# Patient Record
Sex: MALE | Race: WHITE | NOT HISPANIC OR LATINO | Employment: OTHER | URBAN - METROPOLITAN AREA
[De-identification: names, ages, dates, MRNs, and addresses within clinical notes are randomized per-mention and may not be internally consistent; named-entity substitution may affect disease eponyms.]

---

## 2017-03-13 ENCOUNTER — TRANSCRIBE ORDERS (OUTPATIENT)
Dept: ADMINISTRATIVE | Facility: HOSPITAL | Age: 57
End: 2017-03-13

## 2017-03-13 DIAGNOSIS — IMO0001 STRAIN OF RIGHT HAND AND FINGER, INITIAL ENCOUNTER: Primary | ICD-10-CM

## 2017-03-23 ENCOUNTER — HOSPITAL ENCOUNTER (OUTPATIENT)
Dept: MRI IMAGING | Facility: HOSPITAL | Age: 57
Discharge: HOME | End: 2017-03-23
Payer: COMMERCIAL

## 2017-03-23 DIAGNOSIS — IMO0001 STRAIN OF RIGHT HAND AND FINGER, INITIAL ENCOUNTER: ICD-10-CM

## 2017-03-23 PROCEDURE — 73221 MRI JOINT UPR EXTREM W/O DYE: CPT

## 2017-07-18 ENCOUNTER — ALLSCRIPTS OFFICE VISIT (OUTPATIENT)
Dept: OTHER | Facility: OTHER | Age: 57
End: 2017-07-18

## 2017-07-18 DIAGNOSIS — Z12.5 ENCOUNTER FOR SCREENING FOR MALIGNANT NEOPLASM OF PROSTATE: ICD-10-CM

## 2017-07-18 DIAGNOSIS — R31.0 GROSS HEMATURIA: ICD-10-CM

## 2017-07-18 LAB
BILIRUB UR QL STRIP: NORMAL
CLARITY UR: NORMAL
COLOR UR: YELLOW
GLUCOSE (HISTORICAL): NORMAL
HGB UR QL STRIP.AUTO: NORMAL
KETONES UR STRIP-MCNC: NORMAL MG/DL
LEUKOCYTE ESTERASE UR QL STRIP: NORMAL
NITRITE UR QL STRIP: NORMAL
PH UR STRIP.AUTO: 7 [PH]
PROT UR STRIP-MCNC: NORMAL MG/DL
SP GR UR STRIP.AUTO: 1.01
UROBILINOGEN UR QL STRIP.AUTO: 0.2

## 2018-01-12 VITALS
SYSTOLIC BLOOD PRESSURE: 138 MMHG | WEIGHT: 285 LBS | DIASTOLIC BLOOD PRESSURE: 76 MMHG | HEIGHT: 74 IN | BODY MASS INDEX: 36.57 KG/M2

## 2020-11-14 ENCOUNTER — HOSPITAL ENCOUNTER (EMERGENCY)
Facility: HOSPITAL | Age: 60
Discharge: HOME | End: 2020-11-14
Attending: EMERGENCY MEDICINE
Payer: COMMERCIAL

## 2020-11-14 VITALS
RESPIRATION RATE: 18 BRPM | OXYGEN SATURATION: 99 % | SYSTOLIC BLOOD PRESSURE: 185 MMHG | DIASTOLIC BLOOD PRESSURE: 82 MMHG | HEART RATE: 63 BPM | WEIGHT: 271.17 LBS | BODY MASS INDEX: 34.82 KG/M2 | TEMPERATURE: 97.6 F

## 2020-11-14 DIAGNOSIS — G50.0 TRIGEMINAL NEURALGIA PAIN: Primary | ICD-10-CM

## 2020-11-14 PROCEDURE — 99284 EMERGENCY DEPT VISIT MOD MDM: CPT | Performed by: EMERGENCY MEDICINE

## 2020-11-14 PROCEDURE — 99283 EMERGENCY DEPT VISIT LOW MDM: CPT

## 2020-11-14 PROCEDURE — 96365 THER/PROPH/DIAG IV INF INIT: CPT

## 2020-11-14 RX ORDER — MELATONIN
5000 DAILY
COMMUNITY

## 2020-11-14 RX ORDER — LISINOPRIL 10 MG/1
10 TABLET ORAL
COMMUNITY
Start: 2020-08-27 | End: 2021-03-02 | Stop reason: SDUPTHER

## 2020-11-14 RX ORDER — GABAPENTIN 300 MG/1
300 CAPSULE ORAL 3 TIMES DAILY
COMMUNITY
End: 2020-11-19 | Stop reason: SDUPTHER

## 2020-11-14 RX ORDER — CHLORAL HYDRATE 500 MG
2 CAPSULE ORAL DAILY
COMMUNITY

## 2020-11-14 RX ORDER — METOPROLOL TARTRATE AND HYDROCHLOROTHIAZIDE 100; 25 MG/1; MG/1
0.5 TABLET ORAL DAILY
COMMUNITY
Start: 2011-06-21 | End: 2021-10-25 | Stop reason: SDUPTHER

## 2020-11-14 RX ORDER — LANOLIN ALCOHOL/MO/W.PET/CERES
2 CREAM (GRAM) TOPICAL DAILY
COMMUNITY

## 2020-11-14 RX ORDER — ALLOPURINOL 100 MG/1
TABLET ORAL DAILY
COMMUNITY
End: 2022-01-20 | Stop reason: SDUPTHER

## 2020-11-14 RX ORDER — CYCLOBENZAPRINE HCL 10 MG
10 TABLET ORAL
COMMUNITY
End: 2021-01-25 | Stop reason: ALTCHOICE

## 2020-11-14 RX ADMIN — PHENYTOIN SODIUM 1000 MG: 50 INJECTION INTRAMUSCULAR; INTRAVENOUS at 09:39

## 2020-11-16 ENCOUNTER — APPOINTMENT (EMERGENCY)
Dept: CT IMAGING | Facility: HOSPITAL | Age: 60
End: 2020-11-16
Payer: COMMERCIAL

## 2020-11-16 ENCOUNTER — HOSPITAL ENCOUNTER (EMERGENCY)
Facility: HOSPITAL | Age: 60
Discharge: HOME | End: 2020-11-16
Attending: EMERGENCY MEDICINE | Admitting: EMERGENCY MEDICINE
Payer: COMMERCIAL

## 2020-11-16 ENCOUNTER — TELEPHONE (OUTPATIENT)
Dept: NEUROLOGY | Facility: CLINIC | Age: 60
End: 2020-11-16

## 2020-11-16 VITALS
DIASTOLIC BLOOD PRESSURE: 78 MMHG | RESPIRATION RATE: 16 BRPM | HEART RATE: 56 BPM | BODY MASS INDEX: 34.79 KG/M2 | SYSTOLIC BLOOD PRESSURE: 175 MMHG | OXYGEN SATURATION: 99 % | TEMPERATURE: 98.2 F | WEIGHT: 271 LBS

## 2020-11-16 DIAGNOSIS — R51.9 FACIAL PAIN: Primary | ICD-10-CM

## 2020-11-16 DIAGNOSIS — G50.0 TRIGEMINAL NEURALGIA: ICD-10-CM

## 2020-11-16 LAB
ALBUMIN SERPL BCP-MCNC: 3.7 G/DL (ref 3.5–5)
ALP SERPL-CCNC: 85 U/L (ref 46–116)
ALT SERPL W P-5'-P-CCNC: 35 U/L (ref 12–78)
ANION GAP SERPL CALCULATED.3IONS-SCNC: 8 MMOL/L (ref 4–13)
AST SERPL W P-5'-P-CCNC: 11 U/L (ref 5–45)
BASOPHILS # BLD AUTO: 0.06 THOUSANDS/ΜL (ref 0–0.1)
BASOPHILS NFR BLD AUTO: 1 % (ref 0–1)
BILIRUB SERPL-MCNC: 0.23 MG/DL (ref 0.2–1)
BUN SERPL-MCNC: 15 MG/DL (ref 5–25)
CALCIUM SERPL-MCNC: 9.4 MG/DL (ref 8.3–10.1)
CHLORIDE SERPL-SCNC: 103 MMOL/L (ref 100–108)
CO2 SERPL-SCNC: 28 MMOL/L (ref 21–32)
CREAT SERPL-MCNC: 0.88 MG/DL (ref 0.6–1.3)
CRP SERPL QL: 26 MG/L
EOSINOPHIL # BLD AUTO: 0.21 THOUSAND/ΜL (ref 0–0.61)
EOSINOPHIL NFR BLD AUTO: 3 % (ref 0–6)
ERYTHROCYTE [DISTWIDTH] IN BLOOD BY AUTOMATED COUNT: 12.2 % (ref 11.6–15.1)
ERYTHROCYTE [SEDIMENTATION RATE] IN BLOOD: 7 MM/HOUR (ref 0–19)
GFR SERPL CREATININE-BSD FRML MDRD: 92 ML/MIN/1.73SQ M
GLUCOSE SERPL-MCNC: 135 MG/DL (ref 65–140)
HCT VFR BLD AUTO: 42.4 % (ref 36.5–49.3)
HGB BLD-MCNC: 14.6 G/DL (ref 12–17)
IMM GRANULOCYTES # BLD AUTO: 0.02 THOUSAND/UL (ref 0–0.2)
IMM GRANULOCYTES NFR BLD AUTO: 0 % (ref 0–2)
LYMPHOCYTES # BLD AUTO: 1.61 THOUSANDS/ΜL (ref 0.6–4.47)
LYMPHOCYTES NFR BLD AUTO: 25 % (ref 14–44)
MCH RBC QN AUTO: 29 PG (ref 26.8–34.3)
MCHC RBC AUTO-ENTMCNC: 34.4 G/DL (ref 31.4–37.4)
MCV RBC AUTO: 84 FL (ref 82–98)
MONOCYTES # BLD AUTO: 0.67 THOUSAND/ΜL (ref 0.17–1.22)
MONOCYTES NFR BLD AUTO: 10 % (ref 4–12)
NEUTROPHILS # BLD AUTO: 3.88 THOUSANDS/ΜL (ref 1.85–7.62)
NEUTS SEG NFR BLD AUTO: 61 % (ref 43–75)
NRBC BLD AUTO-RTO: 0 /100 WBCS
PLATELET # BLD AUTO: 177 THOUSANDS/UL (ref 149–390)
PMV BLD AUTO: 9.2 FL (ref 8.9–12.7)
POTASSIUM SERPL-SCNC: 4.1 MMOL/L (ref 3.5–5.3)
PROT SERPL-MCNC: 7.1 G/DL (ref 6.4–8.2)
RBC # BLD AUTO: 5.04 MILLION/UL (ref 3.88–5.62)
SODIUM SERPL-SCNC: 139 MMOL/L (ref 136–145)
WBC # BLD AUTO: 6.45 THOUSAND/UL (ref 4.31–10.16)

## 2020-11-16 PROCEDURE — 36415 COLL VENOUS BLD VENIPUNCTURE: CPT | Performed by: EMERGENCY MEDICINE

## 2020-11-16 PROCEDURE — 86140 C-REACTIVE PROTEIN: CPT | Performed by: EMERGENCY MEDICINE

## 2020-11-16 PROCEDURE — 99284 EMERGENCY DEPT VISIT MOD MDM: CPT

## 2020-11-16 PROCEDURE — 70487 CT MAXILLOFACIAL W/DYE: CPT

## 2020-11-16 PROCEDURE — 99284 EMERGENCY DEPT VISIT MOD MDM: CPT | Performed by: EMERGENCY MEDICINE

## 2020-11-16 PROCEDURE — 80053 COMPREHEN METABOLIC PANEL: CPT | Performed by: EMERGENCY MEDICINE

## 2020-11-16 PROCEDURE — 85025 COMPLETE CBC W/AUTO DIFF WBC: CPT | Performed by: EMERGENCY MEDICINE

## 2020-11-16 PROCEDURE — 96375 TX/PRO/DX INJ NEW DRUG ADDON: CPT

## 2020-11-16 PROCEDURE — G1004 CDSM NDSC: HCPCS

## 2020-11-16 PROCEDURE — 96365 THER/PROPH/DIAG IV INF INIT: CPT

## 2020-11-16 PROCEDURE — 85652 RBC SED RATE AUTOMATED: CPT | Performed by: EMERGENCY MEDICINE

## 2020-11-16 PROCEDURE — 70450 CT HEAD/BRAIN W/O DYE: CPT

## 2020-11-16 RX ORDER — IBUPROFEN 400 MG/1
400 TABLET ORAL EVERY 6 HOURS PRN
Qty: 30 TABLET | Refills: 0 | Status: SHIPPED | OUTPATIENT
Start: 2020-11-16 | End: 2020-11-30 | Stop reason: HOSPADM

## 2020-11-16 RX ORDER — CARBAMAZEPINE 100 MG/1
100 CAPSULE, EXTENDED RELEASE ORAL 2 TIMES DAILY
Qty: 60 CAPSULE | Refills: 0 | Status: SHIPPED | OUTPATIENT
Start: 2020-11-16 | End: 2021-01-25 | Stop reason: ALTCHOICE

## 2020-11-16 RX ORDER — KETOROLAC TROMETHAMINE 30 MG/ML
15 INJECTION, SOLUTION INTRAMUSCULAR; INTRAVENOUS ONCE
Status: COMPLETED | OUTPATIENT
Start: 2020-11-16 | End: 2020-11-16

## 2020-11-16 RX ORDER — ACETAMINOPHEN 325 MG/1
650 TABLET ORAL EVERY 6 HOURS PRN
Qty: 30 TABLET | Refills: 0 | Status: SHIPPED | OUTPATIENT
Start: 2020-11-16 | End: 2020-11-23

## 2020-11-16 RX ORDER — CARBAMAZEPINE 200 MG/1
100 TABLET ORAL ONCE
Status: COMPLETED | OUTPATIENT
Start: 2020-11-16 | End: 2020-11-16

## 2020-11-16 RX ORDER — OMEPRAZOLE 20 MG/1
20 CAPSULE, DELAYED RELEASE ORAL DAILY
Qty: 21 CAPSULE | Refills: 0 | Status: SHIPPED | OUTPATIENT
Start: 2020-11-16 | End: 2021-01-25 | Stop reason: ALTCHOICE

## 2020-11-16 RX ORDER — ACETAMINOPHEN 325 MG/1
650 TABLET ORAL ONCE
Status: COMPLETED | OUTPATIENT
Start: 2020-11-16 | End: 2020-11-16

## 2020-11-16 RX ADMIN — SODIUM CHLORIDE 500 ML: 0.9 INJECTION, SOLUTION INTRAVENOUS at 19:19

## 2020-11-16 RX ADMIN — PHENYTOIN SODIUM 1000 MG: 50 INJECTION INTRAMUSCULAR; INTRAVENOUS at 19:32

## 2020-11-16 RX ADMIN — IOHEXOL 100 ML: 350 INJECTION, SOLUTION INTRAVENOUS at 21:02

## 2020-11-16 RX ADMIN — CARBAMAZEPINE 100 MG: 200 TABLET ORAL at 22:42

## 2020-11-16 RX ADMIN — MORPHINE SULFATE 2 MG: 2 INJECTION, SOLUTION INTRAMUSCULAR; INTRAVENOUS at 19:19

## 2020-11-16 RX ADMIN — ACETAMINOPHEN 650 MG: 325 TABLET, FILM COATED ORAL at 19:14

## 2020-11-16 RX ADMIN — KETOROLAC TROMETHAMINE 15 MG: 30 INJECTION, SOLUTION INTRAMUSCULAR at 19:19

## 2020-11-18 ENCOUNTER — TELEPHONE (OUTPATIENT)
Dept: NEUROLOGY | Facility: CLINIC | Age: 60
End: 2020-11-18

## 2020-11-18 ENCOUNTER — HOSPITAL ENCOUNTER (EMERGENCY)
Facility: HOSPITAL | Age: 60
Discharge: HOME | End: 2020-11-18
Attending: EMERGENCY MEDICINE
Payer: COMMERCIAL

## 2020-11-18 VITALS
HEART RATE: 72 BPM | BODY MASS INDEX: 34.01 KG/M2 | OXYGEN SATURATION: 98 % | SYSTOLIC BLOOD PRESSURE: 188 MMHG | RESPIRATION RATE: 18 BRPM | HEIGHT: 74 IN | DIASTOLIC BLOOD PRESSURE: 86 MMHG | WEIGHT: 265 LBS | TEMPERATURE: 98.1 F

## 2020-11-18 DIAGNOSIS — G51.8 NEURALGIC FACIAL PAIN: Primary | ICD-10-CM

## 2020-11-18 LAB
ALBUMIN SERPL BCP-MCNC: 3.9 G/DL (ref 3.5–5)
ALP SERPL-CCNC: 92 U/L (ref 46–116)
ALT SERPL W P-5'-P-CCNC: 37 U/L (ref 12–78)
ANION GAP SERPL CALCULATED.3IONS-SCNC: 12 MMOL/L (ref 4–13)
APAP SERPL-MCNC: <2 UG/ML (ref 10–20)
AST SERPL W P-5'-P-CCNC: 18 U/L (ref 5–45)
BILIRUB SERPL-MCNC: 0.19 MG/DL (ref 0.2–1)
BUN SERPL-MCNC: 12 MG/DL (ref 5–25)
CALCIUM SERPL-MCNC: 9.1 MG/DL (ref 8.3–10.1)
CHLORIDE SERPL-SCNC: 102 MMOL/L (ref 100–108)
CO2 SERPL-SCNC: 27 MMOL/L (ref 21–32)
CREAT SERPL-MCNC: 1 MG/DL (ref 0.6–1.3)
GFR SERPL CREATININE-BSD FRML MDRD: 80 ML/MIN/1.73SQ M
GLUCOSE SERPL-MCNC: 102 MG/DL (ref 65–140)
POTASSIUM SERPL-SCNC: 4 MMOL/L (ref 3.5–5.3)
PROT SERPL-MCNC: 7.8 G/DL (ref 6.4–8.2)
SODIUM SERPL-SCNC: 141 MMOL/L (ref 136–145)

## 2020-11-18 PROCEDURE — 99283 EMERGENCY DEPT VISIT LOW MDM: CPT

## 2020-11-18 PROCEDURE — 80053 COMPREHEN METABOLIC PANEL: CPT | Performed by: EMERGENCY MEDICINE

## 2020-11-18 PROCEDURE — G0480 DRUG TEST DEF 1-7 CLASSES: HCPCS | Performed by: EMERGENCY MEDICINE

## 2020-11-18 PROCEDURE — 80329 ANALGESICS NON-OPIOID 1 OR 2: CPT | Performed by: EMERGENCY MEDICINE

## 2020-11-18 PROCEDURE — 36415 COLL VENOUS BLD VENIPUNCTURE: CPT | Performed by: EMERGENCY MEDICINE

## 2020-11-18 PROCEDURE — 96374 THER/PROPH/DIAG INJ IV PUSH: CPT

## 2020-11-18 PROCEDURE — 99283 EMERGENCY DEPT VISIT LOW MDM: CPT | Performed by: EMERGENCY MEDICINE

## 2020-11-18 RX ORDER — OXYCODONE HYDROCHLORIDE 10 MG/1
10 TABLET ORAL ONCE
Status: COMPLETED | OUTPATIENT
Start: 2020-11-18 | End: 2020-11-18

## 2020-11-18 RX ORDER — OXYCODONE HYDROCHLORIDE 5 MG/1
5-10 TABLET ORAL EVERY 6 HOURS PRN
Qty: 10 TABLET | Refills: 0 | Status: SHIPPED | OUTPATIENT
Start: 2020-11-18 | End: 2021-01-25 | Stop reason: ALTCHOICE

## 2020-11-18 RX ORDER — HYDROMORPHONE HCL/PF 1 MG/ML
1 SYRINGE (ML) INJECTION ONCE
Status: COMPLETED | OUTPATIENT
Start: 2020-11-18 | End: 2020-11-18

## 2020-11-18 RX ADMIN — HYDROMORPHONE HYDROCHLORIDE 1 MG: 1 INJECTION, SOLUTION INTRAMUSCULAR; INTRAVENOUS; SUBCUTANEOUS at 19:59

## 2020-11-18 RX ADMIN — OXYCODONE HYDROCHLORIDE 10 MG: 10 TABLET ORAL at 21:00

## 2020-11-19 ENCOUNTER — CONSULT (OUTPATIENT)
Dept: NEUROLOGY | Facility: CLINIC | Age: 60
End: 2020-11-19
Payer: COMMERCIAL

## 2020-11-19 VITALS
BODY MASS INDEX: 34.01 KG/M2 | SYSTOLIC BLOOD PRESSURE: 138 MMHG | TEMPERATURE: 96.8 F | HEIGHT: 74 IN | DIASTOLIC BLOOD PRESSURE: 76 MMHG | WEIGHT: 265 LBS | HEART RATE: 56 BPM

## 2020-11-19 DIAGNOSIS — M27.2 OSTEOMYELITIS OF MANDIBLE: Primary | ICD-10-CM

## 2020-11-19 DIAGNOSIS — G50.8 TRIGEMINAL NEURITIS: Primary | ICD-10-CM

## 2020-11-19 PROCEDURE — 99204 OFFICE O/P NEW MOD 45 MIN: CPT | Performed by: PSYCHIATRY & NEUROLOGY

## 2020-11-19 RX ORDER — CARBAMAZEPINE 200 MG/1
100 TABLET ORAL 2 TIMES DAILY
Status: ON HOLD | COMMUNITY
Start: 2020-11-11 | End: 2020-11-30 | Stop reason: ALTCHOICE

## 2020-11-19 RX ORDER — ACETAMINOPHEN 500 MG
1000 TABLET ORAL EVERY 4 HOURS
Status: ON HOLD | COMMUNITY
End: 2020-11-30 | Stop reason: SDUPTHER

## 2020-11-19 RX ORDER — IBUPROFEN 200 MG
600 TABLET ORAL EVERY 4 HOURS
COMMUNITY
End: 2020-11-30 | Stop reason: HOSPADM

## 2020-11-19 RX ORDER — GABAPENTIN 300 MG/1
300 CAPSULE ORAL 3 TIMES DAILY
Qty: 30 CAPSULE | Refills: 1 | Status: SHIPPED | OUTPATIENT
Start: 2020-11-19 | End: 2021-01-25 | Stop reason: ALTCHOICE

## 2020-11-19 RX ORDER — ROSUVASTATIN CALCIUM 20 MG/1
TABLET, COATED ORAL
COMMUNITY
End: 2021-03-29 | Stop reason: SDUPTHER

## 2020-11-24 RX ORDER — LEVOFLOXACIN 500 MG/1
500 TABLET, FILM COATED ORAL EVERY 24 HOURS
COMMUNITY
End: 2020-11-30 | Stop reason: HOSPADM

## 2020-11-30 ENCOUNTER — ANESTHESIA (OUTPATIENT)
Dept: PERIOP | Facility: HOSPITAL | Age: 60
End: 2020-11-30
Payer: COMMERCIAL

## 2020-11-30 ENCOUNTER — ANESTHESIA EVENT (OUTPATIENT)
Dept: PERIOP | Facility: HOSPITAL | Age: 60
End: 2020-11-30
Payer: COMMERCIAL

## 2020-11-30 ENCOUNTER — HOSPITAL ENCOUNTER (OUTPATIENT)
Facility: HOSPITAL | Age: 60
Setting detail: OUTPATIENT SURGERY
Discharge: HOME | End: 2020-11-30
Attending: DENTIST | Admitting: DENTIST
Payer: COMMERCIAL

## 2020-11-30 VITALS — HEART RATE: 75 BPM

## 2020-11-30 VITALS
OXYGEN SATURATION: 94 % | HEART RATE: 69 BPM | HEIGHT: 74 IN | WEIGHT: 265 LBS | TEMPERATURE: 97.9 F | DIASTOLIC BLOOD PRESSURE: 92 MMHG | RESPIRATION RATE: 18 BRPM | SYSTOLIC BLOOD PRESSURE: 177 MMHG | BODY MASS INDEX: 34.01 KG/M2

## 2020-11-30 DIAGNOSIS — M27.2 ACUTE OSTEOMYELITIS OF MANDIBLE: Primary | ICD-10-CM

## 2020-11-30 DIAGNOSIS — M87.88 OTHER OSTEONECROSIS, OTHER SITE (HCC): ICD-10-CM

## 2020-11-30 LAB
ATRIAL RATE: 70 BPM
GLUCOSE SERPL-MCNC: 162 MG/DL (ref 65–140)
P AXIS: 29 DEGREES
PR INTERVAL: 112 MS
QRS AXIS: 24 DEGREES
QRSD INTERVAL: 100 MS
QT INTERVAL: 394 MS
QTC INTERVAL: 425 MS
T WAVE AXIS: 19 DEGREES
VENTRICULAR RATE: 70 BPM

## 2020-11-30 PROCEDURE — 88304 TISSUE EXAM BY PATHOLOGIST: CPT | Performed by: PATHOLOGY

## 2020-11-30 PROCEDURE — 88311 DECALCIFY TISSUE: CPT | Performed by: PATHOLOGY

## 2020-11-30 PROCEDURE — 87106 FUNGI IDENTIFICATION YEAST: CPT | Performed by: DENTIST

## 2020-11-30 PROCEDURE — 88342 IMHCHEM/IMCYTCHM 1ST ANTB: CPT | Performed by: PATHOLOGY

## 2020-11-30 PROCEDURE — 82948 REAGENT STRIP/BLOOD GLUCOSE: CPT

## 2020-11-30 PROCEDURE — 87070 CULTURE OTHR SPECIMN AEROBIC: CPT | Performed by: DENTIST

## 2020-11-30 PROCEDURE — 93005 ELECTROCARDIOGRAM TRACING: CPT

## 2020-11-30 PROCEDURE — 87176 TISSUE HOMOGENIZATION CULTR: CPT | Performed by: DENTIST

## 2020-11-30 PROCEDURE — 87205 SMEAR GRAM STAIN: CPT | Performed by: DENTIST

## 2020-11-30 PROCEDURE — 87102 FUNGUS ISOLATION CULTURE: CPT | Performed by: DENTIST

## 2020-11-30 PROCEDURE — 87116 MYCOBACTERIA CULTURE: CPT | Performed by: DENTIST

## 2020-11-30 PROCEDURE — 87075 CULTR BACTERIA EXCEPT BLOOD: CPT | Performed by: DENTIST

## 2020-11-30 PROCEDURE — 93010 ELECTROCARDIOGRAM REPORT: CPT | Performed by: INTERNAL MEDICINE

## 2020-11-30 PROCEDURE — 87077 CULTURE AEROBIC IDENTIFY: CPT | Performed by: DENTIST

## 2020-11-30 PROCEDURE — 87206 SMEAR FLUORESCENT/ACID STAI: CPT | Performed by: DENTIST

## 2020-11-30 RX ORDER — SODIUM CHLORIDE, SODIUM LACTATE, POTASSIUM CHLORIDE, CALCIUM CHLORIDE 600; 310; 30; 20 MG/100ML; MG/100ML; MG/100ML; MG/100ML
125 INJECTION, SOLUTION INTRAVENOUS CONTINUOUS
Status: DISCONTINUED | OUTPATIENT
Start: 2020-11-30 | End: 2020-11-30

## 2020-11-30 RX ORDER — ONDANSETRON 2 MG/ML
INJECTION INTRAMUSCULAR; INTRAVENOUS AS NEEDED
Status: DISCONTINUED | OUTPATIENT
Start: 2020-11-30 | End: 2020-11-30

## 2020-11-30 RX ORDER — NEOSTIGMINE METHYLSULFATE 1 MG/ML
INJECTION INTRAVENOUS AS NEEDED
Status: DISCONTINUED | OUTPATIENT
Start: 2020-11-30 | End: 2020-11-30

## 2020-11-30 RX ORDER — IBUPROFEN 400 MG/1
400 TABLET ORAL EVERY 6 HOURS PRN
Status: DISCONTINUED | OUTPATIENT
Start: 2020-11-30 | End: 2020-11-30 | Stop reason: HOSPADM

## 2020-11-30 RX ORDER — ACETAMINOPHEN 500 MG
1000 TABLET ORAL EVERY 8 HOURS PRN
Qty: 30 TABLET | Refills: 0 | Status: SHIPPED | OUTPATIENT
Start: 2020-11-30 | End: 2021-01-25 | Stop reason: ALTCHOICE

## 2020-11-30 RX ORDER — SODIUM CHLORIDE, SODIUM LACTATE, POTASSIUM CHLORIDE, CALCIUM CHLORIDE 600; 310; 30; 20 MG/100ML; MG/100ML; MG/100ML; MG/100ML
100 INJECTION, SOLUTION INTRAVENOUS CONTINUOUS
Status: DISCONTINUED | OUTPATIENT
Start: 2020-11-30 | End: 2020-11-30 | Stop reason: HOSPADM

## 2020-11-30 RX ORDER — MIDAZOLAM HYDROCHLORIDE 2 MG/2ML
INJECTION, SOLUTION INTRAMUSCULAR; INTRAVENOUS AS NEEDED
Status: DISCONTINUED | OUTPATIENT
Start: 2020-11-30 | End: 2020-11-30

## 2020-11-30 RX ORDER — FENTANYL CITRATE 50 UG/ML
INJECTION, SOLUTION INTRAMUSCULAR; INTRAVENOUS AS NEEDED
Status: DISCONTINUED | OUTPATIENT
Start: 2020-11-30 | End: 2020-11-30

## 2020-11-30 RX ORDER — HYDROMORPHONE HYDROCHLORIDE 2 MG/1
TABLET ORAL
COMMUNITY
Start: 2020-11-23 | End: 2021-01-25 | Stop reason: ALTCHOICE

## 2020-11-30 RX ORDER — CHLORHEXIDINE GLUCONATE 0.12 MG/ML
15 RINSE ORAL 2 TIMES DAILY
Qty: 900 ML | Refills: 0 | Status: SHIPPED | OUTPATIENT
Start: 2020-11-30 | End: 2020-12-30

## 2020-11-30 RX ORDER — LEVOFLOXACIN 500 MG/1
500 TABLET, FILM COATED ORAL EVERY 24 HOURS
Qty: 10 TABLET | Refills: 0 | Status: SHIPPED | OUTPATIENT
Start: 2020-11-30 | End: 2020-12-10

## 2020-11-30 RX ORDER — LIDOCAINE HYDROCHLORIDE 10 MG/ML
0.5 INJECTION, SOLUTION EPIDURAL; INFILTRATION; INTRACAUDAL; PERINEURAL ONCE AS NEEDED
Status: COMPLETED | OUTPATIENT
Start: 2020-11-30 | End: 2020-11-30

## 2020-11-30 RX ORDER — PROPOFOL 10 MG/ML
INJECTION, EMULSION INTRAVENOUS AS NEEDED
Status: DISCONTINUED | OUTPATIENT
Start: 2020-11-30 | End: 2020-11-30

## 2020-11-30 RX ORDER — GLYCOPYRROLATE 0.2 MG/ML
INJECTION INTRAMUSCULAR; INTRAVENOUS AS NEEDED
Status: DISCONTINUED | OUTPATIENT
Start: 2020-11-30 | End: 2020-11-30

## 2020-11-30 RX ORDER — DEXAMETHASONE SODIUM PHOSPHATE 10 MG/ML
INJECTION, SOLUTION INTRAMUSCULAR; INTRAVENOUS AS NEEDED
Status: DISCONTINUED | OUTPATIENT
Start: 2020-11-30 | End: 2020-11-30

## 2020-11-30 RX ORDER — ONDANSETRON 2 MG/ML
4 INJECTION INTRAMUSCULAR; INTRAVENOUS ONCE AS NEEDED
Status: DISCONTINUED | OUTPATIENT
Start: 2020-11-30 | End: 2020-11-30 | Stop reason: HOSPADM

## 2020-11-30 RX ORDER — LIDOCAINE HYDROCHLORIDE AND EPINEPHRINE 10; 10 MG/ML; UG/ML
INJECTION, SOLUTION INFILTRATION; PERINEURAL AS NEEDED
Status: DISCONTINUED | OUTPATIENT
Start: 2020-11-30 | End: 2020-11-30 | Stop reason: HOSPADM

## 2020-11-30 RX ORDER — BUPIVACAINE HYDROCHLORIDE AND EPINEPHRINE 5; 5 MG/ML; UG/ML
INJECTION, SOLUTION EPIDURAL; INTRACAUDAL; PERINEURAL AS NEEDED
Status: DISCONTINUED | OUTPATIENT
Start: 2020-11-30 | End: 2020-11-30 | Stop reason: HOSPADM

## 2020-11-30 RX ORDER — ROCURONIUM BROMIDE 10 MG/ML
INJECTION, SOLUTION INTRAVENOUS AS NEEDED
Status: DISCONTINUED | OUTPATIENT
Start: 2020-11-30 | End: 2020-11-30

## 2020-11-30 RX ORDER — KETOROLAC TROMETHAMINE 10 MG/1
TABLET, FILM COATED ORAL
COMMUNITY
Start: 2020-11-20 | End: 2021-01-25 | Stop reason: ALTCHOICE

## 2020-11-30 RX ORDER — CHLORHEXIDINE GLUCONATE 0.12 MG/ML
RINSE ORAL AS NEEDED
Status: DISCONTINUED | OUTPATIENT
Start: 2020-11-30 | End: 2020-11-30 | Stop reason: HOSPADM

## 2020-11-30 RX ORDER — AMLODIPINE BESYLATE 10 MG/1
TABLET ORAL
COMMUNITY
Start: 2020-11-19 | End: 2021-03-02 | Stop reason: SDUPTHER

## 2020-11-30 RX ORDER — LIDOCAINE HYDROCHLORIDE 10 MG/ML
INJECTION, SOLUTION EPIDURAL; INFILTRATION; INTRACAUDAL; PERINEURAL AS NEEDED
Status: DISCONTINUED | OUTPATIENT
Start: 2020-11-30 | End: 2020-11-30

## 2020-11-30 RX ORDER — OXYCODONE HYDROCHLORIDE AND ACETAMINOPHEN 5; 325 MG/1; MG/1
1 TABLET ORAL EVERY 4 HOURS PRN
Status: DISCONTINUED | OUTPATIENT
Start: 2020-11-30 | End: 2020-11-30 | Stop reason: HOSPADM

## 2020-11-30 RX ORDER — FENTANYL CITRATE/PF 50 MCG/ML
25 SYRINGE (ML) INJECTION
Status: DISCONTINUED | OUTPATIENT
Start: 2020-11-30 | End: 2020-11-30 | Stop reason: HOSPADM

## 2020-11-30 RX ADMIN — LIDOCAINE HYDROCHLORIDE 50 MG: 10 INJECTION, SOLUTION EPIDURAL; INFILTRATION; INTRACAUDAL; PERINEURAL at 14:34

## 2020-11-30 RX ADMIN — ROCURONIUM BROMIDE 30 MG: 50 INJECTION, SOLUTION INTRAVENOUS at 14:34

## 2020-11-30 RX ADMIN — ONDANSETRON 4 MG: 2 INJECTION INTRAMUSCULAR; INTRAVENOUS at 14:33

## 2020-11-30 RX ADMIN — SODIUM CHLORIDE, SODIUM LACTATE, POTASSIUM CHLORIDE, AND CALCIUM CHLORIDE 125 ML/HR: .6; .31; .03; .02 INJECTION, SOLUTION INTRAVENOUS at 14:33

## 2020-11-30 RX ADMIN — PHENYLEPHRINE HYDROCHLORIDE 100 MCG: 10 INJECTION INTRAVENOUS at 15:27

## 2020-11-30 RX ADMIN — FENTANYL CITRATE 100 MCG: 50 INJECTION INTRAMUSCULAR; INTRAVENOUS at 14:34

## 2020-11-30 RX ADMIN — PROPOFOL 200 MG: 10 INJECTION, EMULSION INTRAVENOUS at 14:34

## 2020-11-30 RX ADMIN — REMIFENTANIL HYDROCHLORIDE 0.1 MCG/KG/MIN: 1 INJECTION, POWDER, LYOPHILIZED, FOR SOLUTION INTRAVENOUS at 14:38

## 2020-11-30 RX ADMIN — NEOSTIGMINE METHYLSULFATE 5 MG: 1 INJECTION INTRAVENOUS at 15:41

## 2020-11-30 RX ADMIN — LIDOCAINE HYDROCHLORIDE 0.2 ML: 10 INJECTION, SOLUTION EPIDURAL; INFILTRATION; INTRACAUDAL; PERINEURAL at 14:33

## 2020-11-30 RX ADMIN — MIDAZOLAM 2 MG: 1 INJECTION INTRAMUSCULAR; INTRAVENOUS at 14:27

## 2020-11-30 RX ADMIN — GLYCOPYRROLATE 0.8 MG: 0.2 INJECTION, SOLUTION INTRAMUSCULAR; INTRAVENOUS at 15:41

## 2020-11-30 RX ADMIN — DEXAMETHASONE SODIUM PHOSPHATE 10 MG: 10 INJECTION, SOLUTION INTRAMUSCULAR; INTRAVENOUS at 14:38

## 2020-11-30 RX ADMIN — PHENYLEPHRINE HYDROCHLORIDE 4 DROP: 1 SPRAY NASAL at 14:35

## 2020-12-02 LAB
BACTERIA SPEC ANAEROBE CULT: NORMAL
BACTERIA SPEC ANAEROBE CULT: NORMAL
BACTERIA TISS AEROBE CULT: ABNORMAL
BACTERIA TISS AEROBE CULT: ABNORMAL
BACTERIA TISS AEROBE CULT: NORMAL
GRAM STN SPEC: ABNORMAL
GRAM STN SPEC: NORMAL

## 2020-12-03 LAB — BACTERIA SPEC ANAEROBE CULT: NO GROWTH

## 2020-12-05 LAB
BACTERIA TISS AEROBE CULT: ABNORMAL
BACTERIA TISS AEROBE CULT: ABNORMAL
GRAM STN SPEC: ABNORMAL

## 2020-12-07 ENCOUNTER — OFFICE VISIT (OUTPATIENT)
Dept: INFECTIOUS DISEASES | Facility: CLINIC | Age: 60
End: 2020-12-07
Payer: COMMERCIAL

## 2020-12-07 ENCOUNTER — TELEPHONE (OUTPATIENT)
Dept: PAIN MEDICINE | Facility: CLINIC | Age: 60
End: 2020-12-07

## 2020-12-07 VITALS
TEMPERATURE: 97.9 F | BODY MASS INDEX: 34.78 KG/M2 | WEIGHT: 271 LBS | HEIGHT: 74 IN | HEART RATE: 64 BPM | SYSTOLIC BLOOD PRESSURE: 140 MMHG | DIASTOLIC BLOOD PRESSURE: 82 MMHG

## 2020-12-07 DIAGNOSIS — R79.82 CRP ELEVATED: ICD-10-CM

## 2020-12-07 DIAGNOSIS — M27.2 OSTEOMYELITIS OF MANDIBLE: Primary | ICD-10-CM

## 2020-12-07 DIAGNOSIS — M27.2 OSTEOMYELITIS, JAW ACUTE: ICD-10-CM

## 2020-12-07 DIAGNOSIS — B37.9 CANDIDA GLABRATA INFECTION: ICD-10-CM

## 2020-12-07 DIAGNOSIS — A49.1 STREPTOCOCCAL INFECTION: ICD-10-CM

## 2020-12-07 PROCEDURE — 99214 OFFICE O/P EST MOD 30 MIN: CPT | Performed by: INTERNAL MEDICINE

## 2020-12-07 RX ORDER — SODIUM CHLORIDE 9 MG/ML
20 INJECTION, SOLUTION INTRAVENOUS ONCE
Status: CANCELLED | OUTPATIENT
Start: 2020-12-08

## 2020-12-08 ENCOUNTER — HOSPITAL ENCOUNTER (OUTPATIENT)
Dept: RADIOLOGY | Facility: HOSPITAL | Age: 60
Discharge: HOME | End: 2020-12-08
Attending: RADIOLOGY | Admitting: RADIOLOGY
Payer: COMMERCIAL

## 2020-12-08 ENCOUNTER — PREP FOR PROCEDURE (OUTPATIENT)
Dept: INTERVENTIONAL RADIOLOGY/VASCULAR | Facility: CLINIC | Age: 60
End: 2020-12-08

## 2020-12-08 ENCOUNTER — HOSPITAL ENCOUNTER (OUTPATIENT)
Dept: INFUSION CENTER | Facility: HOSPITAL | Age: 60
Discharge: HOME | End: 2020-12-08
Payer: COMMERCIAL

## 2020-12-08 VITALS
TEMPERATURE: 96.7 F | HEART RATE: 78 BPM | SYSTOLIC BLOOD PRESSURE: 138 MMHG | DIASTOLIC BLOOD PRESSURE: 66 MMHG | RESPIRATION RATE: 18 BRPM

## 2020-12-08 DIAGNOSIS — M27.2 OSTEOMYELITIS, JAW ACUTE: Primary | ICD-10-CM

## 2020-12-08 DIAGNOSIS — M27.2 ACUTE OSTEOMYELITIS OF MANDIBLE: Primary | ICD-10-CM

## 2020-12-08 DIAGNOSIS — M27.2 ACUTE OSTEOMYELITIS OF MANDIBLE: ICD-10-CM

## 2020-12-08 DIAGNOSIS — R79.82 CRP ELEVATED: ICD-10-CM

## 2020-12-08 DIAGNOSIS — M27.2 OSTEOMYELITIS, JAW ACUTE: ICD-10-CM

## 2020-12-08 DIAGNOSIS — B37.9 CANDIDA GLABRATA INFECTION: ICD-10-CM

## 2020-12-08 DIAGNOSIS — A49.1 STREPTOCOCCAL INFECTION: ICD-10-CM

## 2020-12-08 DIAGNOSIS — M27.2 OSTEOMYELITIS OF MANDIBLE: Primary | ICD-10-CM

## 2020-12-08 PROCEDURE — C1751 CATH, INF, PER/CENT/MIDLINE: HCPCS

## 2020-12-08 PROCEDURE — 36573 INSJ PICC RS&I 5 YR+: CPT | Performed by: RADIOLOGY

## 2020-12-08 PROCEDURE — 36569 INSJ PICC 5 YR+ W/O IMAGING: CPT

## 2020-12-08 RX ORDER — SODIUM CHLORIDE 9 MG/ML
20 INJECTION, SOLUTION INTRAVENOUS ONCE
Status: CANCELLED | OUTPATIENT
Start: 2020-12-10

## 2020-12-08 RX ORDER — SODIUM CHLORIDE 9 MG/ML
20 INJECTION, SOLUTION INTRAVENOUS ONCE
Status: CANCELLED | OUTPATIENT
Start: 2020-12-08

## 2020-12-08 RX ORDER — SODIUM CHLORIDE 9 MG/ML
20 INJECTION, SOLUTION INTRAVENOUS ONCE
Status: CANCELLED | OUTPATIENT
Start: 2020-12-09

## 2020-12-09 ENCOUNTER — HOSPITAL ENCOUNTER (OUTPATIENT)
Dept: INFUSION CENTER | Facility: HOSPITAL | Age: 60
Discharge: HOME | End: 2020-12-09
Payer: COMMERCIAL

## 2020-12-09 VITALS
TEMPERATURE: 97.2 F | WEIGHT: 271.17 LBS | HEIGHT: 74 IN | DIASTOLIC BLOOD PRESSURE: 91 MMHG | HEART RATE: 75 BPM | BODY MASS INDEX: 34.8 KG/M2 | RESPIRATION RATE: 18 BRPM | SYSTOLIC BLOOD PRESSURE: 138 MMHG

## 2020-12-09 DIAGNOSIS — M27.2 OSTEOMYELITIS, JAW ACUTE: Primary | ICD-10-CM

## 2020-12-09 PROCEDURE — 96365 THER/PROPH/DIAG IV INF INIT: CPT

## 2020-12-09 PROCEDURE — 96367 TX/PROPH/DG ADDL SEQ IV INF: CPT

## 2020-12-09 RX ORDER — SODIUM CHLORIDE 9 MG/ML
20 INJECTION, SOLUTION INTRAVENOUS ONCE
Status: CANCELLED | OUTPATIENT
Start: 2020-12-09

## 2020-12-09 RX ORDER — SODIUM CHLORIDE 9 MG/ML
20 INJECTION, SOLUTION INTRAVENOUS ONCE
Status: COMPLETED | OUTPATIENT
Start: 2020-12-09 | End: 2020-12-09

## 2020-12-09 RX ADMIN — MICAFUNGIN SODIUM 100 MG: 50 INJECTION, POWDER, LYOPHILIZED, FOR SOLUTION INTRAVENOUS at 09:09

## 2020-12-09 RX ADMIN — CEFTRIAXONE SODIUM 2000 MG: 10 INJECTION, POWDER, FOR SOLUTION INTRAVENOUS at 08:17

## 2020-12-09 RX ADMIN — SODIUM CHLORIDE 20 ML/HR: 0.9 INJECTION, SOLUTION INTRAVENOUS at 08:19

## 2020-12-10 ENCOUNTER — HOSPITAL ENCOUNTER (OUTPATIENT)
Dept: INFUSION CENTER | Facility: HOSPITAL | Age: 60
Discharge: HOME | End: 2020-12-10

## 2020-12-10 LAB
FUNGUS SPEC CULT: ABNORMAL
FUNGUS SPEC CULT: ABNORMAL

## 2020-12-15 ENCOUNTER — TELEPHONE (OUTPATIENT)
Dept: INFECTIOUS DISEASES | Facility: CLINIC | Age: 60
End: 2020-12-15

## 2020-12-21 ENCOUNTER — OFFICE VISIT (OUTPATIENT)
Dept: INFECTIOUS DISEASES | Facility: CLINIC | Age: 60
End: 2020-12-21
Payer: COMMERCIAL

## 2020-12-21 VITALS
DIASTOLIC BLOOD PRESSURE: 76 MMHG | HEART RATE: 59 BPM | TEMPERATURE: 97.6 F | HEIGHT: 74 IN | BODY MASS INDEX: 35.29 KG/M2 | WEIGHT: 275 LBS | SYSTOLIC BLOOD PRESSURE: 138 MMHG

## 2020-12-21 DIAGNOSIS — Z79.2 LONG TERM (CURRENT) USE OF ANTIBIOTICS: ICD-10-CM

## 2020-12-21 DIAGNOSIS — B37.9 CANDIDA GLABRATA INFECTION: ICD-10-CM

## 2020-12-21 DIAGNOSIS — M27.2 OSTEOMYELITIS, JAW ACUTE: Primary | ICD-10-CM

## 2020-12-21 DIAGNOSIS — A49.1 STREPTOCOCCAL INFECTION: ICD-10-CM

## 2020-12-21 PROCEDURE — 99214 OFFICE O/P EST MOD 30 MIN: CPT | Performed by: INTERNAL MEDICINE

## 2021-01-04 LAB — FUNGUS SPEC CULT: NORMAL

## 2021-01-08 ENCOUNTER — TELEPHONE (OUTPATIENT)
Dept: INFECTIOUS DISEASES | Facility: CLINIC | Age: 61
End: 2021-01-08

## 2021-01-08 NOTE — TELEPHONE ENCOUNTER
COVID Pre-Visit Screening     1  Is this a family member screening? No  2  Have you traveled outside of your state in the past 2 weeks? No  3  Do you presently have a fever or flu-like symptoms? No  4  Do you have symptoms of an upper respiratory infection like runny nose, sore throat, or cough? No  5  Are you suffering from new headache that you have not had in the past?  No  6  Do you have/have you experienced any new shortness of breath recently? No  7  Do you have any new diarrhea, nausea or vomiting? No  8  Have you been in contact with anyone who has been sick or diagnosed with COVID-19? No  9  Do you have any new loss of taste or smell? No  10  Are you able to wear a mask without a valve for the entire visit? No - Patient has documentation that he does not have to wear a mask

## 2021-01-11 ENCOUNTER — OFFICE VISIT (OUTPATIENT)
Dept: INFECTIOUS DISEASES | Facility: CLINIC | Age: 61
End: 2021-01-11
Payer: COMMERCIAL

## 2021-01-11 VITALS
SYSTOLIC BLOOD PRESSURE: 142 MMHG | DIASTOLIC BLOOD PRESSURE: 86 MMHG | WEIGHT: 270.6 LBS | HEIGHT: 74 IN | TEMPERATURE: 96.8 F | HEART RATE: 62 BPM | BODY MASS INDEX: 34.73 KG/M2

## 2021-01-11 DIAGNOSIS — Z79.2 LONG TERM (CURRENT) USE OF ANTIBIOTICS: ICD-10-CM

## 2021-01-11 DIAGNOSIS — A49.1 STREPTOCOCCAL INFECTION: ICD-10-CM

## 2021-01-11 DIAGNOSIS — B37.9 CANDIDA GLABRATA INFECTION: ICD-10-CM

## 2021-01-11 DIAGNOSIS — M27.2 OSTEOMYELITIS, JAW ACUTE: Primary | ICD-10-CM

## 2021-01-11 PROCEDURE — 99214 OFFICE O/P EST MOD 30 MIN: CPT | Performed by: INTERNAL MEDICINE

## 2021-01-11 NOTE — PROGRESS NOTES
Progress Note - Infectious Disease   Dulce Gil 56 y o  male MRN: [id number]   Encounter: [id number]    Impression:  · Acute osteomyelitis of mandile: following dental implant infection   Clinically improving with reduced jaw pain and swelling  · 11/30/20 s/p DEBRIDEMENT OF ANTERIOR MANDIBLE BONE BIOPSY, CULTURES AND REMOVAL OF IMPLANT #27, USE OF PRP  Operative findings consistent with osteomyelitis and tissue cx with growth of Streptococcus salivarius and Streptococcus parasanguinis (from broth culture), and Candida glabrata  · Elevated Crp: noted in the setting of osteomyelitis  ESR is mildly elevated, but Crp has risen  · Hx of PCN allergy: hives as a child age 5-6  Pt previously tolerated cephalexin about 20 years ago  · Hx of clindamcyin allergy: hives     RECOMMENDATIONS:   · Continue ceftriaxone 2g IV Q 24 hours times x 8 weeks; started on 12/9/20  · Continue micafungin 100 mg IV Q 24 hours x 8-12 weeks; started on 12/9/20  This will be followed by oral anti-fungal therapy for 6-12 months  · Weekly labs: CBC with differential, creatinine, LFTs, ESR, CRP  · HTN: follow with your primary provider  · Return for ID follow-up in 2 weeks  My recommendations were discussed with the patient in detail who verbalized understanding      Antibiotics: ceftriaxone IV, micafungin IV    Current Outpatient Medications:     allopurinol (ZYLOPRIM) 100 mg tablet, daily, Disp: , Rfl:     cholecalciferol (VITAMIN D3) 1,000 units tablet, Take 1,000 Units by mouth daily Patient stated that is continuously taking, Disp: , Rfl:     glucosamine-chondroitin 500-400 MG tablet, Take 2 tablets by mouth daily , Disp: , Rfl:     lisinopril (ZESTRIL) 10 mg tablet, Take 10 mg by mouth , Disp: , Rfl:     metoprolol-hydrochlorothiazide (LOPRESSOR HCT) 100-25 MG per tablet, Take 0 5 tablets by mouth daily , Disp: , Rfl:     Omega-3 Fatty Acids (Omega-3 Fish Oil) 1000 MG CAPS, Take 2 g by mouth daily, Disp: , Rfl:    rosuvastatin (CRESTOR) 20 MG tablet, rosuvastatin 20 mg tablet, Disp: , Rfl:     sertraline (ZOLOFT) 50 mg tablet, Take 25 mg by mouth daily , Disp: , Rfl:     acetaminophen (TYLENOL) 500 mg tablet, Take 2 tablets (1,000 mg total) by mouth every 8 (eight) hours as needed for mild pain, Disp: 30 tablet, Rfl: 0    amLODIPine (NORVASC) 10 mg tablet, , Disp: , Rfl:     carbamazepine (CARBATROL) 100 MG 12 hr capsule, Take 1 capsule (100 mg total) by mouth 2 (two) times a day, Disp: 60 capsule, Rfl: 0    cyclobenzaprine (FLEXERIL) 10 mg tablet, Take 10 mg by mouth daily at bedtime , Disp: , Rfl:     gabapentin (NEURONTIN) 300 mg capsule, Take 1 capsule (300 mg total) by mouth 3 (three) times a day, Disp: 30 capsule, Rfl: 1    HYDROmorphone (DILAUDID) 2 mg tablet, , Disp: , Rfl:     ketorolac (TORADOL) 10 mg tablet, , Disp: , Rfl:     omeprazole (PriLOSEC) 20 mg delayed release capsule, Take 1 capsule (20 mg total) by mouth daily for 21 days, Disp: 21 capsule, Rfl: 0    oxyCODONE (ROXICODONE) 5 mg immediate release tablet, Take 1-2 tablets (5-10 mg total) by mouth every 6 (six) hours as needed for moderate painMax Daily Amount: 40 mg, Disp: 10 tablet, Rfl: 0    Subjective:  56year-old here for follow-up osteomyelitis of jaw currently receiving both ceftriaxone and micafungin IV  Pt reports insomnia while on IV ABx therapy  He also recently started amlodipine which was effective in lowering his BP with systolic of 720  However, he reports fatigue also and he stopped the amlodipine  His BP has risen to systolic 902K  His jaw pain and swelling have resolved  He stopped taking tylenol and ibuprofen about 3 weeks ago  He denies headaches  He denies fever, chills, nausea, vomiting, diarrhea, rash  He was evaluated by a neurologist and also referred to OMFS  In mid-November he was treated with levofloxacin x 14 day course   His antibiotics were held for 5 days prior to surgery   On 11/30, he underwent debridement of anterior mandible bone with biopsy and cultures   Operative findings included purulent drainage from previous implant site, loose bone and granulation tissue and loose implant       Objective:  Vitals:  Vitals:    01/11/21 1058   BP: 142/86   BP Location: Left arm   Patient Position: Sitting   Cuff Size: Large   Pulse: 62   Temp: (!) 96 8 °F (36 °C)   TempSrc: Temporal   Weight: 123 kg (270 lb 9 6 oz)   Height: 6' 2" (1 88 m)     Physical Exam:   General Appearance:  Alert, interactive, nontoxic, no acute distress  Head, ENT: Mandibular edema and erythema have significantly improved and appear to have resolved  No tenderness to palpation  No mastoid tenderness noted  Upper dental implants noted  Pt has no lower row of teeth   Lungs:   Clear to auscultation bilaterally; no wheezes, respirations unlabored   Heart:  S1, S2, RRR; no murmur   Abdomen:   Soft, non-tender, non-distended   Extremities: RUE PICC line intact  No distal leg edema b/l   Neurologic: AAO x3, conversant, fluent speech   Skin: No rash     Labs, Imaging, & Other studies:   1/4/21: WBC 4 9, Hb 13 8, Hct 39 6, Plt 275, creatinine 0 90, AST 25, ALT 67, ESR 22, Crp 44 5      11/30/20 tissue cx: 2+ mixed oral dat, broth culture demonstrated growth of Streptococcus salivarius and Streptococcus parasanguinis, 2+ growth of Candida glabrata, fungal bone/tissue culture 3+ growth of Candida glabrata, no AFB seen     Imaging Studies:   11/16/20 CT facial bones: Single dental implant in the right anterior mandible without evidence of loosening   Erosive changes in the left anterior mandible with cortical disruption may represent postsurgical or posttraumatic change   No clear evidence of osteomyelitis  All pertinent labs and imaging studies were personally reviewed

## 2021-01-19 LAB
MYCOBACTERIUM SPEC CULT: NORMAL
RHODAMINE-AURAMINE STN SPEC: NORMAL

## 2021-01-22 ENCOUNTER — TELEPHONE (OUTPATIENT)
Dept: INFECTIOUS DISEASES | Facility: CLINIC | Age: 61
End: 2021-01-22

## 2021-01-25 ENCOUNTER — OFFICE VISIT (OUTPATIENT)
Dept: INFECTIOUS DISEASES | Facility: CLINIC | Age: 61
End: 2021-01-25
Payer: COMMERCIAL

## 2021-01-25 VITALS
HEART RATE: 67 BPM | DIASTOLIC BLOOD PRESSURE: 80 MMHG | TEMPERATURE: 96.8 F | RESPIRATION RATE: 17 BRPM | SYSTOLIC BLOOD PRESSURE: 136 MMHG | HEIGHT: 74 IN | BODY MASS INDEX: 34.65 KG/M2 | WEIGHT: 270 LBS

## 2021-01-25 DIAGNOSIS — Z79.2 LONG TERM (CURRENT) USE OF ANTIBIOTICS: ICD-10-CM

## 2021-01-25 DIAGNOSIS — A49.1 STREPTOCOCCAL INFECTION: ICD-10-CM

## 2021-01-25 DIAGNOSIS — M27.2 OSTEOMYELITIS, JAW ACUTE: Primary | ICD-10-CM

## 2021-01-25 DIAGNOSIS — B37.9 CANDIDA GLABRATA INFECTION: ICD-10-CM

## 2021-01-25 PROCEDURE — 99214 OFFICE O/P EST MOD 30 MIN: CPT | Performed by: INTERNAL MEDICINE

## 2021-01-25 RX ORDER — MICAFUNGIN SODIUM 100 MG/5ML
INJECTION, POWDER, LYOPHILIZED, FOR SOLUTION INTRAVENOUS EVERY 24 HOURS
COMMUNITY
End: 2021-03-10 | Stop reason: ALTCHOICE

## 2021-01-25 NOTE — PROGRESS NOTES
Progress Note - Infectious Disease   Manpreet Hathc 56 y o  male MRN: [id number]   Encounter: [id number]    Impression:  · Acute osteomyelitis of mandile: following dental implant infection   Clinically improving with reduced jaw pain and swelling  ESR and Crp have normalized  · 11/30/20 s/p DEBRIDEMENT OF ANTERIOR MANDIBLE BONE BIOPSY, CULTURES AND REMOVAL OF IMPLANT #27, USE OF PRP  Operative findings consistent with osteomyelitis and tissue cx with growth of Streptococcus salivarius and Streptococcus parasanguinis (from broth culture), and Candida glabrata  · Elevated Crp: noted in the setting of osteomyelitis  Both ESR and Crp have normalized    · Hx of PCN allergy: hives as a child age 5-6  Pt previously tolerated cephalexin about 20 years ago  · Hx of clindamcyin allergy: hives     RECOMMENDATIONS:   · Discontinue ceftriaxone 2g IV Q 24 as pt has completed 7 weeks, symptoms and physical exam findings have largely resolved and inflammatory markers have normalized  · Continue micafungin 100 mg IV Q 24 hours x 8-12 weeks; started on 12/9/20  This will be followed by oral anti-fungal therapy for 6-12 months  · Weekly labs: CBC with differential, creatinine, LFTs, ESR, CRP  · Return for ID follow-up in 4 weeks  My recommendations were discussed with the patient in detail who verbalized understanding      Antibiotics: micafungin, ceftriaxone    Current Outpatient Medications:     allopurinol (ZYLOPRIM) 100 mg tablet, daily, Disp: , Rfl:     amLODIPine (NORVASC) 10 mg tablet, , Disp: , Rfl:     cefTRIAXone 2,000 mg in dextrose 5 % 50 mL IVPB, Infuse 2,000 mg into a venous catheter every 24 hours, Disp: , Rfl:     cholecalciferol (VITAMIN D3) 1,000 units tablet, Take 1,000 Units by mouth daily Patient stated that is continuously taking, Disp: , Rfl:     glucosamine-chondroitin 500-400 MG tablet, Take 2 tablets by mouth daily , Disp: , Rfl:     lisinopril (ZESTRIL) 10 mg tablet, Take 10 mg by mouth , Disp: , Rfl:     metoprolol-hydrochlorothiazide (LOPRESSOR HCT) 100-25 MG per tablet, Take 0 5 tablets by mouth daily , Disp: , Rfl:     micafungin sodium (MYCAMINE) 100 mg, Infuse into a venous catheter every 24 hours, Disp: , Rfl:     Omega-3 Fatty Acids (Omega-3 Fish Oil) 1000 MG CAPS, Take 2 g by mouth daily, Disp: , Rfl:     rosuvastatin (CRESTOR) 20 MG tablet, rosuvastatin 20 mg tablet, Disp: , Rfl:     sertraline (ZOLOFT) 50 mg tablet, Take 25 mg by mouth daily , Disp: , Rfl:     Subjective:  49-year-old man here for follow-up osteomyelitis of jaw currently receiving both ceftriaxone and micafungin IV    His jaw pain and swelling have resolved  He denies headaches  He denies fever, chills, nausea, vomiting, diarrhea, rash  Pt is tolerating antibiotics well    He was evaluated by a neurologist and also referred to OMFS  In mid-November he was treated with levofloxacin x 14 day course  His antibiotics were held for 5 days prior to [address] [place] [address] 11/30, he underwent debridement of anterior mandible bone with biopsy and cultures   Operative findings included purulent drainage from previous implant site, loose bone and granulation tissue and loose implant  Objective:  Vitals:  Vitals:    01/25/21 1003   BP: 136/80   Pulse: 67   Resp: 17   Temp: (!) 96 8 °F (36 °C)   Weight: 122 kg (270 lb)   Height: 6' 2" (1 88 m)     Physical Exam:   General Appearance:  Alert, interactive, nontoxic, no acute distress  Throat: No mandible/jaw swelling    Lungs:   Clear to auscultation bilaterally; no wheezes, respirations unlabored   Heart:  S1, S2, RRR; no murmur   Abdomen:   Soft, non-tender, non-distended   Extremities: RUE PICC line intact   No distal leg edema b/l   Neurologic: AAO x3, conversant, fluent speech   Skin: No rash     Labs, Imaging, & Other studies:   1/18/21: WBC 3 5, Hb 14 2, Hct 41 7, Plt 262, creatinine 0 91, AST 23, ALT 71, ESR 14, Crp < 3 0   All pertinent labs and imaging studies were personally reviewed

## 2021-02-19 ENCOUNTER — TELEPHONE (OUTPATIENT)
Dept: INFECTIOUS DISEASES | Facility: CLINIC | Age: 61
End: 2021-02-19

## 2021-02-22 ENCOUNTER — OFFICE VISIT (OUTPATIENT)
Dept: INFECTIOUS DISEASES | Facility: CLINIC | Age: 61
End: 2021-02-22
Payer: COMMERCIAL

## 2021-02-22 VITALS
RESPIRATION RATE: 18 BRPM | HEIGHT: 74 IN | SYSTOLIC BLOOD PRESSURE: 138 MMHG | TEMPERATURE: 96.7 F | DIASTOLIC BLOOD PRESSURE: 82 MMHG | WEIGHT: 273.4 LBS | HEART RATE: 50 BPM | BODY MASS INDEX: 35.09 KG/M2

## 2021-02-22 DIAGNOSIS — D72.819 LEUKOPENIA, UNSPECIFIED TYPE: ICD-10-CM

## 2021-02-22 DIAGNOSIS — M27.2 OSTEOMYELITIS, JAW ACUTE: Primary | ICD-10-CM

## 2021-02-22 DIAGNOSIS — Z79.2 LONG TERM (CURRENT) USE OF ANTIBIOTICS: ICD-10-CM

## 2021-02-22 DIAGNOSIS — R74.01 TRANSAMINITIS: ICD-10-CM

## 2021-02-22 DIAGNOSIS — A49.1 STREPTOCOCCAL INFECTION: ICD-10-CM

## 2021-02-22 DIAGNOSIS — B37.9 CANDIDA GLABRATA INFECTION: ICD-10-CM

## 2021-02-22 PROCEDURE — 99214 OFFICE O/P EST MOD 30 MIN: CPT | Performed by: INTERNAL MEDICINE

## 2021-02-22 NOTE — PROGRESS NOTES
Progress Note - Infectious Disease   Torito Chavez 56 y o  male MRN: [id number]   Encounter: [id number]    Impression/Plan:  1  Acute osteomyelitis of mandile: following dental implant infection   Clinically improving with reduced jaw pain and swelling  ESR and Crp have normalized  · Continue micafungin 100 mg IV Q 24 hours x 8-12 weeks; started on 12/9/20 until 3/3/21  · Transition to fluconazole or voriconazole po x 6-12 months upon completion of micafungin iv  · Pt completed 7 wk course of ceftriaxone 2g IV Q 24  · Plan to remove PICC line upon completion of micafungin IV next week  · Weekly labs: CBC with differential, creatinine, LFTs  · Return in 2 weeks  2  11/30/20 s/p DEBRIDEMENT OF ANTERIOR MANDIBLE BONE BIOPSY, CULTURES AND REMOVAL OF IMPLANT #27, USE OF PRP  Operative findings consistent with osteomyelitis and tissue cx with growth of Streptococcus salivarius and Streptococcus parasanguinis (from broth culture), and 3+ Candida glabrata  3  Elevated Crp: noted in the setting of osteomyelitis  Both ESR and Crp have normalized    4  Hx of PCN allergy: hives as a child age 5-6  Pt previously tolerated cephalexin about 20 years ago  5  Hx of clindamcyin allergy: hives  6  Leukopenia, transaminitis: this may be due to micafungin  · Weekly CBC, LFTs as above      My recommendations were discussed with the patient in detail who verbalized understanding      Antibiotics: micafungin   Current Outpatient Medications:     allopurinol (ZYLOPRIM) 100 mg tablet, daily, Disp: , Rfl:     cefTRIAXone 2,000 mg in dextrose 5 % 50 mL IVPB, Infuse 2,000 mg into a venous catheter every 24 hours, Disp: , Rfl:     cholecalciferol (VITAMIN D3) 1,000 units tablet, Take 1,000 Units by mouth daily Patient stated that is continuously taking, Disp: , Rfl:     glucosamine-chondroitin 500-400 MG tablet, Take 2 tablets by mouth daily , Disp: , Rfl:     lisinopril (ZESTRIL) 10 mg tablet, Take 10 mg by mouth , Disp: , Rfl:    metoprolol-hydrochlorothiazide (LOPRESSOR HCT) 100-25 MG per tablet, Take 0 5 tablets by mouth daily , Disp: , Rfl:     Omega-3 Fatty Acids (Omega-3 Fish Oil) 1000 MG CAPS, Take 2 g by mouth daily, Disp: , Rfl:     rosuvastatin (CRESTOR) 20 MG tablet, rosuvastatin 20 mg tablet, Disp: , Rfl:     sertraline (ZOLOFT) 50 mg tablet, Take 25 mg by mouth daily , Disp: , Rfl:     amLODIPine (NORVASC) 10 mg tablet, , Disp: , Rfl:     micafungin sodium (MYCAMINE) 100 mg, Infuse into a venous catheter every 24 hours, Disp: , Rfl:     Subjective:  68-year-old man here for follow-up osteomyelitis of jaw currently on therapy with micafungin IV    He denies jaw pain and swelling  He denies headaches  He denies fever, chills, nausea, vomiting, diarrhea, rash  No cough, shortness of breath  Pt is tolerating antibiotics well    On 11/30, he underwent debridement of anterior mandible bone with biopsy and cultures   Operative findings included purulent drainage from previous implant site, loose bone and granulation tissue and loose implant  Objective:  Vitals:  Vitals:    02/22/21 1003   BP: 138/82   BP Location: Left arm   Patient Position: Sitting   Cuff Size: Large   Pulse: (!) 50   Resp: 18   Temp: (!) 96 7 °F (35 9 °C)   TempSrc: Temporal   Weight: 124 kg (273 lb 6 4 oz)   Height: 6' 2" (1 88 m)     Physical Exam:   General Appearance:  Alert, interactive, nontoxic, no acute distress  Throat: Oral mucosa moist, no mandible/jaw swelling or tenderness  Pt has no lower teeth  Lungs:   Clear to auscultation bilaterally; no wheezes, respirations unlabored   Heart:  S1, S2, RRR; no murmur   Abdomen:   Soft, non-tender, non-distended   Extremities: RUE PICC line intact  No distal leg edema b/l   Neurologic: AAO x3, conversant, fluent speech   Skin: No rash or lesions        Labs, Imaging, & Other studies:   2/15/21:  WBC 2 8, HGB 14 9, HCT 41 8, , creatinine 0 9, AST 39, ALT 94, ESR 9, CRP < 3  All pertinent labs and imaging studies were personally reviewed

## 2021-03-02 ENCOUNTER — OFFICE VISIT (OUTPATIENT)
Dept: INTERNAL MEDICINE CLINIC | Facility: CLINIC | Age: 61
End: 2021-03-02
Payer: COMMERCIAL

## 2021-03-02 VITALS
TEMPERATURE: 97.3 F | WEIGHT: 270 LBS | DIASTOLIC BLOOD PRESSURE: 82 MMHG | BODY MASS INDEX: 34.65 KG/M2 | HEIGHT: 74 IN | OXYGEN SATURATION: 97 % | SYSTOLIC BLOOD PRESSURE: 150 MMHG | HEART RATE: 60 BPM

## 2021-03-02 DIAGNOSIS — Z12.12 SCREENING FOR COLORECTAL CANCER: ICD-10-CM

## 2021-03-02 DIAGNOSIS — M27.2 OSTEOMYELITIS, JAW ACUTE: ICD-10-CM

## 2021-03-02 DIAGNOSIS — E78.2 MIXED HYPERLIPIDEMIA: ICD-10-CM

## 2021-03-02 DIAGNOSIS — Z11.59 NEED FOR HEPATITIS C SCREENING TEST: ICD-10-CM

## 2021-03-02 DIAGNOSIS — I10 ESSENTIAL HYPERTENSION: Primary | ICD-10-CM

## 2021-03-02 DIAGNOSIS — Z11.4 SCREENING FOR HIV (HUMAN IMMUNODEFICIENCY VIRUS): ICD-10-CM

## 2021-03-02 DIAGNOSIS — M1A.0710 IDIOPATHIC CHRONIC GOUT OF RIGHT FOOT WITHOUT TOPHUS: ICD-10-CM

## 2021-03-02 DIAGNOSIS — Z12.11 SCREENING FOR COLORECTAL CANCER: ICD-10-CM

## 2021-03-02 PROCEDURE — 99204 OFFICE O/P NEW MOD 45 MIN: CPT | Performed by: INTERNAL MEDICINE

## 2021-03-02 PROCEDURE — 3725F SCREEN DEPRESSION PERFORMED: CPT | Performed by: INTERNAL MEDICINE

## 2021-03-02 RX ORDER — AMLODIPINE BESYLATE 5 MG/1
5 TABLET ORAL DAILY
Qty: 90 TABLET | Refills: 3 | Status: SHIPPED | OUTPATIENT
Start: 2021-03-02 | End: 2022-01-20 | Stop reason: SDUPTHER

## 2021-03-02 RX ORDER — LISINOPRIL 20 MG/1
20 TABLET ORAL DAILY
Qty: 90 TABLET | Refills: 3 | Status: SHIPPED | OUTPATIENT
Start: 2021-03-02 | End: 2021-10-25 | Stop reason: DRUGHIGH

## 2021-03-02 NOTE — PROGRESS NOTES
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   Continue allopurinol       Diagnoses and all orders for this visit:    Essential hypertension  -     lisinopril (ZESTRIL) 20 mg tablet; Take 1 tablet (20 mg total) by mouth daily  -     amLODIPine (NORVASC) 5 mg tablet; Take 1 tablet (5 mg total) by mouth daily    Need for hepatitis C screening test    Screening for HIV (human immunodeficiency virus)    Screening for colorectal cancer    Osteomyelitis, jaw acute    Mixed hyperlipidemia    Idiopathic chronic gout 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    Other orders  -     Cancel: Hepatitis C Antibody ([facility]); Future  -     Cancel: HIV 1/2 Antigen/Antibody (4th Generation) w Reflex [facility]; Future  -     Cancel: Ambulatory referral to Gastroenterology; Future        BMI Counseling: Body mass index is 34 67 kg/m²  The BMI is above normal  Nutrition recommendations include encouraging healthy choices of fruits and vegetables and moderation in carbohydrate intake  Exercise recommendations include exercising 3-5 times per week  Subjective:      Patient ID: Dulce Gil is a 56 y o  male  Pt here to establish care  He had recent medical issues related to osteomyelitis  HTN: pt stopped amlodipine due to low /71 at home, but he generally runs around 140  When he was in the hospital amlodipine was started for elevated BP      Hypercholesterolemia:  Pt tolerating statin    Gout: pt has had this in the right foot before, he is on allopurinol      Osteomyelitis of the jaw:  Patient has been following with Infectious Disease for this, he has a PICC line in, has been getting IV meds for this  No fevers chills or sweats, patient currently not having pain  The following portions of the patient's history were reviewed and updated as appropriate: allergies, current medications, past family history, past medical history, past social history, past surgical history and problem list     Review of Systems   Constitutional: Negative for chills, fatigue and fever  HENT: Negative for congestion, nosebleeds, postnasal drip, sore throat and trouble swallowing  Eyes: Negative for pain  Respiratory: Positive for cough (chronic dry)  Negative for chest tightness, shortness of breath and wheezing  Cardiovascular: Negative for chest pain, palpitations and leg swelling  Gastrointestinal: Negative for abdominal pain, constipation, diarrhea, nausea and vomiting  Endocrine: Negative for polydipsia and polyuria  Genitourinary: Negative for dysuria, flank pain and hematuria  Musculoskeletal: Positive for arthralgias and back pain  Negative for myalgias  Skin: Negative for rash  Neurological: Negative for dizziness, tremors, light-headedness and headaches  Hematological: Does not bruise/bleed easily  Psychiatric/Behavioral: Negative for confusion and dysphoric mood  The patient is not nervous/anxious  Objective:      /82   Pulse 60   Temp (!) 97 3 °F (36 3 °C) (Temporal)   Ht 6' 2" (1 88 m)   Wt 122 kg (270 lb)   SpO2 97%   BMI 34 67 kg/m²          Physical Exam  Vitals signs reviewed  Constitutional:       General: He is not in acute distress  Appearance: Normal appearance  He is well-developed  HENT:      Head: Normocephalic and atraumatic  Right Ear: External ear normal       Left Ear: External ear normal    Eyes:      General: No scleral icterus  Conjunctiva/sclera: Conjunctivae normal    Neck:      Musculoskeletal: Normal range of motion and neck supple  Thyroid: No thyromegaly  Trachea: No tracheal deviation  Cardiovascular:      Rate and Rhythm: Normal rate and regular rhythm  Heart sounds: Normal heart sounds  No murmur  Pulmonary:      Effort: Pulmonary effort is normal  No respiratory distress  Breath sounds: Normal breath sounds  No wheezing or rales  Abdominal:      General: Bowel sounds are normal       Palpations: Abdomen is soft  Tenderness: There is no abdominal tenderness  There is no guarding or rebound  Musculoskeletal:      Right lower leg: No edema  Left lower leg: No edema  Lymphadenopathy:      Cervical: No cervical adenopathy  Neurological:      General: No focal deficit present  Mental Status: He is alert and oriented to person, place, and time  Psychiatric:         Mood and Affect: Mood normal          Behavior: Behavior normal          Thought Content:  Thought content normal          Judgment: Judgment normal

## 2021-03-02 NOTE — ASSESSMENT & PLAN NOTE
A little elevated, discussed increasing  Lisinopril from 10 mg to 20 mg verses adding back amlodipine at a lower dose    Later pt reported he has a chronic dry cough, so discussed adding back amlodipine 5 mg daily and can keep lisinopril at 10 mg

## 2021-03-02 NOTE — PATIENT INSTRUCTIONS
Problem List Items Addressed This Visit        Cardiovascular and Mediastinum    Essential hypertension - Primary     A little elevated, discussed increasing  Lisinopril from 10 mg to 20 mg verses adding back amlodipine at a lower dose    Later pt reported he has a chronic dry cough, so discussed adding back amlodipine 5 mg daily and can keep lisinopril at 10 mg         Relevant Medications    lisinopril (ZESTRIL) 20 mg tablet    amLODIPine (NORVASC) 5 mg tablet       Musculoskeletal and Integument    Osteomyelitis, jaw acute       Continue IV medication as per Infectious Disease with plans to change to oral medication in the near future         Idiopathic chronic gout 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      Continue allopurinol            Other    Mixed hyperlipidemia      Continue fish oil and rosuvastatin along with healthy diet and exercise           Other Visit Diagnoses     Need for hepatitis C screening test        Screening for HIV (human immunodeficiency virus)        Screening for colorectal cancer

## 2021-03-02 NOTE — ASSESSMENT & PLAN NOTE
Continue IV medication as per Infectious Disease with plans to change to oral medication in the near future

## 2021-03-03 RX ORDER — FLUCONAZOLE 200 MG/1
800 TABLET ORAL DAILY
Qty: 120 TABLET | Refills: 0 | Status: SHIPPED | OUTPATIENT
Start: 2021-03-03 | End: 2021-03-10 | Stop reason: SDUPTHER

## 2021-03-08 ENCOUNTER — TRANSCRIBE ORDERS (OUTPATIENT)
Dept: LAB | Facility: CLINIC | Age: 61
End: 2021-03-08

## 2021-03-08 ENCOUNTER — LAB (OUTPATIENT)
Dept: LAB | Facility: CLINIC | Age: 61
End: 2021-03-08
Payer: COMMERCIAL

## 2021-03-08 DIAGNOSIS — M27.2 OSTEOMYELITIS, JAW ACUTE: ICD-10-CM

## 2021-03-08 DIAGNOSIS — A49.1 STREPTOCOCCAL INFECTION: ICD-10-CM

## 2021-03-08 LAB
ALBUMIN SERPL BCP-MCNC: 4.3 G/DL (ref 3.5–5)
ALP SERPL-CCNC: 83 U/L (ref 46–116)
ALT SERPL W P-5'-P-CCNC: 82 U/L (ref 12–78)
AST SERPL W P-5'-P-CCNC: 30 U/L (ref 5–45)
BASOPHILS # BLD AUTO: 0.06 THOUSANDS/ΜL (ref 0–0.1)
BASOPHILS NFR BLD AUTO: 2 % (ref 0–1)
BILIRUB DIRECT SERPL-MCNC: 0.08 MG/DL (ref 0–0.2)
BILIRUB SERPL-MCNC: 0.51 MG/DL (ref 0.2–1)
CREAT SERPL-MCNC: 1 MG/DL (ref 0.6–1.3)
EOSINOPHIL # BLD AUTO: 0.13 THOUSAND/ΜL (ref 0–0.61)
EOSINOPHIL NFR BLD AUTO: 4 % (ref 0–6)
ERYTHROCYTE [DISTWIDTH] IN BLOOD BY AUTOMATED COUNT: 12.5 % (ref 11.6–15.1)
GFR SERPL CREATININE-BSD FRML MDRD: 80 ML/MIN/1.73SQ M
HCT VFR BLD AUTO: 45.6 % (ref 36.5–49.3)
HGB BLD-MCNC: 15.6 G/DL (ref 12–17)
IMM GRANULOCYTES # BLD AUTO: 0.01 THOUSAND/UL (ref 0–0.2)
IMM GRANULOCYTES NFR BLD AUTO: 0 % (ref 0–2)
LYMPHOCYTES # BLD AUTO: 1.38 THOUSANDS/ΜL (ref 0.6–4.47)
LYMPHOCYTES NFR BLD AUTO: 43 % (ref 14–44)
MCH RBC QN AUTO: 28.6 PG (ref 26.8–34.3)
MCHC RBC AUTO-ENTMCNC: 34.2 G/DL (ref 31.4–37.4)
MCV RBC AUTO: 84 FL (ref 82–98)
MONOCYTES # BLD AUTO: 0.43 THOUSAND/ΜL (ref 0.17–1.22)
MONOCYTES NFR BLD AUTO: 14 % (ref 4–12)
NEUTROPHILS # BLD AUTO: 1.18 THOUSANDS/ΜL (ref 1.85–7.62)
NEUTS SEG NFR BLD AUTO: 37 % (ref 43–75)
NRBC BLD AUTO-RTO: 0 /100 WBCS
PLATELET # BLD AUTO: 204 THOUSANDS/UL (ref 149–390)
PMV BLD AUTO: 10.4 FL (ref 8.9–12.7)
PROT SERPL-MCNC: 7.7 G/DL (ref 6.4–8.2)
RBC # BLD AUTO: 5.46 MILLION/UL (ref 3.88–5.62)
WBC # BLD AUTO: 3.19 THOUSAND/UL (ref 4.31–10.16)

## 2021-03-08 PROCEDURE — 36415 COLL VENOUS BLD VENIPUNCTURE: CPT

## 2021-03-08 PROCEDURE — 80076 HEPATIC FUNCTION PANEL: CPT

## 2021-03-08 PROCEDURE — 85025 COMPLETE CBC W/AUTO DIFF WBC: CPT

## 2021-03-08 PROCEDURE — 82565 ASSAY OF CREATININE: CPT

## 2021-03-10 ENCOUNTER — OFFICE VISIT (OUTPATIENT)
Dept: INFECTIOUS DISEASES | Facility: CLINIC | Age: 61
End: 2021-03-10
Payer: COMMERCIAL

## 2021-03-10 VITALS
SYSTOLIC BLOOD PRESSURE: 126 MMHG | BODY MASS INDEX: 34.52 KG/M2 | RESPIRATION RATE: 18 BRPM | HEIGHT: 74 IN | HEART RATE: 64 BPM | DIASTOLIC BLOOD PRESSURE: 84 MMHG | WEIGHT: 269 LBS | TEMPERATURE: 97.7 F

## 2021-03-10 DIAGNOSIS — D72.819 LEUKOPENIA, UNSPECIFIED TYPE: ICD-10-CM

## 2021-03-10 DIAGNOSIS — R74.01 TRANSAMINITIS: ICD-10-CM

## 2021-03-10 DIAGNOSIS — B37.9 CANDIDA GLABRATA INFECTION: ICD-10-CM

## 2021-03-10 DIAGNOSIS — M27.2 OSTEOMYELITIS, JAW ACUTE: Primary | ICD-10-CM

## 2021-03-10 DIAGNOSIS — Z23 ENCOUNTER FOR IMMUNIZATION: ICD-10-CM

## 2021-03-10 DIAGNOSIS — Z79.2 LONG TERM (CURRENT) USE OF ANTIBIOTICS: ICD-10-CM

## 2021-03-10 DIAGNOSIS — E66.9 OBESITY (BMI 30.0-34.9): ICD-10-CM

## 2021-03-10 PROCEDURE — 99213 OFFICE O/P EST LOW 20 MIN: CPT | Performed by: INTERNAL MEDICINE

## 2021-03-10 RX ORDER — FLUCONAZOLE 200 MG/1
800 TABLET ORAL DAILY
Qty: 120 TABLET | Refills: 0 | Status: SHIPPED | OUTPATIENT
Start: 2021-04-01 | End: 2021-04-07 | Stop reason: SDUPTHER

## 2021-03-10 NOTE — PROGRESS NOTES
Progress Note - Infectious Disease   Cecilio Russell 56 y o  male MRN: [id number]   Encounter: [id number]    Impression/Plan:  1  Acute osteomyelitis of mandile: following dental implant infection   Clinically improving with reduced jaw pain and swelling  ESR and Crp have normalized  · Continue micafungin 100 mg IV Q 24 hours x 8-12 weeks; started on 12/9/20 until 3/3/21  · Continue fluconazole 800mg po daily x 6-12 months if pt can tolerate  · Pt completed 7 wk course of ceftriaxone 2g IV Q 24  · Plan to remove PICC line upon completion of micafungin IV next week  · Labs every 2 weeks: CBC with differential, creatinine, LFTs  · Return in 4 weeks  2  11/30/20 s/p DEBRIDEMENT OF ANTERIOR MANDIBLE BONE BIOPSY, CULTURES AND REMOVAL OF IMPLANT #27, USE OF PRP  Operative findings consistent with osteomyelitis and tissue cx with growth of Streptococcus salivarius and Streptococcus parasanguinis (from broth culture), and 3+ Candida glabrata  3  Elevated Crp: noted in the setting of osteomyelitis  Both ESR and Crp have normalized    4  Hx of PCN allergy: hives as a child age 5-6  Pt previously tolerated cephalexin about 20 years ago  5  Hx of clindamcyin allergy: hives  6  Leukopenia, transaminitis: this may be due to prolonged antifungal therapy (previously micafungin, now fluconazole)     My recommendations were discussed with the patient in detail who verbalized understanding      Antibiotics: Fluconazole po   Current Outpatient Medications:     allopurinol (ZYLOPRIM) 100 mg tablet, daily, Disp: , Rfl:     amLODIPine (NORVASC) 5 mg tablet, Take 1 tablet (5 mg total) by mouth daily, Disp: 90 tablet, Rfl: 3    cholecalciferol (VITAMIN D3) 1,000 units tablet, Take 1,000 Units by mouth daily Patient stated that is continuously taking, Disp: , Rfl:     fluconazole (DIFLUCAN) 200 mg tablet, Take 4 tablets (800 mg total) by mouth daily, Disp: 120 tablet, Rfl: 0    glucosamine-chondroitin 500-400 MG tablet, Take 2 tablets by mouth daily , Disp: , Rfl:     lisinopril (ZESTRIL) 20 mg tablet, Take 1 tablet (20 mg total) by mouth daily, Disp: 90 tablet, Rfl: 3    metoprolol-hydrochlorothiazide (LOPRESSOR HCT) 100-25 MG per tablet, Take 0 5 tablets by mouth daily , Disp: , Rfl:     Omega-3 Fatty Acids (Omega-3 Fish Oil) 1000 MG CAPS, Take 2 g by mouth daily, Disp: , Rfl:     rosuvastatin (CRESTOR) 20 MG tablet, rosuvastatin 20 mg tablet, Disp: , Rfl:     sertraline (ZOLOFT) 50 mg tablet, Take 25 mg by mouth daily , Disp: , Rfl:     Subjective:  56year-old man here for follow-up osteomyelitis of jaw currently on therapy with fluconazole    He denies jaw pain and swelling  He denies fever, chills, nausea, vomiting, diarrhea, rash  He reports a dry cough which is chronic- his PCP thinks it may be related to lisinopril  Pt is tolerating antibiotics well    On 11/30, he underwent debridement of anterior mandible bone with biopsy and cultures   Operative findings included purulent drainage from previous implant site, loose bone and granulation tissue and loose implant  Objective:  Vitals:  Vitals:    03/10/21 1009   BP: 126/84   BP Location: Right arm   Patient Position: Sitting   Cuff Size: Adult   Pulse: 64   Resp: 18   Temp: 97 7 °F (36 5 °C)   TempSrc: Temporal   Weight: 122 kg (269 lb)   Height: 6' 2" (1 88 m)     Physical Exam:   General Appearance:  Alert, interactive, nontoxic, no acute distress  Throat: Oral mucosa moist, no mandible/jaw swelling or tenderness  Pt has no lower teeth      Lungs:   Clear to auscultation bilaterally; no wheezes, respirations unlabored   Heart:  S1, S2, RRR; no murmur   Abdomen:   Soft, non-tender, non-distended   Extremities: No distal leg edema b/l   Neurologic: AAO x3, conversant, fluent speech   Skin: No rash     Labs, Imaging, & Other studies:   All pertinent labs and imaging studies were personally reviewed  Results from last 7 days   Lab Units 03/08/21  0816   WBC Thousand/uL 3 19* HEMOGLOBIN g/dL 15 6   PLATELETS Thousands/uL 204     Results from last 7 days   Lab Units 03/08/21  0816   CREATININE mg/dL 1 00   EGFR ml/min/1 73sq m 80   AST U/L 30   ALT U/L 82*   ALK PHOS U/L 83

## 2021-03-10 NOTE — PATIENT INSTRUCTIONS
Continue your fluconazole as ordered  Biweekly labs - due next 3/22  Follow up with us 4/7 as scheduled

## 2021-03-22 ENCOUNTER — APPOINTMENT (OUTPATIENT)
Dept: LAB | Facility: CLINIC | Age: 61
End: 2021-03-22
Payer: COMMERCIAL

## 2021-03-22 DIAGNOSIS — E66.9 OBESITY (BMI 30.0-34.9): ICD-10-CM

## 2021-03-22 DIAGNOSIS — E78.5 HYPERLIPIDEMIA, UNSPECIFIED HYPERLIPIDEMIA TYPE: ICD-10-CM

## 2021-03-22 LAB
ALBUMIN SERPL BCP-MCNC: 4.2 G/DL (ref 3.5–5)
ALP SERPL-CCNC: 80 U/L (ref 46–116)
ALT SERPL W P-5'-P-CCNC: 85 U/L (ref 12–78)
AST SERPL W P-5'-P-CCNC: 39 U/L (ref 5–45)
BASOPHILS # BLD AUTO: 0.06 THOUSANDS/ΜL (ref 0–0.1)
BASOPHILS NFR BLD AUTO: 2 % (ref 0–1)
BILIRUB DIRECT SERPL-MCNC: 0.07 MG/DL (ref 0–0.2)
BILIRUB SERPL-MCNC: 0.44 MG/DL (ref 0.2–1)
CHOLEST SERPL-MCNC: 156 MG/DL (ref 50–200)
CREAT SERPL-MCNC: 1.01 MG/DL (ref 0.6–1.3)
EOSINOPHIL # BLD AUTO: 0.15 THOUSAND/ΜL (ref 0–0.61)
EOSINOPHIL NFR BLD AUTO: 4 % (ref 0–6)
ERYTHROCYTE [DISTWIDTH] IN BLOOD BY AUTOMATED COUNT: 12.3 % (ref 11.6–15.1)
EST. AVERAGE GLUCOSE BLD GHB EST-MCNC: 186 MG/DL
GFR SERPL CREATININE-BSD FRML MDRD: 79 ML/MIN/1.73SQ M
HBA1C MFR BLD: 8.1 %
HCT VFR BLD AUTO: 43.8 % (ref 36.5–49.3)
HDLC SERPL-MCNC: 28 MG/DL
HGB BLD-MCNC: 15.2 G/DL (ref 12–17)
IMM GRANULOCYTES # BLD AUTO: 0.01 THOUSAND/UL (ref 0–0.2)
IMM GRANULOCYTES NFR BLD AUTO: 0 % (ref 0–2)
LDLC SERPL CALC-MCNC: 65 MG/DL (ref 0–100)
LYMPHOCYTES # BLD AUTO: 1.45 THOUSANDS/ΜL (ref 0.6–4.47)
LYMPHOCYTES NFR BLD AUTO: 37 % (ref 14–44)
MCH RBC QN AUTO: 28.5 PG (ref 26.8–34.3)
MCHC RBC AUTO-ENTMCNC: 34.7 G/DL (ref 31.4–37.4)
MCV RBC AUTO: 82 FL (ref 82–98)
MONOCYTES # BLD AUTO: 0.42 THOUSAND/ΜL (ref 0.17–1.22)
MONOCYTES NFR BLD AUTO: 11 % (ref 4–12)
NEUTROPHILS # BLD AUTO: 1.85 THOUSANDS/ΜL (ref 1.85–7.62)
NEUTS SEG NFR BLD AUTO: 46 % (ref 43–75)
NONHDLC SERPL-MCNC: 128 MG/DL
NRBC BLD AUTO-RTO: 0 /100 WBCS
PLATELET # BLD AUTO: 186 THOUSANDS/UL (ref 149–390)
PMV BLD AUTO: 10.5 FL (ref 8.9–12.7)
PROT SERPL-MCNC: 7.5 G/DL (ref 6.4–8.2)
RBC # BLD AUTO: 5.34 MILLION/UL (ref 3.88–5.62)
TRIGL SERPL-MCNC: 315 MG/DL
WBC # BLD AUTO: 3.94 THOUSAND/UL (ref 4.31–10.16)

## 2021-03-22 PROCEDURE — 85025 COMPLETE CBC W/AUTO DIFF WBC: CPT

## 2021-03-22 PROCEDURE — 80061 LIPID PANEL: CPT

## 2021-03-22 PROCEDURE — 36415 COLL VENOUS BLD VENIPUNCTURE: CPT

## 2021-03-22 PROCEDURE — 80076 HEPATIC FUNCTION PANEL: CPT

## 2021-03-22 PROCEDURE — 82565 ASSAY OF CREATININE: CPT

## 2021-03-22 PROCEDURE — 83036 HEMOGLOBIN GLYCOSYLATED A1C: CPT

## 2021-03-29 ENCOUNTER — OFFICE VISIT (OUTPATIENT)
Dept: INTERNAL MEDICINE CLINIC | Facility: CLINIC | Age: 61
End: 2021-03-29
Payer: COMMERCIAL

## 2021-03-29 VITALS
BODY MASS INDEX: 34.65 KG/M2 | WEIGHT: 270 LBS | OXYGEN SATURATION: 97 % | HEIGHT: 74 IN | DIASTOLIC BLOOD PRESSURE: 76 MMHG | HEART RATE: 68 BPM | SYSTOLIC BLOOD PRESSURE: 140 MMHG | TEMPERATURE: 98 F

## 2021-03-29 DIAGNOSIS — R73.01 IMPAIRED FASTING GLUCOSE: Primary | ICD-10-CM

## 2021-03-29 DIAGNOSIS — Z11.4 SCREENING FOR HIV (HUMAN IMMUNODEFICIENCY VIRUS): ICD-10-CM

## 2021-03-29 DIAGNOSIS — Z12.11 SCREENING FOR COLORECTAL CANCER: ICD-10-CM

## 2021-03-29 DIAGNOSIS — Z12.12 SCREENING FOR COLORECTAL CANCER: ICD-10-CM

## 2021-03-29 DIAGNOSIS — E78.2 MIXED HYPERLIPIDEMIA: ICD-10-CM

## 2021-03-29 DIAGNOSIS — Z11.59 NEED FOR HEPATITIS C SCREENING TEST: ICD-10-CM

## 2021-03-29 DIAGNOSIS — I10 ESSENTIAL HYPERTENSION: ICD-10-CM

## 2021-03-29 PROCEDURE — 3008F BODY MASS INDEX DOCD: CPT | Performed by: INTERNAL MEDICINE

## 2021-03-29 PROCEDURE — 99214 OFFICE O/P EST MOD 30 MIN: CPT | Performed by: INTERNAL MEDICINE

## 2021-03-29 PROCEDURE — 3077F SYST BP >= 140 MM HG: CPT | Performed by: INTERNAL MEDICINE

## 2021-03-29 PROCEDURE — 3078F DIAST BP <80 MM HG: CPT | Performed by: INTERNAL MEDICINE

## 2021-03-29 PROCEDURE — 1036F TOBACCO NON-USER: CPT | Performed by: INTERNAL MEDICINE

## 2021-03-29 RX ORDER — ROSUVASTATIN CALCIUM 20 MG/1
20 TABLET, COATED ORAL DAILY
Qty: 90 TABLET | Refills: 3 | Status: SHIPPED | OUTPATIENT
Start: 2021-03-29 | End: 2022-07-08 | Stop reason: SDUPTHER

## 2021-03-29 NOTE — ASSESSMENT & PLAN NOTE
See HPI for details, patient has been eating a lot more butter and carbs lately  Discussed options with patient including referral to diabetic nutritionist   Patient knows what to do with his diet, he has done it in the past with good success, he will pursue this route and we can recheck hemoglobin A1c in 3 months rather than starting medication at this time

## 2021-03-29 NOTE — PROGRESS NOTES
Assessment/Plan:    Impaired fasting glucose  See HPI for details, patient has been eating a lot more butter and carbs lately  Discussed options with patient including referral to diabetic nutritionist   Patient knows what to do with his diet, he has done it in the past with good success, he will pursue this route and we can recheck hemoglobin A1c in 3 months rather than starting medication at this time  Mixed hyperlipidemia  Discussed diet, expect triglycerides to improve with his diet changes he is going to make, continue rosuvastatin and fish oil    Essential hypertension  Continue current meds  Diagnoses and all orders for this visit:    Impaired fasting glucose  -     Comprehensive metabolic panel; Future  -     Hemoglobin A1C; Future    Need for hepatitis C screening test  -     Hepatitis C Antibody ([facility]); Future    Screening for HIV (human immunodeficiency virus)    Screening for colorectal cancer    Mixed hyperlipidemia  -     rosuvastatin (CRESTOR) 20 MG tablet; Take 1 tablet (20 mg total) by mouth daily    Essential hypertension    Other orders  -     Cancel: HIV 1/2 Antigen/Antibody (4th Generation) w Reflex [facility]; Future  -     Cancel: Ambulatory referral to Gastroenterology; Future          Subjective:      Patient ID: Neeta Kam is a 56 y o  male  Here for further discussion workup about abnormal labs done by Infectious Disease  Hemoglobin A1c came back at 8 1, I review of records previous hemoglobin A1c from April 1, 2019 was 6 6  On review of his glucose levels, in November 2020 his glucose levels were normal at 98, 73, 102  Going back to 2019 however, his glucose was elevated at 144 on April 1, 2019 and glucose was 127 on October 8, 2014  Regarding patient's associated history around these dates, he was dealing with osteomyelitis November December 2020  He has a PICC line in for 12 weeks    Pt admits to eating a lot more carbs lately, and also a lot more butter than normal and more ice cream   Pt was on metformin in 2010 was he was over 300 pounds and he addressed this with diet, and more recently he stopped drinking alcohol in 2018 (3-4 beers per day)  Patient had tolerability issues with metformin  The following portions of the patient's history were reviewed and updated as appropriate: allergies, current medications, past family history, past medical history, past social history, past surgical history and problem list     Review of Systems   Constitutional: Negative for chills, fatigue and fever  HENT: Negative for congestion, nosebleeds, postnasal drip, sore throat and trouble swallowing  Eyes: Negative for pain  Respiratory: Negative for cough, chest tightness, shortness of breath and wheezing  Cardiovascular: Negative for chest pain, palpitations and leg swelling  Gastrointestinal: Negative for abdominal pain, constipation, diarrhea, nausea and vomiting  Endocrine: Negative for polydipsia and polyuria  Genitourinary: Negative for dysuria, flank pain and hematuria  Musculoskeletal: Negative for arthralgias  Skin: Negative for rash  Neurological: Negative for dizziness, tremors, light-headedness and headaches  Hematological: Does not bruise/bleed easily  Psychiatric/Behavioral: Negative for confusion and dysphoric mood  The patient is not nervous/anxious  Objective:      /76   Pulse 68   Temp 98 °F (36 7 °C) (Temporal)   Ht 6' 2" (1 88 m)   Wt 122 kg (270 lb)   SpO2 97%   BMI 34 67 kg/m²          Physical Exam  Vitals signs reviewed  Constitutional:       General: He is not in acute distress  Appearance: Normal appearance  He is well-developed  HENT:      Head: Normocephalic and atraumatic  Right Ear: External ear normal       Left Ear: External ear normal    Eyes:      General: No scleral icterus       Conjunctiva/sclera: Conjunctivae normal    Neck:      Musculoskeletal: Normal range of motion and neck supple  Thyroid: No thyromegaly  Trachea: No tracheal deviation  Cardiovascular:      Rate and Rhythm: Normal rate and regular rhythm  Heart sounds: Normal heart sounds  No murmur  Pulmonary:      Effort: Pulmonary effort is normal  No respiratory distress  Breath sounds: Normal breath sounds  No wheezing or rales  Abdominal:      General: Bowel sounds are normal       Palpations: Abdomen is soft  Tenderness: There is no abdominal tenderness  There is no guarding or rebound  Musculoskeletal:      Right lower leg: No edema  Left lower leg: No edema  Lymphadenopathy:      Cervical: No cervical adenopathy  Neurological:      General: No focal deficit present  Mental Status: He is alert and oriented to person, place, and time  Psychiatric:         Mood and Affect: Mood normal          Behavior: Behavior normal          Thought Content:  Thought content normal          Judgment: Judgment normal

## 2021-03-29 NOTE — ASSESSMENT & PLAN NOTE
Discussed diet, expect triglycerides to improve with his diet changes he is going to make, continue rosuvastatin and fish oil

## 2021-03-29 NOTE — PATIENT INSTRUCTIONS
Problem List Items Addressed This Visit        Endocrine    Impaired fasting glucose - Primary     See HPI for details, patient has been eating a lot more butter and carbs lately  Discussed options with patient including referral to diabetic nutritionist   Patient knows what to do with his diet, he has done it in the past with good success, he will pursue this route and we can recheck hemoglobin A1c in 3 months rather than starting medication at this time  Relevant Orders    Comprehensive metabolic panel    Hemoglobin A1C       Cardiovascular and Mediastinum    Essential hypertension     Continue current meds              Other    Mixed hyperlipidemia     Discussed diet, expect triglycerides to improve with his diet changes he is going to make, continue rosuvastatin and fish oil         Relevant Medications    rosuvastatin (CRESTOR) 20 MG tablet      Other Visit Diagnoses     Need for hepatitis C screening test        Relevant Orders    Hepatitis C Antibody ([facility])    Screening for HIV (human immunodeficiency virus)        Screening for colorectal cancer

## 2021-04-01 ENCOUNTER — IMMUNIZATIONS (OUTPATIENT)
Dept: FAMILY MEDICINE CLINIC | Facility: HOSPITAL | Age: 61
End: 2021-04-01

## 2021-04-01 DIAGNOSIS — Z23 ENCOUNTER FOR IMMUNIZATION: Primary | ICD-10-CM

## 2021-04-01 PROCEDURE — 0001A SARS-COV-2 / COVID-19 MRNA VACCINE (PFIZER-BIONTECH) 30 MCG: CPT

## 2021-04-01 PROCEDURE — 91300 SARS-COV-2 / COVID-19 MRNA VACCINE (PFIZER-BIONTECH) 30 MCG: CPT

## 2021-04-05 ENCOUNTER — APPOINTMENT (OUTPATIENT)
Dept: LAB | Facility: CLINIC | Age: 61
End: 2021-04-05
Payer: COMMERCIAL

## 2021-04-05 DIAGNOSIS — R73.01 IMPAIRED FASTING GLUCOSE: ICD-10-CM

## 2021-04-05 DIAGNOSIS — Z11.59 NEED FOR HEPATITIS C SCREENING TEST: ICD-10-CM

## 2021-04-05 LAB
ALBUMIN SERPL BCP-MCNC: 4.1 G/DL (ref 3.5–5)
ALP SERPL-CCNC: 82 U/L (ref 46–116)
ALT SERPL W P-5'-P-CCNC: 72 U/L (ref 12–78)
ANION GAP SERPL CALCULATED.3IONS-SCNC: 11 MMOL/L (ref 4–13)
AST SERPL W P-5'-P-CCNC: 38 U/L (ref 5–45)
BILIRUB SERPL-MCNC: 0.36 MG/DL (ref 0.2–1)
BUN SERPL-MCNC: 13 MG/DL (ref 5–25)
CALCIUM SERPL-MCNC: 9.3 MG/DL (ref 8.3–10.1)
CHLORIDE SERPL-SCNC: 103 MMOL/L (ref 100–108)
CO2 SERPL-SCNC: 28 MMOL/L (ref 21–32)
CREAT SERPL-MCNC: 0.92 MG/DL (ref 0.6–1.3)
GFR SERPL CREATININE-BSD FRML MDRD: 89 ML/MIN/1.73SQ M
GLUCOSE SERPL-MCNC: 177 MG/DL (ref 65–140)
HCV AB SER QL: NORMAL
POTASSIUM SERPL-SCNC: 3.9 MMOL/L (ref 3.5–5.3)
PROT SERPL-MCNC: 7.4 G/DL (ref 6.4–8.2)
SODIUM SERPL-SCNC: 142 MMOL/L (ref 136–145)

## 2021-04-05 PROCEDURE — 36415 COLL VENOUS BLD VENIPUNCTURE: CPT

## 2021-04-05 PROCEDURE — 86803 HEPATITIS C AB TEST: CPT

## 2021-04-05 PROCEDURE — 80053 COMPREHEN METABOLIC PANEL: CPT

## 2021-04-07 ENCOUNTER — OFFICE VISIT (OUTPATIENT)
Dept: INFECTIOUS DISEASES | Facility: CLINIC | Age: 61
End: 2021-04-07
Payer: COMMERCIAL

## 2021-04-07 VITALS
TEMPERATURE: 97.7 F | HEIGHT: 74 IN | SYSTOLIC BLOOD PRESSURE: 140 MMHG | DIASTOLIC BLOOD PRESSURE: 90 MMHG | BODY MASS INDEX: 34.19 KG/M2 | WEIGHT: 266.4 LBS | RESPIRATION RATE: 18 BRPM

## 2021-04-07 DIAGNOSIS — B37.9 CANDIDA GLABRATA INFECTION: ICD-10-CM

## 2021-04-07 DIAGNOSIS — R74.01 TRANSAMINITIS: ICD-10-CM

## 2021-04-07 DIAGNOSIS — Z79.2 LONG TERM (CURRENT) USE OF ANTIBIOTICS: Primary | ICD-10-CM

## 2021-04-07 DIAGNOSIS — M27.2 OSTEOMYELITIS, JAW ACUTE: ICD-10-CM

## 2021-04-07 PROCEDURE — 1036F TOBACCO NON-USER: CPT | Performed by: INTERNAL MEDICINE

## 2021-04-07 PROCEDURE — 3080F DIAST BP >= 90 MM HG: CPT | Performed by: INTERNAL MEDICINE

## 2021-04-07 PROCEDURE — 3008F BODY MASS INDEX DOCD: CPT | Performed by: INTERNAL MEDICINE

## 2021-04-07 PROCEDURE — 3077F SYST BP >= 140 MM HG: CPT | Performed by: INTERNAL MEDICINE

## 2021-04-07 PROCEDURE — 99213 OFFICE O/P EST LOW 20 MIN: CPT | Performed by: INTERNAL MEDICINE

## 2021-04-07 RX ORDER — FLUCONAZOLE 200 MG/1
800 TABLET ORAL DAILY
Qty: 120 TABLET | Refills: 0 | Status: SHIPPED | OUTPATIENT
Start: 2021-04-30 | End: 2021-05-30

## 2021-04-07 NOTE — PATIENT INSTRUCTIONS
Please have your labs completed every 3 weeks   Follow up in the office in 6 weeks  We will call you to schedule your appt

## 2021-04-07 NOTE — PROGRESS NOTES
Progress Note - Infectious Disease   Kristopher James 56 y o  male MRN: [id number]   Encounter: [id number]    Impression/Plan:  1  Acute osteomyelitis of mandile: following dental implant infection   Clinically improving with reduced jaw pain and swelling  ESR and Crp have normalized  · Completed micafungin 100 mg IV Q 24 hours course from 12/9/20 until 3/3/21  · Continue fluconazole 800mg po daily x 6-12 months as tolerated  · Pt completed 7 wk course of ceftriaxone 2g IV Q 24  · Labs every 3 weeks: CBC with differential, creatinine, LFTs  · Return in 6 weeks    2  11/30/20 s/p DEBRIDEMENT OF ANTERIOR MANDIBLE BONE BIOPSY, CULTURES AND REMOVAL OF IMPLANT #27, USE OF PRP  Operative findings consistent with osteomyelitis and tissue cx with growth of Streptococcus salivarius and Streptococcus parasanguinis (from broth culture), and 3+ Candida glabrata  3  Elevated Crp: noted in the setting of osteomyelitis  Both ESR and Crp have normalized      4  Hx of PCN allergy: hives as a child age 5-6  Pt previously tolerated cephalexin about 20 years ago  5  Hx of clindamcyin allergy: hives    6  Leukopenia, transaminitis (mild): this may be due to prolonged antifungal therapy (previously micafungin, now fluconazole)  · Repeat CBC and LFTs    7  DM2 with hyperglycemia: noted A1C of 8 1 on 3/22/21  · Advised importance of diet and exercise for overall health and well being  · Follow-up with PCP     My recommendations were discussed with the patient in detail who verbalized understanding      Antibiotics: flunconazole po    Current Outpatient Medications:     allopurinol (ZYLOPRIM) 100 mg tablet, daily, Disp: , Rfl:     amLODIPine (NORVASC) 5 mg tablet, Take 1 tablet (5 mg total) by mouth daily, Disp: 90 tablet, Rfl: 3    cholecalciferol (VITAMIN D3) 1,000 units tablet, Take 1,000 Units by mouth daily 10,000 units Patient stated that is continuously taking, Disp: , Rfl:     fluconazole (DIFLUCAN) 200 mg tablet, Take 4 tablets (800 mg total) by mouth daily, Disp: 120 tablet, Rfl: 0    glucosamine-chondroitin 500-400 MG tablet, Take 2 tablets by mouth daily , Disp: , Rfl:     lisinopril (ZESTRIL) 20 mg tablet, Take 1 tablet (20 mg total) by mouth daily, Disp: 90 tablet, Rfl: 3    metoprolol-hydrochlorothiazide (LOPRESSOR HCT) 100-25 MG per tablet, Take 0 5 tablets by mouth daily , Disp: , Rfl:     Omega-3 Fatty Acids (Omega-3 Fish Oil) 1000 MG CAPS, Take 2 g by mouth daily, Disp: , Rfl:     rosuvastatin (CRESTOR) 20 MG tablet, Take 1 tablet (20 mg total) by mouth daily, Disp: 90 tablet, Rfl: 3    sertraline (ZOLOFT) 50 mg tablet, Take 25 mg by mouth daily , Disp: , Rfl:     Subjective:  56year-old man here for follow-up osteomyelitis of jaw currently on therapy with fluconazole    He denies jaw pain and swelling  He denies fever, chills, nausea, vomiting, diarrhea, rash  Pt is compliant with fluconazole po but reports dry lips for the past 2 weeks    On 11/30, he underwent debridement of anterior mandible bone with biopsy and cultures   Operative findings included purulent drainage from previous implant site, loose bone and granulation tissue and loose implant  Objective:  Vitals:  Vitals:    04/07/21 1030   BP: 140/90   BP Location: Right arm   Patient Position: Sitting   Cuff Size: Adult   Resp: 18   Temp: 97 7 °F (36 5 °C)   TempSrc: Temporal   Weight: 121 kg (266 lb 6 4 oz)   Height: 6' 2" (1 88 m)     Physical Exam:   General Appearance:  Alert, interactive, nontoxic, no acute distress     Throat: Oral mucosa is moist, no mandibular tenderness    Lungs:   Clear to auscultation bilaterally; no wheezes, respirations unlabored   Heart:  S1, S2, RRR; no murmur   Abdomen:   Soft, non-tender, non-distended   Extremities: No distal leg edema b/l   Neurologic: AAO x3, conversant, fluent speech   Skin: No rash      Labs, Imaging, & Other studies:   All pertinent labs and imaging studies were personally reviewed      Results from last 7 days   Lab Units 04/05/21  1046   SODIUM mmol/L 142   POTASSIUM mmol/L 3 9   CHLORIDE mmol/L 103   CO2 mmol/L 28   BUN mg/dL 13   CREATININE mg/dL 0 92   EGFR ml/min/1 73sq m 89   CALCIUM mg/dL 9 3   AST U/L 38   ALT U/L 72   ALK PHOS U/L 82

## 2021-04-08 DIAGNOSIS — R21 RASH: Primary | ICD-10-CM

## 2021-04-23 ENCOUNTER — IMMUNIZATIONS (OUTPATIENT)
Dept: FAMILY MEDICINE CLINIC | Facility: HOSPITAL | Age: 61
End: 2021-04-23

## 2021-04-23 DIAGNOSIS — Z23 ENCOUNTER FOR IMMUNIZATION: Primary | ICD-10-CM

## 2021-04-23 PROCEDURE — 91300 SARS-COV-2 / COVID-19 MRNA VACCINE (PFIZER-BIONTECH) 30 MCG: CPT

## 2021-04-23 PROCEDURE — 0002A SARS-COV-2 / COVID-19 MRNA VACCINE (PFIZER-BIONTECH) 30 MCG: CPT

## 2021-04-26 ENCOUNTER — APPOINTMENT (OUTPATIENT)
Dept: LAB | Facility: CLINIC | Age: 61
End: 2021-04-26
Payer: COMMERCIAL

## 2021-04-26 LAB
ALBUMIN SERPL BCP-MCNC: 4.3 G/DL (ref 3.5–5)
ALP SERPL-CCNC: 81 U/L (ref 46–116)
ALT SERPL W P-5'-P-CCNC: 42 U/L (ref 12–78)
AST SERPL W P-5'-P-CCNC: 36 U/L (ref 5–45)
BASOPHILS # BLD AUTO: 0.04 THOUSANDS/ΜL (ref 0–0.1)
BASOPHILS NFR BLD AUTO: 1 % (ref 0–1)
BILIRUB DIRECT SERPL-MCNC: 0.11 MG/DL (ref 0–0.2)
BILIRUB SERPL-MCNC: 0.43 MG/DL (ref 0.2–1)
CREAT SERPL-MCNC: 0.88 MG/DL (ref 0.6–1.3)
EOSINOPHIL # BLD AUTO: 0.15 THOUSAND/ΜL (ref 0–0.61)
EOSINOPHIL NFR BLD AUTO: 4 % (ref 0–6)
ERYTHROCYTE [DISTWIDTH] IN BLOOD BY AUTOMATED COUNT: 13.2 % (ref 11.6–15.1)
GFR SERPL CREATININE-BSD FRML MDRD: 92 ML/MIN/1.73SQ M
HCT VFR BLD AUTO: 41.8 % (ref 36.5–49.3)
HGB BLD-MCNC: 14.5 G/DL (ref 12–17)
IMM GRANULOCYTES # BLD AUTO: 0.01 THOUSAND/UL (ref 0–0.2)
IMM GRANULOCYTES NFR BLD AUTO: 0 % (ref 0–2)
LYMPHOCYTES # BLD AUTO: 1.25 THOUSANDS/ΜL (ref 0.6–4.47)
LYMPHOCYTES NFR BLD AUTO: 36 % (ref 14–44)
MCH RBC QN AUTO: 28.7 PG (ref 26.8–34.3)
MCHC RBC AUTO-ENTMCNC: 34.7 G/DL (ref 31.4–37.4)
MCV RBC AUTO: 83 FL (ref 82–98)
MONOCYTES # BLD AUTO: 0.55 THOUSAND/ΜL (ref 0.17–1.22)
MONOCYTES NFR BLD AUTO: 16 % (ref 4–12)
NEUTROPHILS # BLD AUTO: 1.52 THOUSANDS/ΜL (ref 1.85–7.62)
NEUTS SEG NFR BLD AUTO: 43 % (ref 43–75)
NRBC BLD AUTO-RTO: 0 /100 WBCS
PLATELET # BLD AUTO: 198 THOUSANDS/UL (ref 149–390)
PMV BLD AUTO: 11.2 FL (ref 8.9–12.7)
PROT SERPL-MCNC: 7.4 G/DL (ref 6.4–8.2)
RBC # BLD AUTO: 5.06 MILLION/UL (ref 3.88–5.62)
WBC # BLD AUTO: 3.52 THOUSAND/UL (ref 4.31–10.16)

## 2021-04-26 PROCEDURE — 36415 COLL VENOUS BLD VENIPUNCTURE: CPT | Performed by: INTERNAL MEDICINE

## 2021-04-26 PROCEDURE — 80076 HEPATIC FUNCTION PANEL: CPT | Performed by: INTERNAL MEDICINE

## 2021-04-26 PROCEDURE — 82565 ASSAY OF CREATININE: CPT | Performed by: INTERNAL MEDICINE

## 2021-04-26 PROCEDURE — 85025 COMPLETE CBC W/AUTO DIFF WBC: CPT | Performed by: INTERNAL MEDICINE

## 2021-05-10 DIAGNOSIS — M25.511 RIGHT SHOULDER PAIN, UNSPECIFIED CHRONICITY: Primary | ICD-10-CM

## 2021-05-14 DIAGNOSIS — M27.2 OSTEOMYELITIS OF MANDIBLE: Primary | ICD-10-CM

## 2021-05-17 ENCOUNTER — APPOINTMENT (OUTPATIENT)
Dept: LAB | Facility: CLINIC | Age: 61
End: 2021-05-17
Payer: COMMERCIAL

## 2021-05-17 LAB
ALBUMIN SERPL BCP-MCNC: 4.3 G/DL (ref 3.5–5)
ALP SERPL-CCNC: 83 U/L (ref 46–116)
ALT SERPL W P-5'-P-CCNC: 61 U/L (ref 12–78)
AST SERPL W P-5'-P-CCNC: 29 U/L (ref 5–45)
BASOPHILS # BLD AUTO: 0.04 THOUSANDS/ΜL (ref 0–0.1)
BASOPHILS NFR BLD AUTO: 1 % (ref 0–1)
BILIRUB DIRECT SERPL-MCNC: 0.12 MG/DL (ref 0–0.2)
BILIRUB SERPL-MCNC: 0.42 MG/DL (ref 0.2–1)
CREAT SERPL-MCNC: 0.91 MG/DL (ref 0.6–1.3)
EOSINOPHIL # BLD AUTO: 0.14 THOUSAND/ΜL (ref 0–0.61)
EOSINOPHIL NFR BLD AUTO: 4 % (ref 0–6)
ERYTHROCYTE [DISTWIDTH] IN BLOOD BY AUTOMATED COUNT: 13.2 % (ref 11.6–15.1)
GFR SERPL CREATININE-BSD FRML MDRD: 90 ML/MIN/1.73SQ M
HCT VFR BLD AUTO: 42.3 % (ref 36.5–49.3)
HGB BLD-MCNC: 14.3 G/DL (ref 12–17)
IMM GRANULOCYTES # BLD AUTO: 0 THOUSAND/UL (ref 0–0.2)
IMM GRANULOCYTES NFR BLD AUTO: 0 % (ref 0–2)
LYMPHOCYTES # BLD AUTO: 1.29 THOUSANDS/ΜL (ref 0.6–4.47)
LYMPHOCYTES NFR BLD AUTO: 39 % (ref 14–44)
MCH RBC QN AUTO: 28.2 PG (ref 26.8–34.3)
MCHC RBC AUTO-ENTMCNC: 33.8 G/DL (ref 31.4–37.4)
MCV RBC AUTO: 83 FL (ref 82–98)
MONOCYTES # BLD AUTO: 0.46 THOUSAND/ΜL (ref 0.17–1.22)
MONOCYTES NFR BLD AUTO: 14 % (ref 4–12)
NEUTROPHILS # BLD AUTO: 1.42 THOUSANDS/ΜL (ref 1.85–7.62)
NEUTS SEG NFR BLD AUTO: 42 % (ref 43–75)
NRBC BLD AUTO-RTO: 0 /100 WBCS
PLATELET # BLD AUTO: 193 THOUSANDS/UL (ref 149–390)
PMV BLD AUTO: 10.3 FL (ref 8.9–12.7)
PROT SERPL-MCNC: 7.4 G/DL (ref 6.4–8.2)
RBC # BLD AUTO: 5.07 MILLION/UL (ref 3.88–5.62)
WBC # BLD AUTO: 3.35 THOUSAND/UL (ref 4.31–10.16)

## 2021-05-17 PROCEDURE — 82565 ASSAY OF CREATININE: CPT

## 2021-05-17 PROCEDURE — 80076 HEPATIC FUNCTION PANEL: CPT

## 2021-05-17 PROCEDURE — 85025 COMPLETE CBC W/AUTO DIFF WBC: CPT

## 2021-05-17 PROCEDURE — 36415 COLL VENOUS BLD VENIPUNCTURE: CPT

## 2021-05-18 ENCOUNTER — OFFICE VISIT (OUTPATIENT)
Dept: OBGYN CLINIC | Facility: HOSPITAL | Age: 61
End: 2021-05-18
Payer: COMMERCIAL

## 2021-05-18 ENCOUNTER — HOSPITAL ENCOUNTER (OUTPATIENT)
Dept: RADIOLOGY | Facility: HOSPITAL | Age: 61
Discharge: HOME | End: 2021-05-18
Attending: ORTHOPAEDIC SURGERY
Payer: COMMERCIAL

## 2021-05-18 VITALS
BODY MASS INDEX: 34.2 KG/M2 | DIASTOLIC BLOOD PRESSURE: 106 MMHG | HEART RATE: 58 BPM | SYSTOLIC BLOOD PRESSURE: 182 MMHG | HEIGHT: 74 IN

## 2021-05-18 DIAGNOSIS — M25.511 CHRONIC RIGHT SHOULDER PAIN: ICD-10-CM

## 2021-05-18 DIAGNOSIS — G89.29 CHRONIC RIGHT SHOULDER PAIN: ICD-10-CM

## 2021-05-18 DIAGNOSIS — M25.511 RIGHT SHOULDER PAIN, UNSPECIFIED CHRONICITY: ICD-10-CM

## 2021-05-18 DIAGNOSIS — M75.01 ADHESIVE CAPSULITIS OF RIGHT SHOULDER: Primary | ICD-10-CM

## 2021-05-18 PROCEDURE — 73030 X-RAY EXAM OF SHOULDER: CPT

## 2021-05-18 PROCEDURE — 99203 OFFICE O/P NEW LOW 30 MIN: CPT | Performed by: ORTHOPAEDIC SURGERY

## 2021-05-18 PROCEDURE — 20610 DRAIN/INJ JOINT/BURSA W/O US: CPT | Performed by: ORTHOPAEDIC SURGERY

## 2021-05-18 RX ORDER — BUPIVACAINE HYDROCHLORIDE 2.5 MG/ML
2 INJECTION, SOLUTION INFILTRATION; PERINEURAL
Status: COMPLETED | OUTPATIENT
Start: 2021-05-18 | End: 2021-05-18

## 2021-05-18 RX ORDER — LISINOPRIL 10 MG/1
TABLET ORAL
COMMUNITY
Start: 2021-05-08 | End: 2022-01-20 | Stop reason: SINTOL

## 2021-05-18 RX ORDER — BETAMETHASONE SODIUM PHOSPHATE AND BETAMETHASONE ACETATE 3; 3 MG/ML; MG/ML
12 INJECTION, SUSPENSION INTRA-ARTICULAR; INTRALESIONAL; INTRAMUSCULAR; SOFT TISSUE
Status: COMPLETED | OUTPATIENT
Start: 2021-05-18 | End: 2021-05-18

## 2021-05-18 RX ORDER — LIDOCAINE HYDROCHLORIDE 10 MG/ML
2 INJECTION, SOLUTION INFILTRATION; PERINEURAL
Status: COMPLETED | OUTPATIENT
Start: 2021-05-18 | End: 2021-05-18

## 2021-05-18 RX ADMIN — LIDOCAINE HYDROCHLORIDE 2 ML: 10 INJECTION, SOLUTION INFILTRATION; PERINEURAL at 07:58

## 2021-05-18 RX ADMIN — BETAMETHASONE SODIUM PHOSPHATE AND BETAMETHASONE ACETATE 12 MG: 3; 3 INJECTION, SUSPENSION INTRA-ARTICULAR; INTRALESIONAL; INTRAMUSCULAR; SOFT TISSUE at 07:58

## 2021-05-18 RX ADMIN — BUPIVACAINE HYDROCHLORIDE 2 ML: 2.5 INJECTION, SOLUTION INFILTRATION; PERINEURAL at 07:58

## 2021-05-18 NOTE — PROGRESS NOTES
Assessment:  1  Adhesive capsulitis of right shoulder  Large joint arthrocentesis: R subacromial bursa   2  Chronic right shoulder pain  Large joint arthrocentesis: R subacromial bursa       Plan:  The patient demonstrates symptoms consistent with adhesive capsulitis  He should initiate physical therapy  The patient was provided with right subacromial steroid injection  The patient tolerated the procedure well  The patient should follow up in 6 weeks  To do next visit:  Return in about 6 weeks (around 6/29/2021)  The above stated was discussed in layman's terms and the patient expressed understanding  All questions were answered to the patient's satisfaction  Scribe Attestation    I,:  Morro Gruber am acting as a scribe while in the presence of the attending physician :       I,:  Eduardo Brown MD personally performed the services described in this documentation    as scribed in my presence :             Subjective:   Marcy Miranda is a 56 y o  male who presents for initial evaluation of bilateral shoulders  He has had symptoms for over a year  He mentions dislocation of right shoulder about 20 years ago  Today he complains of right > left posterolateral shoulder pain  He rates his right shoulder 0-10/10 and left shoulder 0-5/10  Using arm, over head motions and sleeping on side aggravates while rest alleviates  He does use IBU with benefit  He has had left shoulder steroid injection with lasting benefit with Dr Islas  He denies treatment of right shoulder; injections, physical therapy or surgery          Review of systems negative unless otherwise specified in HPI    Past Medical History:   Diagnosis Date    HTN (hypertension)     Hyperlipidemia     Hypertension        Past Surgical History:   Procedure Laterality Date    COLONOSCOPY      HAND SURGERY      HERNIA REPAIR      IR PICC PLACEMENT SINGLE LUMEN  12/8/2020    VASCULAR SURGERY      WOUND DEBRIDEMENT N/A 11/30/2020 Procedure: DEBRIDEMENT OF ANTERIOR MANDIBLE BONE BIOPSY, CULTURES AND REMOVAL OF IMPLANT #27, USE OF PRP;  Surgeon: Marcy Weems DDS;  Location: [facility];  Service: Maxillofacial       Family History   Problem Relation Age of Onset    Cancer Mother        Social History     Occupational History    Not on file   Tobacco Use    Smoking status: Former Smoker    Smokeless tobacco: Never Used   Substance and Sexual Activity    Alcohol use: Never     Frequency: Never    Drug use: Never    Sexual activity: Not on file         Current Outpatient Medications:     allopurinol (ZYLOPRIM) 100 mg tablet, daily, Disp: , Rfl:     amLODIPine (NORVASC) 5 mg tablet, Take 1 tablet (5 mg total) by mouth daily, Disp: 90 tablet, Rfl: 3    cholecalciferol (VITAMIN D3) 1,000 units tablet, Take 1,000 Units by mouth daily 10,000 units Patient stated that is continuously taking, Disp: , Rfl:     fluconazole (DIFLUCAN) 200 mg tablet, Take 4 tablets (800 mg total) by mouth daily, Disp: 120 tablet, Rfl: 0    glucosamine-chondroitin 500-400 MG tablet, Take 2 tablets by mouth daily , Disp: , Rfl:     lisinopril (ZESTRIL) 10 mg tablet, , Disp: , Rfl:     lisinopril (ZESTRIL) 20 mg tablet, Take 1 tablet (20 mg total) by mouth daily, Disp: 90 tablet, Rfl: 3    metoprolol-hydrochlorothiazide (LOPRESSOR HCT) 100-25 MG per tablet, Take 0 5 tablets by mouth daily , Disp: , Rfl:     Omega-3 Fatty Acids (Omega-3 Fish Oil) 1000 MG CAPS, Take 2 g by mouth daily, Disp: , Rfl:     rosuvastatin (CRESTOR) 20 MG tablet, Take 1 tablet (20 mg total) by mouth daily, Disp: 90 tablet, Rfl: 3    sertraline (ZOLOFT) 50 mg tablet, Take 25 mg by mouth daily , Disp: , Rfl:     Allergies   Allergen Reactions    Clindamycin Hives    Penicillins Hives            Vitals:    05/18/21 0733   BP: (!) 182/106   Pulse: 58       Objective:  Physical exam  · General: Awake, Alert, Oriented  · Eyes: Pupils equal, round and reactive to light  · Heart: regular rate and rhythm  · Lungs: No audible wheezing  · Abdomen: soft                    Ortho Exam  Right shoulder:  Symmetric musculature  No muscle waisting  FF active 100, passive 135  Abduction 90, 100  Very limited IR  Good ER  No pain with resisted ER  Good Supraspinatus strength    Diagnostics, reviewed and taken today if performed as documented: The attending physician has personally reviewed the pertinent films in PACS and interpretation is as follows:  Right shoulder x-ray:  No obvious osseus abnormalities  Procedures, if performed today:    Large joint arthrocentesis: R subacromial bursa  Universal Protocol:  Consent: Verbal consent obtained  Risks and benefits: risks, benefits and alternatives were discussed  Consent given by: patient  Time out: Immediately prior to procedure a "time out" was called to verify the correct patient, procedure, equipment, support staff and site/side marked as required  Timeout called at: 5/18/2021 7:56 AM   Patient understanding: patient states understanding of the procedure being performed  Site marked: the operative site was marked  Patient identity confirmed: verbally with patient    Supporting Documentation  Indications: pain   Procedure Details  Location: shoulder - R subacromial bursa  Needle size: 25 G  Ultrasound guidance: no  Approach: posterolateral  Medications administered: 2 mL bupivacaine 0 25 %; 2 mL lidocaine 1 %; 12 mg betamethasone acetate-betamethasone sodium phosphate 6 (3-3) mg/mL    Patient tolerance: patient tolerated the procedure well with no immediate complications  Dressing:  Sterile dressing applied            Portions of the record may have been created with voice recognition software  Occasional wrong word or "sound a like" substitutions may have occurred due to the inherent limitations of voice recognition software  Read the chart carefully and recognize, using context, where substitutions have occurred

## 2021-05-19 ENCOUNTER — OFFICE VISIT (OUTPATIENT)
Dept: INFECTIOUS DISEASES | Facility: CLINIC | Age: 61
End: 2021-05-19
Payer: COMMERCIAL

## 2021-05-19 VITALS
RESPIRATION RATE: 18 BRPM | DIASTOLIC BLOOD PRESSURE: 90 MMHG | SYSTOLIC BLOOD PRESSURE: 146 MMHG | HEIGHT: 74 IN | BODY MASS INDEX: 33.55 KG/M2 | WEIGHT: 261.4 LBS | HEART RATE: 56 BPM | TEMPERATURE: 97.4 F

## 2021-05-19 DIAGNOSIS — R73.01 IMPAIRED FASTING GLUCOSE: Primary | ICD-10-CM

## 2021-05-19 DIAGNOSIS — M27.2 OSTEOMYELITIS, JAW ACUTE: ICD-10-CM

## 2021-05-19 DIAGNOSIS — D72.819 LEUKOPENIA, UNSPECIFIED TYPE: ICD-10-CM

## 2021-05-19 DIAGNOSIS — B37.9 CANDIDA GLABRATA INFECTION: ICD-10-CM

## 2021-05-19 DIAGNOSIS — Z79.2 LONG TERM (CURRENT) USE OF ANTIBIOTICS: ICD-10-CM

## 2021-05-19 DIAGNOSIS — E78.2 MIXED HYPERLIPIDEMIA: ICD-10-CM

## 2021-05-19 PROCEDURE — 99213 OFFICE O/P EST LOW 20 MIN: CPT | Performed by: INTERNAL MEDICINE

## 2021-05-19 PROCEDURE — 3008F BODY MASS INDEX DOCD: CPT | Performed by: INTERNAL MEDICINE

## 2021-05-19 NOTE — PROGRESS NOTES
Progress Note - Infectious Disease   Balwinder Hernandes 56 y o  male MRN: [id number]   Encounter: [id number]    Impression/Plan:  1  Acute osteomyelitis of mandile: following dental implant infection   Clinically improving with reduced jaw pain and swelling    · Completed micafungin 100mg IV Q 24 hours course from 12/9/20 until 3/3/21  · Pt completed 7 wk course of ceftriaxone 2g IV Q 24  · Complete 6 month antifungal course with fluconazole 800mg po daily thru end of this month  · Labs prior to next appt: CBC diff, creatinine, ESR, Crp  · Return in 3-4 weeks for follow-up     2  11/30/20 s/p DEBRIDEMENT OF ANTERIOR MANDIBLE BONE BIOPSY, CULTURES AND REMOVAL OF IMPLANT #27, USE OF PRP  Operative findings consistent with osteomyelitis and tissue cx with growth of Streptococcus salivarius and Streptococcus parasanguinis (from broth culture), and 3+ Candida glabrata      3  Elevated Crp: noted in the setting of osteomyelitis  Both ESR and Crp have normalized       4  Hx of PCN allergy: hives as a child age 5-6  Pt previously tolerated cephalexin about 20 years ago      5  Hx of clindamcyin allergy: hives     6  Leukopenia (mild): this may be due to prolonged antifungal therapy (previously micafungin, now fluconazole)  · Repeat CBC prior to next appt     7  Diabetes mellitus type 2 with hyperglycemia: noted A1C of 8 1 on 3/22/21  · Advised importance of diet and exercise for overall health and well being  · Follow-up with PCP     My recommendations were discussed with the patient in detail who verbalized understanding      Antibiotics: fluconazole    Current Outpatient Medications:     allopurinol (ZYLOPRIM) 100 mg tablet, daily, Disp: , Rfl:     amLODIPine (NORVASC) 5 mg tablet, Take 1 tablet (5 mg total) by mouth daily, Disp: 90 tablet, Rfl: 3    cholecalciferol (VITAMIN D3) 1,000 units tablet, Take 5,000 Units by mouth daily 10,000 units Patient stated that is continuously taking, Disp: , Rfl:     fluconazole (DIFLUCAN) 200 mg tablet, Take 4 tablets (800 mg total) by mouth daily, Disp: 120 tablet, Rfl: 0    glucosamine-chondroitin 500-400 MG tablet, Take 2 tablets by mouth daily , Disp: , Rfl:     lisinopril (ZESTRIL) 10 mg tablet, , Disp: , Rfl:     lisinopril (ZESTRIL) 20 mg tablet, Take 1 tablet (20 mg total) by mouth daily, Disp: 90 tablet, Rfl: 3    metoprolol-hydrochlorothiazide (LOPRESSOR HCT) 100-25 MG per tablet, Take 0 5 tablets by mouth daily , Disp: , Rfl:     Omega-3 Fatty Acids (Omega-3 Fish Oil) 1000 MG CAPS, Take 2 g by mouth daily, Disp: , Rfl:     rosuvastatin (CRESTOR) 20 MG tablet, Take 1 tablet (20 mg total) by mouth daily, Disp: 90 tablet, Rfl: 3    sertraline (ZOLOFT) 50 mg tablet, Take 25 mg by mouth daily , Disp: , Rfl:     Subjective:  56year-old man here for follow-up osteomyelitis of jaw who is compliant with fluconazole    He reports ankle/feet swelling and dry lips over the past 4 weeks and thinks it may be due to fluconazole  He denies jaw pain and swelling  He denies fever, chills, nausea, vomiting, diarrhea, rash  Pt is compliant with fluconazole po but reports dry lips for the past 4 weeks    On 11/30, he underwent debridement of anterior mandible bone with biopsy and cultures  Operative findings included purulent drainage from previous implant site, loose bone and granulation tissue and loose implant  Objective:  Vitals:  Vitals:    05/19/21 0923   BP: 146/90   BP Location: Right arm   Patient Position: Sitting   Cuff Size: Adult   Pulse: 56   Resp: 18   Temp: (!) 97 4 °F (36 3 °C)   TempSrc: Temporal   Weight: 119 kg (261 lb 6 4 oz)   Height: 6' 2" (1 88 m)     Physical Exam:   General Appearance:  Alert, interactive, nontoxic, no acute distress  Throat: Oral mucosa is moist, upper dentures intact   Pt has no lower teeth- dry, chapped lips noted   Lungs:   Clear to auscultation bilaterally; no wheezes, respirations unlabored   Heart:  S1, S2, RRR; no murmur   Abdomen: Obese, soft, non-tender, non-distended   Extremities: Trace distal leg edema b/l; non-pitting   Neurologic: AAO x3, conversant, fluent speech   Skin: No rash     Labs, Imaging, & Other studies:   All pertinent labs, cultures and imaging studies were personally reviewed  Results from last 7 days   Lab Units 05/17/21  1016   WBC Thousand/uL 3 35*   HEMOGLOBIN g/dL 14 3   PLATELETS Thousands/uL 193     Results from last 7 days   Lab Units 05/17/21  1016   CREATININE mg/dL 0 91   EGFR ml/min/1 73sq m 90   AST U/L 29   ALT U/L 61   ALK PHOS U/L 83

## 2021-05-25 DIAGNOSIS — M75.01 ADHESIVE CAPSULITIS OF RIGHT SHOULDER: Primary | ICD-10-CM

## 2021-05-25 RX ORDER — NABUMETONE 750 MG/1
750 TABLET, FILM COATED ORAL 2 TIMES DAILY
Qty: 60 TABLET | Refills: 1 | Status: SHIPPED | OUTPATIENT
Start: 2021-05-25 | End: 2021-07-19

## 2021-05-27 ENCOUNTER — EVALUATION (OUTPATIENT)
Dept: PHYSICAL THERAPY | Facility: CLINIC | Age: 61
End: 2021-05-27
Payer: COMMERCIAL

## 2021-05-27 DIAGNOSIS — M75.01 ADHESIVE CAPSULITIS OF RIGHT SHOULDER: ICD-10-CM

## 2021-05-27 DIAGNOSIS — G89.29 CHRONIC RIGHT SHOULDER PAIN: ICD-10-CM

## 2021-05-27 DIAGNOSIS — M25.511 CHRONIC RIGHT SHOULDER PAIN: ICD-10-CM

## 2021-05-27 PROCEDURE — 97162 PT EVAL MOD COMPLEX 30 MIN: CPT | Performed by: PHYSICAL THERAPIST

## 2021-05-27 NOTE — PROGRESS NOTES
PT Evaluation     Today's date: 2021  Patient name: Avery Luna  : 1960  MRN: [id number]  Referring provider: Rosio Meza MD  Dx:   Encounter Diagnosis     ICD-10-CM    1  Adhesive capsulitis of right shoulder  M75 01 Ambulatory referral to Physical Therapy   2  Chronic right shoulder pain  M25 511 Ambulatory referral to Physical Therapy    G89 29                   Assessment  Assessment details: Avery Luna is a pleasant 56 y o  male who presents with R shoulder pain resulting, greater than 3 months, resulting in difficulty with ADLs and functional activities  He demonstrates significant limitations in AROM and strength deficits moreso related to pain  He was educated at length regarding the phases of adhesive capsulitis  he was agreeable to POC 2x/week to address these deficits and allow improved function  The patient's greatest concerns are the pain he is experiencing, worry over not knowing what's wrong, concern at no signs of improvement and fear of not being able to keep active  The primary movement problem is R shoulder hypomobility and postural dysfunction, resulting in pathoanatomical symptoms of R chronic shoulder pain and adhesive capusulitis and limiting his ability to care for self, drive, exercise or recreation, lift, perform household chores, perform yard work, reach overhead, sleep, turn to look over shoulder and wash behind the back  No further referral appears necessary at this time based upon examination results  Primary Impairments:  1) shoulder hypomobility  2) postural dysfunction    Etiologic factors include prolonged inactivity secondary to recovering from osteomyelitis last winter      Impairments: abnormal coordination, abnormal muscle firing, abnormal muscle tone, abnormal or restricted ROM, abnormal movement, activity intolerance, impaired physical strength, lacks appropriate home exercise program, pain with function, scapular dyskinesis and poor posture Symptom irritability: moderateUnderstanding of Dx/Px/POC: good   Prognosis: good  Prognosis details: Positive prognostic indicators include positive attitude toward recovery, good understanding of diagnosis and treatment plan options and absence of peripheralization  Negative prognostic indicators include chronicity of symptoms, hypertension and multiple concurrent orthopedic problems  Goals  ST  Patient will demonstrate R shoulder abduction AROM > 100 degrees in 4 weeks  2  Patient will be able to tolerate reaching behind head without increase in pain in 4 weeks  LT  Patient will be able to       Patient will be independent with home exercise program    Patient will be able to manage symptoms independently  Plan  Plan details: Prognosis above is given PT services 2x/week tapering to 1x/week over the next 2 months and home program adherence  Patient would benefit from: skilled physical therapy  Planned modality interventions: thermotherapy: hydrocollator packs, cryotherapy and TENS  Planned therapy interventions: activity modification, joint mobilization, manual therapy, motor coordination training, neuromuscular re-education, patient education, self care, therapeutic activities, therapeutic exercise, graded activity, home exercise program, behavior modification, functional ROM exercises, flexibility, stretching, strengthening, balance/weight bearing training and postural training  Frequency: 2x week  Duration in weeks: 8  Treatment plan discussed with: patient and referring physician        Subjective Evaluation    History of Present Illness  Mechanism of injury: Patient reports that he had osteomylolitis of the jaw last winter and he sat around a lot and thinks that  to this R shoulder pain  He notes that 40 years ago he had a R shoulder dislocation, with pain for a few weeks afterwards  He notes that the L shoulder pain started 4-5 years ago   He had an injection in his [facility] joint  He had an injection in it 2 years ago  He notes that it didn't bother him again until last winter while on the IV medications  He notes that he started with R shoulder pain last fall  He notes he is ambidextrous  He notes that any reaching in R shoulder is painful  He notes that he started the new anti-inflammatories last night He notes that he can now reach his elbow to the level of his shoulder  He denied any relief with injection  He denies ice/heat  He notes 1x/week IBU  He denies numbness/tingling  He notes about latency of 30s of pain after reaching  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  He notes that he is working from home currently  He sells flower-bulbs  He notes that he enjoys cooking  He enjoys fishing  He enjoys spending time with his dog and wife  Pain  Current pain ratin  At best pain ratin  At worst pain ratin  Location: R posterior cuff, lateral shoulder  Quality: tight, dull ache and sharp  Relieving factors: medications and rest  Aggravating factors: overhead activity and lifting  Progression: worsening    Treatments  Previous treatment: injection treatment  Patient Goals  Patient goals for therapy: increased motion, decreased pain, increased strength, independence with ADLs/IADLs and return to sport/leisure activities  Patient goal: get more movement,         Objective     Static Posture     Comments  Bilateral rounded shoulders and increased thoracic kyphosis    Active Range of Motion   Left Shoulder   Flexion: 145 degrees   Abduction: 140 degrees   External rotation BTH: C7   Internal rotation BTB: T8     Right Shoulder   Flexion: 105 degrees with pain  Abduction: 90 degrees with pain  External rotation BTH: Active external rotation behind the head: occiput with compensation and pain  Internal rotation BTB: Active internal rotation behind the back: R PSIS       Strength/Myotome Testing     Left Shoulder   Normal muscle strength    Right Shoulder     Planes of Motion   Flexion: 4+   Abduction: 4   External rotation at 90°: 5   Internal rotation at 0°: 5       Flowsheet Rows      Most Recent Value   PT/OT G-Codes   Current Score  45   Projected Score  65   FOTO information reviewed  Yes              Diagnosis: R shoulder adhesive capsulitis  Precautions: HTN, pre diabetic; recent hx osteomyelitis of jaw      Manuals 5/27            Inf/AP/lateral glide GH joint  Gr  III-IV             PROM R shoulder                                       Neuro Re-Ed                          Scapular retractions             Rows, T                                                                 Ther Ex             Pulleys             Wall Climb             Wall Slides             Ball Table Flexion                          Olya Mathew                                       Ther Activity                                       Gait Training                                       Modalities             HP

## 2021-06-01 ENCOUNTER — OFFICE VISIT (OUTPATIENT)
Dept: PHYSICAL THERAPY | Facility: CLINIC | Age: 61
End: 2021-06-01
Payer: COMMERCIAL

## 2021-06-01 DIAGNOSIS — M75.01 ADHESIVE CAPSULITIS OF RIGHT SHOULDER: Primary | ICD-10-CM

## 2021-06-01 DIAGNOSIS — G89.29 CHRONIC RIGHT SHOULDER PAIN: ICD-10-CM

## 2021-06-01 DIAGNOSIS — M25.511 CHRONIC RIGHT SHOULDER PAIN: ICD-10-CM

## 2021-06-01 PROCEDURE — 97110 THERAPEUTIC EXERCISES: CPT | Performed by: PHYSICAL THERAPIST

## 2021-06-01 PROCEDURE — 97112 NEUROMUSCULAR REEDUCATION: CPT | Performed by: PHYSICAL THERAPIST

## 2021-06-01 PROCEDURE — 97110 THERAPEUTIC EXERCISES: CPT

## 2021-06-01 NOTE — PROGRESS NOTES
Daily Note     Today's date: 2021  Patient name: Fern Cr  : 1960  MRN: [id number]  Referring provider: Sandi Nevarez MD  Dx:   Encounter Diagnosis     ICD-10-CM    1  Adhesive capsulitis of right shoulder  M75 01    2  Chronic right shoulder pain  M25 511     G89 29                   Subjective: Patient reports that he felt the best he has in a while this weekend while camping  He notes pain when he was reaching into the tackle box while fishing  Objective: See treatment diary below      Assessment: Tolerated treatment well  Patient would benefit from continued PT  He tolerated initiation of AAROM this date; He was educated on shoulder flexion/abduction AAROM with propping arm on back of couch and walking back vs  Ball on table  He was able to tolerate progression of wall slides with lift off this date, with restriction of lats subjectively reported at end-range  Plan: Continue per plan of care        Diagnosis: R shoulder adhesive capsulitis  Precautions: HTN, pre diabetic; recent hx osteomyelitis of jaw      Manuals            Inf/AP/lateral glide GH joint  Gr  III-IV             PROM R shoulder                                       Neuro Re-Ed                          Scapular retractions             Rows, T                                                                 Ther Ex             Pulleys  3'/3'           Wall Climb             Wall Slides  With lift off 5"x10           Ball Table Flexion  5"x20 flx, scaptio ea                        Cane AAROM  Flx, abd, ER 5"x10-15 ea           IR stretch (towel, wall, sleeper?)             Doorway pec stretch             Thoracic extension seated 1/2 FR             Ther Activity                                       Gait Training                                       Modalities             HP

## 2021-06-04 ENCOUNTER — OFFICE VISIT (OUTPATIENT)
Dept: PHYSICAL THERAPY | Facility: CLINIC | Age: 61
End: 2021-06-04
Payer: COMMERCIAL

## 2021-06-04 DIAGNOSIS — G89.29 CHRONIC RIGHT SHOULDER PAIN: ICD-10-CM

## 2021-06-04 DIAGNOSIS — M25.511 CHRONIC RIGHT SHOULDER PAIN: ICD-10-CM

## 2021-06-04 DIAGNOSIS — M75.01 ADHESIVE CAPSULITIS OF RIGHT SHOULDER: Primary | ICD-10-CM

## 2021-06-04 PROCEDURE — 97110 THERAPEUTIC EXERCISES: CPT

## 2021-06-04 PROCEDURE — 97112 NEUROMUSCULAR REEDUCATION: CPT

## 2021-06-04 NOTE — PROGRESS NOTES
Daily Note     Today's date: 2021  Patient name: Hal Robins  : 1960  MRN: [id number]  Referring provider: Heaven Perez MD  Dx:   Encounter Diagnosis     ICD-10-CM    1  Adhesive capsulitis of right shoulder  M75 01    2  Chronic right shoulder pain  M25 511     G89 29                   Subjective: Pt states he continues to have off and on pain  Pt states he has a lot of achy pain  Objective: See treatment diary below  Assessment: Tolerated treatment well with no increase in pain and moderate fatigue  Pt demonstrates good form with cane exercises as he is able to obtain improved ROM with end reps  Pt had less pain at his end range today   Patient would benefit from continued PT  Plan: Continue per plan of care        Diagnosis: R shoulder adhesive capsulitis  Precautions: HTN, pre diabetic; recent hx osteomyelitis of jaw      Manuals           Inf/AP/lateral glide GH joint  Gr  III-IV             PROM R shoulder                                       Neuro Re-Ed                          Scapular retractions             Rows, T                                                                 Ther Ex             Pulleys  3'/3' 3'/3'           Wall Climb             Wall Slides  With lift off 5"x10 With lift off 5" x 15           Ball Table Flexion  5"x20 flx, scaptio ea 5" x 20 flex, scap                        Cane AAROM  Flx, abd, ER 5"x10-15 ea Flx, abd, ER 5" x 10 ea           IR stretch (towel, wall, sleeper?)             Doorway pec stretch             Thoracic extension seated 1/2 FR             Ther Activity                                       Gait Training                                       Modalities             HP

## 2021-06-07 ENCOUNTER — APPOINTMENT (OUTPATIENT)
Dept: LAB | Facility: MEDICAL CENTER | Age: 61
End: 2021-06-07
Payer: COMMERCIAL

## 2021-06-07 DIAGNOSIS — R73.01 IMPAIRED FASTING GLUCOSE: ICD-10-CM

## 2021-06-07 DIAGNOSIS — M27.2 OSTEOMYELITIS, JAW ACUTE: ICD-10-CM

## 2021-06-07 LAB
BASOPHILS # BLD AUTO: 0.05 THOUSANDS/ΜL (ref 0–0.1)
BASOPHILS NFR BLD AUTO: 1 % (ref 0–1)
CREAT SERPL-MCNC: 1.02 MG/DL (ref 0.6–1.3)
CRP SERPL QL: <3 MG/L
EOSINOPHIL # BLD AUTO: 0.14 THOUSAND/ΜL (ref 0–0.61)
EOSINOPHIL NFR BLD AUTO: 4 % (ref 0–6)
ERYTHROCYTE [DISTWIDTH] IN BLOOD BY AUTOMATED COUNT: 13 % (ref 11.6–15.1)
ERYTHROCYTE [SEDIMENTATION RATE] IN BLOOD: 6 MM/HOUR (ref 0–19)
EST. AVERAGE GLUCOSE BLD GHB EST-MCNC: 140 MG/DL
GFR SERPL CREATININE-BSD FRML MDRD: 78 ML/MIN/1.73SQ M
HBA1C MFR BLD: 6.5 %
HCT VFR BLD AUTO: 43.6 % (ref 36.5–49.3)
HGB BLD-MCNC: 15 G/DL (ref 12–17)
IMM GRANULOCYTES # BLD AUTO: 0.01 THOUSAND/UL (ref 0–0.2)
IMM GRANULOCYTES NFR BLD AUTO: 0 % (ref 0–2)
LYMPHOCYTES # BLD AUTO: 1.33 THOUSANDS/ΜL (ref 0.6–4.47)
LYMPHOCYTES NFR BLD AUTO: 38 % (ref 14–44)
MCH RBC QN AUTO: 28.8 PG (ref 26.8–34.3)
MCHC RBC AUTO-ENTMCNC: 34.4 G/DL (ref 31.4–37.4)
MCV RBC AUTO: 84 FL (ref 82–98)
MONOCYTES # BLD AUTO: 0.42 THOUSAND/ΜL (ref 0.17–1.22)
MONOCYTES NFR BLD AUTO: 12 % (ref 4–12)
NEUTROPHILS # BLD AUTO: 1.51 THOUSANDS/ΜL (ref 1.85–7.62)
NEUTS SEG NFR BLD AUTO: 45 % (ref 43–75)
NRBC BLD AUTO-RTO: 0 /100 WBCS
PLATELET # BLD AUTO: 184 THOUSANDS/UL (ref 149–390)
PMV BLD AUTO: 11.2 FL (ref 8.9–12.7)
RBC # BLD AUTO: 5.21 MILLION/UL (ref 3.88–5.62)
WBC # BLD AUTO: 3.46 THOUSAND/UL (ref 4.31–10.16)

## 2021-06-07 PROCEDURE — 85025 COMPLETE CBC W/AUTO DIFF WBC: CPT

## 2021-06-07 PROCEDURE — 85652 RBC SED RATE AUTOMATED: CPT

## 2021-06-07 PROCEDURE — 86140 C-REACTIVE PROTEIN: CPT

## 2021-06-07 PROCEDURE — 36415 COLL VENOUS BLD VENIPUNCTURE: CPT

## 2021-06-07 PROCEDURE — 82565 ASSAY OF CREATININE: CPT

## 2021-06-07 PROCEDURE — 83036 HEMOGLOBIN GLYCOSYLATED A1C: CPT

## 2021-06-09 ENCOUNTER — OFFICE VISIT (OUTPATIENT)
Dept: PHYSICAL THERAPY | Facility: CLINIC | Age: 61
End: 2021-06-09
Payer: COMMERCIAL

## 2021-06-09 ENCOUNTER — OFFICE VISIT (OUTPATIENT)
Dept: INFECTIOUS DISEASES | Facility: CLINIC | Age: 61
End: 2021-06-09
Payer: COMMERCIAL

## 2021-06-09 VITALS
RESPIRATION RATE: 14 BRPM | SYSTOLIC BLOOD PRESSURE: 130 MMHG | OXYGEN SATURATION: 96 % | WEIGHT: 259 LBS | TEMPERATURE: 97.3 F | HEART RATE: 61 BPM | BODY MASS INDEX: 33.25 KG/M2 | DIASTOLIC BLOOD PRESSURE: 80 MMHG

## 2021-06-09 DIAGNOSIS — D72.819 LEUKOPENIA, UNSPECIFIED TYPE: ICD-10-CM

## 2021-06-09 DIAGNOSIS — M75.01 ADHESIVE CAPSULITIS OF RIGHT SHOULDER: Primary | ICD-10-CM

## 2021-06-09 DIAGNOSIS — G89.29 CHRONIC RIGHT SHOULDER PAIN: ICD-10-CM

## 2021-06-09 DIAGNOSIS — M25.511 CHRONIC RIGHT SHOULDER PAIN: ICD-10-CM

## 2021-06-09 DIAGNOSIS — B37.9 CANDIDA GLABRATA INFECTION: ICD-10-CM

## 2021-06-09 DIAGNOSIS — M27.2 OSTEOMYELITIS, JAW ACUTE: Primary | ICD-10-CM

## 2021-06-09 PROCEDURE — 97112 NEUROMUSCULAR REEDUCATION: CPT | Performed by: PHYSICAL THERAPIST

## 2021-06-09 PROCEDURE — 3079F DIAST BP 80-89 MM HG: CPT | Performed by: INTERNAL MEDICINE

## 2021-06-09 PROCEDURE — 3075F SYST BP GE 130 - 139MM HG: CPT | Performed by: INTERNAL MEDICINE

## 2021-06-09 PROCEDURE — 1036F TOBACCO NON-USER: CPT | Performed by: INTERNAL MEDICINE

## 2021-06-09 PROCEDURE — 99213 OFFICE O/P EST LOW 20 MIN: CPT | Performed by: INTERNAL MEDICINE

## 2021-06-09 PROCEDURE — 97110 THERAPEUTIC EXERCISES: CPT | Performed by: PHYSICAL THERAPIST

## 2021-06-09 NOTE — PROGRESS NOTES
Daily Note     Today's date: 2021  Patient name: Moon Childs  : 1960  MRN: [id number]  Referring provider: Trini Mcrae MD  Dx:   Encounter Diagnosis     ICD-10-CM    1  Adhesive capsulitis of right shoulder  M75 01    2  Chronic right shoulder pain  M25 511     G89 29                   Subjective: He notes that he has improved ROM  He notes that he still has pain at end-range  He notes that he is sleeping on his side comfortably now  He notes that he does have about 40 minutes of improvement in pain after PT  Objective: See treatment diary below      Assessment: Tolerated treatment well  Patient would benefit from continued PT  He tolerated greater ROM on pulleys when cued for thumb up position  He tolerated doorway pec stretch well this date  He had improved ROM of IR after wall stretch this date  He tolerated ER stretch well this date  Updated HEP emailed to patient  Plan: Continue per plan of care  Progress scapular strengthening as able        Diagnosis: R shoulder adhesive capsulitis  Precautions: HTN, pre diabetic; recent hx osteomyelitis of jaw      Manuals          Inf/AP/lateral glide GH joint  Gr  III-IV             PROM R shoulder                                       Neuro Re-Ed                          Scapular retractions             Rows, T                                                                 Ther Ex             Pulleys  3'/3' 3'/3'  3'/3'         Wall Climb    HEP         Wall Slides  With lift off 5"x10 With lift off 5" x 15           Ball Table Flexion  5"x20 flx, scaptio ea 5" x 20 flex, scap  5"x20 flx/scap                      Cane AAROM  Flx, abd, ER 5"x10-15 ea Flx, abd, ER 5" x 10 ea           IR stretch (towel, wall, sleeper?)    Wall 10x10"         Doorway pec stretch    10x10"         Thoracic extension seated 1/2 FR    20x         Inclined table ER stretch    10x10"         Ther Activity                                       Gait Training                                       Modalities             HP

## 2021-06-09 NOTE — PROGRESS NOTES
Progress Note - Infectious Disease   Jennyfer Rice 56 y o  male MRN: [id number]   Encounter: [id number]    Impression/Plan:  1  Acute osteomyelitis of mandile: following dental implant infection   Clinically resolved with resolution of jaw pain and swelling  ESR and Crp have normalized  · Completed micafungin 100mg IV Q 24 hours course from 12/9/20 until 3/3/21  · Pt completed 7 wk course of ceftriaxone thru 2g IV Q 24  · Pt then completed 6 month antifungal course with fluconazole 800mg po daily thru end of 5/31/21  · Discharge from ID office     2  11/30/20 s/p DEBRIDEMENT OF ANTERIOR MANDIBLE BONE BIOPSY, CULTURES AND REMOVAL OF IMPLANT #27, USE OF PRP  Operative findings consistent with osteomyelitis and tissue cx with growth of Streptococcus salivarius and Streptococcus parasanguinis (from broth culture), and 3+ Candida glabrata      3  Hx of PCN allergy: hives as a child age 5-6  Pt previously tolerated cephalexin about 20 years ago      4  Hx of clindamcyin allergy: hives     5  Leukopenia (mild): Could have been due to prolonged antibiotic therapy which was recently completed  Anticipate that WBC will normalize within next few weeks  · Follow with PCP      6  Diabetes mellitus type 2 with hyperglycemia: noted improved HbA1C of 6 5 on 6/7/21  · Advised importance of diet and exercise for overall health and well being  · Follow with PCP     My recommendations were discussed with the patient in detail who verbalized understanding      Antibiotics: none    Current Outpatient Medications:     allopurinol (ZYLOPRIM) 100 mg tablet, daily, Disp: , Rfl:     amLODIPine (NORVASC) 5 mg tablet, Take 1 tablet (5 mg total) by mouth daily, Disp: 90 tablet, Rfl: 3    cholecalciferol (VITAMIN D3) 1,000 units tablet, Take 5,000 Units by mouth daily 10,000 units Patient stated that is continuously taking, Disp: , Rfl:     glucosamine-chondroitin 500-400 MG tablet, Take 2 tablets by mouth daily , Disp: , Rfl:    lisinopril (ZESTRIL) 10 mg tablet, , Disp: , Rfl:     lisinopril (ZESTRIL) 20 mg tablet, Take 1 tablet (20 mg total) by mouth daily, Disp: 90 tablet, Rfl: 3    metoprolol-hydrochlorothiazide (LOPRESSOR HCT) 100-25 MG per tablet, Take 0 5 tablets by mouth daily , Disp: , Rfl:     nabumetone (RELAFEN) 750 mg tablet, Take 1 tablet (750 mg total) by mouth 2 (two) times a day for 60 doses, Disp: 60 tablet, Rfl: 1    Omega-3 Fatty Acids (Omega-3 Fish Oil) 1000 MG CAPS, Take 2 g by mouth daily, Disp: , Rfl:     rosuvastatin (CRESTOR) 20 MG tablet, Take 1 tablet (20 mg total) by mouth daily, Disp: 90 tablet, Rfl: 3    sertraline (ZOLOFT) 50 mg tablet, Take 25 mg by mouth daily , Disp: , Rfl:     Subjective:  56year-old man here for follow-up osteomyelitis of jaw has completed his course of fluconazole on 5/31    He denies jaw pain and swelling  He denies fever, chills, nausea, vomiting, diarrhea, rash    He reports dry/chapped lips are improving  On 11/30, he underwent debridement of anterior mandible bone with biopsy and cultures  Operative findings included purulent drainage from previous implant site, loose bone and granulation tissue and loose implant  Objective:  Vitals:  Vitals:    06/09/21 0900   BP: 130/80   BP Location: Left arm   Cuff Size: Standard   Pulse: 61   Resp: 14   Temp: (!) 97 3 °F (36 3 °C)   TempSrc: Temporal   SpO2: 96%   Weight: 117 kg (259 lb)     Physical Exam:   General Appearance:  Alert, interactive, nontoxic, no acute distress  Throat: Oral mucosa is moist, upper dentures intact   Pt has no lower teeth   Lungs:   Clear to auscultation bilaterally; no wheezes, respirations unlabored   Heart:  S1, S2, RRR; no murmur   Abdomen:   Soft, non-tender, non-distended   Extremities: No distal leg edema b/l   Neurologic: AAO x3, conversant, fluent speech   Skin: No rash     Labs, Imaging, & Other studies:   All pertinent labs, cultures and imaging studies were personally reviewed  Results from last 7 days   Lab Units 06/07/21  1053   WBC Thousand/uL 3 46*   HEMOGLOBIN g/dL 15 0   PLATELETS Thousands/uL 184     Results from last 7 days   Lab Units 06/07/21  1053   CREATININE mg/dL 1 02   EGFR ml/min/1 73sq m 78     Results from last 7 days   Lab Units 06/07/21  1053   CRP mg/L <3 0     ESR 6

## 2021-06-11 ENCOUNTER — APPOINTMENT (OUTPATIENT)
Dept: PHYSICAL THERAPY | Facility: CLINIC | Age: 61
End: 2021-06-11
Payer: COMMERCIAL

## 2021-06-15 ENCOUNTER — OFFICE VISIT (OUTPATIENT)
Dept: PHYSICAL THERAPY | Facility: CLINIC | Age: 61
End: 2021-06-15
Payer: COMMERCIAL

## 2021-06-15 DIAGNOSIS — M25.511 CHRONIC RIGHT SHOULDER PAIN: ICD-10-CM

## 2021-06-15 DIAGNOSIS — M75.01 ADHESIVE CAPSULITIS OF RIGHT SHOULDER: Primary | ICD-10-CM

## 2021-06-15 DIAGNOSIS — G89.29 CHRONIC RIGHT SHOULDER PAIN: ICD-10-CM

## 2021-06-15 PROCEDURE — 97112 NEUROMUSCULAR REEDUCATION: CPT | Performed by: PHYSICAL THERAPIST

## 2021-06-15 PROCEDURE — 97110 THERAPEUTIC EXERCISES: CPT | Performed by: PHYSICAL THERAPIST

## 2021-06-15 NOTE — PROGRESS NOTES
Daily Note     Today's date: 6/15/2021  Patient name: Gurwinder Current  : 1960  MRN: [id number]  Referring provider: Renu Ruvalcaba MD  Dx:   Encounter Diagnosis     ICD-10-CM    1  Adhesive capsulitis of right shoulder  M75 01    2  Chronic right shoulder pain  M25 511     G89 29                   Subjective: Patient reports that he is not compliant with HEP  He notes minimal sleep due to pain  He notes that he does get relief for about 1-2 days after PT sessions  Objective: See treatment diary below      Assessment: Tolerated treatment well  Patient would benefit from continued PT  He was challenged with modified retraction/row/T/Y/I  He tolerated cane extension/adduction stretch well for progression of IR behind back as this remains most challenging for him  He was provided updated HEP to improve consistency with compliance to HEP  He demonstrated improved AROM post session  Plan: Continue per plan of care  Trial sidelying AROM as able        Diagnosis: R shoulder adhesive capsulitis  Precautions: HTN, pre diabetic; recent hx osteomyelitis of jaw      Manuals 5/27 6/1 6/4 6/9 6/15        Inf/AP/lateral glide GH joint  Gr  III-IV             PROM R shoulder                                       Neuro Re-Ed                          Scapular retractions             Rows, T             Bent over incline bench supported retractoins     5"x20        Bent over incline bench T, Y, I, row     15xea                                  Ther Ex             UBE     6' retro        Pulleys  3'/3' 3'/3'  3'/3'         Wall Climb    HEP         Wall Slides  With lift off 5"x10 With lift off 5" x 15           Ball Table Flexion  5"x20 flx, scaptio ea 5" x 20 flex, scap  5"x20 flx/scap         Cane extension, adduction stretch     10x10"        Cane AAROM  Flx, abd, ER 5"x10-15 ea Flx, abd, ER 5" x 10 ea           IR stretch (towel, wall, sleeper?)    Wall 10x10"         Doorway pec stretch    10x10" Doorway ER stretch     10x10"        Thoracic extension seated 1/2 FR    20x         Inclined table ER stretch    10x10"         Ther Activity                                       Gait Training                                       Modalities             HP

## 2021-06-18 ENCOUNTER — OFFICE VISIT (OUTPATIENT)
Dept: PHYSICAL THERAPY | Facility: CLINIC | Age: 61
End: 2021-06-18
Payer: COMMERCIAL

## 2021-06-18 DIAGNOSIS — G89.29 CHRONIC RIGHT SHOULDER PAIN: ICD-10-CM

## 2021-06-18 DIAGNOSIS — M25.511 CHRONIC RIGHT SHOULDER PAIN: ICD-10-CM

## 2021-06-18 DIAGNOSIS — M75.01 ADHESIVE CAPSULITIS OF RIGHT SHOULDER: Primary | ICD-10-CM

## 2021-06-18 PROCEDURE — 97140 MANUAL THERAPY 1/> REGIONS: CPT | Performed by: PHYSICAL THERAPIST

## 2021-06-18 PROCEDURE — 97110 THERAPEUTIC EXERCISES: CPT | Performed by: PHYSICAL THERAPIST

## 2021-06-18 NOTE — PROGRESS NOTES
Daily Note     Today's date: 2021  Patient name: Gregorio Epley  : 1960  MRN: [id number]  Referring provider: Anum Silva MD  Dx:   Encounter Diagnosis     ICD-10-CM    1  Adhesive capsulitis of right shoulder  M75 01    2  Chronic right shoulder pain  M25 511     G89 29        Start Time: 1215  Stop Time: 1300  Total time in clinic (min): 45 minutes    Subjective: Pt reports that typically, the evening following his PT sessions he is not able to sleep at night due to his pain  Reports that he is usually non-compliant with HEP  He gets about one hour of pain relief following PT sessions during the day but then pain returns at nighttime  Objective: See treatment diary below       Assessment: Pt tolerated session fairly well despite continued reports of shoulder pain and motion limitation in general  Session focused on range of motion today through manual treatment and self-stretching  He did require cues on proper performance of ER stretch using the cane in 90* of shoulder abduction to avoid compensation with elbow  I introduced a sleeper stretch which he did well with and motion slowly improved each rep  Also introduced a walk-back stretch to work on flexion direction which he seemed to respond well to  I advised him that he should try using some heat or ice before bed to see if that gives him any symptom relief to allow him to sleep through the night better  He will try this  Plan: Continue per plan of care  Progress treatment as tolerated         Diagnosis: R shoulder adhesive capsulitis  Precautions: HTN, pre diabetic; recent hx osteomyelitis of jaw      Manuals  6/4 6/9 6/15 6/18       Inf/AP/lateral glide GH joint  Gr  III-IV             PROM R shoulder      All directions, per tolerance with OP                                 Neuro Re-Ed                          Scapular retractions             Rows, T             Bent over incline bench supported retractoins     5"x20 Bent over incline bench T, Y, I, row     15xea                                  Ther Ex             UBE     6' retro 6' retro       Pulleys  3'/3' 3'/3'  3'/3'  3'/3'       Wall Climb    HEP         Wall Slides  With lift off 5"x10 With lift off 5" x 15           Ball Table Flexion  5"x20 flx, scaptio ea 5" x 20 flex, scap  5"x20 flx/scap         Cane extension, adduction stretch     10x10"        Cane AAROM  Flx, abd, ER 5"x10-15 ea Flx, abd, ER 5" x 10 ea    Flx, ER 10" 10x       IR stretch (towel, wall, sleeper?)    Wall 10x10"  sleeper stretch 10" 10x       Doorway pec stretch    10x10"         Doorway ER stretch     10x10"        Thoracic extension seated 1/2 FR    20x         Walk-back stretch      10" 10x       Inclined table ER stretch    10x10"         Ther Activity                                       Gait Training                                       Modalities             HP

## 2021-06-21 ENCOUNTER — OFFICE VISIT (OUTPATIENT)
Dept: PHYSICAL THERAPY | Facility: CLINIC | Age: 61
End: 2021-06-21
Payer: COMMERCIAL

## 2021-06-21 DIAGNOSIS — G89.29 CHRONIC RIGHT SHOULDER PAIN: ICD-10-CM

## 2021-06-21 DIAGNOSIS — M25.511 CHRONIC RIGHT SHOULDER PAIN: ICD-10-CM

## 2021-06-21 DIAGNOSIS — M75.01 ADHESIVE CAPSULITIS OF RIGHT SHOULDER: Primary | ICD-10-CM

## 2021-06-21 PROCEDURE — 97112 NEUROMUSCULAR REEDUCATION: CPT | Performed by: PHYSICAL THERAPIST

## 2021-06-21 PROCEDURE — 97110 THERAPEUTIC EXERCISES: CPT | Performed by: PHYSICAL THERAPIST

## 2021-06-21 NOTE — PROGRESS NOTES
Daily Note     Today's date: 2021  Patient name: Gardenia Bowen  : 1960  MRN: [id number]  Referring provider: Kaylie Turner MD  Dx:   Encounter Diagnosis     ICD-10-CM    1  Adhesive capsulitis of right shoulder  M75 01    2  Chronic right shoulder pain  M25 511     G89 29                   Subjective: Patient notes that he still has stiffness/pain when waking from sleep and the pain wakes him from sleep  He notes that he does see progress  Objective: See treatment diary below       Assessment: Tolerated treatment well  Patient would benefit from continued PT  He continues to require verbal cues for scapular retraction and dissociation from [facility] joint  He does demonstrate improved IR ROM this date  He tolerated initiation of resisted serratus punches and also TB r/s this date  Plan: Continue per plan of care  Re-Evaluation next visit        Diagnosis: R shoulder adhesive capsulitis  Precautions: HTN, pre diabetic; recent hx osteomyelitis of jaw      Manuals 5/27 6/1 6/4 6/9 6/15 6/18 6/21      Inf/AP/lateral glide GH joint  Gr  III-IV             PROM R shoulder      All directions, per tolerance with OP                                 Neuro Re-Ed                          Serratus punches       5# 5"x20      Rows, T             Bent over incline bench supported retractoins     5"x20        Bent over incline bench T, Y, I, row     15xea  T 2# 15x    Row 5# 10x    Y no wt  10x    I 2# 15x      TB Rhythmic stabilization       3x30"  1x45"                   Ther Ex             UBE     6' retro 6' retro 3'/3'      Pulleys  3'/3' 3'/3'  3'/3'  3'/3'       Wall Climb    HEP         Wall Slides  With lift off 5"x10 With lift off 5" x 15           Ball Table Flexion  5"x20 flx, scaptio ea 5" x 20 flex, scap  5"x20 flx/scap         Cane extension, adduction stretch     10x10"  10x10"      Cane AAROM  Flx, abd, ER 5"x10-15 ea Flx, abd, ER 5" x 10 ea    Flx, ER 10" 10x       IR stretch (towel, wall, sleeper?)    Wall 10x10"  sleeper stretch 10" 10x Wall 10x10"      Doorway pec stretch    10x10"         Doorway ER stretch     10x10"        Thoracic extension seated 1/2 FR    20x         Walk-back stretch      10" 10x       Inclined table ER stretch    10x10"         Ther Activity                                       Gait Training                                       Modalities             HP

## 2021-06-23 ENCOUNTER — EVALUATION (OUTPATIENT)
Dept: PHYSICAL THERAPY | Facility: CLINIC | Age: 61
End: 2021-06-23
Payer: COMMERCIAL

## 2021-06-23 DIAGNOSIS — M75.01 ADHESIVE CAPSULITIS OF RIGHT SHOULDER: Primary | ICD-10-CM

## 2021-06-23 DIAGNOSIS — G89.29 CHRONIC RIGHT SHOULDER PAIN: ICD-10-CM

## 2021-06-23 DIAGNOSIS — M25.511 CHRONIC RIGHT SHOULDER PAIN: ICD-10-CM

## 2021-06-23 PROCEDURE — 97140 MANUAL THERAPY 1/> REGIONS: CPT | Performed by: PHYSICAL THERAPIST

## 2021-06-23 PROCEDURE — 97110 THERAPEUTIC EXERCISES: CPT | Performed by: PHYSICAL THERAPIST

## 2021-06-23 NOTE — PROGRESS NOTES
PT Re-Evaluation     Today's date: 2021  Patient name: Cat Hendricks  : 1960  MRN: [id number]  Referring provider: Mohsen Ramos MD  Dx:   Encounter Diagnosis     ICD-10-CM    1  Adhesive capsulitis of right shoulder  M75 01    2  Chronic right shoulder pain  M25 511     G89 29                   Assessment  Assessment details: Cat Hendricks is a pleasant 56 y o  male who presents with R shoulder pain resulting, greater than 3 months, resulting in difficulty with ADLs and functional activities  He demonstrates overall improvements in shoulder ROM with greatest deficits in AROM abduction and IR  He does demonstrate greater PROM vs  AROM attributed to his strength deficits  He is demonstrating improved ability to effectively activate scapular musculature  His compliance with HEP remains limited  He would benefit from continued skilled PT to address residual deficits and allow return to PLOF  The primary movement problem is R shoulder hypomobility and postural dysfunction, resulting in pathoanatomical symptoms of R chronic shoulder pain and adhesive capusulitis and limiting his ability to care for self, drive, exercise or recreation, lift, perform household chores, perform yard work, reach overhead, sleep, turn to look over shoulder and wash behind the back  No further referral appears necessary at this time based upon examination results  Primary Impairments:  1) shoulder hypomobility  2) postural dysfunction    Etiologic factors include prolonged inactivity secondary to recovering from osteomyelitis last winter      Impairments: abnormal coordination, abnormal muscle firing, abnormal muscle tone, abnormal or restricted ROM, abnormal movement, activity intolerance, impaired physical strength, lacks appropriate home exercise program, pain with function, scapular dyskinesis and poor posture     Symptom irritability: lowUnderstanding of Dx/Px/POC: good   Prognosis: good  Prognosis details: Positive prognostic indicators include positive attitude toward recovery, good understanding of diagnosis and treatment plan options and absence of peripheralization  Negative prognostic indicators include chronicity of symptoms, hypertension and multiple concurrent orthopedic problems  Goals  ST  Patient will demonstrate R shoulder abduction AROM > 100 degrees in 4 weeks  2  Patient will be able to tolerate reaching behind head without increase in pain in 4 weeks  - met (able to wash hair)  3  Patient will be able to reach down and behind him while fishing without increase in pain in 4 weeks  LT  Patient will be able to reach behind his back without increase in pain in 8 weeks  2  Patient will be able to lift overhead > 10 lbs in 8 weeks  3  Patient will be independent with home exercise program    4  Patient will be able to manage symptoms independently  Plan  Plan details: Patient will be out of town for 1 week  Patient would benefit from: skilled physical therapy  Planned modality interventions: thermotherapy: hydrocollator packs, cryotherapy and TENS  Planned therapy interventions: activity modification, joint mobilization, manual therapy, motor coordination training, neuromuscular re-education, patient education, self care, therapeutic activities, therapeutic exercise, graded activity, home exercise program, behavior modification, functional ROM exercises, flexibility, stretching, strengthening, balance/weight bearing training and postural training  Frequency: 2x week  Duration in weeks: 6  Treatment plan discussed with: patient and referring physician        Subjective Evaluation    History of Present Illness  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  He notes that he is working from home currently  He sells flower-bulbs  He notes that he enjoys cooking  He enjoys fishing  He enjoys spending time with his dog and wife     Pain  Current pain rating: 4  At best pain ratin  At worst pain ratin  Location: R posterior cuff, lateral shoulder  Quality: tight, dull ache and sharp  Relieving factors: medications and rest  Aggravating factors: overhead activity and lifting  Progression: improved    Treatments  Previous treatment: injection treatment  Patient Goals  Patient goals for therapy: increased motion, decreased pain, increased strength, independence with ADLs/IADLs and return to sport/leisure activities  Patient goal: get more movement - progressing    Patient reports 60-70% improvement since start of PT  He notes that reaching overhead is better  He does not incorporate the scapular retraction or thumb up posture into his ADLs, but he does with his exercises  He notes that reaching to the overhead cabinet to lift the peanut butter jar  HE notes that he has been able to wash his hair with his R arm  He notes that he is still not doing his HEP consistently  Objective     Static Posture     Comments  Bilateral rounded shoulders and increased thoracic kyphosis    Active Range of Motion   Left Shoulder   Flexion: 145 degrees   Abduction: 140 degrees   External rotation BTH: C7   Internal rotation BTB: T8     Right Shoulder   Flexion: 140 degrees with pain  Abduction: 105 degrees with pain  External rotation BTH: C7   Internal rotation BTB: Active internal rotation behind the back: R PSIS       Strength/Myotome Testing     Left Shoulder   Normal muscle strength    Right Shoulder     Planes of Motion   Flexion: 4+   Abduction: 4+   External rotation at 90°: 5   Internal rotation at 0°: 5       Flowsheet Rows      Most Recent Value   PT/OT G-Codes   Current Score  49   Projected Score  65   FOTO information reviewed  Yes              Diagnosis: R shoulder adhesive capsulitis  Precautions: HTN, pre diabetic; recent hx osteomyelitis of jaw      Manuals 5/27 6/1 6/4 6/9 6/15 6/18 6/21 6/23     Inf/AP/lateral glide GH joint  Gr  III-IV        RS     PROM R shoulder      All directions, per tolerance with OP  RS                  Re-Evaluation        RS     Neuro Re-Ed                          Serratus punches       5# 5"x20      Rows, T             Bent over incline bench supported retractoins     5"x20        Bent over incline bench T, Y, I, row     15xea  T 2# 15x    Row 5# 10x    Y no wt  10x    I 2# 15x      TB Rhythmic stabilization       3x30"  1x45"                   Ther Ex             UBE     6' retro 6' retro 3'/3' 5' retro     Pulleys  3'/3' 3'/3'  3'/3'  3'/3'       Wall Climb    HEP         Wall Slides  With lift off 5"x10 With lift off 5" x 15      abd with lean 10x     Ball Table Flexion  5"x20 flx, scaptio ea 5" x 20 flex, scap  5"x20 flx/scap         Cane extension, adduction stretch     10x10"  10x10"      Cane AAROM  Flx, abd, ER 5"x10-15 ea Flx, abd, ER 5" x 10 ea    Flx, ER 10" 10x       IR stretch (towel, wall, sleeper?)    Wall 10x10"  sleeper stretch 10" 10x Wall 10x10"      Doorway pec stretch    10x10"         Doorway ER stretch     10x10"        Thoracic extension seated 1/2 FR    20x         Walk-back stretch      10" 10x       Inclined table ER stretch    10x10"         Ther Activity                                       Gait Training                                       Modalities             HP

## 2021-06-29 ENCOUNTER — RA CDI HCC (OUTPATIENT)
Dept: OTHER | Facility: HOSPITAL | Age: 61
End: 2021-06-29

## 2021-06-29 NOTE — PROGRESS NOTES
[facility]  coding opportunities          Chart reviewed, no opportunity found: CHART REVIEWED, NO OPPORTUNITY FOUND                     Patients insurance company: [employer] (Medicare and Commercial for [employer] and [employer])

## 2021-07-06 ENCOUNTER — OFFICE VISIT (OUTPATIENT)
Dept: INTERNAL MEDICINE CLINIC | Facility: CLINIC | Age: 61
End: 2021-07-06
Payer: COMMERCIAL

## 2021-07-06 VITALS
SYSTOLIC BLOOD PRESSURE: 132 MMHG | WEIGHT: 262.4 LBS | BODY MASS INDEX: 33.67 KG/M2 | DIASTOLIC BLOOD PRESSURE: 82 MMHG | HEART RATE: 56 BPM | OXYGEN SATURATION: 98 % | HEIGHT: 74 IN

## 2021-07-06 DIAGNOSIS — E11.9 TYPE 2 DIABETES MELLITUS WITHOUT COMPLICATION, WITHOUT LONG-TERM CURRENT USE OF INSULIN (HCC): ICD-10-CM

## 2021-07-06 DIAGNOSIS — R73.01 IMPAIRED FASTING GLUCOSE: ICD-10-CM

## 2021-07-06 DIAGNOSIS — Z12.5 SCREENING FOR PROSTATE CANCER: ICD-10-CM

## 2021-07-06 DIAGNOSIS — I10 ESSENTIAL HYPERTENSION: ICD-10-CM

## 2021-07-06 DIAGNOSIS — Z00.00 WELLNESS EXAMINATION: Primary | ICD-10-CM

## 2021-07-06 DIAGNOSIS — Z23 NEED FOR SHINGLES VACCINE: ICD-10-CM

## 2021-07-06 DIAGNOSIS — Z11.4 SCREENING FOR HIV (HUMAN IMMUNODEFICIENCY VIRUS): ICD-10-CM

## 2021-07-06 DIAGNOSIS — Z12.11 SCREENING FOR COLORECTAL CANCER: ICD-10-CM

## 2021-07-06 DIAGNOSIS — E78.2 MIXED HYPERLIPIDEMIA: ICD-10-CM

## 2021-07-06 DIAGNOSIS — Z12.12 SCREENING FOR COLORECTAL CANCER: ICD-10-CM

## 2021-07-06 PROCEDURE — 99396 PREV VISIT EST AGE 40-64: CPT | Performed by: INTERNAL MEDICINE

## 2021-07-06 PROCEDURE — 1036F TOBACCO NON-USER: CPT | Performed by: INTERNAL MEDICINE

## 2021-07-06 PROCEDURE — 90750 HZV VACC RECOMBINANT IM: CPT

## 2021-07-06 PROCEDURE — 90471 IMMUNIZATION ADMIN: CPT

## 2021-07-06 NOTE — PATIENT INSTRUCTIONS
Problem List Items Addressed This Visit        Endocrine    Type 2 diabetes mellitus without complication, without long-term current use of insulin (HCC)     Continue healthy diet and exercise, A1C great, diabetic foot exam done today            Cardiovascular and Mediastinum    Essential hypertension     Continue meds along with healthy diet and exercise            Other    Mixed hyperlipidemia      Continue rosuvastatin along with healthy diet and exercise         Wellness examination - Primary       Discussed preventative health, cancer screening, immunizations, and safety issues  Patient reports having most recent colonoscopy 4-5 years ago with recommendations to follow-up in 10 years,   This was at [facility]  Patient up-to-date with Tdap vaccination  I recommend getting the Shingrix shot to help prevent Shingles  You can get it a pharmacy, and they can administer it there  It is a two shot series with the second shot needed between 2-6 months after the first shot  I would not recommend getting the shot before an important or fun event in case you were to have a reaction to the shot like a sore arm or flu-like symptoms  I make the same recommendation about any shot, as people can have a reaction to any shot             Other Visit Diagnoses     Screening for HIV (human immunodeficiency virus)        Screening for colorectal cancer        Screening for prostate cancer        Relevant Orders    PSA, Total Screen    Need for shingles vaccine        Relevant Orders    Zoster Vaccine Recombinant IM

## 2021-07-06 NOTE — PROGRESS NOTES
Assessment/Plan:    Wellness examination    Discussed preventative health, cancer screening, immunizations, and safety issues  Patient reports having most recent colonoscopy 4-5 years ago with recommendations to follow-up in 10 years,   This was at [facility]  Patient up-to-date with Tdap vaccination  I recommend getting the Shingrix shot to help prevent Shingles  You can get it a pharmacy, and they can administer it there  It is a two shot series with the second shot needed between 2-6 months after the first shot  I would not recommend getting the shot before an important or fun event in case you were to have a reaction to the shot like a sore arm or flu-like symptoms  I make the same recommendation about any shot, as people can have a reaction to any shot  Type 2 diabetes mellitus without complication, without long-term current use of insulin (HCC)  Continue healthy diet and exercise, A1C great, diabetic foot exam done today    Essential hypertension  Continue meds along with healthy diet and exercise    Mixed hyperlipidemia   Continue rosuvastatin along with healthy diet and exercise       Diagnoses and all orders for this visit:    Wellness examination    Screening for HIV (human immunodeficiency virus)    Screening for colorectal cancer    Impaired fasting glucose  -     Comprehensive metabolic panel; Future  -     Hemoglobin A1C; Future    Essential hypertension    Mixed hyperlipidemia    Screening for prostate cancer  -     PSA, Total Screen; Future    Type 2 diabetes mellitus without complication, without long-term current use of insulin (HCC)    Need for shingles vaccine  -     Zoster Vaccine Recombinant IM    Other orders  -     Cancel: HIV 1/2 Antigen/Antibody (4th Generation) w Reflex [facility]; Future  -     Cancel: Ambulatory referral to Gastroenterology; Future          Subjective:      Patient ID: Yasmeen Gilbert is a 56 y o  male      Wellness: pt does not the dentist regularly (no teeth), eats a healthy diet, exercises, no smoking cigarettes    Impaired fasting glucose:  Patient's A1c was up high previously at 8 5, he has adjusted his diet, and his hemoglobin A1c came down to 6 5  HTN: pt tolerating BP meds, no lightheadedness          The following portions of the patient's history were reviewed and updated as appropriate: allergies, current medications, past family history, past medical history, past social history, past surgical history and problem list     Review of Systems   Constitutional: Negative for chills, fatigue and fever  HENT: Negative for congestion, nosebleeds, postnasal drip, sore throat and trouble swallowing  Eyes: Negative for pain  Respiratory: Negative for cough, chest tightness, shortness of breath and wheezing  Cardiovascular: Negative for chest pain, palpitations and leg swelling  Gastrointestinal: Negative for abdominal pain, constipation, diarrhea, nausea and vomiting  Endocrine: Negative for polydipsia and polyuria  Genitourinary: Negative for dysuria, flank pain and hematuria  Musculoskeletal: Negative for arthralgias  Skin: Negative for rash  Neurological: Negative for dizziness, tremors, light-headedness and headaches  Hematological: Does not bruise/bleed easily  Psychiatric/Behavioral: Negative for confusion and dysphoric mood  The patient is not nervous/anxious  Objective:      /82   Pulse 56   Ht 6' 2" (1 88 m)   Wt 119 kg (262 lb 6 4 oz)   SpO2 98%   BMI 33 69 kg/m²          Physical Exam  Vitals reviewed  Constitutional:       General: He is not in acute distress  Appearance: Normal appearance  He is well-developed  HENT:      Head: Normocephalic and atraumatic  Right Ear: External ear normal       Left Ear: External ear normal       Nose: Nose normal    Eyes:      General: No scleral icterus  Conjunctiva/sclera: Conjunctivae normal    Neck:      Thyroid: No thyromegaly        Trachea: No tracheal deviation  Cardiovascular:      Rate and Rhythm: Normal rate and regular rhythm  Pulses: no weak pulses          Dorsalis pedis pulses are 1+ on the right side and 2+ on the left side  Heart sounds: Normal heart sounds  No murmur heard  Pulmonary:      Effort: Pulmonary effort is normal  No respiratory distress  Breath sounds: Normal breath sounds  No wheezing or rales  Abdominal:      General: Bowel sounds are normal       Palpations: Abdomen is soft  Tenderness: There is no abdominal tenderness  There is no guarding or rebound  Musculoskeletal:      Cervical back: Normal range of motion and neck supple  Right lower leg: No edema  Left lower leg: No edema  Feet:      Right foot:      Skin integrity: No ulcer, skin breakdown, erythema, warmth, callus or dry skin  Left foot:      Skin integrity: No ulcer, skin breakdown, erythema, warmth, callus or dry skin  Lymphadenopathy:      Cervical: No cervical adenopathy  Skin:     Coloration: Skin is not jaundiced or pale  Neurological:      General: No focal deficit present  Mental Status: He is alert and oriented to person, place, and time  Psychiatric:         Mood and Affect: Mood normal          Behavior: Behavior normal          Thought Content: Thought content normal          Judgment: Judgment normal        Patient's shoes and socks removed  Right Foot/Ankle   Right Foot Inspection  Skin Exam: skin normal and skin intact no dry skin, no warmth, no callus, no erythema, no maceration, no abnormal color, no pre-ulcer, no ulcer and no callus                          Toe Exam: ROM and strength within normal limitsno swelling, no tenderness, erythema and  no right toe deformity  Sensory       Monofilament testing: intact  Vascular    The right DP pulse is 1+       Left Foot/Ankle  Left Foot Inspection  Skin Exam: skin normal and skin intactno dry skin, no warmth, no erythema, no maceration, normal color, no pre-ulcer, no ulcer and no callus                         Toe Exam: ROM and strength within normal limitsno swelling, no tenderness, no erythema and no left toe deformity                   Sensory       Monofilament: intact  Vascular    The left DP pulse is 2+  Assign Risk Category:  No deformity present; No loss of protective sensation;  No weak pulses       Risk: 0

## 2021-07-06 NOTE — ASSESSMENT & PLAN NOTE
Discussed preventative health, cancer screening, immunizations, and safety issues  Patient reports having most recent colonoscopy 4-5 years ago with recommendations to follow-up in 10 years,   This was at [facility]  Patient up-to-date with Tdap vaccination  I recommend getting the Shingrix shot to help prevent Shingles  You can get it a pharmacy, and they can administer it there  It is a two shot series with the second shot needed between 2-6 months after the first shot  I would not recommend getting the shot before an important or fun event in case you were to have a reaction to the shot like a sore arm or flu-like symptoms  I make the same recommendation about any shot, as people can have a reaction to any shot

## 2021-07-07 ENCOUNTER — OFFICE VISIT (OUTPATIENT)
Dept: PHYSICAL THERAPY | Facility: CLINIC | Age: 61
End: 2021-07-07
Payer: COMMERCIAL

## 2021-07-07 ENCOUNTER — OFFICE VISIT (OUTPATIENT)
Dept: OBGYN CLINIC | Facility: HOSPITAL | Age: 61
End: 2021-07-07
Payer: COMMERCIAL

## 2021-07-07 VITALS
DIASTOLIC BLOOD PRESSURE: 84 MMHG | BODY MASS INDEX: 33.55 KG/M2 | HEIGHT: 74 IN | HEART RATE: 62 BPM | WEIGHT: 261.4 LBS | SYSTOLIC BLOOD PRESSURE: 134 MMHG

## 2021-07-07 DIAGNOSIS — M25.511 CHRONIC RIGHT SHOULDER PAIN: ICD-10-CM

## 2021-07-07 DIAGNOSIS — G89.29 CHRONIC RIGHT SHOULDER PAIN: ICD-10-CM

## 2021-07-07 DIAGNOSIS — M75.01 ADHESIVE CAPSULITIS OF RIGHT SHOULDER: Primary | ICD-10-CM

## 2021-07-07 DIAGNOSIS — R26.89 BALANCE PROBLEM: ICD-10-CM

## 2021-07-07 PROCEDURE — 3075F SYST BP GE 130 - 139MM HG: CPT | Performed by: ORTHOPAEDIC SURGERY

## 2021-07-07 PROCEDURE — 97112 NEUROMUSCULAR REEDUCATION: CPT | Performed by: PHYSICAL THERAPIST

## 2021-07-07 PROCEDURE — 1036F TOBACCO NON-USER: CPT | Performed by: ORTHOPAEDIC SURGERY

## 2021-07-07 PROCEDURE — 97140 MANUAL THERAPY 1/> REGIONS: CPT | Performed by: PHYSICAL THERAPIST

## 2021-07-07 PROCEDURE — 99213 OFFICE O/P EST LOW 20 MIN: CPT | Performed by: ORTHOPAEDIC SURGERY

## 2021-07-07 PROCEDURE — 97110 THERAPEUTIC EXERCISES: CPT | Performed by: PHYSICAL THERAPIST

## 2021-07-07 PROCEDURE — 3008F BODY MASS INDEX DOCD: CPT | Performed by: ORTHOPAEDIC SURGERY

## 2021-07-07 PROCEDURE — 3079F DIAST BP 80-89 MM HG: CPT | Performed by: ORTHOPAEDIC SURGERY

## 2021-07-07 NOTE — PROGRESS NOTES
56 y o male  Presents 6 weeks following initiation of functional treatment for adhesive capsulitis affecting the right shoulder  He describes that he continues to make improvement in physical therapy toward achieving greater arcs of motion of his right shoulder  He describes pain at the extremes of motion while in physical therapy, but otherwise he describes very little pain in around the right shoulder area    He also admits to problems with balance and asked with the physical therapy would be helpful    Review of Systems  Review of systems negative unless otherwise specified in HPI    Past Medical History  Past Medical History:   Diagnosis Date    HTN (hypertension)     Hyperlipidemia     Hypertension     Osteomyelitis ([facility] [address] ) 2020    Seizures ([facility] [address] )     Not since age 13       Past Surgical History  Past Surgical History:   Procedure Laterality Date    COLONOSCOPY      HAND SURGERY      HERNIA REPAIR      IR PICC PLACEMENT SINGLE LUMEN  12/8/2020    VASCULAR SURGERY      WOUND DEBRIDEMENT N/A 11/30/2020    Procedure: DEBRIDEMENT OF ANTERIOR MANDIBLE BONE BIOPSY, CULTURES AND REMOVAL OF IMPLANT #27, USE OF PRP;  Surgeon: Amy Graham DDS;  Location: [facility];  Service: Maxillofacial       Current Medications  Current Outpatient Medications on File Prior to Visit   Medication Sig Dispense Refill    allopurinol (ZYLOPRIM) 100 mg tablet daily      amLODIPine (NORVASC) 5 mg tablet Take 1 tablet (5 mg total) by mouth daily 90 tablet 3    cholecalciferol (VITAMIN D3) 1,000 units tablet Take 5,000 Units by mouth daily 10,000 units Patient stated that is continuously taking      glucosamine-chondroitin 500-400 MG tablet Take 2 tablets by mouth daily       lisinopril (ZESTRIL) 10 mg tablet       lisinopril (ZESTRIL) 20 mg tablet Take 1 tablet (20 mg total) by mouth daily 90 tablet 3    metoprolol-hydrochlorothiazide (LOPRESSOR HCT) 100-25 MG per tablet Take 0 5 tablets by mouth daily       Omega-3 Fatty Acids (Omega-3 Fish Oil) 1000 MG CAPS Take 2 g by mouth daily      rosuvastatin (CRESTOR) 20 MG tablet Take 1 tablet (20 mg total) by mouth daily 90 tablet 3    sertraline (ZOLOFT) 50 mg tablet Take 25 mg by mouth daily       nabumetone (RELAFEN) 750 mg tablet Take 1 tablet (750 mg total) by mouth 2 (two) times a day for 60 doses (Patient taking differently: Take 750 mg by mouth daily ) 60 tablet 1     No current facility-administered medications on file prior to visit  Recent Labs [facility])  0   Lab Value Date/Time    HCT 43 6 06/07/2021 1053    HCT 46 9 10/08/2014 1114    HGB 15 0 06/07/2021 1053    HGB 16 3 10/08/2014 1114    WBC 3 46 (L) 06/07/2021 1053    WBC 5 90 10/08/2014 1114    ESR 6 06/07/2021 1053    CRP <3 0 06/07/2021 1053    GLUCOSE 127 10/08/2014 1114    HGBA1C 6 5 (H) 06/07/2021 1053    HGBA1C 6 6 (H) 04/01/2019 0815         Physical exam  · General: Awake, Alert, Oriented  · Eyes: Pupils equal, round and reactive to light  · Heart: regular rate and rhythm  · Lungs: No audible wheezing  · Abdomen: soft   examination finds right shoulder well-muscled  The soft tissue envelope is intact  Active for flexion is 120°, active abduction to 120°  There is a handbreadth difference when comparing full internal rotation on the left shoulder the right shoulder, his external rotation is approaching full in the right shoulder  There is no weakness of external rotation 10 testing  There is no nervous complex of the right upper extremity    Imaging   no x-rays accompany him today    Procedure   none indicated or performed today    Assessment/Plan:   60 y o male  He was making progress towards resolving adhesive capsulitis affecting the right shoulder  He is encouraged to continue physical therapy towards active and passive motion stretching for the next 6 weeks    He has met therapy for his balance issue, and physical therapy would be helpful in helping this patient with balance issue    Office follow-up on an as-needed basis is recommended

## 2021-07-07 NOTE — PROGRESS NOTES
Daily Note     Today's date: 2021  Patient name: Barbie Gilbert  : 1960  MRN: [id number]  Referring provider: Lisandro Trevino MD  Dx:   Encounter Diagnosis     ICD-10-CM    1  Adhesive capsulitis of right shoulder  M75 01    2  Chronic right shoulder pain  M25 511     G89 29                   Subjective: Patient reports that he didn't do his exercises while on vacation but his shoulder feels excellent  Objective: See treatment diary below      Assessment: Tolerated treatment well  Patient would benefit from continued PT  Patient arrived late for session, but was accommodated  He was challenged with end-range abduction and IR during PROM  He tolerated sidelying AROM  He does demonstrate improved IR ROM after stretching  He is tolerating serratus punches well after 10 days on vacation  Plan: Continue per plan of care  Progress strengthening as able        Diagnosis: R shoulder adhesive capsulitis  Precautions: HTN, pre diabetic; recent hx osteomyelitis of jaw      Manuals  6 6/9 6/15 6/18 6/21 6/23 7/7    Inf/AP/lateral glide GH joint  Gr  III-IV        RS RS    PROM R shoulder      All directions, per tolerance with OP  RS RS                 Re-Evaluation        RS     Neuro Re-Ed                          Serratus punches       5# 5"x20  5# 5"x20    Rows, T             Bent over incline bench supported retractoins     5"x20        Bent over incline bench T, Y, I, row     15xea  T 2# 15x    Row 5# 10x    Y no wt  10x    I 2# 15x      TB Rhythmic stabilization       3x30"  1x45"                   Ther Ex             UBE     6' retro 6' retro 3'/3' 5' retro 8'     Pulleys  3'/3' 3'/3'  3'/3'  3'/3'       Wall Climb    HEP         Wall Slides  With lift off 5"x10 With lift off 5" x 15      abd with lean 10x     Ball Table Flexion  5"x20 flx, scaptio ea 5" x 20 flex, scap  5"x20 flx/scap         Cane extension, adduction stretch     10x10"  10x10"      Cane AAROM  Flx, abd, ER 5"x10-15 ea Flx, abd, ER 5" x 10 ea    Flx, ER 10" 10x       sidelying AROM         Flx, abd, ER 15x    IR stretch (towel, wall, sleeper?)    Wall 10x10"  sleeper stretch 10" 10x Wall 10x10"  supine 10x10"    Doorway pec stretch    10x10"         Doorway ER stretch     10x10"        Thoracic extension seated 1/2 FR    20x         Walk-back stretch      10" 10x       Inclined table ER stretch    10x10"         Ther Activity                                       Gait Training                                       Modalities             HP

## 2021-07-09 ENCOUNTER — OFFICE VISIT (OUTPATIENT)
Dept: PHYSICAL THERAPY | Facility: CLINIC | Age: 61
End: 2021-07-09
Payer: COMMERCIAL

## 2021-07-09 DIAGNOSIS — G89.29 CHRONIC RIGHT SHOULDER PAIN: ICD-10-CM

## 2021-07-09 DIAGNOSIS — M75.01 ADHESIVE CAPSULITIS OF RIGHT SHOULDER: Primary | ICD-10-CM

## 2021-07-09 DIAGNOSIS — M25.511 CHRONIC RIGHT SHOULDER PAIN: ICD-10-CM

## 2021-07-09 PROCEDURE — 97110 THERAPEUTIC EXERCISES: CPT | Performed by: PHYSICAL THERAPIST

## 2021-07-09 PROCEDURE — 97140 MANUAL THERAPY 1/> REGIONS: CPT | Performed by: PHYSICAL THERAPIST

## 2021-07-09 NOTE — PROGRESS NOTES
Daily Note     Today's date: 2021  Patient name: Sujit Phillips  : 1960  MRN: [id number]  Referring provider: Rose Mary White MD  Dx:   Encounter Diagnosis     ICD-10-CM    1  Adhesive capsulitis of right shoulder  M75 01    2  Chronic right shoulder pain  M25 511     G89 29                   Subjective: Patient notes that his ortho was pleased with his progress, but encouraged him to continue  Objective: See treatment diary below      Assessment: Tolerated treatment well  Patient would benefit from continued PT  He tolerated initiation of sleeper stretch this date  He had greater difficulty with towel IR stretch  He does continue to tolerate progression of PROM  He was challenged with isotonic IR/ER  He tolerates rows/extensions well  Plan: Continue per plan of care  Evaluate balance next visit        Diagnosis: R shoulder adhesive capsulitis  Precautions: HTN, pre diabetic; recent hx osteomyelitis of jaw      Manuals 5/27 6/1 6/4 6/9 6/15 6/18 6/21 6/23 7/7 7/9   Inf/AP/lateral glide GH joint  Gr  III-IV        RS RS RS   PROM R shoulder      All directions, per tolerance with OP  RS RS RS                Re-Evaluation        RS     Neuro Re-Ed                          Serratus punches       5# 5"x20  5# 5"x20    Rows, Ext          belle  Rows 15#, Ext 10#  20xea   Bent over incline bench supported retractoins     5"x20        Bent over incline bench T, Y, I, row     15xea  T 2# 15x    Row 5# 10x    Y no wt  10x    I 2# 15x      TB Rhythmic stabilization       3x30"  1x45"                   Ther Ex             UBE     6' retro 6' retro 3'/3' 5' retro 8'  5' retro   Pulleys  3'/3' 3'/3'  3'/3'  3'/3'       Wall Climb    HEP         Wall Slides  With lift off 5"x10 With lift off 5" x 15      abd with lean 10x     Ball Table Flexion  5"x20 flx, scaptio ea 5" x 20 flex, scap  5"x20 flx/scap         Cane extension, adduction stretch     10x10"  10x10"      Cane AAROM  Flx, abd, ER 5"x10-15 ea Flx, abd, ER 5" x 10 ea    Flx, ER 10" 10x                    TB IR/ER          Blue 30x   sidelying AROM         Flx, abd, ER 15x    IR stretch (towel, wall, sleeper?)    Wall 10x10"  sleeper stretch 10" 10x Wall 10x10"  supine 10x10" sleeper 10x10"   Doorway pec stretch    10x10"         Doorway ER stretch     10x10"     10x10"   Thoracic extension seated 1/2 FR    20x         Walk-back stretch      10" 10x       Inclined table ER stretch    10x10"         Ther Activity                                       Gait Training                                       Modalities             HP

## 2021-07-13 ENCOUNTER — OFFICE VISIT (OUTPATIENT)
Dept: PHYSICAL THERAPY | Facility: CLINIC | Age: 61
End: 2021-07-13
Payer: COMMERCIAL

## 2021-07-13 DIAGNOSIS — R26.89 BALANCE PROBLEM: Primary | ICD-10-CM

## 2021-07-13 PROCEDURE — 97162 PT EVAL MOD COMPLEX 30 MIN: CPT | Performed by: PHYSICAL THERAPIST

## 2021-07-13 NOTE — PROGRESS NOTES
PT Evaluation     Today's date: 2021  Patient name: Chuck Bangura  : 1960  MRN: [id number]  Referring provider: Stephon Solares PT  Dx:   Encounter Diagnosis     ICD-10-CM    1  Balance problem  R26 89                   Assessment  Assessment details: Chuck Bangura is a pleasant 56 y o  male who presents with increasing difficulty maintaining balance in the past 2 years resulting in difficulty with functional activities  He does demonstrate deficits in LE strength R > L, especially in the posterior chain  He is also currently receiving PT for adhesive capsulitis so he was agreeable to POC 2x/week, 1x/week for shoulder and 1x/week for balance  The patient's greatest concerns are fear of not being able to keep active and future ill health (and wanting to prevent it)  The primary movement problem is balance difficulty resulting in balance problem and limiting his ability to exercise or recreation, perform yard work and walk  No further referral appears necessary at this time based upon examination results  Primary Impairments:  1) balance difficulty  2) LE strength deficits    Etiologic factors include none recalled by the patient  Impairments: impaired balance, impaired physical strength and lacks appropriate home exercise program    Symptom irritability: moderateUnderstanding of Dx/Px/POC: good   Prognosis: good  Prognosis details: Positive prognostic indicators include positive attitude toward recovery, good understanding of diagnosis and treatment plan options and absence of observed red flags  Negative prognostic indicators include multiple concurrent orthopedic problems  Goals  ST  Patient will be able to maintain balance during decline squats in 2 weeks  2  Patient will be able to maintain balance in a crowd without corrective step in 2 weeks  LT  Patient will be able to get on/off his boat with confidence in 4 weeks     2  Patient will be able to negotiate declines on uneven terrain without difficulty in 4 weeks  3  Patient will be independent with home exercise program    4  Patient will be able to manage symptoms independently  Plan  Plan details: Evaluated via direct access, POC expires 8/13/21  Patient would benefit from: skilled physical therapy  Planned therapy interventions: activity modification, joint mobilization, manual therapy, motor coordination training, neuromuscular re-education, patient education, self care, therapeutic activities, therapeutic exercise, graded activity, home exercise program, behavior modification, stretching, strengthening, flexibility, functional ROM exercises, gait training, balance/weight bearing training, balance, postural training and abdominal trunk stabilization  Frequency: 2x week  Duration in weeks: 4  Treatment plan discussed with: patient        Subjective Evaluation    History of Present Illness  Mechanism of injury: He notes that he doesn't feel as confident in his balance in about 2 years ago  He notes that he has less confidence getting on/off his boat  He notes greater difficulty with maintaining balance in the dark  He notes difficulty with maintaining balance in crowds  He notes that he has difficulty with declines or larger than normal steps  He notes that some of this has to do with knee pain  He notes that he had a "chip removed" in R knee a few years ago  He notes that he has had gel injections about 3 years ago  He does get relief with glucosamine and chrondrotin  He denies numbness/tingling in the feet  He notes that typically he has to take corrective steps, but his legs don't necessarily buckle  He believe he is more confident in his R foot  He is also currently receiving PT for his shoulder     Patient Goals  Patient goals for therapy: increased strength, improved balance and return to sport/leisure activities  Patient goal: be confident to walk rough terrain, be more confident on your boat        Objective     Strength/Myotome Testing     Lumbar   Left   Heel walk: normal  Toe walk: normal    Right   Heel walk: normal  Toe walk: normal    Left Hip   Planes of Motion   Flexion: 4-  Extension: 4  Abduction: 4-  External rotation: 4    Right Hip   Planes of Motion   Flexion: 3+  Extension: 4-  Abduction: 4+  External rotation: 4-    Left Knee   Flexion: 4-  Prone flexion: 4  Extension: 5    Right Knee   Flexion: 3+  Prone flexion: 4+  Extension: 4    Left Ankle/Foot   Dorsiflexion: 5  Inversion: 4  Eversion: 4-    Right Ankle/Foot   Dorsiflexion: 5  Inversion: 4  Eversion: 4-    Additional Strength Details  Squat; greater than parallel with forward trunk lean compensation to maintain balance    Functional Assessment        Comments  FTEO: 30s  FTEC: 30s   Tandem stance b/l: 30s with greater sway during LLE posterior  R SLS: 19s greater UE compensations/sways  L SLS: 30s   Tandem on foam: 30s b/l increased sway with LLE posterior     LLE offload of RLE                 Diagnosis: Balance Difficulty  Precautions: R adhesive capsulitis      Manuals 7/13                                                                Neuro Re-Ed             Biodex             Rockerboard             Tandem walk foam             SLS kb pass                                       SLR flx, ext             bridges                                       Ther Ex             Bike             Hamstring stool scoot             Hip hinge                                                                              Ther Activity             Decline squats                          Gait Training                                       Modalities

## 2021-07-16 ENCOUNTER — OFFICE VISIT (OUTPATIENT)
Dept: PHYSICAL THERAPY | Facility: CLINIC | Age: 61
End: 2021-07-16
Payer: COMMERCIAL

## 2021-07-16 DIAGNOSIS — M25.511 CHRONIC RIGHT SHOULDER PAIN: ICD-10-CM

## 2021-07-16 DIAGNOSIS — M75.01 ADHESIVE CAPSULITIS OF RIGHT SHOULDER: Primary | ICD-10-CM

## 2021-07-16 DIAGNOSIS — G89.29 CHRONIC RIGHT SHOULDER PAIN: ICD-10-CM

## 2021-07-16 PROCEDURE — 97112 NEUROMUSCULAR REEDUCATION: CPT | Performed by: PHYSICAL THERAPIST

## 2021-07-16 PROCEDURE — 97110 THERAPEUTIC EXERCISES: CPT | Performed by: PHYSICAL THERAPIST

## 2021-07-16 NOTE — PROGRESS NOTES
Daily Note     Today's date: 2021  Patient name: Richard Amaya  : 1960  MRN: [id number]  Referring provider: Adilson Ramirez MD  Dx:   Encounter Diagnosis     ICD-10-CM    1  Adhesive capsulitis of right shoulder  M75 01    2  Chronic right shoulder pain  M25 511     G89 29                   Subjective: Patient reports that his shoulder is a lot better  He notes that he took his shirt off normally  He notes that he also scratches his back with his R arm without thinking about it  Objective: See treatment diary below      Assessment: Tolerated treatment well  Patient would benefit from continued PT  He did tolerate RTC strengthening at overhead ROM this date  He was challenged with ball on wall with fatigue  He does demonstrate improved IR during wall stretch  He was challenged with seated DB OH, with greater strength deficits on R  He also was unable to obtain full ER on R  Plan: Continue per plan of care        Diagnosis: R shoulder adhesive capsulitis  Precautions: HTN, pre diabetic; recent hx osteomyelitis of jaw      Manuals 7/16 6/1 6/4 6/9 6/15 6/18 6/21 6/23 7/7 7/9   Inf/AP/lateral glide GH joint  Gr  III-IV        RS RS RS   PROM R shoulder      All directions, per tolerance with OP  RS RS RS                Re-Evaluation        RS     Neuro Re-Ed                          Serratus punches       5# 5"x20  5# 5"x20    Rows, Ext          belle  Rows 15#, Ext 10#  20xea   Bent over incline bench supported retractoins     5"x20        Bent over incline bench T, Y, I, row     15xea  T 2# 15x    Row 5# 10x    Y no wt  10x    I 2# 15x      TB Rhythmic stabilization       3x30"  1x45"      TB pull aparts with palms up Green 20x            Ther Ex             UBE 2 5/2 5 lvl 2    6' retro 6' retro 3'/3' 5' retro 8'  5' retro   Pulleys  3'/3' 3'/3'  3'/3'  3'/3'       Wall Climb    HEP         Wall Slides  With lift off 5"x10 With lift off 5" x 15      abd with lean 10x     Ball Table Flexion Red 10xea 5"x20 flx, scaptio ea 5" x 20 flex, scap  5"x20 flx/scap         Cane extension, adduction stretch     10x10"  10x10"      Cane AAROM  Flx, abd, ER 5"x10-15 ea Flx, abd, ER 5" x 10 ea    Flx, ER 10" 10x       DB OH Press seated 5#  2x15            TB IR/ER 90 deg   Blk IR/ER 20x    belle  10# 20x          Blue 30x   sidelying AROM         Flx, abd, ER 15x    IR stretch (towel, wall, sleeper?) Wall 10x10"    Wall 10x10"  sleeper stretch 10" 10x Wall 10x10"  supine 10x10" sleeper 10x10"   Doorway pec stretch    10x10"         Doorway ER stretch 10x10"    10x10"     10x10"   Thoracic extension seated 1/2 FR    20x         Walk-back stretch      10" 10x       Inclined table ER stretch    10x10"         Ther Activity                                       Gait Training                                       Modalities             HP

## 2021-07-18 DIAGNOSIS — M75.01 ADHESIVE CAPSULITIS OF RIGHT SHOULDER: ICD-10-CM

## 2021-07-19 RX ORDER — NABUMETONE 750 MG/1
750 TABLET, FILM COATED ORAL 2 TIMES DAILY
Qty: 60 TABLET | Refills: 1 | Status: SHIPPED | OUTPATIENT
Start: 2021-07-19 | End: 2021-10-25 | Stop reason: ALTCHOICE

## 2021-07-20 ENCOUNTER — OFFICE VISIT (OUTPATIENT)
Dept: PHYSICAL THERAPY | Facility: CLINIC | Age: 61
End: 2021-07-20
Payer: COMMERCIAL

## 2021-07-20 DIAGNOSIS — R26.89 BALANCE PROBLEM: Primary | ICD-10-CM

## 2021-07-20 PROCEDURE — 97110 THERAPEUTIC EXERCISES: CPT | Performed by: PHYSICAL THERAPIST

## 2021-07-20 PROCEDURE — 97112 NEUROMUSCULAR REEDUCATION: CPT | Performed by: PHYSICAL THERAPIST

## 2021-07-22 ENCOUNTER — OFFICE VISIT (OUTPATIENT)
Dept: PHYSICAL THERAPY | Facility: CLINIC | Age: 61
End: 2021-07-22
Payer: COMMERCIAL

## 2021-07-22 DIAGNOSIS — G89.29 CHRONIC RIGHT SHOULDER PAIN: ICD-10-CM

## 2021-07-22 DIAGNOSIS — M75.01 ADHESIVE CAPSULITIS OF RIGHT SHOULDER: Primary | ICD-10-CM

## 2021-07-22 DIAGNOSIS — M25.511 CHRONIC RIGHT SHOULDER PAIN: ICD-10-CM

## 2021-07-22 PROCEDURE — 97112 NEUROMUSCULAR REEDUCATION: CPT | Performed by: PHYSICAL THERAPIST

## 2021-07-22 PROCEDURE — 97110 THERAPEUTIC EXERCISES: CPT | Performed by: PHYSICAL THERAPIST

## 2021-07-22 NOTE — PROGRESS NOTES
Daily Note     Today's date: 2021  Patient name: Bela Suero  : 1960  MRN: [id number]  Referring provider: Compa Peguero MD  Dx:   Encounter Diagnosis     ICD-10-CM    1  Adhesive capsulitis of right shoulder  M75 01    2  Chronic right shoulder pain  M25 511     G89 29                   Subjective: Patient reports that reaching behind his back is still tight, difficulty, but it is improving  Objective: See treatment diary below      Assessment: Tolerated treatment well  Patient would benefit from continued PT  Patient tolerated initiation of PNF for more functional reaching  He did demonstrate aberrant movement with table supported and resistance IR  He tolerated FR forearm wall slides with emphasis on end-range flexion with thoracic extension  Plan: Continue per plan of care        Diagnosis: R shoulder adhesive capsulitis  Precautions: HTN, pre diabetic; recent hx osteomyelitis of jaw      Manuals - balance 7/22 6/9 6/15 6/18 6/21 6/23 7/7 7/9   Inf/AP/lateral glide GH joint  Gr  III-IV        RS RS RS   PROM R shoulder      All directions, per tolerance with OP  RS RS RS                Re-Evaluation        RS     Neuro Re-Ed                          Serratus punches       5# 5"x20  5# 5"x20    Rows, Ext          belle  Rows 15#, Ext 10#  20xea   Bent over incline bench supported retractoins     5"x20        Bent over incline bench T, Y, I, row     15xea  T 2# 15x    Row 5# 10x    Y no wt  10x    I 2# 15x      TB Rhythmic stabilization       3x30"  1x45"      TB pull aparts with palms up Green 20x            FR forearm wall slide    With t/s ext    20x          Standing PNF   Green D1, D2 flx/ext  15xea                       Ther Ex             UBE 2 5/2 5 lvl 2  5' lvl 2 5   6' retro 6' retro 3'/3' 5' retro 8'  5' retro   Pulleys    3'/3'  3'/3'       Wall Climb    HEP         Wall Slides        abd with lean 10x     Ball Table Flexion Red 10xea   5"x20 flx/scap         U [employer] extension, adduction stretch     10x10"  10x10"      Cane AAROM      Flx, ER 10" 10x       DB OH Press seated 5#  2x15            TB IR/ER 90 deg   Blk IR/ER 20x    belle  10# 20x   Table supported Blue IR 20x       Blue 30x   sidelying AROM         Flx, abd, ER 15x    IR stretch (towel, wall, sleeper?) Wall 10x10"   Wall 10x10" Wall 10x10"  sleeper stretch 10" 10x Wall 10x10"  supine 10x10" sleeper 10x10"   Doorway pec stretch    10x10"         Doorway ER stretch 10x10"    10x10"     10x10"   Thoracic extension seated 1/2 FR    20x         Walk-back stretch      10" 10x       Inclined table ER stretch    10x10"         Ther Activity                                       Gait Training                                       Modalities             HP

## 2021-07-23 ENCOUNTER — APPOINTMENT (OUTPATIENT)
Dept: PHYSICAL THERAPY | Facility: CLINIC | Age: 61
End: 2021-07-23
Payer: COMMERCIAL

## 2021-07-27 ENCOUNTER — OFFICE VISIT (OUTPATIENT)
Dept: PHYSICAL THERAPY | Facility: CLINIC | Age: 61
End: 2021-07-27
Payer: COMMERCIAL

## 2021-07-27 DIAGNOSIS — R26.89 BALANCE PROBLEM: Primary | ICD-10-CM

## 2021-07-27 PROCEDURE — 97112 NEUROMUSCULAR REEDUCATION: CPT | Performed by: PHYSICAL THERAPIST

## 2021-07-27 PROCEDURE — 97110 THERAPEUTIC EXERCISES: CPT | Performed by: PHYSICAL THERAPIST

## 2021-07-27 NOTE — PROGRESS NOTES
Daily Note     Today's date: 2021  Patient name: Wicho Bernstein  : 1960  MRN: [id number]  Referring provider: Leesa Thompson MD  Dx:   Encounter Diagnosis     ICD-10-CM    1  Balance problem  R26 89                   Subjective: Patient reports that he plans to go fishing after PT  He notes that he is still worried about his balance on the boat  Objective: See treatment diary below      Assessment: Tolerated treatment well  Patient would benefit from continued PT  He was challenged with hip hinge to avoid lumbar compensation  He was able to perform SLS with kb pass well this date with increased ankle strategy  He was challenged with tandem walking without being able to maintain forward gaze  He tolerated hamstring stool scoot this date  Plan: Continue per plan of care        Diagnosis: Balance Difficulty  Precautions: R adhesive capsulitis      Manuals                                                               Neuro Re-Ed             Biodex  m-CTSIB    weight shift training  2'ea    LOS 3x               Rockerboard  A/P, m/l 2'            Tandem walk    2x           SLS kb pass   10# 20xea                                    SLR flx, ext  10xea           bridges                                       Ther Ex             Bike  5'            T  Mill   5' incline level 2%          Hamstring stool scoot   5# 2x          Hip hinge   10# kb 2x10 to bench                       PF    B/l 20x edge of step                                                 Ther Activity             Decline squats                          Gait Training                                       Modalities

## 2021-07-29 ENCOUNTER — OFFICE VISIT (OUTPATIENT)
Dept: PHYSICAL THERAPY | Facility: CLINIC | Age: 61
End: 2021-07-29
Payer: COMMERCIAL

## 2021-07-29 DIAGNOSIS — M25.511 CHRONIC RIGHT SHOULDER PAIN: ICD-10-CM

## 2021-07-29 DIAGNOSIS — G89.29 CHRONIC RIGHT SHOULDER PAIN: ICD-10-CM

## 2021-07-29 DIAGNOSIS — M75.01 ADHESIVE CAPSULITIS OF RIGHT SHOULDER: ICD-10-CM

## 2021-07-29 DIAGNOSIS — R26.89 BALANCE PROBLEM: Primary | ICD-10-CM

## 2021-07-29 PROCEDURE — 97110 THERAPEUTIC EXERCISES: CPT

## 2021-07-29 PROCEDURE — 97112 NEUROMUSCULAR REEDUCATION: CPT

## 2021-07-29 NOTE — PROGRESS NOTES
Daily Note     Today's date: 2021  Patient name: Fartun Pelaez  : 1960  MRN: [id number]  Referring provider: Luisa Dominguez MD  Dx:   Encounter Diagnosis     ICD-10-CM    1  Balance problem  R26 89    2  Adhesive capsulitis of right shoulder  M75 01    3  Chronic right shoulder pain  M25 511     G89 29                   Subjective: Patient reports that he is doing well today with no new complaints  Notes that he usually is sore following last session  Objective: See treatment diary below      Assessment: Tolerated treatment well  Patient would benefit from continued PT  Pt demonstrated better control with a hip hinge  Added back in the biodex today with no complaints but struggled with weight shifting under control  He was however most challenged with R SLS, with unsteadiness present during KB swings  Improved tandem ambulation present as well as rocker board balance with finger tip A needed  Plan: Continue per plan of care        Diagnosis: Balance Difficulty  Precautions: R adhesive capsulitis      Manuals                                                              Neuro Re-Ed             Biodex  m-CTSIB    weight shift training  2'ea    LOS 3x      LOS static x3         Rockerboard  A/P, m/l 2'   A/P, m/l 2'         Tandem walk    2x  2x         SLS kb pass   10# 20xea 10# 20x ea                                   SLR flx, ext  10xea           bridges                                       Ther Ex             Bike  5'            T  Mill   5' incline level 2% 5' incline lvl 2%         Hamstring stool scoot   5# 2x 5# 2x         Hip hinge   10# kb 2x10 to bench 10# kb 2x10 to bench                      PF    B/l 20x edge of step B/l edge of step 20x         Leg press    70# 2x10                                   Ther Activity             Decline squats                          Gait Training                                       Modalities

## 2021-07-30 ENCOUNTER — APPOINTMENT (OUTPATIENT)
Dept: PHYSICAL THERAPY | Facility: CLINIC | Age: 61
End: 2021-07-30
Payer: COMMERCIAL

## 2021-08-03 ENCOUNTER — OFFICE VISIT (OUTPATIENT)
Dept: PHYSICAL THERAPY | Facility: CLINIC | Age: 61
End: 2021-08-03
Payer: COMMERCIAL

## 2021-08-03 DIAGNOSIS — M25.511 CHRONIC RIGHT SHOULDER PAIN: ICD-10-CM

## 2021-08-03 DIAGNOSIS — G89.29 CHRONIC RIGHT SHOULDER PAIN: ICD-10-CM

## 2021-08-03 DIAGNOSIS — M75.01 ADHESIVE CAPSULITIS OF RIGHT SHOULDER: Primary | ICD-10-CM

## 2021-08-03 PROCEDURE — 97140 MANUAL THERAPY 1/> REGIONS: CPT | Performed by: PHYSICAL THERAPIST

## 2021-08-03 PROCEDURE — 97110 THERAPEUTIC EXERCISES: CPT | Performed by: PHYSICAL THERAPIST

## 2021-08-03 NOTE — PROGRESS NOTES
PT Discharge Summary     Today's date: 8/3/2021  Patient name: Trey Piper  : 1960  MRN: [id number]  Referring provider: Chapito Lyn MD  Dx:   Encounter Diagnosis     ICD-10-CM    1  Adhesive capsulitis of right shoulder  M75 01    2  Chronic right shoulder pain  M25 511     G89 29        Start Time: 930  Stop Time: 1013  Total time in clinic (min): 43 minutes    Assessment  Assessment details: Trey Piper is a pleasant 56 y o  male who presents with R shoulder pain resulting, greater than 3 months, resulting in difficulty with ADLs and functional activities  He demonstrates return to functional AROM and strength  He is appropriate for discharge to [facility] at this time  Goals  ST  Patient will demonstrate R shoulder abduction AROM > 100 degrees in 4 weeks  - MET  2  Patient will be able to tolerate reaching behind head without increase in pain in 4 weeks  - met (able to wash hair)  3  Patient will be able to reach down and behind him while fishing without increase in pain in 4 weeks  - MET    LT  Patient will be able to reach behind his back without increase in pain in 8 weeks  - MET  2  Patient will be able to lift overhead > 10 lbs in 8 weeks  - MET   3  Patient will be independent with home exercise program  - MET  4  Patient will be able to manage symptoms independently  - MET    Plan  Plan details: Discharge to [facility]  Planned therapy interventions: home exercise program  Treatment plan discussed with: patient and referring physician        Subjective Evaluation    History of Present Illness  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  He notes that he is working from home currently  He sells flower-bulbs  He notes that he enjoys cooking  He enjoys fishing  He enjoys spending time with his dog and wife     Pain  Current pain ratin  At best pain ratin  At worst pain ratin  Location: R posterior cuff, lateral shoulder  Quality: tight and dull ache  Relieving factors: medications and rest  Aggravating factors: overhead activity and lifting  Progression: improved    Treatments  Previous treatment: injection treatment  Patient Goals  Patient goals for therapy: increased motion, decreased pain, increased strength, independence with ADLs/IADLs and return to sport/leisure activities  Patient goal: get more movement - met    Patient reports 100% improvement in his shoulder pain  He notes that he has all the overhead ROM throughout his functional activities  He notes that he only has mild difficulty with reaching behind his back now  He plans to continue with HEP       Objective     Static Posture     Comments  Bilateral rounded shoulders and increased thoracic kyphosis    Active Range of Motion   Left Shoulder   Flexion: 145 degrees   Abduction: 140 degrees   External rotation BTH: C7   Internal rotation BTB: T8     Right Shoulder   Flexion: 150 degrees   Abduction: 140 degrees   External rotation BTH: C7   Internal rotation BTB: L1     Strength/Myotome Testing     Left Shoulder   Normal muscle strength    Right Shoulder     Planes of Motion   Flexion: 5   Abduction: 5   External rotation at 90°: 5   Internal rotation at 0°: 5       Flowsheet Rows      Most Recent Value   PT/OT G-Codes   Current Score  80   Projected Score  65   FOTO information reviewed  Yes              Diagnosis: R shoulder adhesive capsulitis  Precautions: HTN, pre diabetic; recent hx osteomyelitis of jaw      Manuals 7/16 7/20- balance 7/22 8/3 6/15 6/18 6/21 6/23 7/7 7/9   Inf/AP/lateral glide GH joint  Gr  III-IV        RS RS RS   PROM R shoulder      All directions, per tolerance with OP  RS RS RS                Re-Evaluation    RS    RS     Neuro Re-Ed                          Serratus punches       5# 5"x20  5# 5"x20    Rows, Ext          belle  Rows 15#, Ext 10#  20xea   Bent over incline bench supported retractoins     5"x20        Bent over incline bench T, Y, I, row Spero Halon 2# 15x    Row 5# 10x    Y no wt  10x    I 2# 15x      TB Rhythmic stabilization       3x30"  1x45"      TB pull aparts with palms up Green 20x            FR forearm wall slide    With t/s ext    20x          Standing PNF   Green D1, D2 flx/ext  15xea                       Ther Ex             UBE 2 5/2 5 lvl 2  5' lvl 2 5  5' retro 6' retro 6' retro 3'/3' 5' retro 8'  5' retro   Pulleys      3'/3'       Wall Climb             Wall Slides        abd with lean 10x     Ball Table Flexion Red 10xea            Cane extension, adduction stretch     10x10"  10x10"      Cane AAROM      Flx, ER 10" 10x       DB OH Press seated 5#  2x15            TB IR/ER 90 deg   Blk IR/ER 20x    belle  10# 20x   Table supported Blue IR 20x ER 20x      Blue 30x   sidelying AROM         Flx, abd, ER 15x    IR stretch (towel, wall, sleeper?) Wall 10x10"   Wall 10x10"   sleeper stretch 10" 10x Wall 10x10"  supine 10x10" sleeper 10x10"   Doorway pec stretch             Doorway ER stretch 10x10"    10x10"     10x10"   Thoracic extension seated 1/2 FR             Walk-back stretch      10" 10x       Inclined table ER stretch             Ther Activity                                       Gait Training                                       Modalities             HP

## 2021-08-06 ENCOUNTER — OFFICE VISIT (OUTPATIENT)
Dept: PHYSICAL THERAPY | Facility: CLINIC | Age: 61
End: 2021-08-06
Payer: COMMERCIAL

## 2021-08-06 DIAGNOSIS — M25.511 CHRONIC RIGHT SHOULDER PAIN: ICD-10-CM

## 2021-08-06 DIAGNOSIS — G89.29 CHRONIC RIGHT SHOULDER PAIN: ICD-10-CM

## 2021-08-06 DIAGNOSIS — M75.01 ADHESIVE CAPSULITIS OF RIGHT SHOULDER: Primary | ICD-10-CM

## 2021-08-06 DIAGNOSIS — R26.89 BALANCE PROBLEM: ICD-10-CM

## 2021-08-06 PROCEDURE — 97110 THERAPEUTIC EXERCISES: CPT | Performed by: PHYSICAL THERAPIST

## 2021-08-06 PROCEDURE — 97112 NEUROMUSCULAR REEDUCATION: CPT | Performed by: PHYSICAL THERAPIST

## 2021-08-06 NOTE — PROGRESS NOTES
Daily Note     Today's date: 2021  Patient name: Fartun Pelaez  : 1960  MRN: [id number]  Referring provider: Stanley Murrieta, SPENCER  Dx:   Encounter Diagnosis     ICD-10-CM    1  Adhesive capsulitis of right shoulder  M75 01    2  Chronic right shoulder pain  M25 511     G89 29    3  Balance problem  R26 89                   Subjective: Patient denies any new balance difficulties  Objective: See treatment diary below      Assessment: Tolerated treatment well  Patient would benefit from continued PT  He tolerated decline squats well,but did require verbal cues for posterior weight shift and to maintain chest upright  He was able to tolerate added resistance hamstring stool scoot  He was challenged with PF up 2 down 1 with greater difficulty on R vs  L  He was able to tolerate x-walks well with only initial cues for technique  He was able to perform tandem stance during kb pass  Plan: Continue per plan of care  Re-Evaluation next week        Diagnosis: Balance Difficulty  Precautions: R adhesive capsulitis      Manuals                                                             Neuro Re-Ed             Biodex  m-CTSIB    weight shift training  2'ea    LOS 3x      LOS static x3         Rockerboard  A/P, m/l 2'   A/P, m/l 2' A/P, M/L 2'         Tandem walk    2x  2x 2x        SLS kb pass   10# 20xea 10# 20x ea tandem 10# 20xea                                  SLR flx, ext  10xea           bridges             X-walks     Green 2x                     Ther Ex             Bike  5'            T  Mill   5' incline level 2% 5' incline lvl 2% 5' incline 3 0        Hamstring stool scoot   5# 2x 5# 2x 10# 2x        Hip hinge   10# kb 2x10 to bench 10# kb 2x10 to bench                      PF    B/l 20x edge of step B/l edge of step 20x Up 2 down 1  Step  20x        Leg press    70# 2x10                                   Ther Activity             Decline squats     x10  x10 10#kb Gait Training                                       Modalities

## 2021-08-10 ENCOUNTER — EVALUATION (OUTPATIENT)
Dept: PHYSICAL THERAPY | Facility: CLINIC | Age: 61
End: 2021-08-10
Payer: COMMERCIAL

## 2021-08-10 DIAGNOSIS — M75.01 ADHESIVE CAPSULITIS OF RIGHT SHOULDER: Primary | ICD-10-CM

## 2021-08-10 DIAGNOSIS — R26.89 BALANCE PROBLEM: ICD-10-CM

## 2021-08-10 DIAGNOSIS — G89.29 CHRONIC RIGHT SHOULDER PAIN: ICD-10-CM

## 2021-08-10 DIAGNOSIS — M25.511 CHRONIC RIGHT SHOULDER PAIN: ICD-10-CM

## 2021-08-10 PROCEDURE — 97140 MANUAL THERAPY 1/> REGIONS: CPT | Performed by: PHYSICAL THERAPIST

## 2021-08-10 NOTE — PROGRESS NOTES
PT Re-Evaluation     Today's date: 8/10/2021  Patient name: Antonino Moreno  : 1960  MRN: [id number]  Referring provider: Erik Lopez MD  Dx:   Encounter Diagnosis     ICD-10-CM    1  Adhesive capsulitis of right shoulder  M75 01    2  Chronic right shoulder pain  M25 511     G89 29    3  Balance problem  R26 89                   Assessment  Assessment details: Antonino Moreno is a pleasant 56 y o  male who presents with increasing difficulty maintaining balance in the past 2 years resulting in difficulty with functional activities  He does demonstrate improvements in strength deficits in LE strength with greatest impairments still R > L, especially in the posterior chain  He plans to continue to progress overall with his strength and balance as he makes improvements in PT  He plans to transition to HEP within the next month  The patient's greatest concerns are fear of not being able to keep active and future ill health (and wanting to prevent it)  The primary movement problem is balance difficulty resulting in balance problem and limiting his ability to exercise or recreation, perform yard work and walk  No further referral appears necessary at this time based upon examination results  Primary Impairments:  1) balance difficulty  2) LE strength deficits    Etiologic factors include none recalled by the patient  Impairments: impaired balance, impaired physical strength and lacks appropriate home exercise program    Symptom irritability: moderateUnderstanding of Dx/Px/POC: good   Prognosis: good  Prognosis details: Positive prognostic indicators include positive attitude toward recovery, good understanding of diagnosis and treatment plan options and absence of observed red flags  Negative prognostic indicators include multiple concurrent orthopedic problems  Goals  ST  Patient will be able to maintain balance during decline squats in 2 weeks  - partially met  2   Patient will be able to maintain balance in a crowd without corrective step in 2 weeks  - progressing    LT  Patient will be able to get on/off his boat with confidence in 4 weeks  - progressing  2  Patient will be able to negotiate declines on uneven terrain without difficulty in 4 weeks  - progressing  3  Patient will be independent with home exercise program    4  Patient will be able to manage symptoms independently  Plan  Plan details: POC sent to PCP  Patient would benefit from: skilled physical therapy  Planned therapy interventions: activity modification, joint mobilization, manual therapy, motor coordination training, neuromuscular re-education, patient education, self care, therapeutic activities, therapeutic exercise, graded activity, home exercise program, behavior modification, stretching, strengthening, flexibility, functional ROM exercises, gait training, balance/weight bearing training, balance, postural training and abdominal trunk stabilization  Frequency: 2x week  Duration in weeks: 4  Treatment plan discussed with: patient and PCP        Subjective Evaluation    History of Present Illness  Mechanism of injury:   He denies numbness/tingling in the feet  He notes that typically he has to take corrective steps, but his legs don't necessarily buckle  He believe he is more confident in his R foot  He is also currently receiving PT for his shoulder  Patient Goals  Patient goals for therapy: increased strength, improved balance and return to sport/leisure activities  Patient goal: be confident to walk rough terrain- progressing, be more confident on your boat - progressing    Patient reports that he's not sure he sees improvement because he feels as though he always wants to be better  He notes that he has been on the boat since starting PT and he has had less stumbled and had to grab on less for balance   He notes that he is more aware of what muscles are required for maintaining his balance and where he has deficits  He notes that he has had soreness in muscles       Objective     Strength/Myotome Testing     Lumbar   Left   Heel walk: normal  Toe walk: normal    Right   Heel walk: normal  Toe walk: normal    Left Hip   Planes of Motion   Flexion: 4  Extension: 4  Abduction: 4+  External rotation: 4+    Right Hip   Planes of Motion   Flexion: 4-  Extension: 4  Abduction: 4+  External rotation: 4    Left Knee   Flexion: 4+  Prone flexion: 4  Extension: 5    Right Knee   Flexion: 4  Prone flexion: 4+  Extension: 4+    Left Ankle/Foot   Dorsiflexion: 5  Inversion: 4  Eversion: 4-    Right Ankle/Foot   Dorsiflexion: 5  Inversion: 4  Eversion: 4-    Additional Strength Details  Squat; greater than parallel with forward trunk lean compensation to maintain balance    Functional Assessment        Comments  M-CTSIB scanned into media tab              Diagnosis: Balance Difficulty  Precautions: R adhesive capsulitis      Manuals 7/13 7/20 7/27 7/29 8/6 8/10                                              Re-Evaluation      RS       Neuro Re-Ed             Biodex  m-CTSIB    weight shift training  2'ea    LOS 3x      LOS static x3         Rockerboard  A/P, m/l 2'   A/P, m/l 2' A/P, M/L 2'         Tandem walk    2x  2x 2x        SLS kb pass   10# 20xea 10# 20x ea tandem 10# 20xea                                  SLR flx, ext  10xea           bridges             X-walks     Green 2x                     Ther Ex             Bike  5'            T  Mill   5' incline level 2% 5' incline lvl 2% 5' incline 3 0        Hamstring stool scoot   5# 2x 5# 2x 10# 2x        Hip hinge   10# kb 2x10 to bench 10# kb 2x10 to bench                      PF    B/l 20x edge of step B/l edge of step 20x Up 2 down 1  Step  20x        Leg press    70# 2x10                                   Ther Activity             Decline squats     x10  x10 10#kb                     Gait Training                                       Modalities

## 2021-08-13 ENCOUNTER — OFFICE VISIT (OUTPATIENT)
Dept: PHYSICAL THERAPY | Facility: CLINIC | Age: 61
End: 2021-08-13
Payer: COMMERCIAL

## 2021-08-13 DIAGNOSIS — M25.511 CHRONIC RIGHT SHOULDER PAIN: ICD-10-CM

## 2021-08-13 DIAGNOSIS — R26.89 BALANCE PROBLEM: ICD-10-CM

## 2021-08-13 DIAGNOSIS — G89.29 CHRONIC RIGHT SHOULDER PAIN: ICD-10-CM

## 2021-08-13 DIAGNOSIS — M75.01 ADHESIVE CAPSULITIS OF RIGHT SHOULDER: Primary | ICD-10-CM

## 2021-08-13 PROCEDURE — 97112 NEUROMUSCULAR REEDUCATION: CPT

## 2021-08-13 PROCEDURE — 97110 THERAPEUTIC EXERCISES: CPT

## 2021-08-13 NOTE — PROGRESS NOTES
Daily Note     Today's date: 2021  Patient name: Rach Ramsey  : 1960  MRN: [id number]  Referring provider: Sofia Arroyo PT  Dx:   Encounter Diagnosis     ICD-10-CM    1  Adhesive capsulitis of right shoulder  M75 01    2  Chronic right shoulder pain  M25 511     G89 29    3  Balance problem  R26 89        Start Time: 1200  Stop Time: 1240  Total time in clinic (min): 40 minutes    Subjective: Patient states he is not sure if balance is improving because he just continues to do more  Objective: See treatment diary below      Assessment:Continued with outlined program  Patient cued for to avoid looking down to improve proprioception with activities  He was able to perform tandem walking while maintaining straight line without looking  Patient exhibits noticeable weakness with R calf for eccentric heel raises at Biodex step following several reps requiring UE support  Patient cued to maintain proper technique with squats as he tends to shift forward due to muscle fatigue  Patient challenged with single leg stance on dynamic surfaces  Will continue to progress as he is able to tolerate  Plan: Progress treatment as tolerated         Diagnosis: Balance Difficulty  Precautions: R adhesive capsulitis      Manuals 7/13 7/20 7/27 7/29 8/6 8/10 8/13                                             Re-Evaluation      RS       Neuro Re-Ed             Biodex  m-CTSIB    weight shift training  2'ea    LOS 3x      LOS static x3         Rockerboard  A/P, m/l 2'   A/P, m/l 2' A/P, M/L 2'   A/P, M/L 2'      Tandem walk    2x  2x 2x  2x      SLS kb pass   10# 20xea 10# 20x ea tandem 10# 20xea        SLS        30 sec x3 ea                   SLR flx, ext  10xea           bridges             X-walks     Green 2x  Green 2x                   Ther Ex             Bike  5'            T  Mill   5' incline level 2% 5' incline lvl 2% 5' incline 3 0  5' incline 3 0       Hamstring stool scoot   5# 2x 5# 2x 10# 2x  10# 2x Hip hinge   10# kb 2x10 to bench 10# kb 2x10 to bench                      PF    B/l 20x edge of step B/l edge of step 20x Up 2 down 1  Step  20x  Up 2 down 1 step 20x       Leg press    70# 2x10                                   Ther Activity             Decline squats     x10  x10 10#kb  20x   10# kb                    Gait Training                                       Modalities

## 2021-08-17 ENCOUNTER — OFFICE VISIT (OUTPATIENT)
Dept: PHYSICAL THERAPY | Facility: CLINIC | Age: 61
End: 2021-08-17
Payer: COMMERCIAL

## 2021-08-17 DIAGNOSIS — R26.89 BALANCE PROBLEM: Primary | ICD-10-CM

## 2021-08-17 PROCEDURE — 97110 THERAPEUTIC EXERCISES: CPT | Performed by: PHYSICAL THERAPIST

## 2021-08-17 PROCEDURE — 97112 NEUROMUSCULAR REEDUCATION: CPT | Performed by: PHYSICAL THERAPIST

## 2021-08-17 NOTE — PROGRESS NOTES
Daily Note     Today's date: 2021  Patient name: Carrie Rose  : 1960  MRN: [id number]  Referring provider: John Burton, PT  Dx:   Encounter Diagnosis     ICD-10-CM    1  Balance problem  R26 89                   Subjective: Patient reports that his L shoulder and knees have been hurting more but he attributes it to not taking Chondrotin in a few weeks  He notes that he doesn't go home as sore as he did previously  Objective: See treatment diary below      Assessment: Tolerated treatment well  Patient would benefit from continued PT  He was challenged with Biodex balance training this date with initiation of maze control  He remains challenged with SLS kb pass when foot is elevated behind him vs  Anterior to him  He was challenged with hip hinge this date requiring verbal cues to unlock knees  He did demonstrate better eccentric control with x-walks this date  Plan: Discharge to [facility] next visit if no change in status        Diagnosis: Balance Difficulty  Precautions: R adhesive capsulitis      Manuals 7/13 7/20 7/27 7/29 8/6 8/10 8/13 8/17                                            Re-Evaluation      RS       Neuro Re-Ed             Biodex  m-CTSIB    weight shift training  2'ea    LOS 3x      LOS static x3    LOS  static 3x    Maze control 1x lvl 1  1x lvl 2     Rockerboard  A/P, m/l 2'   A/P, m/l 2' A/P, M/L 2'   A/P, M/L 2'      Tandem walk    2x  2x 2x  2x      SLS kb pass   10# 20xea 10# 20x ea tandem 10# 20xea   SLS 10# 20x     SLS        30 sec x3 ea                   SLR flx, ext  10xea           bridges             X-walks     Green 2x  Green 2x Green 2x                  Ther Ex             Bike  5'            T  Mill   5' incline level 2% 5' incline lvl 2% 5' incline 3 0  5' incline 3 0  5' inline 3 0%     Hamstring stool scoot   5# 2x 5# 2x 10# 2x  10# 2x       Hip hinge   10# kb 2x10 to bench 10# kb 2x10 to bench    20# kb 2x10 to bench                  PF    B/l 20x edge of step B/l edge of step 20x Up 2 down 1  Step  20x  Up 2 down 1 step 20x       Leg press    70# 2x10    70# 2x10 DL    70# PF  30x                                   Ther Activity             Decline squats     x10  x10 10#kb  20x   10# kb                    Gait Training                                       Modalities

## 2021-08-20 ENCOUNTER — EVALUATION (OUTPATIENT)
Dept: PHYSICAL THERAPY | Facility: CLINIC | Age: 61
End: 2021-08-20
Payer: COMMERCIAL

## 2021-08-20 DIAGNOSIS — G89.29 CHRONIC RIGHT SHOULDER PAIN: ICD-10-CM

## 2021-08-20 DIAGNOSIS — M25.511 CHRONIC RIGHT SHOULDER PAIN: ICD-10-CM

## 2021-08-20 DIAGNOSIS — R26.89 BALANCE PROBLEM: Primary | ICD-10-CM

## 2021-08-20 DIAGNOSIS — M75.01 ADHESIVE CAPSULITIS OF RIGHT SHOULDER: ICD-10-CM

## 2021-08-20 PROCEDURE — 97140 MANUAL THERAPY 1/> REGIONS: CPT | Performed by: PHYSICAL THERAPIST

## 2021-08-20 NOTE — PROGRESS NOTES
PT Discharge Summary    Today's date: 2021  Patient name: Nathan Mcguire  : 1960  MRN: [id number]  Referring provider: Akosua Garcia MD  Dx:   Encounter Diagnosis     ICD-10-CM    1  Balance problem  R26 89    2  Adhesive capsulitis of right shoulder  M75 01    3  Chronic right shoulder pain  M25 511     G89 29                   Assessment  Assessment details: Nathan Mcguire is a pleasant 56 y o  male who presents with increasing difficulty maintaining balance in the past 2 years resulting in difficulty with functional activities  He does demonstrate improvements in strength deficits in LE strength and balance with improved postural awareness  He is appropriate for and agreeable to discharge to [facility]  Goals  ST  Patient will be able to maintain balance during decline squats in 2 weeks  - met  2  Patient will be able to maintain balance in a crowd without corrective step in 2 weeks  - progressing    LT  Patient will be able to get on/off his boat with confidence in 4 weeks  - met  2  Patient will be able to negotiate declines on uneven terrain without difficulty in 4 weeks  - partially met (with the correct shoes)  3  Patient will be independent with home exercise program  - met  4  Patient will be able to manage symptoms independently  - met    Plan  Plan details: Discharge to [facility]  Planned therapy interventions: home exercise program  Treatment plan discussed with: patient        Subjective Evaluation    History of Present Illness  Mechanism of injury:   He denies numbness/tingling in the feet  He notes that typically he has to take corrective steps, but his legs don't necessarily buckle  He believe he is more confident in his R foot  He is also currently receiving PT for his shoulder     Patient Goals  Patient goals for therapy: increased strength, improved balance and return to sport/leisure activities  Patient goal: be confident to walk rough terrain- progressing, be more confident on your boat - progressing    Patient reports that he sees improvement in his balance  He notes that he would like to continue with HEP  He notes that as he sees improvements in his balance, he plans to increase the challenge to get better  Objective     Strength/Myotome Testing     Lumbar   Left   Heel walk: normal  Toe walk: normal    Right   Heel walk: normal  Toe walk: normal    Left Hip   Planes of Motion   Flexion: 4  Extension: 4  Abduction: 4+  External rotation: 4+    Right Hip   Planes of Motion   Flexion: 4-  Extension: 4  Abduction: 4+  External rotation: 4    Left Knee   Flexion: 4+  Prone flexion: 4  Extension: 5    Right Knee   Flexion: 4  Prone flexion: 4+  Extension: 4+    Left Ankle/Foot   Dorsiflexion: 5  Inversion: 4  Eversion: 4-    Right Ankle/Foot   Dorsiflexion: 5  Inversion: 4  Eversion: 4-    Additional Strength Details  Squat; greater than parallel with forward trunk lean compensation to maintain balance    Functional Assessment        Comments  M-CTSIB scanned into media tab       Patient declined FOTO survey         Diagnosis: Balance Difficulty  Precautions: R adhesive capsulitis      Manuals 7/13 7/20 7/27 7/29 8/6 8/10 8/13 8/17 8/20                                           Re-Evaluation      RS   RS    Neuro Re-Ed             Biodex  m-CTSIB    weight shift training  2'ea    LOS 3x      LOS static x3    LOS  static 3x    Maze control 1x lvl 1  1x lvl 2     Rockerboard  A/P, m/l 2'   A/P, m/l 2' A/P, M/L 2'   A/P, M/L 2'      Tandem walk    2x  2x 2x  2x      SLS kb pass   10# 20xea 10# 20x ea tandem 10# 20xea   SLS 10# 20x     SLS        30 sec x3 ea                   SLR flx, ext  10xea           bridges             X-walks     Green 2x  Green 2x Green 2x                  Ther Ex             Bike  5'            T  Mill   5' incline level 2% 5' incline lvl 2% 5' incline 3 0  5' incline 3 0  5' inline 3 0%     Hamstring stool scoot   5# 2x 5# 2x 10# 2x  10# 2x       Hip hinge   10# kb 2x10 to bench 10# kb 2x10 to bench    20# kb 2x10 to bench                  PF    B/l 20x edge of step B/l edge of step 20x Up 2 down 1  Step  20x  Up 2 down 1 step 20x       Leg press    70# 2x10    70# 2x10 DL    70# PF  30x                                   Ther Activity             Decline squats     x10  x10 10#kb  20x   10# kb                    Gait Training                                       Modalities

## 2021-08-24 ENCOUNTER — APPOINTMENT (OUTPATIENT)
Dept: PHYSICAL THERAPY | Facility: CLINIC | Age: 61
End: 2021-08-24
Payer: COMMERCIAL

## 2021-08-27 ENCOUNTER — APPOINTMENT (OUTPATIENT)
Dept: PHYSICAL THERAPY | Facility: CLINIC | Age: 61
End: 2021-08-27
Payer: COMMERCIAL

## 2021-08-31 ENCOUNTER — APPOINTMENT (OUTPATIENT)
Dept: PHYSICAL THERAPY | Facility: CLINIC | Age: 61
End: 2021-08-31
Payer: COMMERCIAL

## 2021-09-25 ENCOUNTER — HOSPITAL ENCOUNTER (EMERGENCY)
Facility: HOSPITAL | Age: 61
Discharge: HOME | End: 2021-09-25
Attending: EMERGENCY MEDICINE | Admitting: EMERGENCY MEDICINE
Payer: COMMERCIAL

## 2021-09-25 ENCOUNTER — APPOINTMENT (EMERGENCY)
Dept: RADIOLOGY | Facility: HOSPITAL | Age: 61
End: 2021-09-25
Payer: COMMERCIAL

## 2021-09-25 VITALS
HEART RATE: 54 BPM | TEMPERATURE: 98.6 F | SYSTOLIC BLOOD PRESSURE: 152 MMHG | WEIGHT: 264 LBS | BODY MASS INDEX: 33.9 KG/M2 | OXYGEN SATURATION: 97 % | RESPIRATION RATE: 18 BRPM | DIASTOLIC BLOOD PRESSURE: 68 MMHG

## 2021-09-25 DIAGNOSIS — S39.012A LOW BACK STRAIN, INITIAL ENCOUNTER: ICD-10-CM
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
## 2021-09-25 DIAGNOSIS — M25.569 KNEE PAIN: ICD-10-CM

## 2021-09-25 DIAGNOSIS — S92.819A: ICD-10-CM

## 2021-09-25 DIAGNOSIS — S90.31XA CONTUSION OF RIGHT FOOT: ICD-10-CM

## 2021-09-25 PROCEDURE — 72100 X-RAY EXAM L-S SPINE 2/3 VWS: CPT

## 2021-09-25 PROCEDURE — 99284 EMERGENCY DEPT VISIT MOD MDM: CPT | Performed by: EMERGENCY MEDICINE

## 2021-09-25 PROCEDURE — 99284 EMERGENCY DEPT VISIT MOD MDM: CPT

## 2021-09-25 PROCEDURE — 73564 X-RAY EXAM KNEE 4 OR MORE: CPT

## 2021-09-25 PROCEDURE — 73130 X-RAY EXAM OF HAND: CPT

## 2021-09-25 PROCEDURE — 73630 X-RAY EXAM OF FOOT: CPT

## 2021-09-25 NOTE — DISCHARGE INSTRUCTIONS
Elevate the right hands and foot as able  Apply ice for 15 to 20 minute intervals over the next couple of days  Consider taking ibuprofen as directed on over-the-counter packaging to assist with discomfort/inflammation    Please schedule follow-up care with either a podiatrist or orthopedic specialist for your foot injury  If hand pain worsens or does not dramatically improve over the next week please follow-up additionally with orthopedics for your hand  Foot Contusion   WHAT YOU NEED TO KNOW:   A foot contusion is a bruise to the foot  DISCHARGE INSTRUCTIONS:   Medicines:   · NSAIDs:  These medicines decrease swelling and pain  NSAIDs are available without a doctor's order  Ask your healthcare provider which medicine is right for you  Ask how much to take and when to take it  Take as directed  NSAIDs can cause stomach bleeding and kidney problems if not taken correctly  · Take your medicine as directed  Contact your healthcare provider if you think your medicine is not helping or if you have side effects  Tell him of her if you are allergic to any medicine  Keep a list of the medicines, vitamins, and herbs you take  Include the amounts, and when and why you take them  Bring the list or the pill bottles to follow-up visits  Carry your medicine list with you in case of an emergency  Follow up with your healthcare provider as directed:  Write down your questions so you remember to ask them during your visits  Care for your foot: Follow your treatment plan to help decrease your pain and improve your muscle movement  · Rest:  You will need to rest your foot for 1 to 2 days after your injury  This will help decrease the risk of more damage  · Ice:  Ice helps decrease swelling and pain  Ice may also help prevent tissue damage  Use an ice pack, or put crushed ice in a plastic bag   Cover it with a towel and place it on your foot for 15 to 20 minutes every hour or as directed  · Compression:  Compression (tight hold) provides support and helps decrease swelling and movement so your foot can heal  You may be told to keep your foot wrapped with a tight elastic bandage  Follow instructions about how to apply your bandage  Do not massage your foot  You could cause more damage or pain  · Elevation:  Keep your foot raised above the level of your heart while you are sitting or lying down  This will help decrease or limit swelling  Use pillows, blankets, or rolled towels to elevate your foot comfortably  Exercise your foot:  You may be given gentle exercises to improve your foot movement and help decrease stiffness  Ask when you can return to your normal activities or sports  Prevent another injury:   · Wear equipment to protect yourself when you play sports  · Make sure your shoes fit properly  · Always wear shoes on streets or sidewalks  · Clean spills off the floor right away to avoid slipping or hitting your foot  · Make sure your home is well lit when you get up during the night  This will help you avoid hurting your foot in the dark  Contact your healthcare provider if:   · You have increased swelling on your foot  · You have severe foot pain  · You are not able to move your foot  · You have questions or concerns about your injury or treatment  © Copyright [employer] 2021 Information is for End User's use only and may not be sold, redistributed or otherwise used for commercial purposes  All illustrations and images included in CareNotes® are the copyrighted property of [employer]  or Aileen Snow   The above information is an  only  It is not intended as medical advice for individual conditions or treatments  Talk to your doctor, nurse or pharmacist before following any medical regimen to see if it is safe and effective for you        Acute Low Back Pain, Ambulatory Care   GENERAL INFORMATION:   Acute low back pain  is discomfort in your lower back area that lasts for less than 12 weeks  The word acute is used to describe pain that starts suddenly, worsens quickly, and lasts for a short time  Common symptoms include the following:   · Back stiffness or spasms    · Pain down the back or side of one leg    · Holding yourself in an unusual position or posture to decrease your back pain    · Not being able to find a sitting position that is comfortable    · Slow increase in your pain for 24 to 48 hours after you stress your back    · Tenderness on your lower back or severe pain when you move your back  Seek immediate care for the following symptoms:   · Severe pain    · Sudden stiffness and heaviness in both buttocks down to both legs    · Numbness or weakness in one leg, or pain in both legs    · Numbness in your genital area or across your lower back    · Unable to control your urine or bowel movements  Treatment for acute low back pain  may include any of the following:  · Medicines:      ¨ NSAIDs  help decrease swelling and pain or fever  This medicine is available with or without a doctor's order  NSAIDs can cause stomach bleeding or kidney problems in certain people  If you take blood thinner medicine, always ask your healthcare provider if NSAIDs are safe for you  Always read the medicine label and follow directions  ¨ Muscle relaxers  help decrease muscle spasms pain  ¨ Prescription pain medicine  may be given  Ask how to take this medicine safely  · Surgery  may be needed if your pain is severe and other treatments do not work  Surgery may be needed for conditions of the lumbar spine, such as herniated disc or spinal stenosis  Manage your symptoms:   · Sleep on a firm mattress  If you do not have a firm mattress, have someone move your mattress to the floor for a few days  A piece of plywood under your mattress can also help make it firmer      · Apply ice  on your lower back for 15 to 20 minutes every hour or as directed  Use an ice pack, or put crushed ice in a plastic bag  Cover it with a towel  Ice helps prevent tissue damage and decreases swelling and pain  You can alternate ice and heat  · Apply heat  on your lower back for 20 to 30 minutes every 2 hours for as many days as directed  Heat helps decrease pain and muscle spasms  · Go to physical therapy  A physical therapist teaches you exercises to help improve movement and strength, and to decrease pain  Prevent acute low back pain:   · Use proper body mechanics  ¨ Bend at the hips and knees when you  objects  Do not bend from the waist  Use your leg muscles as you lift the load  Do not use your back  Keep the object close to your chest as you lift it  Try not to twist or lift anything above your waist     ¨ Change your position often when you stand for long periods of time  Rest one foot on a small box or footrest, and then switch to the other foot often  ¨ Try not to sit for long periods of time  When you do, sit in a straight-backed chair with your feet flat on the floor  Never reach, pull, or push while you are sitting  · Exercise regularly  Warm up before you exercise  Do exercises that strengthen your back muscles  Ask about the best exercise plan for you  · Maintain a healthy weight  Ask your healthcare provider how much you should weigh  Ask him to help you create a weight loss plan if you are overweight  Follow up with your healthcare provider as directed:  Return for a follow-up visit if you still have pain after 1 to 3 weeks of treatment  You may need to visit an orthopedist if your back pain lasts more than 6 to 12 weeks  Write down your questions so you remember to ask them during your visits  CARE AGREEMENT:   You have the right to help plan your care  Learn about your health condition and how it may be treated  Discuss treatment options with your caregivers to decide what care you want to receive   You always have the right to refuse treatment  The above information is an  only  It is not intended as medical advice for individual conditions or treatments  Talk to your doctor, nurse or pharmacist before following any medical regimen to see if it is safe and effective for you  © 2014 [address] is for End User's use only and may not be sold, redistributed or otherwise used for commercial purposes  All illustrations and images included in CareNotes® are the copyrighted property of [employer]  or Joel Newman  Hand Sprain   WHAT YOU NEED TO KNOW:   A hand sprain is when a ligament in your hand is stretched or torn  Ligaments are the strong tissues that connect bones  You may have bruising, pain, and swelling of your injured hand  DISCHARGE INSTRUCTIONS:   Call your doctor if:   · The skin of your injured hand looks bluish or pale (less color than normal)  · You have increased swelling and pain in your hand  · You have new or increased numbness in your injured hand  · You have new or increased stiffness or trouble moving your injured hand  · You have questions or concerns about your injury or treatment  Medicines:   · NSAIDs  help decrease swelling and pain or fever  This medicine is available with or without a doctor's order  NSAIDs can cause stomach bleeding or kidney problems in certain people  If you take blood thinner medicine, always ask your healthcare provider if NSAIDs are safe for you  Always read the medicine label and follow directions  · Take your medicine as directed  Contact your healthcare provider if you think your medicine is not helping or if you have side effects  Tell him or her if you are allergic to any medicine  Keep a list of the medicines, vitamins, and herbs you take  Include the amounts, and when and why you take them  Bring the list or the pill bottles to follow-up visits   Carry your medicine list with you in case of an emergency  Rest your hand: You will need to rest your hand for 1 to 2 days after your injury  This will help decrease the risk of more damage to your hand  Do not lift anything with your injured hand  Ask your healthcare provider when you can return to your normal activities  Ice your hand:  Ice your hand to help decrease swelling and pain  Put crushed ice in a plastic bag and cover it with a towel  Put the ice on your hand for 15 to 20 minutes every hour  Use ice as directed  Use compression:  Compression (tight hold) provides support and helps decrease swelling and movement so your hand can heal  You may need to keep your hand wrapped with an elastic bandage  Elevate your hand:  Keep your injured hand raised above the level of your heart as often as you can  This will help decrease or limit swelling  You can elevate your hand by resting your arm up on a pillow  Use a splint:  You may need to use a splint on your hand and wrist  A splint is a special device that keeps your hand and wrist from moving  Use the splint as directed  Exercise your hand: You may be given exercises to improve your strength once you are able to move your hand without pain  Exercises will also help decrease stiffness  Start your exercises and normal activities slowly  Exercise your hand as directed  Follow up with your doctor as directed:  Write down your questions you so you remember to ask them during your visits  © Copyright [employer] 2021 Information is for End User's use only and may not be sold, redistributed or otherwise used for commercial purposes  All illustrations and images included in CareNotes® are the copyrighted property of [employer]  or [facility] Grecia Snow   The above information is an  only  It is not intended as medical advice for individual conditions or treatments   Talk to your doctor, nurse or pharmacist before following any medical regimen to see if it is safe and effective for you
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
## 2021-09-30 NOTE — PROGRESS NOTES
Daily Note     Today's date: 2021  Patient name: Dakotah Hoff  : 1960  MRN: [id number]  Referring provider: Audrey Moreno MD  Dx:   Encounter Diagnosis     ICD-10-CM    1  Balance problem  R26 89                   Subjective: He denies any balance issues this weekend as he wasn't on a boat or on hills lately  Objective: See treatment diary below      Assessment: Tolerated treatment well  Patient would benefit from continued PT  He only demonstrated mild deficits in his balance testing during m-CSIB with noncompliant surface and eyes closed  He was challenged with balance training with dynamic surface  He was most challenged with anterior weight shift during LOS training on [employer]; this was further replicated during a/p rockerboard training for proprioception training  This is attributed to deficits in hamstring and glute strength discovered on IE  He was more challenged with medial/lateral rockerboard proprioception without tactile feedback of finger tip on railing  Plan: Continue per plan of care        Diagnosis: Balance Difficulty  Precautions: R adhesive capsulitis      Manuals                                                                Neuro Re-Ed             [employer]  m-CTSIB    weight shift training  2'ea    LOS 3x               Rockerboard  A/P, m/l 2'            Tandem walk foam             SLS kb pass                                       SLR flx, ext  10xea           bridges                                       Ther Ex             Bike  5'            Hamstring stool scoot             Hip hinge                                                                              Ther Activity             Decline squats                          Gait Training                                       Modalities
Never

## 2021-10-01 ENCOUNTER — RA CDI HCC (OUTPATIENT)
Dept: OTHER | Facility: HOSPITAL | Age: 61
End: 2021-10-01

## 2021-10-11 ENCOUNTER — RA CDI HCC (OUTPATIENT)
Dept: OTHER | Facility: HOSPITAL | Age: 61
End: 2021-10-11

## 2021-10-25 ENCOUNTER — OFFICE VISIT (OUTPATIENT)
Dept: INTERNAL MEDICINE CLINIC | Facility: CLINIC | Age: 61
End: 2021-10-25
Payer: COMMERCIAL

## 2021-10-25 VITALS
RESPIRATION RATE: 16 BRPM | WEIGHT: 269.4 LBS | BODY MASS INDEX: 34.57 KG/M2 | HEART RATE: 60 BPM | DIASTOLIC BLOOD PRESSURE: 78 MMHG | OXYGEN SATURATION: 98 % | SYSTOLIC BLOOD PRESSURE: 124 MMHG | HEIGHT: 74 IN

## 2021-10-25 DIAGNOSIS — Z11.4 SCREENING FOR HIV (HUMAN IMMUNODEFICIENCY VIRUS): ICD-10-CM

## 2021-10-25 DIAGNOSIS — E11.9 TYPE 2 DIABETES MELLITUS WITHOUT COMPLICATION, WITHOUT LONG-TERM CURRENT USE OF INSULIN (HCC): ICD-10-CM

## 2021-10-25 DIAGNOSIS — Z23 ENCOUNTER FOR IMMUNIZATION: ICD-10-CM

## 2021-10-25 DIAGNOSIS — M27.2 OSTEOMYELITIS, JAW ACUTE: ICD-10-CM

## 2021-10-25 DIAGNOSIS — Z12.11 SCREENING FOR COLORECTAL CANCER: ICD-10-CM

## 2021-10-25 DIAGNOSIS — E78.2 MIXED HYPERLIPIDEMIA: Primary | ICD-10-CM

## 2021-10-25 DIAGNOSIS — I10 ESSENTIAL HYPERTENSION: ICD-10-CM

## 2021-10-25 DIAGNOSIS — M1A.0710 IDIOPATHIC CHRONIC GOUT OF RIGHT FOOT WITHOUT TOPHUS: ICD-10-CM

## 2021-10-25 DIAGNOSIS — Z12.12 SCREENING FOR COLORECTAL CANCER: ICD-10-CM

## 2021-10-25 PROCEDURE — 90472 IMMUNIZATION ADMIN EACH ADD: CPT

## 2021-10-25 PROCEDURE — 90471 IMMUNIZATION ADMIN: CPT

## 2021-10-25 PROCEDURE — 99214 OFFICE O/P EST MOD 30 MIN: CPT | Performed by: INTERNAL MEDICINE

## 2021-10-25 PROCEDURE — 3074F SYST BP LT 130 MM HG: CPT | Performed by: INTERNAL MEDICINE

## 2021-10-25 PROCEDURE — 90750 HZV VACC RECOMBINANT IM: CPT

## 2021-10-25 PROCEDURE — 3078F DIAST BP <80 MM HG: CPT | Performed by: INTERNAL MEDICINE

## 2021-10-25 PROCEDURE — 1036F TOBACCO NON-USER: CPT | Performed by: INTERNAL MEDICINE

## 2021-10-25 PROCEDURE — 90682 RIV4 VACC RECOMBINANT DNA IM: CPT

## 2021-10-25 PROCEDURE — 3008F BODY MASS INDEX DOCD: CPT | Performed by: INTERNAL MEDICINE

## 2021-10-25 RX ORDER — METOPROLOL TARTRATE AND HYDROCHLOROTHIAZIDE 100; 25 MG/1; MG/1
0.5 TABLET ORAL DAILY
Qty: 90 TABLET | Refills: 3 | Status: SHIPPED | OUTPATIENT
Start: 2021-10-25

## 2021-12-08 ENCOUNTER — TELEMEDICINE (OUTPATIENT)
Dept: INTERNAL MEDICINE CLINIC | Facility: CLINIC | Age: 61
End: 2021-12-08
Payer: COMMERCIAL

## 2021-12-08 DIAGNOSIS — Z11.59 SCREENING FOR VIRAL DISEASE: Primary | ICD-10-CM

## 2021-12-08 DIAGNOSIS — R05.9 COUGH: Primary | ICD-10-CM

## 2021-12-08 PROCEDURE — U0003 INFECTIOUS AGENT DETECTION BY NUCLEIC ACID (DNA OR RNA); SEVERE ACUTE RESPIRATORY SYNDROME CORONAVIRUS 2 (SARS-COV-2) (CORONAVIRUS DISEASE [COVID-19]), AMPLIFIED PROBE TECHNIQUE, MAKING USE OF HIGH THROUGHPUT TECHNOLOGIES AS DESCRIBED BY CMS-2020-01-R: HCPCS | Performed by: INTERNAL MEDICINE

## 2021-12-08 PROCEDURE — 99213 OFFICE O/P EST LOW 20 MIN: CPT | Performed by: INTERNAL MEDICINE

## 2021-12-08 PROCEDURE — U0005 INFEC AGEN DETEC AMPLI PROBE: HCPCS | Performed by: INTERNAL MEDICINE

## 2021-12-14 ENCOUNTER — CONSULT (OUTPATIENT)
Dept: DERMATOLOGY | Facility: CLINIC | Age: 61
End: 2021-12-14
Payer: COMMERCIAL

## 2021-12-14 VITALS — TEMPERATURE: 96.3 F | WEIGHT: 272 LBS | BODY MASS INDEX: 34.92 KG/M2

## 2021-12-14 DIAGNOSIS — Z12.83 SCREENING FOR MALIGNANT NEOPLASM OF SKIN: Primary | ICD-10-CM

## 2021-12-14 PROCEDURE — 1036F TOBACCO NON-USER: CPT | Performed by: DERMATOLOGY

## 2021-12-14 PROCEDURE — 99203 OFFICE O/P NEW LOW 30 MIN: CPT | Performed by: DERMATOLOGY

## 2021-12-16 ENCOUNTER — HOSPITAL ENCOUNTER (EMERGENCY)
Facility: HOSPITAL | Age: 61
Discharge: HOME | End: 2021-12-16
Attending: EMERGENCY MEDICINE
Payer: COMMERCIAL

## 2021-12-16 VITALS
RESPIRATION RATE: 18 BRPM | DIASTOLIC BLOOD PRESSURE: 88 MMHG | HEART RATE: 62 BPM | BODY MASS INDEX: 34.92 KG/M2 | OXYGEN SATURATION: 100 % | TEMPERATURE: 98.7 F | WEIGHT: 272 LBS | SYSTOLIC BLOOD PRESSURE: 146 MMHG

## 2021-12-16 DIAGNOSIS — S51.851A DOG BITE OF RIGHT FOREARM, INITIAL ENCOUNTER: Primary | ICD-10-CM
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
## 2021-12-16 PROCEDURE — 99284 EMERGENCY DEPT VISIT MOD MDM: CPT | Performed by: PHYSICIAN ASSISTANT

## 2021-12-16 PROCEDURE — 99283 EMERGENCY DEPT VISIT LOW MDM: CPT

## 2021-12-16 RX ORDER — CEFUROXIME AXETIL 250 MG/1
500 TABLET ORAL EVERY 12 HOURS SCHEDULED
Status: DISCONTINUED | OUTPATIENT
Start: 2021-12-16 | End: 2021-12-16 | Stop reason: HOSPADM

## 2021-12-16 RX ORDER — METRONIDAZOLE 500 MG/1
500 TABLET ORAL EVERY 8 HOURS SCHEDULED
Qty: 21 TABLET | Refills: 0 | Status: SHIPPED | OUTPATIENT
Start: 2021-12-16 | End: 2021-12-23

## 2021-12-16 RX ORDER — METRONIDAZOLE 500 MG/1
500 TABLET ORAL ONCE
Status: COMPLETED | OUTPATIENT
Start: 2021-12-16 | End: 2021-12-16

## 2021-12-16 RX ORDER — CEFUROXIME AXETIL 500 MG/1
500 TABLET ORAL EVERY 12 HOURS SCHEDULED
Qty: 14 TABLET | Refills: 0 | Status: SHIPPED | OUTPATIENT
Start: 2021-12-16 | End: 2021-12-23

## 2021-12-16 RX ADMIN — METRONIDAZOLE 500 MG: 500 TABLET ORAL at 19:30

## 2021-12-16 RX ADMIN — CEFUROXIME AXETIL 500 MG: 250 TABLET, FILM COATED ORAL at 19:30

## 2022-01-19 RX ORDER — ALLOPURINOL 100 MG/1
100 TABLET ORAL DAILY
Qty: 90 TABLET | Refills: 1 | Status: CANCELLED | OUTPATIENT
Start: 2022-01-19

## 2022-01-20 ENCOUNTER — OFFICE VISIT (OUTPATIENT)
Dept: INTERNAL MEDICINE CLINIC | Facility: CLINIC | Age: 62
End: 2022-01-20
Payer: COMMERCIAL

## 2022-01-20 VITALS
HEART RATE: 61 BPM | HEIGHT: 74 IN | BODY MASS INDEX: 34.91 KG/M2 | DIASTOLIC BLOOD PRESSURE: 88 MMHG | SYSTOLIC BLOOD PRESSURE: 136 MMHG | OXYGEN SATURATION: 97 % | WEIGHT: 272 LBS

## 2022-01-20 DIAGNOSIS — E11.9 TYPE 2 DIABETES MELLITUS WITHOUT COMPLICATION, WITHOUT LONG-TERM CURRENT USE OF INSULIN (HCC): ICD-10-CM

## 2022-01-20 DIAGNOSIS — Z23 ENCOUNTER FOR IMMUNIZATION: ICD-10-CM

## 2022-01-20 DIAGNOSIS — Z12.12 SCREENING FOR COLORECTAL CANCER: ICD-10-CM

## 2022-01-20 DIAGNOSIS — E78.2 MIXED HYPERLIPIDEMIA: ICD-10-CM

## 2022-01-20 DIAGNOSIS — I10 ESSENTIAL HYPERTENSION: ICD-10-CM

## 2022-01-20 DIAGNOSIS — M79.671 RIGHT FOOT PAIN: ICD-10-CM

## 2022-01-20 DIAGNOSIS — M1A.0710 IDIOPATHIC CHRONIC GOUT OF RIGHT FOOT WITHOUT TOPHUS: ICD-10-CM

## 2022-01-20 DIAGNOSIS — Z11.4 SCREENING FOR HIV (HUMAN IMMUNODEFICIENCY VIRUS): ICD-10-CM

## 2022-01-20 DIAGNOSIS — R05.9 COUGH: Primary | ICD-10-CM

## 2022-01-20 DIAGNOSIS — R00.2 PALPITATION: ICD-10-CM

## 2022-01-20 DIAGNOSIS — Z12.11 SCREENING FOR COLORECTAL CANCER: ICD-10-CM

## 2022-01-20 DIAGNOSIS — F33.8 SEASONAL AFFECTIVE DISORDER (HCC): ICD-10-CM

## 2022-01-20 LAB — SL AMB POCT HEMOGLOBIN AIC: 9 (ref ?–6.5)

## 2022-01-20 PROCEDURE — 3075F SYST BP GE 130 - 139MM HG: CPT | Performed by: INTERNAL MEDICINE

## 2022-01-20 PROCEDURE — 93000 ELECTROCARDIOGRAM COMPLETE: CPT | Performed by: INTERNAL MEDICINE

## 2022-01-20 PROCEDURE — 3008F BODY MASS INDEX DOCD: CPT | Performed by: INTERNAL MEDICINE

## 2022-01-20 PROCEDURE — 1036F TOBACCO NON-USER: CPT | Performed by: INTERNAL MEDICINE

## 2022-01-20 PROCEDURE — 3725F SCREEN DEPRESSION PERFORMED: CPT | Performed by: INTERNAL MEDICINE

## 2022-01-20 PROCEDURE — 4010F ACE/ARB THERAPY RXD/TAKEN: CPT | Performed by: INTERNAL MEDICINE

## 2022-01-20 PROCEDURE — 3052F HG A1C>EQUAL 8.0%<EQUAL 9.0%: CPT | Performed by: INTERNAL MEDICINE

## 2022-01-20 PROCEDURE — 99214 OFFICE O/P EST MOD 30 MIN: CPT | Performed by: INTERNAL MEDICINE

## 2022-01-20 PROCEDURE — 83036 HEMOGLOBIN GLYCOSYLATED A1C: CPT | Performed by: INTERNAL MEDICINE

## 2022-01-20 PROCEDURE — 3079F DIAST BP 80-89 MM HG: CPT | Performed by: INTERNAL MEDICINE

## 2022-01-20 RX ORDER — LOSARTAN POTASSIUM 50 MG/1
50 TABLET ORAL DAILY
Qty: 90 TABLET | Refills: 3 | Status: SHIPPED | OUTPATIENT
Start: 2022-01-20 | End: 2022-02-19

## 2022-01-20 RX ORDER — ALLOPURINOL 100 MG/1
100 TABLET ORAL DAILY
Qty: 90 TABLET | Refills: 3 | Status: SHIPPED | OUTPATIENT
Start: 2022-01-20

## 2022-01-20 RX ORDER — AMLODIPINE BESYLATE 5 MG/1
5 TABLET ORAL DAILY
Qty: 90 TABLET | Refills: 3 | Status: SHIPPED | OUTPATIENT
Start: 2022-01-20 | End: 2022-07-08 | Stop reason: SDUPTHER

## 2022-01-20 NOTE — ASSESSMENT & PLAN NOTE
Patient having some cough, will stop lisinopril and substitute losartan 50 mg daily to see if cough improves

## 2022-01-20 NOTE — ASSESSMENT & PLAN NOTE
Ectopy picked up on exam,  Will check EKG today    EKG shows Sinus bradycardia at 46 beats per minute, normal axis, no ischemic changes

## 2022-01-20 NOTE — ASSESSMENT & PLAN NOTE
Pt had COVID a few weeks ago, but pt has had a cough for about 2 years  Patient is on lisinopril    I recommend changing to losartan to see if cough improves, and if not improving will check chest x-ray

## 2022-01-20 NOTE — PATIENT INSTRUCTIONS
Problem List Items Addressed This Visit        Endocrine    Type 2 diabetes mellitus without complication, without long-term current use of insulin (HCC)      A1c elevated at 9 0, patient reports he did not tolerate metformin in the past, discussed other medication options for patient due to his elevated A1c  Continue with healthy diet and exercise also  Will start Januvia 50 mg daily, discussed with patient keep an eye out for any epigastric pain  Lab Results   Component Value Date    HGBA1C 9 0 (A) 01/20/2022            Relevant Medications    sitaGLIPtin (JANUVIA) 50 mg tablet    Other Relevant Orders    Ambulatory Referral to Ophthalmology    POCT hemoglobin A1c (Completed)       Cardiovascular and Mediastinum    Essential hypertension      Patient having some cough, will stop lisinopril and substitute losartan 50 mg daily to see if cough improves         Relevant Medications    losartan (COZAAR) 50 mg tablet    amLODIPine (NORVASC) 5 mg tablet       Musculoskeletal and Integument    Idiopathic chronic gout 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    Relevant Medications    allopurinol (ZYLOPRIM) 100 mg tablet       Other    Mixed hyperlipidemia       Continue rosuvastatin along healthy diet and exercise         Cough - Primary     Pt had COVID a few weeks ago, but pt has had a cough for about 2 years  Patient is on lisinopril  I recommend changing to losartan to see if cough improves, and if not improving will check chest x-ray         Relevant Orders    XR chest pa & lateral    Seasonal affective disorder (HCC)      Continue sertraline         Relevant Medications    sertraline (ZOLOFT) 50 mg tablet    Palpitation     Ectopy picked up on exam,  Will check EKG today    EKG shows Sinus bradycardia at 46 beats per minute, normal axis, no ischemic changes         Relevant Orders    POCT ECG (Completed)    Right foot pain     Patient has questions about possibly removing the sesamoid bone, will refer to Podiatry Relevant Orders    Ambulatory Referral to Podiatry      Other Visit Diagnoses     Screening for HIV (human immunodeficiency virus)        Relevant Orders    HIV 1/2 Antigen/Antibody (4th Generation) w Reflex [facility]    Screening for colorectal cancer        Encounter for immunization        Relevant Orders    PNEUMOCOCCAL POLYSACCHARIDE VACCINE 23-VALENT =>3YO SQ IM

## 2022-01-20 NOTE — ASSESSMENT & PLAN NOTE
A1c elevated at 9 0, patient reports he did not tolerate metformin in the past, discussed other medication options for patient due to his elevated A1c  Continue with healthy diet and exercise also      Will start Januvia 50 mg daily, discussed with patient keep an eye out for any epigastric pain  Lab Results   Component Value Date    HGBA1C 9 0 (A) 01/20/2022

## 2022-01-20 NOTE — PROGRESS NOTES
Assessment/Plan:    Cough  Pt had COVID a few weeks ago, but pt has had a cough for about 2 years  Patient is on lisinopril  I recommend changing to losartan to see if cough improves, and if not improving will check chest x-ray    Mixed hyperlipidemia    Continue rosuvastatin along healthy diet and exercise    Essential hypertension   Patient having some cough, will stop lisinopril and substitute losartan 50 mg daily to see if cough improves    Seasonal affective disorder (HCC)   Continue sertraline    Palpitation  Ectopy picked up on exam,  Will check EKG today  EKG shows Sinus bradycardia at 46 beats per minute, normal axis, no ischemic changes    Right foot pain  Patient has questions about possibly removing the sesamoid bone, will refer to Podiatry    Type 2 diabetes mellitus without complication, without long-term current use of insulin ([facility])   A1c elevated at 9 0, patient reports he did not tolerate metformin in the past, discussed other medication options for patient due to his elevated A1c  Continue with healthy diet and exercise also  Will start Januvia 50 mg daily, discussed with patient keep an eye out for any epigastric pain  Lab Results   Component Value Date    HGBA1C 9 0 (A) 01/20/2022        Diagnoses and all orders for this visit:    Cough  -     XR chest pa & lateral; Future    Type 2 diabetes mellitus without complication, without long-term current use of insulin ([facility] [address] )  -     Ambulatory Referral to Ophthalmology; Future  -     POCT hemoglobin A1c  -     sitaGLIPtin (JANUVIA) 50 mg tablet; Take 1 tablet (50 mg total) by mouth daily    Screening for HIV (human immunodeficiency virus)  -     HIV 1/2 Antigen/Antibody (4th Generation) w Reflex [facility]; Future    Screening for colorectal cancer    Encounter for immunization  -     PNEUMOCOCCAL POLYSACCHARIDE VACCINE 23-VALENT =>1YO SQ IM    Mixed hyperlipidemia    Essential hypertension  -     losartan (COZAAR) 50 mg tablet;  Take 1 tablet (50 mg total) by mouth daily  -     amLODIPine (NORVASC) 5 mg tablet; Take 1 tablet (5 mg total) by mouth daily    Right foot pain  -     Ambulatory Referral to Podiatry; Future    Seasonal affective disorder (HCC)  -     sertraline (ZOLOFT) 50 mg tablet; Take 0 5 tablets (25 mg total) by mouth daily    Palpitation  -     POCT ECG    Idiopathic chronic gout 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  -     allopurinol (ZYLOPRIM) 100 mg tablet; Take 1 tablet (100 mg total) by mouth daily        BMI Counseling: Body mass index is 34 92 kg/m²  The BMI is above normal  Nutrition recommendations include encouraging healthy choices of fruits and vegetables and moderation in carbohydrate intake  Exercise recommendations include exercising 3-5 times per week  Rationale for BMI follow-up plan is due to patient being overweight or obese  Depression Screening and Follow-up Plan: Patient was screened for depression during today's encounter  They screened negative with a PHQ-2 score of 0  Subjective:      Patient ID: Dipika Yarbrough is a 59 y o  male  Patient here for follow-up chronic conditions, and to discuss issue of ongoing cough  He did have COVID few weeks ago, but has been having a cough for couple of years  No hemoptysis, no pleuritic pain  The following portions of the patient's history were reviewed and updated as appropriate: allergies, current medications, past family history, past medical history, past social history, past surgical history and problem list     Review of Systems   Constitutional: Negative for chills, fatigue and fever  HENT: Negative for congestion, nosebleeds, postnasal drip, sore throat and trouble swallowing  Eyes: Negative for pain  Respiratory: Negative for cough, chest tightness, shortness of breath and wheezing  Cardiovascular: Negative for chest pain, palpitations and leg swelling  Gastrointestinal: Negative for abdominal pain, constipation, diarrhea, nausea and vomiting  Endocrine: Negative for polydipsia and polyuria  Genitourinary: Negative for dysuria, flank pain and hematuria  Musculoskeletal: Positive for arthralgias (foot pain)  Skin: Negative for rash  Neurological: Negative for dizziness, tremors, light-headedness and headaches  Hematological: Does not bruise/bleed easily  Psychiatric/Behavioral: Negative for confusion and dysphoric mood  The patient is not nervous/anxious  Objective:      /88 (BP Location: Left arm, Patient Position: Sitting, Cuff Size: Large)   Pulse 61   Ht 6' 2" (1 88 m)   Wt 123 kg (272 lb)   SpO2 97%   BMI 34 92 kg/m²          Physical Exam  Vitals reviewed  Constitutional:       General: He is not in acute distress  Appearance: Normal appearance  He is well-developed  HENT:      Head: Normocephalic and atraumatic  Right Ear: External ear normal       Left Ear: External ear normal       Nose: Nose normal    Eyes:      General: No scleral icterus  Conjunctiva/sclera: Conjunctivae normal    Neck:      Thyroid: No thyromegaly  Trachea: No tracheal deviation  Cardiovascular:      Rate and Rhythm: Normal rate and regular rhythm  Occasional extrasystoles are present  Pulses: no weak pulses          Dorsalis pedis pulses are 1+ on the right side and 1+ on the left side  Heart sounds: Normal heart sounds  No murmur heard  Pulmonary:      Effort: Pulmonary effort is normal  No respiratory distress  Breath sounds: Normal breath sounds  No wheezing or rales  Abdominal:      General: Bowel sounds are normal       Palpations: Abdomen is soft  Tenderness: There is no abdominal tenderness  There is no guarding or rebound  Musculoskeletal:      Cervical back: Normal range of motion and neck supple  Right lower leg: No edema  Left lower leg: No edema  Feet:      Right foot:      Skin integrity: No ulcer, skin breakdown, erythema, warmth, callus or dry skin        Left foot: Skin integrity: No ulcer, skin breakdown, erythema, warmth, callus or dry skin  Lymphadenopathy:      Cervical: No cervical adenopathy  Skin:     Coloration: Skin is not jaundiced or pale  Neurological:      General: No focal deficit present  Mental Status: He is alert and oriented to person, place, and time  Psychiatric:         Mood and Affect: Mood normal          Behavior: Behavior normal          Thought Content: Thought content normal          Judgment: Judgment normal          Patient's shoes and socks removed  Right Foot/Ankle   Right Foot Inspection  Skin Exam: skin normal and skin intact  No dry skin, no warmth, no callus, no erythema, no maceration, no abnormal color, no pre-ulcer, no ulcer and no callus  Toe Exam: ROM and strength within normal limits  No swelling, no tenderness, erythema and  no right toe deformity    Sensory   Monofilament testing: intact    Vascular  The right DP pulse is 1+  Left Foot/Ankle  Left Foot Inspection  Skin Exam: skin normal and skin intact  No dry skin, no warmth, no erythema, no maceration, normal color, no pre-ulcer, no ulcer and no callus  Toe Exam: ROM and strength within normal limits  No swelling, no tenderness, no erythema and no left toe deformity  Sensory   Monofilament testing: intact    Vascular  The left DP pulse is 1+       Assign Risk Category  No deformity present  No loss of protective sensation  No weak pulses  Risk: 0

## 2022-04-20 ENCOUNTER — RA CDI HCC (OUTPATIENT)
Dept: OTHER | Facility: HOSPITAL | Age: 62
End: 2022-04-20

## 2022-04-20 PROBLEM — E11.65 TYPE 2 DIABETES MELLITUS WITH HYPERGLYCEMIA (HCC): Status: ACTIVE | Noted: 2022-04-20

## 2022-04-20 NOTE — PROGRESS NOTES
[facility]  coding opportunities          Chart Reviewed number of suggestions sent to Provider: 1     Patients Insurance        Commercial Insurance: [employer]     Based on clinical documentation indicated in your record, it appears that the patient may have the following conditions:    E11 63 Type 2 diabetes mellitus with hyperglycemia (elevated a1c)    If this is correct, please document and assess at your next visit, April 27

## 2022-07-08 ENCOUNTER — NURSE TRIAGE (OUTPATIENT)
Dept: OTHER | Facility: OTHER | Age: 62
End: 2022-07-08

## 2022-07-08 DIAGNOSIS — I10 ESSENTIAL HYPERTENSION: ICD-10-CM

## 2022-07-08 DIAGNOSIS — E78.2 MIXED HYPERLIPIDEMIA: ICD-10-CM

## 2022-07-08 RX ORDER — ROSUVASTATIN CALCIUM 20 MG/1
20 TABLET, COATED ORAL DAILY
Qty: 90 TABLET | Refills: 3 | Status: SHIPPED | OUTPATIENT
Start: 2022-07-08

## 2022-07-08 RX ORDER — AMLODIPINE BESYLATE 5 MG/1
5 TABLET ORAL DAILY
Qty: 90 TABLET | Refills: 3 | Status: SHIPPED | OUTPATIENT
Start: 2022-07-08 | End: 2022-10-01

## 2022-07-08 NOTE — TELEPHONE ENCOUNTER
Patient leaving on vacation and is requesting an urgent refill for Amlodipine and Rosuvastatin  Reason for Disposition   [1] Prescription refill request for ESSENTIAL medicine (i e , likelihood of harm to patient if not taken) AND [2] triager unable to refill per department policy    Answer Assessment - Initial Assessment Questions  1   DRUG NAME: "What medicine do you need to have refilled?"      Amlodipine 5mg, Rosuvastatin 20mg    Protocols used: MEDICATION REFILL AND RENEWAL CALL-[facility]

## 2022-07-11 ENCOUNTER — VBI (OUTPATIENT)
Dept: ADMINISTRATIVE | Facility: OTHER | Age: 62
End: 2022-07-11

## 2022-08-30 DIAGNOSIS — Z12.5 SCREENING FOR PROSTATE CANCER: ICD-10-CM

## 2022-08-30 DIAGNOSIS — E11.9 TYPE 2 DIABETES MELLITUS WITHOUT COMPLICATION, WITHOUT LONG-TERM CURRENT USE OF INSULIN (HCC): Primary | ICD-10-CM

## 2022-08-30 DIAGNOSIS — E55.9 VITAMIN D DEFICIENCY: ICD-10-CM

## 2022-09-08 ENCOUNTER — LAB (OUTPATIENT)
Dept: LAB | Facility: CLINIC | Age: 62
End: 2022-09-08
Payer: COMMERCIAL

## 2022-09-08 DIAGNOSIS — E55.9 VITAMIN D DEFICIENCY: ICD-10-CM

## 2022-09-08 DIAGNOSIS — Z11.4 SCREENING FOR HIV (HUMAN IMMUNODEFICIENCY VIRUS): ICD-10-CM

## 2022-09-08 DIAGNOSIS — Z12.5 SCREENING FOR PROSTATE CANCER: ICD-10-CM

## 2022-09-08 DIAGNOSIS — E11.9 TYPE 2 DIABETES MELLITUS WITHOUT COMPLICATION, WITHOUT LONG-TERM CURRENT USE OF INSULIN (HCC): ICD-10-CM

## 2022-09-08 LAB
25(OH)D3 SERPL-MCNC: 30.6 NG/ML (ref 30–100)
ALBUMIN SERPL BCP-MCNC: 4.8 G/DL (ref 3.5–5)
ALP SERPL-CCNC: 66 U/L (ref 34–104)
ALT SERPL W P-5'-P-CCNC: 69 U/L (ref 7–52)
ANION GAP SERPL CALCULATED.3IONS-SCNC: 6 MMOL/L (ref 4–13)
AST SERPL W P-5'-P-CCNC: 41 U/L (ref 13–39)
BASOPHILS # BLD AUTO: 0.07 THOUSANDS/ΜL (ref 0–0.1)
BASOPHILS NFR BLD AUTO: 2 % (ref 0–1)
BILIRUB SERPL-MCNC: 0.8 MG/DL (ref 0.2–1)
BUN SERPL-MCNC: 11 MG/DL (ref 5–25)
CALCIUM SERPL-MCNC: 9.6 MG/DL (ref 8.4–10.2)
CHLORIDE SERPL-SCNC: 96 MMOL/L (ref 96–108)
CHOLEST SERPL-MCNC: 162 MG/DL
CO2 SERPL-SCNC: 31 MMOL/L (ref 21–32)
CREAT SERPL-MCNC: 0.93 MG/DL (ref 0.6–1.3)
CREAT UR-MCNC: 98 MG/DL
EOSINOPHIL # BLD AUTO: 0.13 THOUSAND/ΜL (ref 0–0.61)
EOSINOPHIL NFR BLD AUTO: 4 % (ref 0–6)
ERYTHROCYTE [DISTWIDTH] IN BLOOD BY AUTOMATED COUNT: 12 % (ref 11.6–15.1)
EST. AVERAGE GLUCOSE BLD GHB EST-MCNC: 235 MG/DL
GFR SERPL CREATININE-BSD FRML MDRD: 87 ML/MIN/1.73SQ M
GLUCOSE P FAST SERPL-MCNC: 287 MG/DL (ref 65–99)
HBA1C MFR BLD: 9.8 %
HCT VFR BLD AUTO: 44.8 % (ref 36.5–49.3)
HDLC SERPL-MCNC: 29 MG/DL
HGB BLD-MCNC: 16.2 G/DL (ref 12–17)
IMM GRANULOCYTES # BLD AUTO: 0.01 THOUSAND/UL (ref 0–0.2)
IMM GRANULOCYTES NFR BLD AUTO: 0 % (ref 0–2)
LDLC SERPL DIRECT ASSAY-MCNC: 70 MG/DL (ref 0–100)
LYMPHOCYTES # BLD AUTO: 1.27 THOUSANDS/ΜL (ref 0.6–4.47)
LYMPHOCYTES NFR BLD AUTO: 35 % (ref 14–44)
MCH RBC QN AUTO: 29.5 PG (ref 26.8–34.3)
MCHC RBC AUTO-ENTMCNC: 36.2 G/DL (ref 31.4–37.4)
MCV RBC AUTO: 82 FL (ref 82–98)
MICROALBUMIN UR-MCNC: 53.2 MG/L (ref 0–20)
MICROALBUMIN/CREAT 24H UR: 54 MG/G CREATININE (ref 0–30)
MONOCYTES # BLD AUTO: 0.38 THOUSAND/ΜL (ref 0.17–1.22)
MONOCYTES NFR BLD AUTO: 11 % (ref 4–12)
NEUTROPHILS # BLD AUTO: 1.73 THOUSANDS/ΜL (ref 1.85–7.62)
NEUTS SEG NFR BLD AUTO: 48 % (ref 43–75)
NRBC BLD AUTO-RTO: 0 /100 WBCS
PLATELET # BLD AUTO: 201 THOUSANDS/UL (ref 149–390)
PMV BLD AUTO: 10.3 FL (ref 8.9–12.7)
POTASSIUM SERPL-SCNC: 4.6 MMOL/L (ref 3.5–5.3)
PROT SERPL-MCNC: 7.5 G/DL (ref 6.4–8.4)
PSA SERPL-MCNC: 0.4 NG/ML (ref 0–4)
RBC # BLD AUTO: 5.49 MILLION/UL (ref 3.88–5.62)
SODIUM SERPL-SCNC: 133 MMOL/L (ref 135–147)
TRIGL SERPL-MCNC: 491 MG/DL
TSH SERPL DL<=0.05 MIU/L-ACNC: 1.48 UIU/ML (ref 0.45–4.5)
WBC # BLD AUTO: 3.59 THOUSAND/UL (ref 4.31–10.16)

## 2022-09-08 PROCEDURE — 82043 UR ALBUMIN QUANTITATIVE: CPT | Performed by: INTERNAL MEDICINE

## 2022-09-08 PROCEDURE — 84443 ASSAY THYROID STIM HORMONE: CPT

## 2022-09-08 PROCEDURE — 87389 HIV-1 AG W/HIV-1&-2 AB AG IA: CPT

## 2022-09-08 PROCEDURE — 85025 COMPLETE CBC W/AUTO DIFF WBC: CPT

## 2022-09-08 PROCEDURE — G0103 PSA SCREENING: HCPCS

## 2022-09-08 PROCEDURE — 80053 COMPREHEN METABOLIC PANEL: CPT

## 2022-09-08 PROCEDURE — 83036 HEMOGLOBIN GLYCOSYLATED A1C: CPT

## 2022-09-08 PROCEDURE — 36415 COLL VENOUS BLD VENIPUNCTURE: CPT

## 2022-09-08 PROCEDURE — 83721 ASSAY OF BLOOD LIPOPROTEIN: CPT

## 2022-09-08 PROCEDURE — 82306 VITAMIN D 25 HYDROXY: CPT

## 2022-09-08 PROCEDURE — 80061 LIPID PANEL: CPT

## 2022-09-08 PROCEDURE — 82570 ASSAY OF URINE CREATININE: CPT | Performed by: INTERNAL MEDICINE

## 2022-09-09 DIAGNOSIS — E78.2 MIXED HYPERLIPIDEMIA: Primary | ICD-10-CM

## 2022-09-09 DIAGNOSIS — E11.9 TYPE 2 DIABETES MELLITUS WITHOUT COMPLICATION, WITHOUT LONG-TERM CURRENT USE OF INSULIN (HCC): ICD-10-CM

## 2022-09-09 LAB — HIV 1+2 AB+HIV1 P24 AG SERPL QL IA: NORMAL

## 2022-09-09 RX ORDER — CHLORAL HYDRATE 500 MG
2000 CAPSULE ORAL DAILY
Qty: 180 CAPSULE | Refills: 3 | Status: SHIPPED | OUTPATIENT
Start: 2022-09-09

## 2022-09-12 ENCOUNTER — TELEPHONE (OUTPATIENT)
Dept: INTERNAL MEDICINE CLINIC | Facility: CLINIC | Age: 62
End: 2022-09-12

## 2022-09-30 DIAGNOSIS — I10 ESSENTIAL HYPERTENSION: ICD-10-CM

## 2022-10-01 RX ORDER — AMLODIPINE BESYLATE 5 MG/1
5 TABLET ORAL DAILY
Qty: 90 TABLET | Refills: 3 | Status: SHIPPED | OUTPATIENT
Start: 2022-10-01

## 2022-10-12 PROBLEM — R05.9 COUGH: Status: RESOLVED | Noted: 2021-12-08 | Resolved: 2022-10-12

## 2022-10-18 ENCOUNTER — RA CDI HCC (OUTPATIENT)
Dept: OTHER | Facility: HOSPITAL | Age: 62
End: 2022-10-18

## 2022-10-25 ENCOUNTER — OFFICE VISIT (OUTPATIENT)
Dept: INTERNAL MEDICINE CLINIC | Facility: CLINIC | Age: 62
End: 2022-10-25

## 2022-10-25 VITALS
OXYGEN SATURATION: 94 % | BODY MASS INDEX: 34.78 KG/M2 | HEART RATE: 54 BPM | DIASTOLIC BLOOD PRESSURE: 92 MMHG | HEIGHT: 74 IN | SYSTOLIC BLOOD PRESSURE: 140 MMHG | WEIGHT: 271 LBS

## 2022-10-25 DIAGNOSIS — Z23 NEEDS FLU SHOT: ICD-10-CM

## 2022-10-25 DIAGNOSIS — I10 ESSENTIAL HYPERTENSION: ICD-10-CM

## 2022-10-25 DIAGNOSIS — E11.65 TYPE 2 DIABETES MELLITUS WITH HYPERGLYCEMIA, WITHOUT LONG-TERM CURRENT USE OF INSULIN (HCC): ICD-10-CM

## 2022-10-25 DIAGNOSIS — M1A.0710 IDIOPATHIC CHRONIC GOUT OF RIGHT FOOT WITHOUT TOPHUS: ICD-10-CM

## 2022-10-25 DIAGNOSIS — E78.2 MIXED HYPERLIPIDEMIA: ICD-10-CM

## 2022-10-25 DIAGNOSIS — E11.9 TYPE 2 DIABETES MELLITUS WITHOUT COMPLICATION, WITHOUT LONG-TERM CURRENT USE OF INSULIN (HCC): Primary | ICD-10-CM

## 2022-10-25 DIAGNOSIS — Z00.00 WELLNESS EXAMINATION: ICD-10-CM

## 2022-10-25 DIAGNOSIS — F33.8 SEASONAL AFFECTIVE DISORDER (HCC): ICD-10-CM

## 2022-10-25 DIAGNOSIS — R47.01 APHASIA: ICD-10-CM

## 2022-10-25 LAB — SL AMB POCT HEMOGLOBIN AIC: 10.7 (ref ?–6.5)

## 2022-10-25 RX ORDER — LOSARTAN POTASSIUM 100 MG/1
100 TABLET ORAL DAILY
Qty: 90 TABLET | Refills: 3 | Status: SHIPPED | OUTPATIENT
Start: 2022-10-25 | End: 2023-01-23

## 2022-10-25 NOTE — PROGRESS NOTES
Name: Kika Moreau      : 1960      MRN: [id number]  Encounter Provider: Lynn Means MD  Encounter Date: 10/25/2022   [facility]    Assessment & Plan     1  Type 2 diabetes mellitus without complication, without long-term current use of insulin ([facility] )  Assessment & Plan:  Patient has been on a Januvia for about 4 weeks  Patient reports having tolerability issues with metformin in the past  Lab Results   Component Value Date    HGBA1C 9 8 (H) 2022       Orders:  -     IRIS Diabetic eye exam  -     POCT hemoglobin A1c    2  Essential hypertension  Assessment & Plan:  I recommend increasing losartan from 50 mg daily to 100 mg daily  Orders:  -     losartan (COZAAR) 100 MG tablet; Take 1 tablet (100 mg total) by mouth daily    3  Mixed hyperlipidemia  Assessment & Plan:  Continue rosuvastatin along with healthy diet      4  Seasonal affective disorder [facility])  Assessment & Plan:  Continue sertraline      5  Idiopathic chronic gout 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  Assessment & Plan:  Continue allopurinol    Orders:  -     Uric acid; Future    6  Type 2 diabetes mellitus with hyperglycemia, without long-term current use of insulin ([facility])    7  Aphasia  Assessment & Plan:  Patient had episode of this over the summer for a couple of months  Will check CT scan of the head to make sure he did have a small stroke, will consider MRI brain if needed  Discussed other possible etiologies including atypical migraine which can cause aphasia, but would not expect the aphasia to last a couple of months from an atypical migraine    Orders:  -     CT head wo contrast; Future; Expected date: 10/25/2022    8  Needs flu shot  -     influenza vaccine, quadrivalent, recombinant, PF, 0 5 mL, for patients 18 yr+ (FLUBLOK)    9  Wellness examination  Assessment & Plan:  Discussed preventative health, cancer screening, immunizations, and safety issues    Patient reports having a colonoscopy about 5-6 years ago with recommendations to follow-up in 10 years, this was done according [facility], will try to get this information  Patient had Shingrix vaccines  I recommend yearly flu shot  Patient up-to-date with labs             Subjective     Wellness:  Patient gets dental care (has false teeth), no smoking cigarettes, patient is active, eats a healthy diet    Review of Systems   Constitutional: Negative for chills, fatigue and fever  HENT: Negative for congestion, nosebleeds, postnasal drip, sore throat and trouble swallowing  Eyes: Negative for pain  Respiratory: Negative for cough, chest tightness, shortness of breath and wheezing  Cardiovascular: Negative for chest pain, palpitations and leg swelling  Gastrointestinal: Negative for abdominal pain, constipation, diarrhea, nausea and vomiting  Endocrine: Negative for polydipsia and polyuria  Genitourinary: Negative for dysuria, flank pain and hematuria  Musculoskeletal: Negative for arthralgias, back pain and myalgias  Skin: Negative for rash  Neurological: Negative for dizziness, tremors, light-headedness and headaches  Hematological: Does not bruise/bleed easily  Psychiatric/Behavioral: Negative for confusion and dysphoric mood  The patient is not nervous/anxious          Past Medical History:   Diagnosis Date   • HTN (hypertension)    • Hyperlipidemia    • Hypertension    • Osteomyelitis ([facility] [address] ) 2020   • Seizures ([facility] [address] )     Not since age 13     Past Surgical History:   Procedure Laterality Date   • COLONOSCOPY     • HAND SURGERY     • HERNIA REPAIR     • IR PICC PLACEMENT SINGLE LUMEN  12/8/2020   • VASCULAR SURGERY     • WOUND DEBRIDEMENT N/A 11/30/2020    Procedure: DEBRIDEMENT OF ANTERIOR MANDIBLE BONE BIOPSY, CULTURES AND REMOVAL OF IMPLANT #27, USE OF PRP;  Surgeon: Boby Bence, DDS;  Location: [facility];  Service: Maxillofacial     Family History   Problem Relation Age of Onset   • Cancer Mother      Social History     Socioeconomic History   • Marital status: /Civil Union     Spouse name: None   • Number of children: None   • Years of education: None   • Highest education level: None   Occupational History   • None   Tobacco Use   • Smoking status: Former Smoker     Packs/day: 3 00     Types: Cigarettes, Pipe, Cigars     Quit date: 2019     Years since quitting: 3 8   • Smokeless tobacco: Never Used   Vaping Use   • Vaping Use: Never used   Substance and Sexual Activity   • Alcohol use: Never   • Drug use: Never   • Sexual activity: Not Currently   Other Topics Concern   • None   Social History Narrative   • None     Social Determinants of Health     Financial Resource Strain: Not on file   Food Insecurity: Not on file   Transportation Needs: Not on file   Physical Activity: Not on file   Stress: Not on file   Social Connections: Not on file   Intimate Partner Violence: Not on file   Housing Stability: Not on file     Current Outpatient Medications on File Prior to Visit   Medication Sig   • allopurinol (ZYLOPRIM) 100 mg tablet Take 1 tablet (100 mg total) by mouth daily   • amLODIPine (NORVASC) 5 mg tablet TAKE 1 TABLET (5 MG TOTAL) BY MOUTH DAILY     • cholecalciferol (VITAMIN D3) 1,000 units tablet Take 5,000 Units by mouth daily 10,000 units Patient stated that is continuously taking   • glucosamine-chondroitin 500-400 MG tablet Take 2 tablets by mouth daily   • metoprolol-hydrochlorothiazide (LOPRESSOR HCT) 100-25 MG per tablet Take 0 5 tablets by mouth daily   • Omega-3 Fatty Acids (Omega-3 Fish Oil) 1000 MG CAPS Take 2,000 mg by mouth daily   • rosuvastatin (CRESTOR) 20 MG tablet Take 1 tablet (20 mg total) by mouth daily   • sertraline (ZOLOFT) 50 mg tablet Take 0 5 tablets (25 mg total) by mouth daily   • sitaGLIPtin (JANUVIA) 50 mg tablet Take 1 tablet (50 mg total) by mouth daily   • [DISCONTINUED] losartan (COZAAR) 50 mg tablet Take 1 tablet (50 mg total) by mouth daily     Allergies   Allergen Reactions   • Clindamycin Hives   • Penicillins Hives     Immunization History   Administered Date(s) Administered   • COVID-19 PFIZER VACCINE 0 3 ML IM 04/01/2021, 04/23/2021, 12/28/2021   • INFLUENZA 10/07/2011, 05/18/2012, 10/05/2012, 10/26/2015, 10/11/2016, 11/05/2020   • Influenza, recombinant, quadrivalent,injectable, preservative free 10/25/2021   • Pneumococcal Conjugate 13-Valent 10/26/2015   • Tdap 07/17/2017   • Zoster Vaccine Recombinant 07/06/2021, 10/25/2021       Objective     /92 (BP Location: Left arm, Patient Position: Sitting, Cuff Size: Large)   Pulse (!) 54   Ht 6' 2" (1 88 m)   Wt 123 kg (271 lb)   SpO2 94%   BMI 34 79 kg/m²     Physical Exam  Vitals reviewed  Constitutional:       General: He is not in acute distress  Appearance: Normal appearance  He is well-developed  HENT:      Head: Normocephalic and atraumatic  Right Ear: External ear normal       Left Ear: External ear normal       Nose: Nose normal    Eyes:      General: No scleral icterus  Conjunctiva/sclera: Conjunctivae normal    Neck:      Thyroid: No thyromegaly  Trachea: No tracheal deviation  Cardiovascular:      Rate and Rhythm: Normal rate and regular rhythm  Heart sounds: Normal heart sounds  No murmur heard  Pulmonary:      Effort: Pulmonary effort is normal  No respiratory distress  Breath sounds: Normal breath sounds  No wheezing or rales  Abdominal:      General: Bowel sounds are normal       Palpations: Abdomen is soft  Tenderness: There is no abdominal tenderness  There is no guarding or rebound  Hernia: There is no hernia in the left inguinal area or right inguinal area  Genitourinary:     Testes: Normal    Musculoskeletal:      Cervical back: Normal range of motion and neck supple  Right lower leg: No edema  Left lower leg: No edema  Lymphadenopathy:      Cervical: No cervical adenopathy  Skin:     Coloration: Skin is not jaundiced or pale  Neurological:      General: No focal deficit present  Mental Status: He is alert and oriented to person, place, and time  Psychiatric:         Mood and Affect: Mood normal          Behavior: Behavior normal          Thought Content:  Thought content normal          Judgment: Judgment normal        Jean Mendez MD

## 2022-10-25 NOTE — ASSESSMENT & PLAN NOTE
Patient had episode of this over the summer for a couple of months  Will check CT scan of the head to make sure he did have a small stroke, will consider MRI brain if needed    Discussed other possible etiologies including atypical migraine which can cause aphasia, but would not expect the aphasia to last a couple of months from an atypical migraine

## 2022-10-25 NOTE — ASSESSMENT & PLAN NOTE
Patient has been on a Januvia for about 4 weeks  Patient reports having tolerability issues with metformin in the past   I recommend increasing Januvia frrom 50 mg daily to 100 mg daily if A1C elevated, will check A1C today  A1C higher at 10 7, will refer to Endo    Lab Results   Component Value Date    HGBA1C 9 8 (H) 09/08/2022

## 2022-10-25 NOTE — PATIENT INSTRUCTIONS
Problem List Items Addressed This Visit          Endocrine    Type 2 diabetes mellitus without complication, without long-term current use of insulin ([facility] [address] ) - Primary     Patient has been on a Januvia for about 4 weeks  Patient reports having tolerability issues with metformin in the past   I recommend increasing Januvia frrom 50 mg daily to 100 mg daily if A1C elevated, will check A1C today  A1C higher at 10 7, will refer to Endo  Lab Results   Component Value Date    HGBA1C 9 8 (H) 09/08/2022            Relevant Medications    sitaGLIPtin (JANUVIA) 100 mg tablet    Other Relevant Orders    IRIS Diabetic eye exam    POCT hemoglobin A1c    Ambulatory Referral to Endocrinology    Type 2 diabetes mellitus with hyperglycemia (HCC)    Relevant Medications    sitaGLIPtin (JANUVIA) 100 mg tablet    Other Relevant Orders    Ambulatory Referral to Endocrinology       Cardiovascular and Mediastinum    Essential hypertension     I recommend increasing losartan from 50 mg daily to 100 mg daily  Relevant Medications    losartan (COZAAR) 100 MG tablet       Musculoskeletal and Integument    Idiopathic chronic gout 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     Continue allopurinol           Relevant Orders    Uric acid       Other    Mixed hyperlipidemia     Continue rosuvastatin along with healthy diet           Wellness examination     Discussed preventative health, cancer screening, immunizations, and safety issues  Patient reports having a colonoscopy about 5-6 years ago with recommendations to follow-up in 10 years, this was done [facility], will try to get this information  Patient had Shingrix vaccines  I recommend yearly flu shot  Patient up-to-date with labs           Seasonal affective disorder [facility])     Continue sertraline           Aphasia     Patient had episode of this over the summer for a couple of months    Will check CT scan of the head to make sure he did have a small stroke, will consider MRI brain if needed    Discussed other possible etiologies including atypical migraine which can cause aphasia, but would not expect the aphasia to last a couple of months from an atypical migraine           Relevant Orders    CT head wo contrast          Other Visit Diagnoses       Needs flu shot        Relevant Orders    influenza vaccine, quadrivalent, recombinant, PF, 0 5 mL, for patients 18 yr+ (FLUBLOK)

## 2022-10-25 NOTE — PROGRESS NOTES
Name: Nikunj Graham      : 1960      MRN: [id number]  Encounter Provider: Gaviota Phan MD  Encounter Date: 10/25/2022   Encounter department: [facility]    Assessment & Plan     1  Type 2 diabetes mellitus without complication, without long-term current use of insulin ([facility] [address] )  Assessment & Plan:  Patient has been on a Januvia for about 4 weeks  Patient reports having tolerability issues with metformin in the past   I recommend increasing Januvia frrom 50 mg daily to 100 mg daily if A1C elevated, will check A1C today  A1C higher at 10 7, will refer to Endo  Lab Results   Component Value Date    HGBA1C 9 8 (H) 2022       Orders:  -     IRIS Diabetic eye exam  -     POCT hemoglobin A1c  -     sitaGLIPtin (JANUVIA) 100 mg tablet; Take 1 tablet (100 mg total) by mouth daily  -     Ambulatory Referral to Endocrinology; Future    2  Essential hypertension  Assessment & Plan:  I recommend increasing losartan from 50 mg daily to 100 mg daily  Orders:  -     losartan (COZAAR) 100 MG tablet; Take 1 tablet (100 mg total) by mouth daily    3  Mixed hyperlipidemia  Assessment & Plan:  Continue rosuvastatin along with healthy diet      4  Seasonal affective disorder [facility])  Assessment & Plan:  Continue sertraline      5  Idiopathic chronic gout 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  Assessment & Plan:  Continue allopurinol    Orders:  -     Uric acid; Future    6  Type 2 diabetes mellitus with hyperglycemia, without long-term current use of insulin ([facility] )  -     Ambulatory Referral to Endocrinology; Future    7  Aphasia  Assessment & Plan:  Patient had episode of this over the summer for a couple of months  Will check CT scan of the head to make sure he did have a small stroke, will consider MRI brain if needed    Discussed other possible etiologies including atypical migraine which can cause aphasia, but would not expect the aphasia to last a couple of months from an atypical migraine    Orders:  - CT head wo contrast; Future; Expected date: 10/25/2022    8  Needs flu shot  -     influenza vaccine, quadrivalent, recombinant, PF, 0 5 mL, for patients 18 yr+ (FLUBLOK)    9  Wellness examination  Assessment & Plan:  Discussed preventative health, cancer screening, immunizations, and safety issues  Patient reports having a colonoscopy about 5-6 years ago with recommendations to follow-up in 10 years, this was done according [facility], will try to get this information  Patient had Shingrix vaccines  I recommend yearly flu shot  Patient up-to-date with labs             Subjective     Here for follow-up of chronic conditions and wellness    Review of Systems   Constitutional: Negative for chills, fatigue and fever  HENT: Negative for congestion, nosebleeds, postnasal drip, sore throat and trouble swallowing  Eyes: Negative for pain  Respiratory: Negative for cough, chest tightness, shortness of breath and wheezing  Cardiovascular: Negative for chest pain, palpitations and leg swelling  Gastrointestinal: Negative for abdominal pain, constipation, diarrhea, nausea and vomiting  Endocrine: Negative for polydipsia and polyuria  Genitourinary: Negative for dysuria, flank pain and hematuria  Musculoskeletal: Negative for arthralgias, back pain and myalgias  Skin: Negative for rash  Neurological: Positive for speech difficulty (for a couple months over the summer)  Negative for dizziness, tremors, light-headedness and headaches  Hematological: Does not bruise/bleed easily  Psychiatric/Behavioral: Negative for confusion and dysphoric mood  The patient is not nervous/anxious          Past Medical History:   Diagnosis Date   • HTN (hypertension)    • Hyperlipidemia    • Hypertension    • Osteomyelitis ([facility] [address] ) 2020   • Seizures ([facility] [address] )     Not since age 13     Past Surgical History:   Procedure Laterality Date   • COLONOSCOPY     • HAND SURGERY     • HERNIA REPAIR     • IR PICC PLACEMENT SINGLE LUMEN 12/8/2020   • VASCULAR SURGERY     • WOUND DEBRIDEMENT N/A 11/30/2020    Procedure: DEBRIDEMENT OF ANTERIOR MANDIBLE BONE BIOPSY, CULTURES AND REMOVAL OF IMPLANT #27, USE OF PRP;  Surgeon: Skye Flores DDS;  Location: [facility];  Service: Maxillofacial     Family History   Problem Relation Age of Onset   • Cancer Mother      Social History     Socioeconomic History   • Marital status: /Civil Union     Spouse name: None   • Number of children: None   • Years of education: None   • Highest education level: None   Occupational History   • None   Tobacco Use   • Smoking status: Former Smoker     Packs/day: 3 00     Types: Cigarettes, Pipe, Cigars     Quit date: 2019     Years since quitting: 3 8   • Smokeless tobacco: Never Used   Vaping Use   • Vaping Use: Never used   Substance and Sexual Activity   • Alcohol use: Never   • Drug use: Never   • Sexual activity: Not Currently   Other Topics Concern   • None   Social History Narrative   • None     Social Determinants of Health     Financial Resource Strain: Not on file   Food Insecurity: Not on file   Transportation Needs: Not on file   Physical Activity: Not on file   Stress: Not on file   Social Connections: Not on file   Intimate Partner Violence: Not on file   Housing Stability: Not on file     Current Outpatient Medications on File Prior to Visit   Medication Sig   • allopurinol (ZYLOPRIM) 100 mg tablet Take 1 tablet (100 mg total) by mouth daily   • amLODIPine (NORVASC) 5 mg tablet TAKE 1 TABLET (5 MG TOTAL) BY MOUTH DAILY     • cholecalciferol (VITAMIN D3) 1,000 units tablet Take 5,000 Units by mouth daily 10,000 units Patient stated that is continuously taking   • glucosamine-chondroitin 500-400 MG tablet Take 2 tablets by mouth daily   • metoprolol-hydrochlorothiazide (LOPRESSOR HCT) 100-25 MG per tablet Take 0 5 tablets by mouth daily   • Omega-3 Fatty Acids (Omega-3 Fish Oil) 1000 MG CAPS Take 2,000 mg by mouth daily   • rosuvastatin (CRESTOR) 20 MG tablet Take 1 tablet (20 mg total) by mouth daily   • sertraline (ZOLOFT) 50 mg tablet Take 0 5 tablets (25 mg total) by mouth daily   • [DISCONTINUED] sitaGLIPtin (JANUVIA) 50 mg tablet Take 1 tablet (50 mg total) by mouth daily   • [DISCONTINUED] losartan (COZAAR) 50 mg tablet Take 1 tablet (50 mg total) by mouth daily     Allergies   Allergen Reactions   • Clindamycin Hives   • Penicillins Hives     Immunization History   Administered Date(s) Administered   • COVID-19 PFIZER VACCINE 0 3 ML IM 04/01/2021, 04/23/2021, 12/28/2021   • INFLUENZA 10/07/2011, 05/18/2012, 10/05/2012, 10/26/2015, 10/11/2016, 11/05/2020   • Influenza, recombinant, quadrivalent,injectable, preservative free 10/25/2021   • Pneumococcal Conjugate 13-Valent 10/26/2015   • Tdap 07/17/2017   • Zoster Vaccine Recombinant 07/06/2021, 10/25/2021       Objective     /92 (BP Location: Left arm, Patient Position: Sitting, Cuff Size: Large)   Pulse (!) 54   Ht 6' 2" (1 88 m)   Wt 123 kg (271 lb)   SpO2 94%   BMI 34 79 kg/m²     Physical Exam  Vitals reviewed  Constitutional:       General: He is not in acute distress  Appearance: Normal appearance  He is well-developed  HENT:      Head: Normocephalic and atraumatic  Right Ear: External ear normal       Left Ear: External ear normal       Nose: Nose normal    Eyes:      General: No scleral icterus  Conjunctiva/sclera: Conjunctivae normal    Neck:      Thyroid: No thyromegaly  Trachea: No tracheal deviation  Cardiovascular:      Rate and Rhythm: Normal rate and regular rhythm  Heart sounds: Normal heart sounds  No murmur heard  Pulmonary:      Effort: Pulmonary effort is normal  No respiratory distress  Breath sounds: Normal breath sounds  No wheezing or rales  Abdominal:      General: Bowel sounds are normal       Palpations: Abdomen is soft  Tenderness: There is no abdominal tenderness  There is no guarding or rebound        Hernia: There is no hernia in the left inguinal area or right inguinal area  Genitourinary:     Testes: Normal    Musculoskeletal:      Cervical back: Normal range of motion and neck supple  Right lower leg: No edema  Left lower leg: No edema  Lymphadenopathy:      Cervical: No cervical adenopathy  Skin:     Coloration: Skin is not jaundiced or pale  Neurological:      General: No focal deficit present  Mental Status: He is alert and oriented to person, place, and time  Psychiatric:         Mood and Affect: Mood normal          Behavior: Behavior normal          Thought Content:  Thought content normal          Judgment: Judgment normal        Danis Lutz MD

## 2022-10-25 NOTE — ASSESSMENT & PLAN NOTE
Discussed preventative health, cancer screening, immunizations, and safety issues  Patient reports having a colonoscopy about 5-6 years ago with recommendations to follow-up in 10 years, this was done according [facility], will try to get this information  Patient had Shingrix vaccines  I recommend yearly flu shot    Patient up-to-date with labs

## 2022-11-02 ENCOUNTER — TELEPHONE (OUTPATIENT)
Dept: ADMINISTRATIVE | Facility: OTHER | Age: 62
End: 2022-11-02

## 2022-11-14 DIAGNOSIS — I10 ESSENTIAL HYPERTENSION: ICD-10-CM

## 2022-11-14 RX ORDER — METOPROLOL TARTRATE AND HYDROCHLOROTHIAZIDE 100; 25 MG/1; MG/1
TABLET ORAL
Qty: 45 TABLET | Refills: 7 | Status: SHIPPED | OUTPATIENT
Start: 2022-11-14

## 2022-12-06 ENCOUNTER — OFFICE VISIT (OUTPATIENT)
Dept: INTERNAL MEDICINE CLINIC | Facility: CLINIC | Age: 62
End: 2022-12-06

## 2022-12-06 VITALS
HEIGHT: 74 IN | BODY MASS INDEX: 34.27 KG/M2 | OXYGEN SATURATION: 97 % | WEIGHT: 267 LBS | HEART RATE: 60 BPM | SYSTOLIC BLOOD PRESSURE: 120 MMHG | DIASTOLIC BLOOD PRESSURE: 70 MMHG

## 2022-12-06 DIAGNOSIS — E11.9 TYPE 2 DIABETES MELLITUS WITHOUT COMPLICATION, WITHOUT LONG-TERM CURRENT USE OF INSULIN (HCC): Primary | ICD-10-CM

## 2022-12-06 DIAGNOSIS — F33.8 SEASONAL AFFECTIVE DISORDER (HCC): ICD-10-CM

## 2022-12-06 DIAGNOSIS — I10 ESSENTIAL HYPERTENSION: ICD-10-CM

## 2022-12-06 DIAGNOSIS — E78.2 MIXED HYPERLIPIDEMIA: ICD-10-CM

## 2022-12-06 LAB — SL AMB POCT HEMOGLOBIN AIC: 10.8 (ref ?–6.5)

## 2022-12-06 RX ORDER — BUPROPION HYDROCHLORIDE 75 MG/1
75 TABLET ORAL 2 TIMES DAILY
Qty: 180 TABLET | Refills: 3 | Status: SHIPPED | OUTPATIENT
Start: 2022-12-06

## 2022-12-06 NOTE — ASSESSMENT & PLAN NOTE
Last hemoglobin A1c was high, patient was not eating well at that time on a road trip, we will recheck  Lab Results   Component Value Date    HGBA1C 10 7 (A) 10/25/2022

## 2022-12-06 NOTE — ASSESSMENT & PLAN NOTE
Discussed with pt increasing sertraline  Pt is on 25 mg  Pt can increase to 50 mg and titrate up as needed  Pt is asking about switching to wellbutrin  Will start 75 mg daily, and then titrate up to 150 mg daily if needed  Patient is on metoprolol /25 mg daily, he takes half a tablet daily  Discussed possible interaction with this medication with higher levels of metoprolol, and if patient having lightheadedness, low blood pressure, slow heart rate, then we could decrease this medication further

## 2022-12-06 NOTE — ASSESSMENT & PLAN NOTE
To new medications, discussed with patient about possible need for decreasing metoprolol with starting bupropion

## 2022-12-06 NOTE — PROGRESS NOTES
Name: Bradford Rodgers      : 1960      MRN: [id number]  Encounter Provider: Michael Rapp MD  Encounter Date: 2022   Encounter department: [facility]    Assessment & Plan     1  Type 2 diabetes mellitus without complication, without long-term current use of insulin ([facility])  Assessment & Plan:  Last hemoglobin A1c was high, patient was not eating well at that time on a road trip, we will recheck  Lab Results   Component Value Date    HGBA1C 10 7 (A) 10/25/2022       Orders:  -     POCT hemoglobin A1c    2  Seasonal affective disorder [facility])  Assessment & Plan:  Discussed with pt increasing sertraline  Pt is on 25 mg  Pt can increase to 50 mg and titrate up as needed  Pt is asking about switching to wellbutrin  Will start 75 mg daily, and then titrate up to 150 mg daily if needed  Patient is on metoprolol /25 mg daily, he takes half a tablet daily  Discussed possible interaction with this medication with higher levels of metoprolol, and if patient having lightheadedness, low blood pressure, slow heart rate, then we could decrease this medication further  Orders:  -     buPROPion (WELLBUTRIN) 75 mg tablet; Take 1 tablet (75 mg total) by mouth 2 (two) times a day    3  Essential hypertension  Assessment & Plan: To new medications, discussed with patient about possible need for decreasing metoprolol with starting bupropion      4  Mixed hyperlipidemia  Assessment & Plan:  Continue rosuvastatin along with healthy diet and exercise             Subjective     Pt reports having problems with seasonal affective disorder which he has had most of his life  Review of Systems   Constitutional: Negative for chills, fatigue and fever  HENT: Negative for congestion, nosebleeds, postnasal drip, sore throat and trouble swallowing  Eyes: Negative for pain  Respiratory: Negative for cough, chest tightness, shortness of breath and wheezing      Cardiovascular: Negative for chest pain, palpitations and leg swelling  Gastrointestinal: Negative for abdominal pain, constipation, diarrhea, nausea and vomiting  Endocrine: Negative for polydipsia and polyuria  Genitourinary: Negative for dysuria, flank pain and hematuria  Musculoskeletal: Negative for arthralgias, back pain and myalgias  Skin: Negative for rash  Neurological: Positive for light-headedness  Negative for dizziness, tremors and headaches  Hematological: Does not bruise/bleed easily  Psychiatric/Behavioral: Negative for confusion and dysphoric mood  The patient is not nervous/anxious          Past Medical History:   Diagnosis Date   • HTN (hypertension)    • Hyperlipidemia    • Hypertension    • Osteomyelitis ([facility] [address] ) 2020   • [facility] ([facility] )     Not since age 13     Past Surgical History:   Procedure Laterality Date   • COLONOSCOPY     • HAND SURGERY     • HERNIA REPAIR     • IR PICC PLACEMENT SINGLE LUMEN  12/8/2020   • VASCULAR SURGERY     • WOUND DEBRIDEMENT N/A 11/30/2020    Procedure: DEBRIDEMENT OF ANTERIOR MANDIBLE BONE BIOPSY, CULTURES AND REMOVAL OF IMPLANT #27, USE OF PRP;  Surgeon: Dixie Holter, DDS;  Location: [facility];  Service: Maxillofacial     Family History   Problem Relation Age of Onset   • Cancer Mother      Social History     Socioeconomic History   • Marital status: /Civil Union     Spouse name: None   • Number of children: None   • Years of education: None   • Highest education level: None   Occupational History   • None   Tobacco Use   • Smoking status: Former     Packs/day: 3 00     Types: Cigarettes, Pipe, Cigars     Quit date: 2019     Years since quitting: 3 9   • Smokeless tobacco: Never   Vaping Use   • Vaping Use: Never used   Substance and Sexual Activity   • Alcohol use: Never   • Drug use: Never   • Sexual activity: Not Currently   Other Topics Concern   • None   Social History Narrative   • None     Social Determinants of Health     Financial Resource Strain: Not on file   Food Insecurity: Not on file   Transportation Needs: Not on file   Physical Activity: Not on file   Stress: Not on file   Social Connections: Not on file   Intimate Partner Violence: Not on file   Housing Stability: Not on file     Current Outpatient Medications on File Prior to Visit   Medication Sig   • allopurinol (ZYLOPRIM) 100 mg tablet Take 1 tablet (100 mg total) by mouth daily   • amLODIPine (NORVASC) 5 mg tablet TAKE 1 TABLET (5 MG TOTAL) BY MOUTH DAILY  • cholecalciferol (VITAMIN D3) 1,000 units tablet Take 5,000 Units by mouth daily 10,000 units Patient stated that is continuously taking   • glucosamine-chondroitin 500-400 MG tablet Take 2 tablets by mouth daily   • losartan (COZAAR) 100 MG tablet Take 1 tablet (100 mg total) by mouth daily   • metoprolol-hydrochlorothiazide (LOPRESSOR HCT) 100-25 MG per tablet TAKE 1/2 TABLET BY MOUTH EVERY DAY   • Omega-3 Fatty Acids (Omega-3 Fish Oil) 1000 MG CAPS Take 2,000 mg by mouth daily   • rosuvastatin (CRESTOR) 20 MG tablet Take 1 tablet (20 mg total) by mouth daily   • sitaGLIPtin (JANUVIA) 100 mg tablet Take 1 tablet (100 mg total) by mouth daily   • [DISCONTINUED] sertraline (ZOLOFT) 50 mg tablet Take 0 5 tablets (25 mg total) by mouth daily     Allergies   Allergen Reactions   • Clindamycin Hives   • Penicillins Hives     Immunization History   Administered Date(s) Administered   • COVID-19 PFIZER VACCINE 0 3 ML IM 04/01/2021, 04/23/2021, 12/28/2021   • INFLUENZA 10/07/2011, 05/18/2012, 10/05/2012, 10/26/2015, 10/11/2016, 11/05/2020   • Influenza, recombinant, quadrivalent,injectable, preservative free 10/25/2021, 10/25/2022   • Pneumococcal Conjugate 13-Valent 10/26/2015   • Tdap 07/17/2017   • Zoster Vaccine Recombinant 07/06/2021, 10/25/2021       Objective     /70   Pulse 60   Ht 6' 2" (1 88 m)   Wt 121 kg (267 lb)   SpO2 97%   BMI 34 28 kg/m²     Physical Exam  Vitals reviewed  Constitutional:       General: He is not in acute distress  Appearance: Normal appearance  He is well-developed and well-nourished  HENT:      Head: Normocephalic and atraumatic  Right Ear: External ear normal       Left Ear: External ear normal    Eyes:      General: No scleral icterus  Conjunctiva/sclera: Conjunctivae normal    Neck:      Thyroid: No thyromegaly  Trachea: No tracheal deviation  Cardiovascular:      Rate and Rhythm: Normal rate and regular rhythm  Heart sounds: Normal heart sounds  No murmur heard  Pulmonary:      Effort: Pulmonary effort is normal  No respiratory distress  Breath sounds: Normal breath sounds  No wheezing or rales  Musculoskeletal:         General: No edema  Cervical back: Normal range of motion and neck supple  Right lower leg: No edema  Left lower leg: No edema  Lymphadenopathy:      Cervical: No cervical adenopathy  Skin:     Coloration: Skin is not jaundiced or pale  Neurological:      Mental Status: He is alert and oriented to person, place, and time  Psychiatric:         Mood and Affect: Mood and affect normal          Behavior: Behavior normal          Thought Content:  Thought content normal          Judgment: Judgment normal        Jarrod Sparrow MD

## 2022-12-06 NOTE — PATIENT INSTRUCTIONS
Problem List Items Addressed This Visit          Endocrine    Type 2 diabetes mellitus without complication, without long-term current use of insulin ([facility] ) - Primary     Last hemoglobin A1c was high, patient was not eating well at that time on a road trip, we will recheck  Lab Results   Component Value Date    HGBA1C 10 7 (A) 10/25/2022            Relevant Orders    POCT hemoglobin A1c       Cardiovascular and Mediastinum    Essential hypertension     To new medications, discussed with patient about possible need for decreasing metoprolol with starting bupropion            Other    Mixed hyperlipidemia     Continue rosuvastatin along with healthy diet and exercise         Seasonal affective disorder ([facility] )     Discussed with pt increasing sertraline  Pt is on 25 mg  Pt can increase to 50 mg and titrate up as needed  Pt is asking about switching to wellbutrin  Will start 75 mg daily, and then titrate up to 150 mg daily if needed  Patient is on metoprolol /25 mg daily, he takes half a tablet daily  Discussed possible interaction with this medication with higher levels of metoprolol, and if patient having lightheadedness, low blood pressure, slow heart rate, then we could decrease this medication further           Relevant Medications    buPROPion (WELLBUTRIN) 75 mg tablet

## 2022-12-28 ENCOUNTER — CONSULT (OUTPATIENT)
Dept: ENDOCRINOLOGY | Facility: CLINIC | Age: 62
End: 2022-12-28

## 2022-12-28 VITALS
HEART RATE: 65 BPM | TEMPERATURE: 98 F | SYSTOLIC BLOOD PRESSURE: 124 MMHG | DIASTOLIC BLOOD PRESSURE: 86 MMHG | HEIGHT: 74 IN | WEIGHT: 247 LBS | OXYGEN SATURATION: 97 % | BODY MASS INDEX: 31.7 KG/M2

## 2022-12-28 DIAGNOSIS — E78.2 MIXED HYPERLIPIDEMIA: ICD-10-CM

## 2022-12-28 DIAGNOSIS — E11.65 TYPE 2 DIABETES MELLITUS WITH HYPERGLYCEMIA, WITHOUT LONG-TERM CURRENT USE OF INSULIN (HCC): Primary | ICD-10-CM

## 2022-12-28 DIAGNOSIS — K76.0 NAFLD (NONALCOHOLIC FATTY LIVER DISEASE): ICD-10-CM

## 2022-12-28 DIAGNOSIS — G47.33 OSA (OBSTRUCTIVE SLEEP APNEA): ICD-10-CM

## 2022-12-28 DIAGNOSIS — E55.9 VITAMIN D DEFICIENCY: ICD-10-CM

## 2022-12-28 PROBLEM — E11.9 TYPE 2 DIABETES MELLITUS WITHOUT COMPLICATION, WITHOUT LONG-TERM CURRENT USE OF INSULIN (HCC): Status: RESOLVED | Noted: 2021-03-29 | Resolved: 2022-12-28

## 2022-12-28 RX ORDER — SEMAGLUTIDE 1.34 MG/ML
INJECTION, SOLUTION SUBCUTANEOUS
Qty: 6 ML | Refills: 1 | Status: SHIPPED | OUTPATIENT
Start: 2022-12-28

## 2022-12-28 NOTE — LETTER
Medical Fitness Referral    Patient: Angelo Serna  : 1960  MRN: [id number]  Address: [place] [address]  Phone Number: [phone]  E-mail: [email or web address]    [] Medical Disorders (Ex  Diabetes)   [] Cardiovascular Disorders (Ex  Hypertension)   [] Pulmonary Disorders (Ex  Asthma)   [] Cancer Disorders (Ex  Breast, Prostate)   [] Immunological & Hematological Disorders (Rheumatoid Arthritis)   [] Neurological Disorders (Ex  Parkinson's Disease)   [] Cognitive Disorders (Ex  Dementia)   [] Children & Adolescents (Ex  BMI)   [] Older Adults (Ex  Balance & Ambulation)   [] Female Specific Disorders (Ex  Osteoporosis)       Diagnoses:   1  Type 2 diabetes mellitus with hyperglycemia, without long-term current use of insulin [facility])  Ambulatory Referral to Endocrinology      2  Mixed hyperlipidemia        3  Vitamin D deficiency        4   NAFLD (nonalcoholic fatty liver disease)          Additional Comments:    Service Requested:  [x] Medical Fitness Consult- Screening For Appropriateness of Medical Fitness Program   [x] Medical Fitness Program- Assessment of Body Composition, Aerobic, Anaerobic, Muscle Strength & Endurance, Flexibility, Neuromuscular & Functional Fitness   [x] Medical Fitness Assessment- Individualized Evidence-Based Exercise Program       Requesting Provider: Sonia Wilson MD  Date: 22

## 2022-12-28 NOTE — PROGRESS NOTES
New consult Note      CC:hyperglycemia    History of Present Illness:   68-year-old male with type 2 diabetes, macroalbuminuria, HTN, HLD, seasonal affective disorder, history of osteomyelitis of jaw 2019, DJD, gout, obesity and vitamin D deficiency  He was first diagnosed with prediabetes about 10 years ago  That progressed to diabetes over a few years and he was well controlled with A1c <7%  Since his OM jaw, he had tooth extraction and had to switch to softer diets and started to gain weights  Max adult weight: 305lbs  Lowest adult weight 185lbs  Current 247 lbs  Home blood glucose monitoring:   Before breakfast:   Before lunch:   Before dinner:   Bedtime:   Hypoglycemia:    Current meds:  Januvia 100 mg daily    Opthamology: No  Podiatry: No  Vaccination: yes   Dental:  Pancreatitis: No    Ace/ARB: Losartan  Statin: Crestor 20 mg nightly  Thyroid issues: no    FH: Mother had type 2 diabetes   Sister - diabetes    Patient Active Problem List   Diagnosis   • Trigeminal neuritis   • Osteomyelitis, jaw acute   • Streptococcal infection   • Candida glabrata infection   • Long term (current) use of antibiotics   • Leukopenia   • Transaminitis   • Essential hypertension   • Mixed hyperlipidemia   • Idiopathic chronic gout 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   • Wellness examination   • Adhesive capsulitis of right shoulder   • Balance problem   • Seasonal affective disorder (HCC)   • Palpitation   • Right foot pain   • Type 2 diabetes mellitus with hyperglycemia ([address] )   • Aphasia     Past Medical History:   Diagnosis Date   • HTN (hypertension)    • Hyperlipidemia    • Hypertension    • Osteomyelitis ([address] ) 2020   • Seizures ([address] )     Not since age 13      Past Surgical History:   Procedure Laterality Date   • COLONOSCOPY     • HAND SURGERY     • HERNIA REPAIR     • IR PICC PLACEMENT SINGLE LUMEN  12/8/2020   • VASCULAR SURGERY     • WOUND DEBRIDEMENT N/A 11/30/2020    Procedure: DEBRIDEMENT OF ANTERIOR MANDIBLE BONE BIOPSY, CULTURES AND REMOVAL OF IMPLANT #27, USE OF PRP;  Surgeon: Brinda Phoenix DDS;  Location: [facility];  Service: Maxillofacial      Family History   Problem Relation Age of Onset   • Cancer Mother      Social History     Tobacco Use   • Smoking status: Former     Packs/day: 3 00     Types: Cigarettes, Pipe, Cigars     Quit date: 2019     Years since quitting: 3 9   • Smokeless tobacco: Never   Substance Use Topics   • Alcohol use: Never     Allergies   Allergen Reactions   • Clindamycin Hives   • Penicillins Hives         Current Outpatient Medications:   •  allopurinol (ZYLOPRIM) 100 mg tablet, Take 1 tablet (100 mg total) by mouth daily, Disp: 90 tablet, Rfl: 3  •  amLODIPine (NORVASC) 5 mg tablet, TAKE 1 TABLET (5 MG TOTAL) BY MOUTH DAILY  , Disp: 90 tablet, Rfl: 3  •  buPROPion (WELLBUTRIN) 75 mg tablet, Take 1 tablet (75 mg total) by mouth 2 (two) times a day, Disp: 180 tablet, Rfl: 3  •  cholecalciferol (VITAMIN D3) 1,000 units tablet, Take 5,000 Units by mouth daily 10,000 units Patient stated that is continuously taking, Disp: , Rfl:   •  glucosamine-chondroitin 500-400 MG tablet, Take 2 tablets by mouth daily, Disp: , Rfl:   •  losartan (COZAAR) 100 MG tablet, Take 1 tablet (100 mg total) by mouth daily, Disp: 90 tablet, Rfl: 3  •  metoprolol-hydrochlorothiazide (LOPRESSOR HCT) 100-25 MG per tablet, TAKE 1/2 TABLET BY MOUTH EVERY DAY, Disp: 45 tablet, Rfl: 7  •  Omega-3 Fatty Acids (Omega-3 Fish Oil) 1000 MG CAPS, Take 2,000 mg by mouth daily, Disp: 180 capsule, Rfl: 3  •  rosuvastatin (CRESTOR) 20 MG tablet, Take 1 tablet (20 mg total) by mouth daily, Disp: 90 tablet, Rfl: 3  •  sitaGLIPtin (JANUVIA) 100 mg tablet, Take 1 tablet (100 mg total) by mouth daily, Disp: 90 tablet, Rfl: 3    Review of Systems   Constitutional: Positive for activity change, appetite change and fatigue  HENT: Negative  Eyes: Negative  Respiratory: Negative  Cardiovascular: Negative for chest pain  Gastrointestinal: Negative  Endocrine: Negative  Genitourinary: Negative  Musculoskeletal: Negative  Skin: Negative  Allergic/Immunologic: Negative  Neurological: Negative  Hematological: Negative  Psychiatric/Behavioral: Negative  All other systems reviewed and are negative  Physical Exam:  Body mass index is 31 71 kg/m²  /86   Pulse 65   Temp 98 °F (36 7 °C)   Ht 6' 2" (1 88 m)   Wt 112 kg (247 lb)   SpO2 97%   BMI 31 71 kg/m²    Vitals:    12/28/22 0838   Weight: 112 kg (247 lb)        Physical Exam  Constitutional:       Appearance: He is well-developed  HENT:      Head: Normocephalic  Eyes:      Pupils: Pupils are equal, round, and reactive to light  Neck:      Thyroid: No thyromegaly  Cardiovascular:      Rate and Rhythm: Normal rate  Pulmonary:      Effort: Pulmonary effort is normal    Abdominal:      Palpations: Abdomen is soft  Musculoskeletal:         General: No deformity  Cervical back: Normal range of motion  Skin:     Capillary Refill: Capillary refill takes less than 2 seconds  Coloration: Skin is not pale  Findings: No rash  Neurological:      Mental Status: He is alert and oriented to person, place, and time           Labs:   Lab Results   Component Value Date    HGBA1C 10 8 (A) 12/06/2022       Lab Results   Component Value Date    SPC9VDNARKGF 1 483 09/08/2022       Lab Results   Component Value Date    CREATININE 0 93 09/08/2022    CREATININE 1 02 06/07/2021    CREATININE 0 91 05/17/2021    BUN 11 09/08/2022     10/08/2014    K 4 6 09/08/2022    CL 96 09/08/2022    CO2 31 09/08/2022     eGFR   Date Value Ref Range Status   09/08/2022 87 ml/min/1 73sq m Final       Lab Results   Component Value Date    ALT 69 (H) 09/08/2022    AST 41 (H) 09/08/2022    ALKPHOS 66 09/08/2022    BILITOT 0 4 10/08/2014       Lab Results   Component Value Date    CHOLESTEROL 162 09/08/2022    CHOLESTEROL 156 03/22/2021     Lab Results Component Value Date    HDL 29 (L) 09/08/2022    HDL 28 (L) 03/22/2021     Lab Results   Component Value Date    TRIG 491 (H) 09/08/2022    TRIG 315 (H) 03/22/2021     Lab Results   Component Value Date    [place] 128 03/22/2021         Impression:  1  Type 2 diabetes mellitus with hyperglycemia, without long-term current use of insulin ([address] )    2  Mixed hyperlipidemia    3  Vitamin D deficiency    4  NAFLD (nonalcoholic fatty liver disease)    5  DEBRA (obstructive sleep apnea)         Plan:    Enrique Taylor was seen today for consult  Diagnoses and all orders for this visit:    Type 2 diabetes mellitus with hyperglycemia, without long-term current use of insulin ([address] )  Is uncontrolled with an A1c of 9 9%  Goal is 7%  Today we discussed all aspects of diabetes including pathophysiology, risk factors, complications, SAGM, CGM, diet, lifestyle modifications, medical fitness training, diabetes education, goals of therapy, follow up needs and medications including insulin, metformin, Jardiance and GLP1 agonists  Advised to maintain goal blood sugars 70-180mg/dL  He would probably benefit from a basal insulin but patient elected to try lifestyle modifications over the next few weeks along with switching to a GLP-1 agonist before considering insulin  I think this is reasonable  Advised to stop Januvia once Ozempic is started  Referral made to [facility] medical fitness program   Follow-up in 4 to 6 weeks with glucose logs  -     Ambulatory Referral to Endocrinology  -     Semaglutide,0 25 or 0 5MG/DOS, (Ozempic, 0 25 or 0 5 MG/DOSE,) 2 MG/1 5ML SOPN; Use 0 25mg weekly for 4 weeks and then 0 5mg weekly  -     Glucometer test strips  -     Glucometer  -     Lancets  -     Ambulatory referral to Diabetic Education; Future    Mixed hyperlipidemia  Significant hypertriglyceridemia  Continue omega-3  We will need to treat underlying diabetes to improve this  Vitamin D deficiency  On replacement    Advised to take 5000 IU vitamin D3 OTC  NAFLD (nonalcoholic fatty liver disease)  GLP-1 agonist should help  DEBRA (obstructive sleep apnea)  -     Ambulatory referral to Sleep Medicine; Future        I have spent 50 minutes with patient today in which greater than 50% of this time was spent in counseling/coordination of care  Discussed with the patient and all questioned fully answered  He will call me if any problems arise  Educated/ Counseled patient on diagnostic test results, prognosis, risk vs benefit of treatment options, importance of treatment compliance, healthy life and lifestyle choices        [address]

## 2022-12-28 NOTE — PATIENT INSTRUCTIONS
Instructions    Diet:   Take 1500 lars/day of balanced diet with 1/3rd carbs, 1/3rd protein and rest fiber and small amount fat  Try to include fasting for 12-16hrs -early dinner and late breakfast   Drink at least 64 oz fluids daily  Lifestyle:   30 min brisk activity most days  Join  [employer] fitness training to build muscles and burn fat  Medical fitness: follow these exercise links  Full Version - [email or web address]     Warmup - [email or web address]     Lower Body - [email or web address]     Upper Body - [email or web address]     Core -  [email or web address]    Medications: look up Wegovy, Trulicity, Victoza - weight loss meds  Consider switching from medications that cause weight gain to weight neutral medications  Other:   Make sure you are treated appropriately if you have sleep apnea

## 2022-12-30 ENCOUNTER — TELEPHONE (OUTPATIENT)
Dept: ENDOCRINOLOGY | Facility: CLINIC | Age: 62
End: 2022-12-30

## 2022-12-30 NOTE — TELEPHONE ENCOUNTER
Ric Hernández (Key: [id number])  Rx #: [id number]  Ozempic (0 25 or 0 5 MG/DOSE) 2MG/1 5ML pen-injectors       Form  [facility] Prescription Drug Authorization Form

## 2023-01-03 ENCOUNTER — TELEPHONE (OUTPATIENT)
Dept: ENDOCRINOLOGY | Facility: CLINIC | Age: 63
End: 2023-01-03

## 2023-01-03 ENCOUNTER — DOCUMENTATION (OUTPATIENT)
Dept: PULMONOLOGY | Facility: CLINIC | Age: 63
End: 2023-01-03

## 2023-01-03 DIAGNOSIS — E11.65 TYPE 2 DIABETES MELLITUS WITH HYPERGLYCEMIA, WITHOUT LONG-TERM CURRENT USE OF INSULIN (HCC): Primary | ICD-10-CM

## 2023-01-03 NOTE — TELEPHONE ENCOUNTER
[facility] left v/m re: Glucometer test strips and Lancets  They are requesting directions on how many times pt is suppose to test in order to bill insurances  Their call back #[phone]

## 2023-01-04 ENCOUNTER — TELEPHONE (OUTPATIENT)
Dept: ENDOCRINOLOGY | Facility: CLINIC | Age: 63
End: 2023-01-04

## 2023-01-04 NOTE — TELEPHONE ENCOUNTER
Scripts are in the system under other orders, can you please print and fax them to pharmacy   Thank you

## 2023-01-04 NOTE — TELEPHONE ENCOUNTER
Pts wife called in stating that they recently switched pharmacys to [facility], and they are questioning the scripts that were sent in for the test strips to check pts blood sugar   Please advise

## 2023-01-05 ENCOUNTER — OFFICE VISIT (OUTPATIENT)
Dept: DIABETES SERVICES | Facility: CLINIC | Age: 63
End: 2023-01-05

## 2023-01-05 VITALS — BODY MASS INDEX: 34.92 KG/M2 | WEIGHT: 272 LBS

## 2023-01-05 DIAGNOSIS — E11.65 TYPE 2 DIABETES MELLITUS WITH HYPERGLYCEMIA, WITHOUT LONG-TERM CURRENT USE OF INSULIN (HCC): Primary | ICD-10-CM

## 2023-01-05 NOTE — PATIENT INSTRUCTIONS
Consume 3 balanced meals/day at appropriate times  45 gram carbohydrate/meal and 15 grams carbohydrate/snack  Consume 3 balanced snacks/day at appropriate times  Physically healthy/Supportive family

## 2023-01-05 NOTE — PROGRESS NOTES
Medical Nutrition Therapy        Assessment    Visit Type: Initial visit  Chief complaint/Medical Diagnosis/reason for visit E11 63 Type 2 Diabetes Mellitus with hyperglycemia without long term current use of insulin    HPI Yuridia Saunders came in with his wife, Belkis Allen, for his initial [facility] (MNT) appointment today  First, Yuridia Saunders stated the weight that was recorded on 12/28/22, his last encounter on his chart, was incorrect  On 12/28/22, Stuart's weight is listed as 247 lbs, however, Yuridia Saunders stated he currently has been weighing around 274 lbs  Today, Yuridia Saunders weighed in at 272 lbs  Yuridia Saunders also stated he has a large appetite, eats 3 large meals, 3 snacks, and also snacks at 2AM when he wakes up in the middle of the night  Yuridia Saunders stated his physician advised him to consume 1500 calories/day  However, Yuridia Saunders stated he does not feel he would be able to follow a 1500 calorie/day meal plan based on his current daily food intake  Obtained a 24 hour food recall from Yuridia Saunders, which revealed Leahs daily food intake is approximately 7033-1059 calories/day  Based on my professional opinion and nutritional assessment of Yuridia Saunders being 6'2" and weighing 272 lbs, the most appropriate daily meal plan for lowering his blood sugars and for appropriate weight loss is 2200 calories/day  This was calculated using an adjusted desirable weight and 22 kcal/kg for weight loss  Yuridia Saunders feels he can abide by this 2200 calorie diabetic weight loss meal plan as he feels it is most realistic for him at this time  Problems identified in food recall include inconsistent carbohydrate intake, and unbalanced meals plus consuming large intake of food, including marshmallows and ice cream   Patient stated he does not have any teeth due to necrosis of jaw, but patient stated he is being fitted for correct set of dentures  Provided patient with a 2200 calorie balanced individualized meal plan to assist with consistency, balance and portion control    Encouraged the consumption of balanced meals and snacks at appropriate times  Advised patient to keep carbohydrate intake to 45grams per meal and 15 grams per snack to assist with glycemic control  My Plate, food models, portion booklet and food labels were used to teach basic carbohydrate counting  Patient agreed to keep daily food logs and return them in four weeks at his MNT follow-up appointment for assessment  RD will remain available for further dietary questions/concerns  Ht Readings from Last 1 Encounters:   12/28/22 6' 2" (1 88 m)     Wt Readings from Last 3 Encounters:   01/05/23 123 kg (272 lb)   12/28/22 112 kg (247 lb)   12/06/22 121 kg (267 lb)     Weight Change: Yes Patient stated 12/28/22 encounter of 247 is incorrect  Patient stated he has been weighing around 274 lbs, of which using 274 as last encounter of chart, means patient lost 2 lbs  Barriers to Learning: no barriers    Do you follow any special diet presently?: No  Who shops: patient and spouse  Who cooks: patient and spouse    Food Log: Completed via the method of food recall    Breakfast:7:30AM - whole avocado, jalopena peppers with olive oil, 3 eggs, 2 sl bread with lots of butter, sometimes 1 cup cooked grits and or fried potatoes, and 2 small sausage, 2 c coffee with little milk + Splenda  Morning Snack:10AM - brownie and 5 marshmallows, coffee with Splenda  Lunch:1PM - Leftovers from dinner, Large Chili with beans, 2 sl bread, kale bowl with olives and olive oil, whole avocado, and a Mixed Drink of 3 oz diet coke, 1 oz juice, 8 oz water    Afternoon Snack: 3PM - Brownie + 5 marshmallows, coffee  Dinner:6PM - 2 hot dogs, kale bowl, rutabaga fries, jalopena peppers with olive oil, Mixed drink of 3 oz diet coke, 1 oz juice, 8 oz water, mineral water, 12 oz non-alcoholic beer, 1 oz michael  Evening Snack:9PM - 10 marshmallows, and or sometimes large bowl of ice cream with large amount of peanut butter and regular Cedar's chocolate syrup  2AM Snack: 3/4 pack of lawrence crackers + 8 oz 2 % milk  Beverages: mineral water, coffee, hot tea, diet coke mixed with juice and water  Eating out/Take out:occasionally  Exercise has started walking    Calorie needs 2200kcals/day  Carbs: 45g/meal, 15g/snack         Nutrition Diagnosis:  Food and nutrition related knowledge deficit  related to Lack of prior exposure to accurate nutrition related information as evidenced by [employer] inaccurate or incomplete information    Intervention: plate method, label reading, carbohydrate counting, meal timing, weight/ BMI goals, meal planning, individualized meal plan, monitoring portion control, exercise guidelines and food diary     Treatment Goals: Patient will consume 3 meals a day, Patient will consume snacks and Patient will count carbohydrates    Monitoring and evaluation:    Term code indicator  FH 1 3 2 Food Intake Criteria: Consume 3 balanced meals/day at appropriate times  Term code indicator  FH 1 6 3 Carbohydrate Intake Criteria: 45 grams carbohydrate/meal and 15 grams carbohydrate/snack  Term code indicator  FH 1 3 2 Food Intake Criteria: Consume 3 balanced snacks/day at appropriate times  Materials Provided: CHO counting, portion booklet, individualized meal plan, food logs    Patient’s Response to Instruction:  Comprehensiongood  Motivationgood  Expected Compliancegood    Begin Time: 1:35PM  End Time: 2:50PM  Referring Provider: Dr Rizwan Kimble    Thank you for coming to the [address] [place] [address] for education today  Please feel free to call with any questions or concerns      Navi García  [address] [place] [id number] [phone]

## 2023-01-06 ENCOUNTER — TELEPHONE (OUTPATIENT)
Dept: OTHER | Facility: OTHER | Age: 63
End: 2023-01-06

## 2023-01-06 NOTE — TELEPHONE ENCOUNTER
Spoke with pt and advised Dr Nguyen is not back in the office until Monday 1/9  Pt states he is okay to wait for this to be addressed until then  Pt would like his Wellbutrin increased to 225 mg as his current dose seems to "wear off" late day  He notices he becomes more agitated and critical  Pt denies any suicidal and homicidal ideation

## 2023-01-06 NOTE — TELEPHONE ENCOUNTER
Patient would like a call back from Dr Geneva Matos to discuss his Wellbutrin  He would like Dr Geneva Matos or staff to continue to try to reach him if he does not answer the first time because he is having a hard time getting through to the office

## 2023-01-09 DIAGNOSIS — F33.8 SEASONAL AFFECTIVE DISORDER (HCC): ICD-10-CM

## 2023-01-09 RX ORDER — BUPROPION HYDROCHLORIDE 75 MG/1
75 TABLET ORAL DAILY
Qty: 90 TABLET | Refills: 3 | Status: SHIPPED | OUTPATIENT
Start: 2023-01-09 | End: 2023-01-20 | Stop reason: DRUGHIGH

## 2023-01-09 RX ORDER — BUPROPION HYDROCHLORIDE 150 MG/1
150 TABLET ORAL DAILY
Qty: 90 TABLET | Refills: 3 | Status: SHIPPED | OUTPATIENT
Start: 2023-01-09

## 2023-01-10 DIAGNOSIS — E11.65 TYPE 2 DIABETES MELLITUS WITH HYPERGLYCEMIA, WITHOUT LONG-TERM CURRENT USE OF INSULIN (HCC): Primary | ICD-10-CM

## 2023-01-20 ENCOUNTER — NURSE TRIAGE (OUTPATIENT)
Dept: OTHER | Facility: OTHER | Age: 63
End: 2023-01-20

## 2023-01-20 ENCOUNTER — TELEMEDICINE (OUTPATIENT)
Dept: INTERNAL MEDICINE CLINIC | Facility: CLINIC | Age: 63
End: 2023-01-20

## 2023-01-20 DIAGNOSIS — E11.65 TYPE 2 DIABETES MELLITUS WITH HYPERGLYCEMIA, WITHOUT LONG-TERM CURRENT USE OF INSULIN (HCC): ICD-10-CM

## 2023-01-20 DIAGNOSIS — R05.1 ACUTE COUGH: Primary | ICD-10-CM

## 2023-01-20 RX ORDER — AZITHROMYCIN 250 MG/1
250 TABLET, FILM COATED ORAL EVERY 24 HOURS
Qty: 6 TABLET | Refills: 0 | Status: SHIPPED | OUTPATIENT
Start: 2023-01-20 | End: 2023-01-25

## 2023-01-20 RX ORDER — FLUTICASONE PROPIONATE 220 UG/1
1 AEROSOL, METERED RESPIRATORY (INHALATION) 2 TIMES DAILY
Qty: 12 G | Refills: 0 | Status: SHIPPED | OUTPATIENT
Start: 2023-01-20 | End: 2023-02-19

## 2023-01-20 NOTE — ASSESSMENT & PLAN NOTE
Patient did a home COVID test which was negative  With onset symptoms being 5 days ago, will treat for possible bronchitis/pneumonia  Follow-up significant shortness of breath or pleuritic pain or worsening symptoms, then I recommend evaluation  Does have some slight wheezing so we will send prescription for inhaler  Pt can also use cough medicine    Will get chest x-ray if not improving

## 2023-01-20 NOTE — PROGRESS NOTES
COVID-19 Outpatient Progress Note    Assessment/Plan:    Problem List Items Addressed This Visit        Endocrine    Type 2 diabetes mellitus with hyperglycemia ([facility] [address] )    Relevant Orders    Microalbumin / creatinine urine ratio       Other    Acute cough - Primary     Patient did a home COVID test which was negative  With onset symptoms being 5 days ago, will treat for possible bronchitis/pneumonia  Follow-up significant shortness of breath or pleuritic pain or worsening symptoms, then I recommend evaluation  Does have some slight wheezing so we will send prescription for inhaler  Pt can also use cough medicine  Will get chest x-ray if not improving         Relevant Medications    azithromycin (ZITHROMAX) 250 mg tablet    fluticasone (FLOVENT HFA) 220 mcg/act inhaler    Other Relevant Orders    Hemoglobin A1C    Comprehensive metabolic panel      Disposition:     I have spent 15 minutes directly with the patient  Greater than 50% of this time was spent in counseling/coordination of care regarding: instructions for management, patient and family education and impressions  Encounter provider: Jose Hawkins MD     Provider located at: [address] [place] [phone]     Recent Visits  No visits were found meeting these conditions  Showing recent visits within past 7 days and meeting all other requirements  Today's Visits  Date Type Provider Dept   01/20/23 Telemedicine Stevan Becerra today's visits and meeting all other requirements  Future Appointments  No visits were found meeting these conditions  Showing future appointments within next 150 days and meeting all other requirements       Patient agrees to participate in a virtual check in via telephone or video visit instead of presenting to the office to address urgent/immediate medical needs  Patient is aware this is a billable service   He acknowledged consent and understanding of privacy and security of the video platform  The patient has agreed to participate and understands they can discontinue the visit at any time  After connecting through [facility], the patient was identified by name and date of birth  Lesvia Leiva was informed that this was a telemedicine visit and that the exam was being conducted confidentially over secure lines  My office door was closed  No one else was in the room  Lesvia Leiva acknowledged consent and understanding of privacy and security of the telemedicine visit  I informed the patient that I have reviewed his record in Epic and presented the opportunity for him to ask any questions regarding the visit today  The patient agreed to participate  Verification of patient location:  Patient is located in the following state in which I hold an active license: [place]    Subjective:   Lesvia Leiva is a 57 y o  male who is concerned about COVID-19  Patient's symptoms include fever, fatigue, nasal congestion, rhinorrhea, sore throat, cough, shortness of breath (mild) and myalgias  - Date of symptom onset: 1/16/2023      COVID-19 vaccination status: Fully vaccinated with booster  BMI Counseling: There is no height or weight on file to calculate BMI  The BMI is above normal  Nutrition recommendations include encouraging healthy choices of fruits and vegetables and moderation in carbohydrate intake  Exercise recommendations include exercising 3-5 times per week  Rationale for BMI follow-up plan is due to patient being overweight or obese  Lab Results   Component Value Date    SARSCOV2 Negative 12/08/2021       Review of Systems   Constitutional: Positive for fatigue and fever  HENT: Positive for congestion, rhinorrhea and sore throat  Respiratory: Positive for cough and shortness of breath (mild)  Musculoskeletal: Positive for myalgias       Current Outpatient Medications on File Prior to Visit   Medication Sig   • allopurinol (ZYLOPRIM) 100 mg tablet Take 1 tablet (100 mg total) by mouth daily   • amLODIPine (NORVASC) 5 mg tablet TAKE 1 TABLET (5 MG TOTAL) BY MOUTH DAILY  • buPROPion (Wellbutrin XL) 150 mg 24 hr tablet Take 1 tablet (150 mg total) by mouth daily   • cholecalciferol (VITAMIN D3) 1,000 units tablet Take 5,000 Units by mouth daily 10,000 units Patient stated that is continuously taking   • glucosamine-chondroitin 500-400 MG tablet Take 2 tablets by mouth daily   • losartan (COZAAR) 100 MG tablet Take 1 tablet (100 mg total) by mouth daily   • metoprolol-hydrochlorothiazide (LOPRESSOR HCT) 100-25 MG per tablet TAKE 1/2 TABLET BY MOUTH EVERY DAY   • Omega-3 Fatty Acids (Omega-3 Fish Oil) 1000 MG CAPS Take 2,000 mg by mouth daily   • rosuvastatin (CRESTOR) 20 MG tablet Take 1 tablet (20 mg total) by mouth daily   • Semaglutide,0 25 or 0 5MG/DOS, (Ozempic, 0 25 or 0 5 MG/DOSE,) 2 MG/1 5ML SOPN Use 0 25mg weekly for 4 weeks and then 0 5mg weekly (Patient taking differently: Use 0 25mg weekly for 4 weeks and then 0 5mg weekly    Patient stated he will be starting this medication 1/6/23, as he will be picking this medication up at the Pharmacy today )   • [DISCONTINUED] buPROPion (WELLBUTRIN) 75 mg tablet Take 1 tablet (75 mg total) by mouth in the morning   • [DISCONTINUED] sitaGLIPtin (JANUVIA) 100 mg tablet Take 1 tablet (100 mg total) by mouth daily       Objective: There were no vitals taken for this visit  Physical Exam  Constitutional:       General: He is not in acute distress  Pulmonary:      Effort: Pulmonary effort is normal  No respiratory distress  Neurological:      Mental Status: He is alert and oriented to person, place, and time         Garrick Mcguire MD

## 2023-01-20 NOTE — PATIENT INSTRUCTIONS
Problem List Items Addressed This Visit          Endocrine    Type 2 diabetes mellitus with hyperglycemia ([facility] [address] )    Relevant Orders    Microalbumin / creatinine urine ratio       Other    Acute cough - Primary     Patient did a home COVID test which was negative  With onset symptoms being 5 days ago, will treat for possible bronchitis/pneumonia  Follow-up significant shortness of breath or pleuritic pain or worsening symptoms, then I recommend evaluation  Does have some slight wheezing so we will send prescription for inhaler  Pt can also use cough medicine    Will get chest x-ray if not improving         Relevant Medications    azithromycin (ZITHROMAX) 250 mg tablet    fluticasone (FLOVENT HFA) 220 mcg/act inhaler    Other Relevant Orders    Hemoglobin A1C    Comprehensive metabolic panel

## 2023-01-20 NOTE — TELEPHONE ENCOUNTER
Patient with non productive cough for the past several days, patient states cough is worsening  Patient's wife states he was wheezing in his sleep  Patient denies fever and chest pain  Patient requesting medications because he will be traveling soon  Patient scheduled for virtual appointment today, home care advice given  Reason for Disposition  • Continuous (nonstop) coughing interferes with work or school and no improvement using cough treatment per Care Advice    Answer Assessment - Initial Assessment Questions  1  ONSET: "When did the cough begin?"      1/16    2  SEVERITY: "How bad is the cough today?"       Dry cough    3  SPUTUM: "Describe the color of your sputum" (none, dry cough; clear, white, yellow, green)      Denies    4  HEMOPTYSIS: "Are you coughing up any blood?" If so ask: "How much?" (flecks, streaks, tablespoons, etc )      Denies    5  DIFFICULTY BREATHING: "Are you having difficulty breathing?" If Yes, ask: "How bad is it?" (e g , mild, moderate, severe)     - MILD: No SOB at rest, mild SOB with walking, speaks normally in sentences, can lay down, no retractions, pulse < 100      - MODERATE: SOB at rest, SOB with minimal exertion and prefers to sit, cannot lie down flat, speaks in phrases, mild retractions, audible wheezing, pulse 100-120      - SEVERE: Very SOB at rest, speaks in single words, struggling to breathe, sitting hunched forward, retractions, pulse > 120      Denies    6  FEVER: "Do you have a fever?" If Yes, ask: "What is your temperature, how was it measured, and when did it start?"      Denies    7  CARDIAC HISTORY: "Do you have any history of heart disease?" (e g , heart attack, congestive heart failure)       Denies    8  LUNG HISTORY: "Do you have any history of lung disease?"  (e g , pulmonary embolus, asthma, emphysema)      Denies     9   PE RISK FACTORS: "Do you have a history of blood clots?" (or: recent major surgery, recent prolonged travel, bedridden) Denies    10  OTHER SYMPTOMS: "Do you have any other symptoms?" (e g , runny nose, wheezing, chest pain)        Denies     11  PREGNANCY: "Is there any chance you are pregnant?" "When was your last menstrual period?"        N/A    12   TRAVEL: "Have you traveled out of the country in the last month?" (e g , travel history, exposures)        Denies    Protocols used: COUGH-ADULT-OH

## 2023-01-20 NOTE — TELEPHONE ENCOUNTER
Regarding: ocugh, wants Rx  ----- Message from Marisel Núñez sent at 1/20/2023  8:52 AM EST -----  " I am getting over a cold  I still have a cough, I am getting ready to go on a business trip  I want to get something in case it goes into my lungs  "

## 2023-01-27 ENCOUNTER — TELEMEDICINE (OUTPATIENT)
Dept: INTERNAL MEDICINE CLINIC | Facility: CLINIC | Age: 63
End: 2023-01-27

## 2023-01-27 ENCOUNTER — TELEPHONE (OUTPATIENT)
Dept: OTHER | Facility: OTHER | Age: 63
End: 2023-01-27

## 2023-01-27 DIAGNOSIS — H53.9 VISUAL DISTURBANCE: Primary | ICD-10-CM

## 2023-01-27 NOTE — ASSESSMENT & PLAN NOTE
Symptoms consistent with ocular migraine, patient has had this in the past, I recommend evaluation with eye doctor    Having worsening symptoms over the weekend, then to get to the emergency room

## 2023-01-27 NOTE — PROGRESS NOTES
Virtual Regular Visit    Verification of patient location:    Patient is located in the following state in which I hold an active license [place]      Assessment/Plan:    Problem List Items Addressed This Visit        Other    Visual disturbance - Primary     Symptoms consistent with ocular migraine, patient has had this in the past, I recommend evaluation with eye doctor  Having worsening symptoms over the weekend, then to get to the emergency room            BMI Counseling: There is no height or weight on file to calculate BMI  The BMI is above normal  Nutrition recommendations include encouraging healthy choices of fruits and vegetables and moderation in carbohydrate intake  Exercise recommendations include exercising 3-5 times per week  Rationale for BMI follow-up plan is due to patient being overweight or obese  Reason for visit is   Chief Complaint   Patient presents with   • Virtual Regular Visit        Encounter provider Kaushik Wang MD    Provider located at [address]  [address] [place] [id number]      Recent Visits  Date Type Provider Dept   01/20/23 Telemedicine Kaushik Wang MD [facility] recent visits within past 7 days and meeting all other requirements  Today's Visits  Date Type Provider Dept   01/27/23 Telemedicine Kaushik Wang MD [address] today's visits and meeting all other requirements  Future Appointments  No visits were found meeting these conditions  Showing future appointments within next 150 days and meeting all other requirements       The patient was identified by name and date of birth  Brandyn Prestoncarlos was informed that this is a telemedicine visit and that the visit is being conducted through other   [facility] My office door was closed  No one else was in the room  He acknowledged consent and understanding of privacy and security of the video platform   The patient has agreed to participate and understands they can discontinue the visit at any time  Patient is aware this is a billable service  Subjective  Merla Goodpasture is a 57 y o  male    I called pt, symptoms have resolved  He had some transient "tunnel vision" associated with a little dizziness a few hours ago  No headache or slurred speech or weakness numbness tingling 1 side of the body  Pt denies dim shade/curtain coming down  He does admit to a sparkly blind spot that lasted less than two hours  No fevers no chills  Pt does have a history of migraines  Past Medical History:   Diagnosis Date   • HTN (hypertension)    • Hyperlipidemia    • Hypertension    • Osteomyelitis ([facility] ) 2020   • Seizures ([facility] [facility] )     Not since age 13       Past Surgical History:   Procedure Laterality Date   • COLONOSCOPY     • HAND SURGERY     • HERNIA REPAIR     • IR PICC PLACEMENT SINGLE LUMEN  12/8/2020   • VASCULAR SURGERY     • WOUND DEBRIDEMENT N/A 11/30/2020    Procedure: DEBRIDEMENT OF ANTERIOR MANDIBLE BONE BIOPSY, CULTURES AND REMOVAL OF IMPLANT #27, USE OF PRP;  Surgeon: Nia Clinton DDS;  Location: [facility];  Service: Maxillofacial       Current Outpatient Medications   Medication Sig Dispense Refill   • allopurinol (ZYLOPRIM) 100 mg tablet Take 1 tablet (100 mg total) by mouth daily 90 tablet 3   • amLODIPine (NORVASC) 5 mg tablet TAKE 1 TABLET (5 MG TOTAL) BY MOUTH DAILY   90 tablet 3   • buPROPion (Wellbutrin XL) 150 mg 24 hr tablet Take 1 tablet (150 mg total) by mouth daily 90 tablet 3   • cholecalciferol (VITAMIN D3) 1,000 units tablet Take 5,000 Units by mouth daily 10,000 units Patient stated that is continuously taking     • fluticasone (FLOVENT HFA) 220 mcg/act inhaler Inhale 1 puff 2 (two) times a day 12 g 0   • glucosamine-chondroitin 500-400 MG tablet Take 2 tablets by mouth daily     • losartan (COZAAR) 100 MG tablet Take 1 tablet (100 mg total) by mouth daily 90 tablet 3   • metoprolol-hydrochlorothiazide (LOPRESSOR HCT) 100-25 MG per tablet TAKE 1/2 TABLET BY MOUTH EVERY DAY 45 tablet 7   • Omega-3 Fatty Acids (Omega-3 Fish Oil) 1000 MG CAPS Take 2,000 mg by mouth daily 180 capsule 3   • rosuvastatin (CRESTOR) 20 MG tablet Take 1 tablet (20 mg total) by mouth daily 90 tablet 3   • Semaglutide,0 25 or 0 5MG/DOS, (Ozempic, 0 25 or 0 5 MG/DOSE,) 2 MG/1 5ML SOPN Use 0 25mg weekly for 4 weeks and then 0 5mg weekly (Patient taking differently: Use 0 25mg weekly for 4 weeks and then 0 5mg weekly    Patient stated he will be starting this medication 1/6/23, as he will be picking this medication up at the Pharmacy today ) 6 mL 1     No current facility-administered medications for this visit  Allergies   Allergen Reactions   • Clindamycin Hives   • Penicillins Hives       Review of Systems   Constitutional: Negative for chills and fever  HENT: Negative for trouble swallowing  Eyes: Positive for visual disturbance  Gastrointestinal: Negative for nausea and vomiting  Neurological: Positive for dizziness  Negative for facial asymmetry, speech difficulty and headaches  Psychiatric/Behavioral: Negative for confusion  Video Exam    There were no vitals filed for this visit  Physical Exam  Constitutional:       General: He is not in acute distress  Pulmonary:      Effort: Pulmonary effort is normal  No respiratory distress  Neurological:      Mental Status: He is alert and oriented to person, place, and time        Comments: Speech is fluent, no slurred speech, no word finding problems          I spent 20 minutes with patient today in which greater than 50% of the time was spent in counseling/coordination of care regarding acute issue

## 2023-01-27 NOTE — PATIENT INSTRUCTIONS
Problem List Items Addressed This Visit          Other    Visual disturbance - Primary     Symptoms consistent with ocular migraine, patient has had this in the past, I recommend evaluation with eye doctor    Having worsening symptoms over the weekend, then to get to the emergency room

## 2023-01-27 NOTE — TELEPHONE ENCOUNTER
Call from patient requesting to speak with Dr Ivet Stevens about his dizziness and tunnel vision that he is having  It started about 2 hours ago  He has had this before and it comes and goes  Please return his call

## 2023-02-03 ENCOUNTER — OFFICE VISIT (OUTPATIENT)
Dept: DIABETES SERVICES | Facility: CLINIC | Age: 63
End: 2023-02-03

## 2023-02-03 ENCOUNTER — TELEPHONE (OUTPATIENT)
Dept: ENDOCRINOLOGY | Facility: CLINIC | Age: 63
End: 2023-02-03

## 2023-02-03 VITALS — BODY MASS INDEX: 33.77 KG/M2 | WEIGHT: 263 LBS

## 2023-02-03 DIAGNOSIS — E11.65 TYPE 2 DIABETES MELLITUS WITH HYPERGLYCEMIA, WITHOUT LONG-TERM CURRENT USE OF INSULIN (HCC): Primary | ICD-10-CM

## 2023-02-03 NOTE — PATIENT INSTRUCTIONS
Consume 3 balanced meals/day at appropriate times  30 grams carbohydrate/meal and 15 grams carbohydrate/snack  Consume 3 balanced snacks/day at appropriate times

## 2023-02-03 NOTE — PROGRESS NOTES
Medical Nutrition Therapy        Assessment    Visit Type: Follow-up visit  Chief complaint/Medical Diagnosis/reason for visit E11 63 Type 2 Diabetes Mellitus with hyperglycemia without long term current use of insulin    HPI Melchor Alvarez came in for his Medical Nutrition Therapy (MNT) follow up appointment  Melchor Alvarez stated since he started taking his Ozempic on 1/6/23, he has no appetite  Melchor Alvarez stated he cannot eat his 2200 calorie meal plan with 45 grams Carbohydrates/meal and 15 grams carbohydrate/snack  Melchor Alvarez has also lost 9 lbs since his last appointment with me on 1/5/23  Therefore, Stuart's meal plan will be reduced to 1800 calories with 30 grams carbohydrate/meal and 15 grams carbohydrate/snack, which Melchor Nikunjas feels is more realistic at this time  Melchor Alvarez stated he has been trying to have at least 30 grams carbohydrate/meal along with his 15 grams carbohydrate/snack  Melchor Alvarez stated he is not good about writing down what he eats, but will tell me what he ate  Obtained a 24 hour food recall from Jackieilda Nikunjas  Problems identified in food recall include some inconsistent carbohydrate intake, low protein intake, and some unbalanced meals  Reviewed and reinforced Stuart's new 1800 calorie individualized meal plan with 30 grams carbohydrate/meal and 15 grams carbohydrate/snack (with 3 snacks/day)  Various meals and snacks were discussed with the 30 grams carbohydrate/meal and 15 grams carbohydrate/snack  Emphasized the importance of following the 1800 calorie meal plan to assist with consistency, balance, portion control, and glycemic control  Proper timing for meals and snacks were also emphasized for blood sugar control as patient stated he felt he had a few low blood sugars within the past four weeks  Melchor Alvarez was educated on the 15/15 rule and written guidelines were provide for hypoglycemia   Patient stated he will be traveling for the next couple months for work, however, he requested to make another MNT follow up appointment in April, which was scheduled  RD will remain available for further dietary questions/concerns  Ht Readings from Last 1 Encounters:   12/28/22 6' 2" (1 88 m)     Wt Readings from Last 3 Encounters:   02/03/23 119 kg (263 lb)   01/05/23 123 kg (272 lb)   12/28/22 112 kg (247 lb)     Weight Change: Yes Lost 9 lbs since last encounter    Barriers to Learning: no barriers    Do you follow any special diet presently?: Yes - trying to follow carbohydrate counting meal plan  Who shops: patient and spouse  Who cooks: patient and spouse    Food Log: Completed via the method of food recall    Breakfast:7:30AM - italian sausage, 1 egg, chopped peppers and onions, 1/2 avocado, 1/2 large Santos pear, 2 c coffee  Morning Snack:10AM - Greek yogurt + 1 T peanut butter  Lunch:12:30PM - Homemade Chicken Barley Soup - 1 cup, and 1/2 large Santos Pear  Afternoon Snack: 3PM - Greek yogurt + 1 T Peanut butter  Dinner:6PM - Codfish, brussel sprouts, and asparagus  Evening Snack:9PM - 1/4 cup ice cream + 1 T Peanut butter  Beverages: coffee, water, diet coke    Eating out/Take out:occasionally  Exercise started walking    Calorie needs 1800kcals/day  Carbs: 30g/meal, 15g/snack         Nutrition Diagnosis:  Food and nutrition related knowledge deficit  related to Lack of prior exposure to accurate nutrition related information as evidenced by Verbalizes inaccurate or incomplete information    Intervention: carbohydrate counting, meal timing, meal planning and individualized meal plan     Treatment Goals: Patient will consume 3 meals a day, Patient will consume snacks and Patient will count carbohydrates    Monitoring and evaluation:    Term code indicator  FH 1 3 2 Food Intake Criteria: Consume 3 balanced meals/day at appropriate times  Term code indicator  FH 1 6 3 Carbohydrate Intake Criteria: 30 grams carbohydrate/meal and 15 grams carbohydrate/snack    Term code indicator  FH 1 3 2 Food Intake Criteria: Consume 3 balanced snacks/day at appropriate times  Materials Provided: CHO counting, 15/15 rule to treat hypoglycemia, individualized meal plan for 1800 calorie with 30 grams carbohydrate/meal and 15 grams carbohydrate/snack with 3 balanced snacks/day  Patient’s Response to Instruction:  Comprehensiongood  Motivationgood  Expected Compliancegood    Begin Time: 9:00AM  End Time: 9:30AM  Referring Provider: Dr Quintin Mortimer    Thank you for coming to the [address] [place] [address] for education today  Please feel free to call with any questions or concerns      Lena Benites  [address] [place] [id number] [phone]

## 2023-02-03 NOTE — TELEPHONE ENCOUNTER
Pt called due to his Ozempic  He was suppose to come in on 2/7 for a f/u but can not come in until 2/15  His dosage was suppose to change and he was asking if he is going to have enough

## 2023-02-04 DIAGNOSIS — E11.65 TYPE 2 DIABETES MELLITUS WITH HYPERGLYCEMIA, WITHOUT LONG-TERM CURRENT USE OF INSULIN (HCC): Primary | ICD-10-CM

## 2023-02-04 RX ORDER — PERPHENAZINE 16 MG/1
TABLET, FILM COATED ORAL
Qty: 100 STRIP | Refills: 1 | Status: SHIPPED | OUTPATIENT
Start: 2023-02-04

## 2023-02-06 DIAGNOSIS — E11.65 TYPE 2 DIABETES MELLITUS WITH HYPERGLYCEMIA, WITHOUT LONG-TERM CURRENT USE OF INSULIN (HCC): ICD-10-CM

## 2023-02-15 ENCOUNTER — OFFICE VISIT (OUTPATIENT)
Dept: ENDOCRINOLOGY | Facility: CLINIC | Age: 63
End: 2023-02-15

## 2023-02-15 VITALS
TEMPERATURE: 96.9 F | SYSTOLIC BLOOD PRESSURE: 132 MMHG | DIASTOLIC BLOOD PRESSURE: 92 MMHG | OXYGEN SATURATION: 98 % | WEIGHT: 266 LBS | HEART RATE: 67 BPM | BODY MASS INDEX: 34.14 KG/M2 | HEIGHT: 74 IN

## 2023-02-15 DIAGNOSIS — E11.65 TYPE 2 DIABETES MELLITUS WITH HYPERGLYCEMIA, WITHOUT LONG-TERM CURRENT USE OF INSULIN (HCC): Primary | ICD-10-CM

## 2023-02-15 DIAGNOSIS — I10 ESSENTIAL HYPERTENSION: ICD-10-CM

## 2023-02-15 DIAGNOSIS — E78.2 MIXED HYPERLIPIDEMIA: ICD-10-CM

## 2023-02-15 RX ORDER — LOSARTAN POTASSIUM 100 MG/1
100 TABLET ORAL DAILY
Qty: 90 TABLET | Refills: 3
Start: 2023-02-15 | End: 2024-02-10

## 2023-02-15 NOTE — PROGRESS NOTES
Follow-up Patient Progress Note      CC:hyperglycemia     History of Present Illness:   64-year-old male with type 2 diabetes since 2018, macroalbuminuria, HTN, HLD, NAFLD, seasonal affective disorder, history of osteomyelitis of jaw 2019, DJD, gout, obesity and vitamin D deficiency  Last visit was 12/28/2022    Since last visit, weight is stable  Max adult weight: 305lbs  Lowest adult weight 185lbs  Current 247 lbs     Home blood glucose monitoring: checks 2 times a day fasting 120-130mg/dl and postprandial     Current meds: metformin -intolerance  Ozempic 0 5mg weekly     Opthamology: yes, 2 weeks ago  Podiatry: No  Vaccination: yes   Dental:  Pancreatitis: No     Ace/ARB: Losartan  Statin: Crestor 20 mg nightly  Thyroid issues: no     FH: Mother had type 2 diabetes   Sister - diabetes       Patient Active Problem List   Diagnosis   • Trigeminal neuritis   • Osteomyelitis, jaw acute   • Streptococcal infection   • Candida glabrata infection   • Long term (current) use of antibiotics   • Leukopenia   • Transaminitis   • Essential hypertension   • Mixed hyperlipidemia   • Idiopathic chronic gout 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   • Wellness examination   • Adhesive capsulitis of right shoulder   • Balance problem   • Acute cough   • Seasonal affective disorder (HCC)   • Palpitation   • Right foot pain   • Type 2 diabetes mellitus with hyperglycemia (HCC)   • Aphasia   • Visual disturbance     Past Medical History:   Diagnosis Date   • HTN (hypertension)    • Hyperlipidemia    • Hypertension    • Osteomyelitis ([address] ) 2020   • Seizures ([address] )     Not since age 13      Past Surgical History:   Procedure Laterality Date   • COLONOSCOPY     • HAND SURGERY     • HERNIA REPAIR     • IR PICC PLACEMENT SINGLE LUMEN  12/8/2020   • VASCULAR SURGERY     • WOUND DEBRIDEMENT N/A 11/30/2020    Procedure: DEBRIDEMENT OF ANTERIOR MANDIBLE BONE BIOPSY, CULTURES AND REMOVAL OF IMPLANT #27, USE OF PRP;  Surgeon: Karri Capps DDS; Location[facility];  Service: Maxillofacial      Family History   Problem Relation Age of Onset   • Cancer Mother      Social History     Tobacco Use   • Smoking status: Former     Packs/day: 3 00     Types: Cigarettes, Pipe, Cigars     Quit date:      Years since quittin 1   • Smokeless tobacco: Never   Substance Use Topics   • Alcohol use: Never     Allergies   Allergen Reactions   • Clindamycin Hives   • Penicillins Hives         Current Outpatient Medications:   •  allopurinol (ZYLOPRIM) 100 mg tablet, Take 1 tablet (100 mg total) by mouth daily, Disp: 90 tablet, Rfl: 3  •  amLODIPine (NORVASC) 5 mg tablet, TAKE 1 TABLET (5 MG TOTAL) BY MOUTH DAILY  , Disp: 90 tablet, Rfl: 3  •  buPROPion (Wellbutrin XL) 150 mg 24 hr tablet, Take 1 tablet (150 mg total) by mouth daily, Disp: 90 tablet, Rfl: 3  •  cholecalciferol (VITAMIN D3) 1,000 units tablet, Take 5,000 Units by mouth daily 10,000 units Patient stated that is continuously taking, Disp: , Rfl:   •  Contour Next Test test strip, TEST UP TO FOUR TIMES A DAY BEFORE MEALS AND BEDTIME, Disp: 100 strip, Rfl: 1  •  glucosamine-chondroitin 500-400 MG tablet, Take 2 tablets by mouth daily, Disp: , Rfl:   •  metoprolol-hydrochlorothiazide (LOPRESSOR HCT) 100-25 MG per tablet, TAKE 1/2 TABLET BY MOUTH EVERY DAY, Disp: 45 tablet, Rfl: 7  •  Omega-3 Fatty Acids (Omega-3 Fish Oil) 1000 MG CAPS, Take 2,000 mg by mouth daily, Disp: 180 capsule, Rfl: 3  •  rosuvastatin (CRESTOR) 20 MG tablet, Take 1 tablet (20 mg total) by mouth daily, Disp: 90 tablet, Rfl: 3  •  semaglutide, 0 25 or 0 5 mg/dose, (Ozempic, 0 25 or 0 5 MG/DOSE,) 2 mg/1 5 mL injection pen, Inject 0 38 mL (0 5 mg total) under the skin every 7 days, Disp: 6 mL, Rfl: 0  •  fluticasone (FLOVENT HFA) 220 mcg/act inhaler, Inhale 1 puff 2 (two) times a day (Patient not taking: Reported on 2/15/2023), Disp: 12 g, Rfl: 0  •  losartan (COZAAR) 100 MG tablet, Take 1 tablet (100 mg total) by mouth daily (Patient not taking: Reported on 2/15/2023), Disp: 90 tablet, Rfl: 3    Review of Systems   Constitutional: Positive for activity change and appetite change  HENT: Negative  Eyes: Negative  Respiratory: Negative  Cardiovascular: Negative for chest pain  Gastrointestinal: Negative  Endocrine: Negative  Genitourinary: Negative  Musculoskeletal: Negative  Skin: Negative  Allergic/Immunologic: Negative  Neurological: Negative  Hematological: Negative  Psychiatric/Behavioral: Negative  All other systems reviewed and are negative  Physical Exam:  Body mass index is 34 15 kg/m²  /92 (BP Location: Left arm, Patient Position: Sitting, Cuff Size: Large)   Pulse 67   Temp (!) 96 9 °F (36 1 °C) (Tympanic)   Ht 6' 2" (1 88 m)   Wt 121 kg (266 lb)   SpO2 98%   BMI 34 15 kg/m²    Vitals:    02/15/23 0926   Weight: 121 kg (266 lb)        Physical Exam  Constitutional:       General: He is not in acute distress  Appearance: He is well-developed  He is not ill-appearing  HENT:      Head: Normocephalic and atraumatic  Nose: Nose normal       Mouth/Throat:      Pharynx: Oropharynx is clear  Eyes:      Extraocular Movements: Extraocular movements intact  Conjunctiva/sclera: Conjunctivae normal    Neck:      Thyroid: No thyromegaly  Cardiovascular:      Rate and Rhythm: Normal rate  Pulmonary:      Effort: Pulmonary effort is normal    Musculoskeletal:         General: No deformity  Cervical back: Normal range of motion  Skin:     Capillary Refill: Capillary refill takes less than 2 seconds  Coloration: Skin is not pale  Findings: No rash  Neurological:      Mental Status: He is alert and oriented to person, place, and time     Psychiatric:         Behavior: Behavior normal          Labs:   Lab Results   Component Value Date    HGBA1C 10 8 (A) 12/06/2022       Lab Results   Component Value Date    KVC3PWXBPWMP 1 483 09/08/2022       Lab Results Component Value Date    CREATININE 0 93 09/08/2022    CREATININE 1 02 06/07/2021    CREATININE 0 91 05/17/2021    BUN 11 09/08/2022     10/08/2014    K 4 6 09/08/2022    CL 96 09/08/2022    CO2 31 09/08/2022     eGFR   Date Value Ref Range Status   09/08/2022 87 ml/min/1 73sq m Final       Lab Results   Component Value Date    ALT 69 (H) 09/08/2022    AST 41 (H) 09/08/2022    ALKPHOS 66 09/08/2022    BILITOT 0 4 10/08/2014       Lab Results   Component Value Date    CHOLESTEROL 162 09/08/2022    CHOLESTEROL 156 03/22/2021     Lab Results   Component Value Date    HDL 29 (L) 09/08/2022    HDL 28 (L) 03/22/2021     Lab Results   Component Value Date    TRIG 491 (H) 09/08/2022    TRIG 315 (H) 03/22/2021     Lab Results   Component Value Date    [place] 128 03/22/2021         Impression:  1  Type 2 diabetes mellitus with hyperglycemia, without long-term current use of insulin ([facility] [address] )    2  Mixed hyperlipidemia    3  Essential hypertension         Plan:    Runell Nageotte was seen today for follow-up  Diagnoses and all orders for this visit:    Type 2 diabetes mellitus with hyperglycemia, without long-term current use of insulin ([facility] )  He is uncontrolled with most recent A1c 9%  More recent reported SAGM seem to be under 180 mg/dL  We discussed options and agreed to increase Ozempic to 1 mg daily  He has intolerance to metformin  Advised to send glucose logs  In future, may consider adding an SGLT2 inhibitor  Repeat labs prior to next visit in 3 months    -     Hemoglobin A1C; Future  -     Hemoglobin A1C    Mixed hyperlipidemia  His ASCVD risk score is 30%  He is on Crestor 20 mg daily  Essential hypertension  -     losartan (COZAAR) 100 MG tablet; Take 1 tablet (100 mg total) by mouth daily      I have spent 22 minutes with patient today in which greater than 50% of this time was spent in counseling/coordination of care  Discussed with the patient and all questioned fully answered   He will call me if any problems arise  Educated/ Counseled patient on diagnostic test results, prognosis, risk vs benefit of treatment options, importance of treatment compliance, healthy life and lifestyle choices        Nadege Putnam

## 2023-02-20 ENCOUNTER — TELEPHONE (OUTPATIENT)
Dept: OTHER | Facility: OTHER | Age: 63
End: 2023-02-20

## 2023-02-20 ENCOUNTER — OFFICE VISIT (OUTPATIENT)
Dept: INTERNAL MEDICINE CLINIC | Facility: CLINIC | Age: 63
End: 2023-02-20

## 2023-02-20 ENCOUNTER — CONSULT (OUTPATIENT)
Dept: SURGERY | Facility: CLINIC | Age: 63
End: 2023-02-20

## 2023-02-20 VITALS
BODY MASS INDEX: 34.29 KG/M2 | WEIGHT: 267.2 LBS | HEART RATE: 66 BPM | OXYGEN SATURATION: 96 % | SYSTOLIC BLOOD PRESSURE: 128 MMHG | DIASTOLIC BLOOD PRESSURE: 84 MMHG | HEIGHT: 74 IN

## 2023-02-20 VITALS — HEIGHT: 74 IN | WEIGHT: 265 LBS | BODY MASS INDEX: 34.01 KG/M2

## 2023-02-20 DIAGNOSIS — E78.2 MIXED HYPERLIPIDEMIA: ICD-10-CM

## 2023-02-20 DIAGNOSIS — M1A.0710 IDIOPATHIC CHRONIC GOUT OF RIGHT FOOT WITHOUT TOPHUS: ICD-10-CM

## 2023-02-20 DIAGNOSIS — E11.65 TYPE 2 DIABETES MELLITUS WITH HYPERGLYCEMIA, WITHOUT LONG-TERM CURRENT USE OF INSULIN (HCC): Primary | ICD-10-CM

## 2023-02-20 DIAGNOSIS — L72.3 SEBACEOUS CYST: ICD-10-CM

## 2023-02-20 DIAGNOSIS — I10 ESSENTIAL HYPERTENSION: ICD-10-CM

## 2023-02-20 RX ORDER — BLOOD-GLUCOSE METER
EACH MISCELLANEOUS
COMMUNITY
Start: 2023-01-04

## 2023-02-20 RX ORDER — LANCETS
EACH MISCELLANEOUS
COMMUNITY
Start: 2023-01-05

## 2023-02-20 NOTE — ASSESSMENT & PLAN NOTE
Continue semaglutide, continue monitoring A1c, diabetic eye exam just done, diabetic foot exam done today    Lab Results   Component Value Date    HGBA1C 10 8 (A) 12/06/2022

## 2023-02-20 NOTE — PROGRESS NOTES
Name: Gregorio Epley      : 1960      MRN: [id number]  Encounter Provider: Antonella Reed MD  Encounter Date: 2023   [facility] [address] [place], [place] [address]     1  Type 2 diabetes mellitus with hyperglycemia, without long-term current use of insulin (HCC)  Assessment & Plan:  Continue semaglutide, continue monitoring A1c, diabetic eye exam just done, diabetic foot exam done today  Lab Results   Component Value Date    HGBA1C 10 8 (A) 2022       Orders:  -     Hemoglobin A1C; Future; Expected date: 2023  -     Hemoglobin A1C    2  Essential hypertension  Assessment & Plan:  Controlled, continue meds      3  Mixed hyperlipidemia  Assessment & Plan:  Continue rosuvastatin along with healthy diet      4  Idiopathic chronic gout 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  Assessment & Plan:  Continue allopurinol      5  Sebaceous cyst  Assessment & Plan: Will refer to surgery for evaluation    Orders:  -     Ambulatory Referral to General Surgery; Future      BMI Counseling: Body mass index is 34 31 kg/m²  The BMI is above normal  Nutrition recommendations include encouraging healthy choices of fruits and vegetables and moderation in carbohydrate intake  Exercise recommendations include exercising 3-5 times per week  Rationale for BMI follow-up plan is due to patient being overweight or obese  Subjective     Pt here for regular follow up    Review of Systems   Constitutional: Negative for chills, fatigue and fever  HENT: Negative for congestion, nosebleeds, postnasal drip, sore throat and trouble swallowing  Eyes: Negative for pain  Respiratory: Negative for cough, chest tightness, shortness of breath and wheezing  Cardiovascular: Negative for chest pain, palpitations and leg swelling  Gastrointestinal: Negative for abdominal pain, constipation, diarrhea, nausea and vomiting  Endocrine: Negative for polydipsia and polyuria     Genitourinary: Negative for dysuria, flank pain and hematuria  Musculoskeletal: Positive for back pain  Negative for arthralgias and myalgias  Skin: Negative for rash  Neurological: Negative for dizziness, tremors, light-headedness and headaches  Hematological: Does not bruise/bleed easily  Psychiatric/Behavioral: Negative for confusion and dysphoric mood  The patient is not nervous/anxious          Past Medical History:   Diagnosis Date   • HTN (hypertension)    • Hyperlipidemia    • Hypertension    • Osteomyelitis ([facility] [address] )    • [facility] )     Not since age 13     Past Surgical History:   Procedure Laterality Date   • COLONOSCOPY     • HAND SURGERY     • HERNIA REPAIR     • IR PICC PLACEMENT SINGLE LUMEN  2020   • VASCULAR SURGERY     • WOUND DEBRIDEMENT N/A 2020    Procedure: DEBRIDEMENT OF ANTERIOR MANDIBLE BONE BIOPSY, CULTURES AND REMOVAL OF IMPLANT #27, USE OF PRP;  Surgeon: Tessa Hartley DDS;  Location: [facility];  Service: Maxillofacial     Family History   Problem Relation Age of Onset   • Cancer Mother    • Kidney disease Maternal Uncle      Social History     Socioeconomic History   • Marital status: /Civil Union     Spouse name: None   • Number of children: None   • Years of education: None   • Highest education level: None   Occupational History   • None   Tobacco Use   • Smoking status: Former     Packs/day: 3 00     Types: Cigarettes, Pipe, Cigars     Start date:      Quit date: 2019     Years since quittin 1   • Smokeless tobacco: Never   Vaping Use   • Vaping Use: Never used   Substance and Sexual Activity   • Alcohol use: Never   • Drug use: Never   • Sexual activity: Not Currently   Other Topics Concern   • None   Social History Narrative   • None     Social Determinants of Health     Financial Resource Strain: Not on file   Food Insecurity: Not on file   Transportation Needs: Not on file   Physical Activity: Not on file   Stress: Not on file   Social Connections: Not on file Intimate Partner Violence: Not on file   Housing Stability: Not on file     Current Outpatient Medications on File Prior to Visit   Medication Sig   • allopurinol (ZYLOPRIM) 100 mg tablet Take 1 tablet (100 mg total) by mouth daily   • amLODIPine (NORVASC) 5 mg tablet TAKE 1 TABLET (5 MG TOTAL) BY MOUTH DAILY  • buPROPion (Wellbutrin XL) 150 mg 24 hr tablet Take 1 tablet (150 mg total) by mouth daily   • cholecalciferol (VITAMIN D3) 1,000 units tablet Take 5,000 Units by mouth daily 10,000 units Patient stated that is continuously taking   • Contour Next Test test strip TEST UP TO FOUR TIMES A DAY BEFORE MEALS AND BEDTIME   • glucosamine-chondroitin 500-400 MG tablet Take 2 tablets by mouth daily   • losartan (COZAAR) 100 MG tablet Take 1 tablet (100 mg total) by mouth daily   • metoprolol-hydrochlorothiazide (LOPRESSOR HCT) 100-25 MG per tablet TAKE 1/2 TABLET BY MOUTH EVERY DAY   • Omega-3 Fatty Acids (Omega-3 Fish Oil) 1000 MG CAPS Take 2,000 mg by mouth daily   • rosuvastatin (CRESTOR) 20 MG tablet Take 1 tablet (20 mg total) by mouth daily   • semaglutide, 2 mg/dose, (Ozempic, 2 MG/DOSE,) 8 mg/ mL injection pen Inject 0 75 mL (2 mg total) under the skin every 7 days (Patient taking differently: Inject 2 mg under the skin every 7 days The patient is currently taking   5 ml, every 7 days)   • Blood Glucose Monitoring Suppl (Contour Next One) JIN Use as directed   • fluticasone (FLOVENT HFA) 220 mcg/act inhaler Inhale 1 puff 2 (two) times a day (Patient not taking: Reported on 2/15/2023)   • Microlet Lancets MISC TEST BLOOD SUGAR UP TO 4 TIMES DAILY (BEFORE MEALS AND AT BEDTIME)     Allergies   Allergen Reactions   • Clindamycin Hives   • Penicillins Hives     Immunization History   Administered Date(s) Administered   • COVID-19 PFIZER VACCINE 0 3 ML IM 04/01/2021, 04/23/2021, 12/28/2021   • INFLUENZA 10/07/2011, 05/18/2012, 10/05/2012, 10/26/2015, 10/11/2016, 11/05/2020, 10/25/2022   • Influenza, recombinant, quadrivalent,injectable, preservative free 10/25/2021, 10/25/2022   • Pneumococcal Conjugate 13-Valent 10/26/2015   • Tdap 07/17/2017   • Zoster Vaccine Recombinant 07/06/2021, 10/25/2021       Objective     /84   Pulse 66   Ht 6' 2" (1 88 m)   Wt 121 kg (267 lb 3 2 oz)   SpO2 96%   BMI 34 31 kg/m²   Patient's shoes and socks removed  Right Foot/Ankle   Right Foot Inspection  Skin Exam: skin normal and skin intact  No dry skin, no warmth, no callus, no erythema, no maceration, no abnormal color, no pre-ulcer, no ulcer and no callus  Toe Exam: ROM and strength within normal limits  No swelling, no tenderness, erythema and  no right toe deformity    Sensory   Monofilament testing: intact    Vascular  The right DP pulse is 1+  Left Foot/Ankle  Left Foot Inspection  Skin Exam: skin normal and skin intact  No dry skin, no warmth, no erythema, no maceration, normal color, no pre-ulcer, no ulcer and no callus  Toe Exam: ROM and strength within normal limits  No swelling, no tenderness, no erythema and no left toe deformity  Sensory   Monofilament testing: intact    Vascular  The left DP pulse is 1+  Assign Risk Category  No deformity present  No loss of protective sensation  No weak pulses  Risk: 0      Physical Exam  Vitals reviewed  Constitutional:       General: He is not in acute distress  Appearance: Normal appearance  He is well-developed  HENT:      Head: Normocephalic and atraumatic  Right Ear: External ear normal       Left Ear: External ear normal    Eyes:      General: No scleral icterus  Conjunctiva/sclera: Conjunctivae normal    Neck:      Thyroid: No thyromegaly  Trachea: No tracheal deviation  Cardiovascular:      Rate and Rhythm: Normal rate and regular rhythm  Pulses: no weak pulses          Dorsalis pedis pulses are 1+ on the right side and 1+ on the left side  Heart sounds: Normal heart sounds  No murmur heard    Pulmonary:      Effort: Pulmonary effort is normal  No respiratory distress  Breath sounds: Normal breath sounds  No wheezing or rales  Abdominal:      General: Bowel sounds are normal       Palpations: Abdomen is soft  Tenderness: There is no abdominal tenderness  There is no guarding or rebound  Musculoskeletal:      Cervical back: Normal range of motion and neck supple  Right lower leg: No edema  Left lower leg: No edema  Feet:      Right foot:      Skin integrity: No ulcer, skin breakdown, erythema, warmth, callus or dry skin  Left foot:      Skin integrity: No ulcer, skin breakdown, erythema, warmth, callus or dry skin  Lymphadenopathy:      Cervical: No cervical adenopathy  Skin:     Coloration: Skin is not jaundiced or pale  Neurological:      General: No focal deficit present  Mental Status: He is alert and oriented to person, place, and time  Psychiatric:         Behavior: Behavior normal          Thought Content:  Thought content normal          Judgment: Judgment normal        Casper Ruiz MD

## 2023-02-20 NOTE — PROGRESS NOTES
Assessment/Plan: Patient presents with 2 subcutaneous masses over the back  These are most consistent with sebaceous cysts  He would like to undergo removal for symptoms of discomfort and itching  He is due to go on a road trip to sell [employer] over the next several months  We can remove these 2 areas at the office upon his return  Risks and benefits were described  Patient is agreeable  Diagnoses and all orders for this visit:    Sebaceous cyst  -     Ambulatory Referral to General Surgery        Subjective:      Patient ID: Kristopher James is a 57 y o  male  Patient presents for evaluation of 2 back cysts  He has had the cysts for 30 years  He has pain with pressure  The following portions of the patient's history were reviewed and updated as appropriate:     He  has a past medical history of HTN (hypertension), Hyperlipidemia, Hypertension, Osteomyelitis ([address] ) (2020), and Seizures ([facility] [address] )  He  has a past surgical history that includes Hand surgery; Vascular surgery; Colonoscopy; Hernia repair; Wound debridement (N/A, 11/30/2020); and IR PICC placement single lumen (12/8/2020)  His family history includes Cancer in his mother; Kidney disease in his maternal uncle  He  reports that he quit smoking about 4 years ago  His smoking use included cigarettes, pipe, and cigars  He started smoking about 54 years ago  He smoked an average of 3 packs per day  He has never used smokeless tobacco  He reports that he does not drink alcohol and does not use drugs  Current Outpatient Medications   Medication Sig Dispense Refill   • allopurinol (ZYLOPRIM) 100 mg tablet Take 1 tablet (100 mg total) by mouth daily 90 tablet 3   • amLODIPine (NORVASC) 5 mg tablet TAKE 1 TABLET (5 MG TOTAL) BY MOUTH DAILY   90 tablet 3   • Blood Glucose Monitoring Suppl (Contour Next One) JIN Use as directed     • buPROPion (Wellbutrin XL) 150 mg 24 hr tablet Take 1 tablet (150 mg total) by mouth daily 90 tablet 3   • cholecalciferol (VITAMIN D3) 1,000 units tablet Take 5,000 Units by mouth daily 10,000 units Patient stated that is continuously taking     • Contour Next Test test strip TEST UP TO FOUR TIMES A DAY BEFORE MEALS AND BEDTIME 100 strip 1   • fluticasone (FLOVENT HFA) 220 mcg/act inhaler Inhale 1 puff 2 (two) times a day (Patient not taking: Reported on 2/15/2023) 12 g 0   • glucosamine-chondroitin 500-400 MG tablet Take 2 tablets by mouth daily     • losartan (COZAAR) 100 MG tablet Take 1 tablet (100 mg total) by mouth daily 90 tablet 3   • metoprolol-hydrochlorothiazide (LOPRESSOR HCT) 100-25 MG per tablet TAKE 1/2 TABLET BY MOUTH EVERY DAY 45 tablet 7   • Microlet Lancets MISC TEST BLOOD SUGAR UP TO 4 TIMES DAILY (BEFORE MEALS AND AT BEDTIME)     • Omega-3 Fatty Acids (Omega-3 Fish Oil) 1000 MG CAPS Take 2,000 mg by mouth daily 180 capsule 3   • rosuvastatin (CRESTOR) 20 MG tablet Take 1 tablet (20 mg total) by mouth daily 90 tablet 3   • semaglutide, 2 mg/dose, (Ozempic, 2 MG/DOSE,) 8 mg/ mL injection pen Inject 0 75 mL (2 mg total) under the skin every 7 days (Patient taking differently: Inject 2 mg under the skin every 7 days The patient is currently taking   5 ml, every 7 days) 6 mL 0     No current facility-administered medications for this visit  He is allergic to clindamycin and penicillins       Review of Systems   Constitutional: Negative  Negative for activity change  HENT: Negative  Eyes: Negative  Respiratory: Negative  Cardiovascular: Negative  Gastrointestinal: Negative  Endocrine: Negative  Genitourinary: Negative  Musculoskeletal: Negative  Skin: Negative  Allergic/Immunologic: Negative  Neurological: Negative  Psychiatric/Behavioral: Negative for agitation, behavioral problems and confusion  The patient is not nervous/anxious            Objective:      Ht 6' 2" (1 88 m)   Wt 120 kg (265 lb)   BMI 34 02 kg/m²          Physical Exam  Constitutional: Appearance: He is well-developed  He is not diaphoretic  HENT:      Head: Normocephalic and atraumatic  Eyes:      General: No scleral icterus  Right eye: No discharge  Left eye: No discharge  Extraocular Movements: Extraocular movements intact  Conjunctiva/sclera: Conjunctivae normal    Neck:      Thyroid: No thyromegaly  Trachea: No tracheal deviation  Cardiovascular:      Rate and Rhythm: Normal rate and regular rhythm  Heart sounds: Normal heart sounds  No murmur heard  No friction rub  Pulmonary:      Effort: Pulmonary effort is normal  No respiratory distress  Breath sounds: Normal breath sounds  No stridor  No wheezing  Chest:      Chest wall: No tenderness  Abdominal:      General: There is no distension  Palpations: There is no mass  Tenderness: There is no abdominal tenderness  There is no guarding or rebound  Musculoskeletal:         General: No tenderness  Right lower leg: No edema  Left lower leg: No edema  Lymphadenopathy:      Cervical: No cervical adenopathy  Skin:     General: Skin is warm and dry  Findings: No erythema or rash  Comments: 2 sebaceous cyst noted over the back  The central area is 3 cm  The other nodule is 2 cm in size  Neurological:      Mental Status: He is alert and oriented to person, place, and time  Cranial Nerves: No cranial nerve deficit        Coordination: Coordination normal    Psychiatric:         Behavior: Behavior normal          Judgment: Judgment normal

## 2023-02-20 NOTE — TELEPHONE ENCOUNTER
Patient is calling regarding cancelling an appointment      Date/Time: 02/20/2023 @ 0920    Patient was rescheduled: YES [] NO [x]    Patient requesting call back to reschedule: YES [x] NO []

## 2023-02-20 NOTE — PATIENT INSTRUCTIONS
Problem List Items Addressed This Visit          Endocrine    Type 2 diabetes mellitus with hyperglycemia ([facility] [address] ) - Primary     Continue semaglutide, continue monitoring A1c, diabetic eye exam just done, diabetic foot exam done today    Lab Results   Component Value Date    HGBA1C 10 8 (A) 12/06/2022            Relevant Orders    Hemoglobin A1C       Cardiovascular and Mediastinum    Essential hypertension     Controlled, continue meds            Musculoskeletal and Integument    Idiopathic chronic gout 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     Continue allopurinol         Sebaceous cyst     Will refer to surgery for evaluation         Relevant Orders    Ambulatory Referral to General Surgery       Other    Mixed hyperlipidemia     Continue rosuvastatin along with healthy diet

## 2023-02-24 DIAGNOSIS — M1A.0710 IDIOPATHIC CHRONIC GOUT OF RIGHT FOOT WITHOUT TOPHUS: ICD-10-CM

## 2023-02-24 RX ORDER — ALLOPURINOL 100 MG/1
TABLET ORAL
Qty: 90 TABLET | Refills: 3 | Status: SHIPPED | OUTPATIENT
Start: 2023-02-24

## 2023-03-21 PROBLEM — R05.1 ACUTE COUGH: Status: RESOLVED | Noted: 2021-12-08 | Resolved: 2023-03-21

## 2023-04-05 DIAGNOSIS — E11.65 TYPE 2 DIABETES MELLITUS WITH HYPERGLYCEMIA, WITHOUT LONG-TERM CURRENT USE OF INSULIN (HCC): ICD-10-CM

## 2023-04-25 ENCOUNTER — PROCEDURE VISIT (OUTPATIENT)
Dept: SURGERY | Facility: CLINIC | Age: 63
End: 2023-04-25

## 2023-04-25 VITALS — WEIGHT: 265 LBS | HEIGHT: 74 IN | BODY MASS INDEX: 34.01 KG/M2

## 2023-04-25 DIAGNOSIS — L72.3 SEBACEOUS CYST: Primary | ICD-10-CM

## 2023-04-25 NOTE — PROGRESS NOTES
Assessment/Plan:Pt presents with 4 SQ masses over the back  He desires removal for relief of pain  Risks discussed  There are no diagnoses linked to this encounter  Subjective:      Patient ID: Akira Nguyen is a 57 y o  male  Patient presents for follow up for excision of 2 SQ masses over the back  The following portions of the patient's history were reviewed and updated as appropriate:     He  has a past medical history of HTN (hypertension), Hyperlipidemia, Hypertension, Osteomyelitis ([address] ) (2020), and Seizures ([facility] [address] )  He  has a past surgical history that includes Hand surgery; Vascular surgery; Colonoscopy; Hernia repair; Wound debridement (N/A, 11/30/2020); and IR PICC placement single lumen (12/8/2020)  His family history includes Cancer in his mother; Kidney disease in his maternal uncle  He  reports that he quit smoking about 4 years ago  His smoking use included cigarettes, pipe, and cigars  He started smoking about 54 years ago  He smoked an average of 3 packs per day  He has never used smokeless tobacco  He reports that he does not drink alcohol and does not use drugs    Current Outpatient Medications   Medication Sig Dispense Refill   • allopurinol (ZYLOPRIM) 100 mg tablet TAKE 1 TABLET BY MOUTH EVERY DAY 90 tablet 3   • amLODIPine (NORVASC) 5 mg tablet Take 1 tablet (5 mg total) by mouth daily 90 tablet 3   • Blood Glucose Monitoring Suppl (Contour Next One) JIN Use as directed     • buPROPion (Wellbutrin XL) 150 mg 24 hr tablet Take 1 tablet (150 mg total) by mouth daily 90 tablet 3   • cholecalciferol (VITAMIN D3) 1,000 units tablet Take 5,000 Units by mouth daily 10,000 units Patient stated that is continuously taking     • Contour Next Test test strip TEST UP TO FOUR TIMES A DAY BEFORE MEALS AND BEDTIME 100 strip 1   • fluticasone (FLOVENT HFA) 220 mcg/act inhaler Inhale 1 puff 2 (two) times a day (Patient not taking: Reported on 2/15/2023) 12 g 0   • glucosamine-chondroitin "500-400 MG tablet Take 2 tablets by mouth daily     • losartan (COZAAR) 100 MG tablet Take 1 tablet (100 mg total) by mouth daily 90 tablet 3   • metoprolol-hydrochlorothiazide (LOPRESSOR HCT) 100-25 MG per tablet TAKE 1/2 TABLET BY MOUTH EVERY DAY 45 tablet 7   • Microlet Lancets MISC TEST BLOOD SUGAR UP TO 4 TIMES DAILY (BEFORE MEALS AND AT BEDTIME)     • Omega-3 Fatty Acids (Omega-3 Fish Oil) 1000 MG CAPS Take 2,000 mg by mouth daily 180 capsule 3   • rosuvastatin (CRESTOR) 20 MG tablet Take 1 tablet (20 mg total) by mouth daily 90 tablet 3   • semaglutide, 0 25 or 0 5 mg/dose, (Ozempic, 0 25 or 0 5 MG/DOSE,) 2 mg/3 mL injection pen Use 0 5mg weekly 6 mL 0     No current facility-administered medications for this visit  He is allergic to clindamycin and penicillins       Review of Systems   Constitutional: Negative  Negative for activity change  HENT: Negative  Eyes: Negative  Respiratory: Negative  Cardiovascular: Negative  Gastrointestinal: Negative  Endocrine: Negative  Genitourinary: Negative  Musculoskeletal: Negative  Skin: Negative  Allergic/Immunologic: Negative  Neurological: Negative  Psychiatric/Behavioral: Negative for agitation, behavioral problems and confusion  The patient is not nervous/anxious  Objective:      Ht 6' 2\" (1 88 m)   Wt 120 kg (265 lb)   BMI 34 02 kg/m²          Physical Exam  Constitutional:       Appearance: He is well-developed  He is not diaphoretic  HENT:      Head: Normocephalic and atraumatic  Eyes:      General: No scleral icterus  Right eye: No discharge  Left eye: No discharge  Extraocular Movements: Extraocular movements intact  Conjunctiva/sclera: Conjunctivae normal    Neck:      Thyroid: No thyromegaly  Trachea: No tracheal deviation  Cardiovascular:      Rate and Rhythm: Normal rate and regular rhythm  Heart sounds: Normal heart sounds  No murmur heard  No friction rub   " Pulmonary:      Effort: Pulmonary effort is normal  No respiratory distress  Breath sounds: Normal breath sounds  No stridor  No wheezing  Chest:      Chest wall: No tenderness  Abdominal:      General: There is no distension  Palpations: There is no mass  Tenderness: There is no abdominal tenderness  There is no guarding or rebound  Musculoskeletal:         General: No tenderness  Right lower leg: No edema  Left lower leg: No edema  Lymphadenopathy:      Cervical: No cervical adenopathy  Skin:     General: Skin is warm and dry  Findings: Lesion (,4 SQ masses over the back   ) present  No erythema or rash  Neurological:      Mental Status: He is alert and oriented to person, place, and time  Cranial Nerves: No cranial nerve deficit        Coordination: Coordination normal    Psychiatric:         Behavior: Behavior normal          Judgment: Judgment normal

## 2023-04-26 ENCOUNTER — PREP FOR PROCEDURE (OUTPATIENT)
Dept: SURGERY | Facility: CLINIC | Age: 63
End: 2023-04-26

## 2023-04-26 DIAGNOSIS — R22.2 MASS ON BACK: Primary | ICD-10-CM

## 2023-04-26 PROBLEM — L72.3 SEBACEOUS CYST: Status: RESOLVED | Noted: 2023-02-20 | Resolved: 2023-04-26

## 2023-04-28 ENCOUNTER — APPOINTMENT (OUTPATIENT)
Dept: LAB | Facility: CLINIC | Age: 63
End: 2023-04-28

## 2023-04-28 ENCOUNTER — OFFICE VISIT (OUTPATIENT)
Dept: LAB | Facility: CLINIC | Age: 63
End: 2023-04-28

## 2023-04-28 DIAGNOSIS — M1A.0710 IDIOPATHIC CHRONIC GOUT OF RIGHT FOOT WITHOUT TOPHUS: ICD-10-CM

## 2023-04-28 DIAGNOSIS — E11.65 TYPE II DIABETES MELLITUS WITH HYPEROSMOLARITY, UNCONTROLLED (HCC): ICD-10-CM

## 2023-04-28 DIAGNOSIS — R22.2 MASS ON BACK: ICD-10-CM

## 2023-04-28 DIAGNOSIS — R05.1 ACUTE COUGH: Primary | ICD-10-CM

## 2023-04-28 DIAGNOSIS — E11.00 TYPE II DIABETES MELLITUS WITH HYPEROSMOLARITY, UNCONTROLLED (HCC): ICD-10-CM

## 2023-04-28 LAB
ALBUMIN SERPL BCP-MCNC: 4.4 G/DL (ref 3.5–5)
ALP SERPL-CCNC: 55 U/L (ref 34–104)
ALT SERPL W P-5'-P-CCNC: 49 U/L (ref 7–52)
ANION GAP SERPL CALCULATED.3IONS-SCNC: 6 MMOL/L (ref 4–13)
AST SERPL W P-5'-P-CCNC: 27 U/L (ref 13–39)
ATRIAL RATE: 52 BPM
BASOPHILS # BLD AUTO: 0.07 THOUSANDS/ΜL (ref 0–0.1)
BASOPHILS NFR BLD AUTO: 2 % (ref 0–1)
BILIRUB SERPL-MCNC: 0.59 MG/DL (ref 0.2–1)
BUN SERPL-MCNC: 16 MG/DL (ref 5–25)
CALCIUM SERPL-MCNC: 9.4 MG/DL (ref 8.4–10.2)
CHLORIDE SERPL-SCNC: 101 MMOL/L (ref 96–108)
CO2 SERPL-SCNC: 28 MMOL/L (ref 21–32)
CREAT SERPL-MCNC: 1.04 MG/DL (ref 0.6–1.3)
EOSINOPHIL # BLD AUTO: 0.16 THOUSAND/ΜL (ref 0–0.61)
EOSINOPHIL NFR BLD AUTO: 4 % (ref 0–6)
ERYTHROCYTE [DISTWIDTH] IN BLOOD BY AUTOMATED COUNT: 12.7 % (ref 11.6–15.1)
GFR SERPL CREATININE-BSD FRML MDRD: 75 ML/MIN/1.73SQ M
GLUCOSE SERPL-MCNC: 158 MG/DL (ref 65–140)
HCT VFR BLD AUTO: 47.2 % (ref 36.5–49.3)
HGB BLD-MCNC: 16.2 G/DL (ref 12–17)
IMM GRANULOCYTES # BLD AUTO: 0.01 THOUSAND/UL (ref 0–0.2)
IMM GRANULOCYTES NFR BLD AUTO: 0 % (ref 0–2)
LYMPHOCYTES # BLD AUTO: 1.11 THOUSANDS/ΜL (ref 0.6–4.47)
LYMPHOCYTES NFR BLD AUTO: 30 % (ref 14–44)
MCH RBC QN AUTO: 29 PG (ref 26.8–34.3)
MCHC RBC AUTO-ENTMCNC: 34.3 G/DL (ref 31.4–37.4)
MCV RBC AUTO: 85 FL (ref 82–98)
MONOCYTES # BLD AUTO: 0.48 THOUSAND/ΜL (ref 0.17–1.22)
MONOCYTES NFR BLD AUTO: 13 % (ref 4–12)
NEUTROPHILS # BLD AUTO: 1.9 THOUSANDS/ΜL (ref 1.85–7.62)
NEUTS SEG NFR BLD AUTO: 51 % (ref 43–75)
NRBC BLD AUTO-RTO: 0 /100 WBCS
P AXIS: 40 DEGREES
PLATELET # BLD AUTO: 192 THOUSANDS/UL (ref 149–390)
PMV BLD AUTO: 9.7 FL (ref 8.9–12.7)
POTASSIUM SERPL-SCNC: 4.8 MMOL/L (ref 3.5–5.3)
PR INTERVAL: 138 MS
PROT SERPL-MCNC: 6.9 G/DL (ref 6.4–8.4)
QRS AXIS: 36 DEGREES
QRSD INTERVAL: 100 MS
QT INTERVAL: 416 MS
QTC INTERVAL: 386 MS
RBC # BLD AUTO: 5.58 MILLION/UL (ref 3.88–5.62)
SODIUM SERPL-SCNC: 135 MMOL/L (ref 135–147)
T WAVE AXIS: 82 DEGREES
URATE SERPL-MCNC: 6.7 MG/DL (ref 3.5–8.5)
VENTRICULAR RATE: 52 BPM
WBC # BLD AUTO: 3.73 THOUSAND/UL (ref 4.31–10.16)

## 2023-04-30 LAB
CREAT UR-MCNC: 101 MG/DL
MICROALBUMIN UR-MCNC: 18.4 MG/L (ref 0–20)
MICROALBUMIN/CREAT 24H UR: 18 MG/G CREATININE (ref 0–30)

## 2023-05-01 LAB
EST. AVERAGE GLUCOSE BLD GHB EST-MCNC: 134 MG/DL
HBA1C MFR BLD: 6.3 %

## 2023-05-08 ENCOUNTER — CONSULT (OUTPATIENT)
Age: 63
End: 2023-05-08

## 2023-05-08 VITALS
OXYGEN SATURATION: 96 % | DIASTOLIC BLOOD PRESSURE: 80 MMHG | SYSTOLIC BLOOD PRESSURE: 130 MMHG | WEIGHT: 270 LBS | HEIGHT: 74 IN | HEART RATE: 73 BPM | BODY MASS INDEX: 34.65 KG/M2

## 2023-05-08 DIAGNOSIS — G47.19 EXCESSIVE DAYTIME SLEEPINESS: ICD-10-CM

## 2023-05-08 DIAGNOSIS — G47.00 FREQUENT NOCTURNAL AWAKENING: ICD-10-CM

## 2023-05-08 DIAGNOSIS — R06.83 SNORING: ICD-10-CM

## 2023-05-08 DIAGNOSIS — R29.818 SUSPECTED SLEEP APNEA: Primary | ICD-10-CM

## 2023-05-08 DIAGNOSIS — E78.2 MIXED HYPERLIPIDEMIA: ICD-10-CM

## 2023-05-08 DIAGNOSIS — G47.33 OSA (OBSTRUCTIVE SLEEP APNEA): ICD-10-CM

## 2023-05-08 RX ORDER — ROSUVASTATIN CALCIUM 20 MG/1
20 TABLET, COATED ORAL DAILY
Qty: 90 TABLET | Refills: 3 | Status: CANCELLED | OUTPATIENT
Start: 2023-05-08

## 2023-05-08 NOTE — LETTER
May 8, 2023     [address] Godwin, [address]    Patient: Natali Lozano   YOB: 1960   Date of Visit: 5/8/2023       Dear Dr Gerri Mcdonald: Thank you for referring Galen Yancey to me for evaluation  Below are my notes for this consultation  If you have questions, please do not hesitate to call me  I look forward to following your patient along with you  Sincerely,        Nona Duane, MD        CC: No Recipients  Nona Duane, MD  5/8/2023  3:45 PM  Attested  Sleep Consultation   Natali Lozano 57 y o  male MRN: [id number]      Reason for consultation: Concern for sleep apnea    Requesting physician: Kp Godwin MD    Assessment/Plan  1  Suspected sleep apnea- He is having daytime fatigue which he attributes to multiple awakenings at night due to his bladder issues  Minimal occasional snoring and occasional gasping    - Home sleep study ordered to evaluate for sleep apnea  - We discussed the need to treat his sleep apnea even if it is mild due to his daytime symptoms and comorbid conditions  - He is willing to try CPAP if he has sleep apnea  - Will discuss about diagnostic PSG if home sleep study is negative for DEBRA  - Follow up after the sleep study  2  Multiple nocturnal awakenings- He attributes this to issues with his bladder  He does not know exact diagnosis but says that his bladder wall is thick and his bladder capacity is reduced  He does not this this is related to DEBRA  We discussed that DEBRA can also cause multiple awakenings  - Follow up after the sleep study  - Also advised to follow up with urologist to re-evaluate  History of Present Illness   HPI:  Natali Lozano is a 57 y o  male with past medical history of hypertension, hyperlipidemia, diabetes, and gout who is referred by his endocrinologist to evaluate for sleep apnea  He is having symptoms of occasional snoring and occasional awakenings with gasping    He "denies any symptoms of witnessed apneas, morning headaches, or awakenings with dry mouth  He complains of daytime fatigue as well as daytime sleepiness and takes a nap every afternoon which lasts for about 30 minutes  Patient says that it is a \"family tradition\" to take nap  His ESS is 10/24  He mentions that he had a sleep study about 20 years ago which did not show sleep apnea  He denies any symptoms of restless legs, sleep paralysis, hallucinations, or symptoms of narcolepsy/cataplexy  Sleep schedule: He goes to bed between 10-10:30 PM on weekdays and weekends and falls asleep in 15 minutes  He wakes up at 7 AM on weekdays and 8 AM on weekends and does not feel fully rested  He takes a nap every afternoon around 2 PM for 30 minutes  He wakes up at least once at night to urinate by may wake up up to 3 times depending on how much fluids he drank  He says that he was told by doctors in the past that he is always dehydrated  He does not take any medications to sleep         Review of Systems      Genitourinary need to urinate more than twice a night   Cardiology none   Gastrointestinal none   Neurology need to move extremities, forgetfulness and poor concentration or confusion,    Constitutional claustrophobia and fatigue   Integumentary itching   Psychiatry anxiety and depression   Musculoskeletal joint pain, back pain, legs twitching/jerking and leg cramps   Pulmonary shortness of breath with activity and snoring   ENT throat clearing and ringing in ears   Endocrine frequent urination   Hematological none               Historical Information   Past Medical History:   Diagnosis Date   • Diabetes mellitus ([place] [address] )    • HTN (hypertension)    • Hyperlipidemia    • Hypertension    • Kidney stone    • Osteomyelitis ([place] [address] ) 2020   • Seizures ([place] [address] )     Not since age 13     Past Surgical History:   Procedure Laterality Date   • COLONOSCOPY     • HAND SURGERY     • HERNIA REPAIR     • IR PICC PLACEMENT SINGLE LUMEN  " 2020    remove   • VASCULAR SURGERY     • WOUND DEBRIDEMENT N/A 2020    Procedure: DEBRIDEMENT OF ANTERIOR MANDIBLE BONE BIOPSY, CULTURES AND REMOVAL OF IMPLANT #27, USE OF PRP;  Surgeon: Trena Cuellar DDS;  Location: [facility];  Service: Maxillofacial     Family History   Problem Relation Age of Onset   • Cancer Mother    • Kidney disease Maternal Uncle      Social History     Socioeconomic History   • Marital status: /Civil Union     Spouse name: Not on file   • Number of children: Not on file   • Years of education: Not on file   • Highest education level: Not on file   Occupational History   • Not on file   Tobacco Use   • Smoking status: Former     Packs/day: 3 00     Types: Cigarettes, Pipe, Cigars     Start date: 54     Quit date: 2000     Years since quittin 3   • Smokeless tobacco: Never   Vaping Use   • Vaping Use: Never used   Substance and Sexual Activity   • Alcohol use: Yes     Alcohol/week: 5 0 standard drinks     Types: 5 Cans of beer per week   • Drug use: Never   • Sexual activity: Not Currently   Other Topics Concern   • Not on file   Social History Narrative   • Not on file     Social Determinants of Health     Financial Resource Strain: Not on file   Food Insecurity: Not on file   Transportation Needs: Not on file   Physical Activity: Not on file   Stress: Not on file   Social Connections: Not on file   Intimate Partner Violence: Not on file   Housing Stability: Not on file       Occupational History: Sales    Meds/Allergies    Allergies   Allergen Reactions   • Clindamycin Hives   • Penicillins Hives       Home medications:  Prior to Admission medications    Medication Sig Start Date End Date Taking?  Authorizing Provider   allopurinol (ZYLOPRIM) 100 mg tablet TAKE 1 TABLET BY MOUTH EVERY DAY 23   Kristina Mansfield MD   amLODIPine [facility]) 5 mg tablet Take 1 tablet (5 mg total) by mouth daily 23   Kristina Mansfield MD   Blood Glucose Monitoring Suppl "(Contour Next One) JIN Use as directed 1/4/23   Historical Provider, MD   buPROPion (Wellbutrin XL) 150 mg 24 hr tablet Take 1 tablet (150 mg total) by mouth daily 4/21/23   Zaheer Chi MD   cholecalciferol (VITAMIN D3) 1,000 units tablet Take 5,000 Units by mouth daily 10,000 units Patient stated that is continuously taking    Historical Provider, MD   Contour Next Test test strip TEST UP TO FOUR TIMES A DAY BEFORE MEALS AND BEDTIME 2/4/23   [address]ovan Godwin MD   glucosamine-chondroitin 500-400 MG tablet Take 2 tablets by mouth daily    Historical Provider, MD   losartan (COZAAR) 100 MG tablet Take 1 tablet (100 mg total) by mouth daily 2/15/23 2/10/24  Kp Godwin MD   metoprolol-hydrochlorothiazide (LOPRESSOR HCT) 100-25 MG per tablet TAKE 1/2 TABLET BY MOUTH EVERY DAY 11/14/22   Zaheer Chi MD   Microlet Lancets MISC TEST BLOOD SUGAR UP TO 4 TIMES DAILY (BEFORE MEALS AND AT BEDTIME) 1/5/23   Historical Provider, MD   Omega-3 Fatty Acids (Omega-3 Fish Oil) 1000 MG CAPS Take 2,000 mg by mouth daily 9/9/22   Zaheer Chi MD   rosuvastatin (CRESTOR) 20 MG tablet Take 1 tablet (20 mg total) by mouth daily 7/8/22   Zaheer Chi MD   semaglutide, 0 25 or 0 5 mg/dose, (Ozempic, 0 25 or 0 5 MG/DOSE,) 2 mg/3 mL injection pen Use 0 5mg weekly  Patient taking differently: 0 25 mg every 7 days Sundays 4/22/23   Kp Godwin MD       Vitals:   Blood pressure 130/80, pulse 73, height 6' 2\" (1 88 m), weight 122 kg (270 lb), SpO2 96 %  , Body mass index is 34 67 kg/m²         Physical Exam  General: Obese, Awake alert and oriented x 3, conversant without conversational dyspnea, NAD, normal affect  HEENT:  PERRL, Sclera noninjected, nonicteric OU, Nares patent,  no craniofacial abnormalities, Mucous membranes, moist, no oral lesions, normal dentition, Mallampati class III  NECK: Trachea midline, no accessory muscle use, no stridor, no cervical or supraclavicular adenopathy, JVP not elevated  CARDIAC: " Reg, single s1/S2, no m/r/g  PULM: CTA bilaterally no wheezing, rhonchi or rales  EXT: No cyanosis, no clubbing, no edema, normal capillary refill  NEURO: no focal neurologic deficits, AAOx3, moving all extremities appropriately    Labs: I have personally reviewed pertinent lab results  Lab results, metabolic panel  Lab Results   Component Value Date    WBC 3 73 (L) 04/28/2023    HGB 16 2 04/28/2023    HCT 47 2 04/28/2023    MCV 85 04/28/2023     04/28/2023      Lab Results   Component Value Date    GLUCOSE 127 10/08/2014    CALCIUM 9 4 04/28/2023     10/08/2014    K 4 8 04/28/2023    CO2 28 04/28/2023     04/28/2023    BUN 16 04/28/2023    CREATININE 1 04 04/28/2023     No results found for: IRON, TIBC, FERRITIN  No results found for: [id number]  No results found for: MD Sultana Davis's Sleep Fellow  Attestation signed by Porfirio Rao DO at 5/8/2023  9:17 PM:  I have personally seen and examined  I discussed the patient with the  fellow including, but not limited to, verifying findings; reviewing labs and x-rays;developing the plan of care with patient  I have reviewed the note and assessment performed by the fellow and agree with the fellow's documented findings and plan of care with the following additions  Please see my following comments for details and adjustments  Assessment/Plan  57 y o  M with PMHx of hypertension, hyperlipidemia, diabetes, and gout who comes in for evaluation of snoring and excessive daytime sleepiness  1   Snoring/Excessive daytime sleepiness/Witnessed apneas -  Mallampati class 4, Body mass index is 34 67 kg/m² ,    He is at risk for obstructive sleep apnea based on [employer] survey        -  Check a home study to assess for obstructive sleep apnea      -  I discussed in depth the diagnostic studies and treatment options involved with obstructive sleep apnea      -  I also discussed in depth the risk of leaving sleep apnea untreated including hypertension, heart failure, arrhythmia, MI and stroke  -  The patient is agreeable to undergo testing and treatment of obstructive sleep apnea  He understands that pitfalls she may encounter along the way and is willing to attempt CPAP treatment  2   Nocturnal awakenings - likely due to BPH  He will continue following with urologist      HPI:  Lars Melton is a 57 y o  male with PMHx as below who comes in for evaluation of snoring  Patient notes weight gain and symptoms of difficulty falling asleep, difficulty staying asleep, snoring, excessive daytime sleepiness with an [place] score of 10, awakenings with gasping, witnessed apneas, morning headaches, awakenings with dry mouth  he denies symptoms of restless legs  he denies symptoms of cataplexy, sleep paralysis, hypnopompic or hypnagogic hallucinations  Sleep History:  he goes to bed at approximately 10, will get to sleep in 3- min, will get out of bed at 7 am   he will get up one  times at night to urinate  He does nap during the day  He does feel a bit better after a 30 minute nap        Vitals:    05/08/23 1133   BP: 130/80   Pulse: 73   SpO2: 96%   RA  General:  Patient is awake, alert, non-toxic and in no acute respiratory distress  Eyes: PERRL, no scleral icterus  Neck: No JVD  CV:  Regular, +S1 and S2, No murmurs, gallops or rubs appreciated  Lungs: Clear to auscultation bilateral without wheeze, rales or rhonci  Abdomen: Soft, +BS, Non-tender, non-distended  Extremities: No clubbing, cyanosis or edema  Neuro: No focal motor/sensory deficits  Skin: Warm, No rashes or ulcerations  Lab Results   Component Value Date    WBC 3 73 (L) 04/28/2023    HGB 16 2 04/28/2023    HCT 47 2 04/28/2023    MCV 85 04/28/2023     04/28/2023     Lab Results   Component Value Date    SODIUM 135 04/28/2023    K 4 8 04/28/2023     04/28/2023    CO2 28 04/28/2023    BUN 16 04/28/2023 CREATININE 1 04 04/28/2023    GLUC 158 (H) 04/28/2023    CALCIUM 9 4 04/28/2023

## 2023-05-08 NOTE — PROGRESS NOTES
"Sleep Consultation   Tracie Hu 57 y o  male MRN: [id number]      Reason for consultation: Concern for sleep apnea    Requesting physician: Jessika Tony MD    Assessment/Plan  1  Suspected sleep apnea- He is having daytime fatigue which he attributes to multiple awakenings at night due to his bladder issues  Minimal occasional snoring and occasional gasping    - Home sleep study ordered to evaluate for sleep apnea  - We discussed the need to treat his sleep apnea even if it is mild due to his daytime symptoms and comorbid conditions  - He is willing to try CPAP if he has sleep apnea  - Will discuss about diagnostic PSG if home sleep study is negative for DEBRA  - Follow up after the sleep study  2  Multiple nocturnal awakenings- He attributes this to issues with his bladder  He does not know exact diagnosis but says that his bladder wall is thick and his bladder capacity is reduced  He does not this this is related to DEBRA  We discussed that DEBRA can also cause multiple awakenings  - Follow up after the sleep study  - Also advised to follow up with urologist to re-evaluate  History of Present Illness   HPI:  Tracie Hu is a 57 y o  male with past medical history of hypertension, hyperlipidemia, diabetes, and gout who is referred by his endocrinologist to evaluate for sleep apnea  He is having symptoms of occasional snoring and occasional awakenings with gasping  He denies any symptoms of witnessed apneas, morning headaches, or awakenings with dry mouth  He complains of daytime fatigue as well as daytime sleepiness and takes a nap every afternoon which lasts for about 30 minutes  Patient says that it is a \"family tradition\" to take nap  His ESS is 10/24  He mentions that he had a sleep study about 20 years ago which did not show sleep apnea  He denies any symptoms of restless legs, sleep paralysis, hallucinations, or symptoms of narcolepsy/cataplexy     Sleep schedule: He goes to " bed between 10-10:30 PM on weekdays and weekends and falls asleep in 15 minutes  He wakes up at 7 AM on weekdays and 8 AM on weekends and does not feel fully rested  He takes a nap every afternoon around 2 PM for 30 minutes  He wakes up at least once at night to urinate by may wake up up to 3 times depending on how much fluids he drank  He says that he was told by doctors in the past that he is always dehydrated  He does not take any medications to sleep         Review of Systems      Genitourinary need to urinate more than twice a night   Cardiology none   Gastrointestinal none   Neurology need to move extremities, forgetfulness and poor concentration or confusion,    Constitutional claustrophobia and fatigue   Integumentary itching   Psychiatry anxiety and depression   Musculoskeletal joint pain, back pain, legs twitching/jerking and leg cramps   Pulmonary shortness of breath with activity and snoring   ENT throat clearing and ringing in ears   Endocrine frequent urination   Hematological none               Historical Information   Past Medical History:   Diagnosis Date   • Diabetes mellitus ([place] [address] )    • HTN (hypertension)    • Hyperlipidemia    • Hypertension    • Kidney stone    • Osteomyelitis ([facility] ) 2020   • Seizures ([facility] )     Not since age 13     Past Surgical History:   Procedure Laterality Date   • COLONOSCOPY     • HAND SURGERY     • HERNIA REPAIR     • IR PICC PLACEMENT SINGLE LUMEN  12/08/2020    remove   • VASCULAR SURGERY     • WOUND DEBRIDEMENT N/A 11/30/2020    Procedure: DEBRIDEMENT OF ANTERIOR MANDIBLE BONE BIOPSY, CULTURES AND REMOVAL OF IMPLANT #27, USE OF PRP;  Surgeon: Janneth Navarrete DDS;  Location: [facility];  Service: Maxillofacial     Family History   Problem Relation Age of Onset   • Cancer Mother    • Kidney disease Maternal Uncle      Social History     Socioeconomic History   • Marital status: /Civil Union     Spouse name: Not on file   • Number of children: Not on file   • Years of education: Not on file   • Highest education level: Not on file   Occupational History   • Not on file   Tobacco Use   • Smoking status: Former     Packs/day: 3 00     Types: Cigarettes, Pipe, Cigars     Start date: 54     Quit date: 2000     Years since quittin 3   • Smokeless tobacco: Never   Vaping Use   • Vaping Use: Never used   Substance and Sexual Activity   • Alcohol use: Yes     Alcohol/week: 5 0 standard drinks     Types: 5 Cans of beer per week   • Drug use: Never   • Sexual activity: Not Currently   Other Topics Concern   • Not on file   Social History Narrative   • Not on file     Social Determinants of Health     Financial Resource Strain: Not on file   Food Insecurity: Not on file   Transportation Needs: Not on file   Physical Activity: Not on file   Stress: Not on file   Social Connections: Not on file   Intimate Partner Violence: Not on file   Housing Stability: Not on file       Occupational History: Sales    Meds/Allergies   Allergies   Allergen Reactions   • Clindamycin Hives   • Penicillins Hives       Home medications:  Prior to Admission medications    Medication Sig Start Date End Date Taking?  Authorizing Provider   allopurinol (ZYLOPRIM) 100 mg tablet TAKE 1 TABLET BY MOUTH EVERY DAY 23   Sandeep Milian MD   amLODIPine (NORVASC) 5 mg tablet Take 1 tablet (5 mg total) by mouth daily 23   Sandeep Milian MD   Blood Glucose Monitoring Suppl (Contour Next One) JIN Use as directed 23   Historical Provider, MD   buPROPion (Wellbutrin XL) 150 mg 24 hr tablet Take 1 tablet (150 mg total) by mouth daily 23   Sandeep Milian MD   cholecalciferol (VITAMIN D3) 1,000 units tablet Take 5,000 Units by mouth daily 10,000 units Patient stated that is continuously taking    Historical Provider, MD   Contour Next Test test strip TEST UP TO FOUR TIMES A DAY BEFORE MEALS AND BEDTIME 23   [address] Godwin MD   glucosamine-chondroitin 500-400 MG tablet Take 2 tablets by mouth "daily    Historical Provider, MD   losartan (COZAAR) 100 MG tablet Take 1 tablet (100 mg total) by mouth daily 2/15/23 2/10/24  Maggie Barger MD   metoprolol-hydrochlorothiazide (LOPRESSOR HCT) 100-25 MG per tablet TAKE 1/2 TABLET BY MOUTH EVERY DAY 11/14/22   Adalid Haynes MD   Microlet Lancets MISC TEST BLOOD SUGAR UP TO 4 TIMES DAILY (BEFORE MEALS AND AT BEDTIME) 1/5/23   Historical Provider, MD   Omega-3 Fatty Acids (Omega-3 Fish Oil) 1000 MG CAPS Take 2,000 mg by mouth daily 9/9/22   Adalid Haynes MD   rosuvastatin (CRESTOR) 20 MG tablet Take 1 tablet (20 mg total) by mouth daily 7/8/22   Adalid Haynes MD   semaglutide, 0 25 or 0 5 mg/dose, (Ozempic, 0 25 or 0 5 MG/DOSE,) 2 mg/3 mL injection pen Use 0 5mg weekly  Patient taking differently: 0 25 mg every 7 days Sundays 4/22/23   Maggie Barger MD       Vitals:   Blood pressure 130/80, pulse 73, height 6' 2\" (1 88 m), weight 122 kg (270 lb), SpO2 96 %  , Body mass index is 34 67 kg/m²  Physical Exam  General: Obese, Awake alert and oriented x 3, conversant without conversational dyspnea, NAD, normal affect  HEENT:  PERRL, Sclera noninjected, nonicteric OU, Nares patent,  no craniofacial abnormalities, Mucous membranes, moist, no oral lesions, normal dentition, Mallampati class III  NECK: Trachea midline, no accessory muscle use, no stridor, no cervical or supraclavicular adenopathy, JVP not elevated  CARDIAC: Reg, single s1/S2, no m/r/g  PULM: CTA bilaterally no wheezing, rhonchi or rales  EXT: No cyanosis, no clubbing, no edema, normal capillary refill  NEURO: no focal neurologic deficits, AAOx3, moving all extremities appropriately    Labs: I have personally reviewed pertinent lab results  Lab results, metabolic panel     Lab Results   Component Value Date    WBC 3 73 (L) 04/28/2023    HGB 16 2 04/28/2023    HCT 47 2 04/28/2023    MCV 85 04/28/2023     04/28/2023      Lab Results   Component Value Date    GLUCOSE 127 10/08/2014    " CALCIUM 9 4 04/28/2023     10/08/2014    K 4 8 04/28/2023    CO2 28 04/28/2023     04/28/2023    BUN 16 04/28/2023    CREATININE 1 04 04/28/2023     No results found for: IRON, TIBC, FERRITIN  No results found for: [id number]  No results found for: MD Jamie Power's Sleep Fellow

## 2023-05-09 DIAGNOSIS — E78.2 MIXED HYPERLIPIDEMIA: ICD-10-CM

## 2023-05-09 RX ORDER — ROSUVASTATIN CALCIUM 20 MG/1
20 TABLET, COATED ORAL DAILY
Qty: 90 TABLET | Refills: 3 | Status: SHIPPED | OUTPATIENT
Start: 2023-05-09

## 2023-05-23 ENCOUNTER — APPOINTMENT (EMERGENCY)
Dept: RADIOLOGY | Facility: HOSPITAL | Age: 63
End: 2023-05-23

## 2023-05-23 ENCOUNTER — HOSPITAL ENCOUNTER (OUTPATIENT)
Facility: HOSPITAL | Age: 63
Setting detail: OBSERVATION
Discharge: HOME | End: 2023-05-24
Attending: EMERGENCY MEDICINE | Admitting: INTERNAL MEDICINE

## 2023-05-23 DIAGNOSIS — G45.9 TIA (TRANSIENT ISCHEMIC ATTACK): Primary | ICD-10-CM

## 2023-05-23 DIAGNOSIS — E78.2 MIXED HYPERLIPIDEMIA: ICD-10-CM

## 2023-05-23 DIAGNOSIS — R94.31 ABNORMAL EKG: ICD-10-CM

## 2023-05-23 DIAGNOSIS — H53.9 VISUAL DISTURBANCE: ICD-10-CM

## 2023-05-23 LAB
2HR DELTA HS TROPONIN: 0 NG/L
ALBUMIN SERPL BCP-MCNC: 4.3 G/DL (ref 3.5–5)
ALP SERPL-CCNC: 56 U/L (ref 34–104)
ALT SERPL W P-5'-P-CCNC: 41 U/L (ref 7–52)
ANION GAP SERPL CALCULATED.3IONS-SCNC: 9 MMOL/L (ref 4–13)
AST SERPL W P-5'-P-CCNC: 25 U/L (ref 13–39)
BASOPHILS # BLD AUTO: 0.05 THOUSANDS/ÂΜL (ref 0–0.1)
BASOPHILS NFR BLD AUTO: 1 % (ref 0–1)
BILIRUB SERPL-MCNC: 0.58 MG/DL (ref 0.2–1)
BUN SERPL-MCNC: 16 MG/DL (ref 5–25)
CALCIUM SERPL-MCNC: 9.2 MG/DL (ref 8.4–10.2)
CARDIAC TROPONIN I PNL SERPL HS: 3 NG/L
CARDIAC TROPONIN I PNL SERPL HS: 3 NG/L
CHLORIDE SERPL-SCNC: 103 MMOL/L (ref 96–108)
CO2 SERPL-SCNC: 24 MMOL/L (ref 21–32)
CREAT SERPL-MCNC: 1 MG/DL (ref 0.6–1.3)
EOSINOPHIL # BLD AUTO: 0.2 THOUSAND/ÂΜL (ref 0–0.61)
EOSINOPHIL NFR BLD AUTO: 5 % (ref 0–6)
ERYTHROCYTE [DISTWIDTH] IN BLOOD BY AUTOMATED COUNT: 12.3 % (ref 11.6–15.1)
GFR SERPL CREATININE-BSD FRML MDRD: 79 ML/MIN/1.73SQ M
GLUCOSE SERPL-MCNC: 101 MG/DL (ref 65–140)
GLUCOSE SERPL-MCNC: 123 MG/DL (ref 65–140)
GLUCOSE SERPL-MCNC: 132 MG/DL (ref 65–140)
GLUCOSE SERPL-MCNC: 134 MG/DL (ref 65–140)
HCT VFR BLD AUTO: 45.9 % (ref 36.5–49.3)
HGB BLD-MCNC: 16.3 G/DL (ref 12–17)
IMM GRANULOCYTES # BLD AUTO: 0.01 THOUSAND/UL (ref 0–0.2)
IMM GRANULOCYTES NFR BLD AUTO: 0 % (ref 0–2)
LYMPHOCYTES # BLD AUTO: 1.14 THOUSANDS/ÂΜL (ref 0.6–4.47)
LYMPHOCYTES NFR BLD AUTO: 30 % (ref 14–44)
MAGNESIUM SERPL-MCNC: 1.9 MG/DL (ref 1.9–2.7)
MCH RBC QN AUTO: 29.3 PG (ref 26.8–34.3)
MCHC RBC AUTO-ENTMCNC: 35.5 G/DL (ref 31.4–37.4)
MCV RBC AUTO: 83 FL (ref 82–98)
MONOCYTES # BLD AUTO: 0.47 THOUSAND/ÂΜL (ref 0.17–1.22)
MONOCYTES NFR BLD AUTO: 12 % (ref 4–12)
NEUTROPHILS # BLD AUTO: 1.91 THOUSANDS/ÂΜL (ref 1.85–7.62)
NEUTS SEG NFR BLD AUTO: 52 % (ref 43–75)
NRBC BLD AUTO-RTO: 0 /100 WBCS
PLATELET # BLD AUTO: 179 THOUSANDS/UL (ref 149–390)
PMV BLD AUTO: 9.8 FL (ref 8.9–12.7)
POTASSIUM SERPL-SCNC: 4.4 MMOL/L (ref 3.5–5.3)
PROT SERPL-MCNC: 6.9 G/DL (ref 6.4–8.4)
RBC # BLD AUTO: 5.56 MILLION/UL (ref 3.88–5.62)
SODIUM SERPL-SCNC: 136 MMOL/L (ref 135–147)
WBC # BLD AUTO: 3.78 THOUSAND/UL (ref 4.31–10.16)

## 2023-05-23 RX ORDER — MELATONIN
5000 DAILY
Status: DISCONTINUED | OUTPATIENT
Start: 2023-05-23 | End: 2023-05-24 | Stop reason: HOSPADM

## 2023-05-23 RX ORDER — ENOXAPARIN SODIUM 100 MG/ML
40 INJECTION SUBCUTANEOUS DAILY
Status: DISCONTINUED | OUTPATIENT
Start: 2023-05-23 | End: 2023-05-24 | Stop reason: HOSPADM

## 2023-05-23 RX ORDER — INSULIN LISPRO 100 [IU]/ML
2-12 INJECTION, SOLUTION INTRAVENOUS; SUBCUTANEOUS
Status: DISCONTINUED | OUTPATIENT
Start: 2023-05-23 | End: 2023-05-24 | Stop reason: HOSPADM

## 2023-05-23 RX ORDER — CHLORAL HYDRATE 500 MG
2000 CAPSULE ORAL DAILY
Status: DISCONTINUED | OUTPATIENT
Start: 2023-05-23 | End: 2023-05-24 | Stop reason: HOSPADM

## 2023-05-23 RX ORDER — BUPROPION HYDROCHLORIDE 150 MG/1
150 TABLET ORAL DAILY
Status: DISCONTINUED | OUTPATIENT
Start: 2023-05-23 | End: 2023-05-24 | Stop reason: HOSPADM

## 2023-05-23 RX ORDER — ATORVASTATIN CALCIUM 40 MG/1
80 TABLET, FILM COATED ORAL ONCE
Status: COMPLETED | OUTPATIENT
Start: 2023-05-23 | End: 2023-05-23

## 2023-05-23 RX ORDER — ASPIRIN 81 MG/1
81 TABLET, CHEWABLE ORAL DAILY
Status: DISCONTINUED | OUTPATIENT
Start: 2023-05-24 | End: 2023-05-24 | Stop reason: HOSPADM

## 2023-05-23 RX ORDER — ATORVASTATIN CALCIUM 40 MG/1
40 TABLET, FILM COATED ORAL EVERY EVENING
Status: DISCONTINUED | OUTPATIENT
Start: 2023-05-24 | End: 2023-05-24 | Stop reason: HOSPADM

## 2023-05-23 RX ORDER — INSULIN LISPRO 100 [IU]/ML
1-6 INJECTION, SOLUTION INTRAVENOUS; SUBCUTANEOUS
Status: DISCONTINUED | OUTPATIENT
Start: 2023-05-23 | End: 2023-05-24 | Stop reason: HOSPADM

## 2023-05-23 RX ORDER — ALLOPURINOL 100 MG/1
100 TABLET ORAL DAILY
Status: DISCONTINUED | OUTPATIENT
Start: 2023-05-24 | End: 2023-05-24 | Stop reason: HOSPADM

## 2023-05-23 RX ADMIN — ENOXAPARIN SODIUM 40 MG: 40 INJECTION SUBCUTANEOUS at 18:27

## 2023-05-23 RX ADMIN — IOHEXOL 85 ML: 350 INJECTION, SOLUTION INTRAVENOUS at 11:42

## 2023-05-23 RX ADMIN — ATORVASTATIN CALCIUM 80 MG: 40 TABLET, FILM COATED ORAL at 13:37

## 2023-05-23 NOTE — ASSESSMENT & PLAN NOTE
Patient presented to ED with right hand numbness, bottom lip numbness, and left visiual disturbance this morning which have resolved  EMS also noted left sided facial droop, however, patient does not agree he had one    CTA head/neck: no acute intracranial pathology, no significant stenosis, occlusion, or aneurysm  · Took aspirin 325 mg and Lipitor 80 mg  · MRI brain ordered  · Obtain ECHO with bubble study  · Check lipid panel and HbA1c  · Monitor on telemetry  · Neuro checks  · Hold home blood pressure medications  · Continue daily aspirin and high dose statin  · Speech, PT/OT consult  · Neurology consult

## 2023-05-23 NOTE — ED NOTES
Patient transported to [address] [place] [address],[id number], RN  05/23/23 [id number]

## 2023-05-23 NOTE — ASSESSMENT & PLAN NOTE
· Patient takes amlodipine, metoprolol-HCTZ, and losartan  · Hold home regimen to allow for permissive hypertension

## 2023-05-23 NOTE — ED PROVIDER NOTES
History  Chief Complaint   Patient presents with   • Numbness     Pt reports of r arm numbness started about an hour and a half ago, pt's symptoms cleared upon arrival      56 y/o male presents with right hand numbness, left eye vision disturbance blurry and left facial droop  Last known normal was an hour and half ago  Symptoms are resolving greatly now  Denies any coordination difficulty, focal weakness, tingling, no slurred speech, expressive aphasia  No chest pain,dyspnea  History of HTN,HDL, DM2  History provided by:  Patient   used: No        Prior to Admission Medications   Prescriptions Last Dose Informant Patient Reported? Taking?    Blood Glucose Monitoring Suppl (Contour Next One) JIN   Yes No   Sig: Use as directed   Contour Next Test test strip   No No   Sig: TEST UP TO FOUR TIMES A DAY BEFORE MEALS AND BEDTIME   Microlet Lancets MISC   Yes No   Sig: TEST BLOOD SUGAR UP TO 4 TIMES DAILY (BEFORE MEALS AND AT BEDTIME)   Omega-3 Fatty Acids (Omega-3 Fish Oil) 1000 MG CAPS   No No   Sig: Take 2,000 mg by mouth daily   allopurinol (ZYLOPRIM) 100 mg tablet   No No   Sig: TAKE 1 TABLET BY MOUTH EVERY DAY   amLODIPine (NORVASC) 5 mg tablet   No No   Sig: Take 1 tablet (5 mg total) by mouth daily   buPROPion (Wellbutrin XL) 150 mg 24 hr tablet   No No   Sig: Take 1 tablet (150 mg total) by mouth daily   cholecalciferol (VITAMIN D3) 1,000 units tablet   Yes No   Sig: Take 5,000 Units by mouth daily 10,000 units Patient stated that is continuously taking   glucosamine-chondroitin 500-400 MG tablet   Yes No   Sig: Take 2 tablets by mouth daily   losartan (COZAAR) 100 MG tablet   No No   Sig: Take 1 tablet (100 mg total) by mouth daily   metoprolol-hydrochlorothiazide (LOPRESSOR HCT) 100-25 MG per tablet   No No   Sig: TAKE 1/2 TABLET BY MOUTH EVERY DAY   rosuvastatin (CRESTOR) 20 MG tablet   No No   Sig: Take 1 tablet (20 mg total) by mouth daily   semaglutide, 0 25 or 0 5 mg/dose, (Ozempic, 0 25 or 0 5 MG/DOSE,) 2 mg/3 mL injection pen   No No   Sig: Use 0 5mg weekly   Patient taking differently: 0 25 mg every 7 days Sundays      Facility-Administered Medications: None       Past Medical History:   Diagnosis Date   • Diabetes mellitus ([facility] )    • HTN (hypertension)    • Hyperlipidemia    • Hypertension    • Kidney stone    • Osteomyelitis ([facility] )    • Seizures ([facility] )     Not since age 13       Past Surgical History:   Procedure Laterality Date   • COLONOSCOPY     • HAND SURGERY     • HERNIA REPAIR     • IR PICC PLACEMENT SINGLE LUMEN  2020    remove   • VASCULAR SURGERY     • WOUND DEBRIDEMENT N/A 2020    Procedure: DEBRIDEMENT OF ANTERIOR MANDIBLE BONE BIOPSY, CULTURES AND REMOVAL OF IMPLANT #27, USE OF PRP;  Surgeon: Adore Barnett DDS;  Location: [facility];  Service: Maxillofacial       Family History   Problem Relation Age of Onset   • Cancer Mother    • Kidney disease Maternal Uncle      I have reviewed and agree with the history as documented  E-Cigarette/Vaping   • E-Cigarette Use Never User      E-Cigarette/Vaping Substances   • Nicotine No    • THC No    • CBD No    • Flavoring No    • Other No    • Unknown No      Social History     Tobacco Use   • Smoking status: Former     Packs/day: 3 00     Types: Cigarettes, Pipe, Cigars     Start date:      Quit date: 2000     Years since quittin 3   • Smokeless tobacco: Never   Vaping Use   • Vaping Use: Never used   Substance Use Topics   • Alcohol use: Yes     Alcohol/week: 5 0 standard drinks     Types: 5 Cans of beer per week   • Drug use: Never       Review of Systems   Constitutional: Negative  HENT: Negative  Eyes: Positive for visual disturbance  Respiratory: Negative  Cardiovascular: Negative  Gastrointestinal: Negative  Endocrine: Negative  Genitourinary: Negative  Musculoskeletal: Negative  Skin: Negative  Allergic/Immunologic: Negative      Neurological: Positive for numbness  Hematological: Negative  Psychiatric/Behavioral: Negative  All other systems reviewed and are negative  Physical Exam  Physical Exam  Vitals and nursing note reviewed  Constitutional:       Appearance: Normal appearance  HENT:      Head: Normocephalic and atraumatic  Nose: Nose normal       Mouth/Throat:      Mouth: Mucous membranes are moist    Eyes:      Extraocular Movements: Extraocular movements intact  Pupils: Pupils are equal, round, and reactive to light  Cardiovascular:      Rate and Rhythm: Normal rate and regular rhythm  Pulmonary:      Effort: Pulmonary effort is normal       Breath sounds: Normal breath sounds  Abdominal:      General: Abdomen is flat  Bowel sounds are normal       Palpations: Abdomen is soft  Musculoskeletal:         General: Normal range of motion  Cervical back: Normal range of motion and neck supple  Skin:     General: Skin is warm  Capillary Refill: Capillary refill takes less than 2 seconds  Neurological:      General: No focal deficit present  Mental Status: He is alert and oriented to person, place, and time  Mental status is at baseline  Cranial Nerves: No cranial nerve deficit  Sensory: No sensory deficit  Motor: No weakness  Coordination: Coordination normal       Gait: Gait normal       Deep Tendon Reflexes: Reflexes normal    Psychiatric:         Mood and Affect: Mood normal          Thought Content:  Thought content normal          Vital Signs  ED Triage Vitals   Temperature Pulse Respirations Blood Pressure SpO2   05/23/23 1153 05/23/23 1124 05/23/23 1124 05/23/23 1124 05/23/23 1124   98 2 °F (36 8 °C) 58 20 (!) 187/91 98 %      Temp Source Heart Rate Source Patient Position - Orthostatic VS BP Location FiO2 (%)   05/23/23 1124 05/23/23 1124 -- 05/23/23 1337 --   Tympanic Monitor  Right arm       Pain Score       --                  Vitals:    05/23/23 1130 05/23/23 1215 05/23/23 1230 05/23/23 1337   BP: (!) 187/91 151/83 148/83 160/82   Pulse: 57 57 56 63         Visual Acuity      ED Medications  Medications   allopurinol (ZYLOPRIM) tablet 100 mg (has no administration in time range)   buPROPion (WELLBUTRIN XL) 24 hr tablet 150 mg (has no administration in time range)   cholecalciferol (VITAMIN D3) tablet 5,000 Units (has no administration in time range)   fish oil capsule 2,000 mg (has no administration in time range)   atorvastatin (LIPITOR) tablet 40 mg (has no administration in time range)   aspirin chewable tablet 81 mg (has no administration in time range)   enoxaparin (LOVENOX) subcutaneous injection 40 mg (has no administration in time range)   iohexol (OMNIPAQUE) 350 MG/ML injection (SINGLE-DOSE) 85 mL (85 mL Intravenous Given 5/23/23 1142)   atorvastatin (LIPITOR) tablet 80 mg (80 mg Oral Given 5/23/23 1337)       Diagnostic Studies  Results Reviewed     Procedure Component Value Units Date/Time    HS Troponin I 2hr [[id number]]  (Normal) Collected: 05/23/23 1341    Lab Status: Final result Specimen: Blood from Line, Venous Updated: 05/23/23 1411     hs TnI 2hr 3 ng/L      Delta 2hr hsTnI 0 ng/L     HS Troponin 0hr (reflex protocol) [[id number]]  (Normal) Collected: 05/23/23 1131    Lab Status: Final result Specimen: Blood from Arm, Left Updated: 05/23/23 1203     hs TnI 0hr 3 ng/L     Comprehensive metabolic panel [[id number]] Collected: 05/23/23 1131    Lab Status: Final result Specimen: Blood from Arm, Left Updated: 05/23/23 1155     Sodium 136 mmol/L      Potassium 4 4 mmol/L      Chloride 103 mmol/L      CO2 24 mmol/L      ANION GAP 9 mmol/L      BUN 16 mg/dL      Creatinine 1 00 mg/dL      Glucose 132 mg/dL      Calcium 9 2 mg/dL      AST 25 U/L      ALT 41 U/L      Alkaline Phosphatase 56 U/L      Total Protein 6 9 g/dL      Albumin 4 3 g/dL      Total Bilirubin 0 58 mg/dL      eGFR 79 ml/min/1 73sq m     Narrative:      [place] guidelines for Chronic Kidney Disease (CKD):   •  Stage 1 with normal or high GFR (GFR > 90 mL/min/1 73 square meters)  •  Stage 2 Mild CKD (GFR = 60-89 mL/min/1 73 square meters)  •  Stage 3A Moderate CKD (GFR = 45-59 mL/min/1 73 square meters)  •  Stage 3B Moderate CKD (GFR = 30-44 mL/min/1 73 square meters)  •  Stage 4 Severe CKD (GFR = 15-29 mL/min/1 73 square meters)  •  Stage 5 End Stage CKD (GFR <15 mL/min/1 73 square meters)  Note: GFR calculation is accurate only with a steady state creatinine    Magnesium [[id number]]  (Normal) Collected: 05/23/23 1131    Lab Status: Final result Specimen: Blood from Arm, Left Updated: 05/23/23 1155     Magnesium 1 9 mg/dL     Fingerstick Glucose (POCT) [[id number]]  (Normal) Collected: 05/23/23 1151    Lab Status: Final result Updated: 05/23/23 1152     POC Glucose 123 mg/dl     CBC and differential [[id number]]  (Abnormal) Collected: 05/23/23 1131    Lab Status: Final result Specimen: Blood from Arm, Left Updated: 05/23/23 1139     WBC 3 78 Thousand/uL      RBC 5 56 Million/uL      Hemoglobin 16 3 g/dL      Hematocrit 45 9 %      MCV 83 fL      MCH 29 3 pg      MCHC 35 5 g/dL      RDW 12 3 %      MPV 9 8 fL      Platelets 154 Thousands/uL      nRBC 0 /100 WBCs      Neutrophils Relative 52 %      Immat GRANS % 0 %      Lymphocytes Relative 30 %      Monocytes Relative 12 %      Eosinophils Relative 5 %      Basophils Relative 1 %      Neutrophils Absolute 1 91 Thousands/µL      Immature Grans Absolute 0 01 Thousand/uL      Lymphocytes Absolute 1 14 Thousands/µL      Monocytes Absolute 0 47 Thousand/µL      Eosinophils Absolute 0 20 Thousand/µL      Basophils Absolute 0 05 Thousands/µL                  CTA head and neck with and without contrast   Final Result by WILVER Collado MD (05/23 1209)      No acute intracranial pathology  No significant stenosis of the cervical carotid or vertebral arteries   No significant intracranial stenosis, large vessel occlusion or aneurysm  Workstation performed: [id number]         XR chest 1 view portable    (Results Pending)   MRI Inpatient Order    (Results Pending)              Procedures  ECG 12 Lead Documentation Only  Performed by: Magalys Cisneros DO  Authorized by: Magalys Cisneros DO     ECG reviewed by me, the ED Provider: yes    Patient location:  ED  Previous ECG:     Previous ECG:  Unavailable  Interpretation:     Interpretation: non-specific    Rate:     ECG rate assessment: normal    Rhythm:     Rhythm: sinus rhythm    Ectopy:     Ectopy: none    QRS:     QRS axis:  Normal  Conduction:     Conduction: normal    ST segments:     ST segments:  Non-specific  T waves:     T waves: non-specific               ED Course                                             Medical Decision Making  Patient evaluated with labs, imaging  Reviewed results discussed with patient, patient improved with symptoms, admitted to hospitalist service for further evaluation and management  Patient verbalized understanding of results and agreed with the plan  TIA (transient ischemic attack): acute illness or injury  Amount and/or Complexity of Data Reviewed  Labs: ordered  Decision-making details documented in ED Course  Radiology: ordered and independent interpretation performed  Decision-making details documented in ED Course  ECG/medicine tests: ordered and independent interpretation performed  Decision-making details documented in ED Course  Risk  Prescription drug management  Decision regarding hospitalization            Disposition  Final diagnoses:   TIA (transient ischemic attack)     Time reflects when diagnosis was documented in both MDM as applicable and the Disposition within this note     Time User Action Codes Description Comment    5/23/2023 12:59 PM Nimco De Souza Add [G45 9] TIA (transient ischemic attack)     5/23/2023  1:55 PM Aidee Romero Add [H53 9] Visual disturbance       ED Disposition     ED Disposition   Admit    Condition   Stable Date/Time   Tue May 23, 2023 12:59 PM    Comment               Follow-up Information    None         Patient's Medications   Discharge Prescriptions    No medications on file       No discharge procedures on file      PDMP Review       Value Time User    PDMP Reviewed  Yes 11/18/2020  7:26 PM Alana Kelly MD          ED Provider  Electronically Signed by           Zohra Strauss DO  05/23/23 7716

## 2023-05-23 NOTE — H&P
[facility]  H&P  Name: Tyler Philip 57 y o  male I MRN: [id number]  Unit/Bed#: ED CT1 I Date of Admission: 5/23/2023   Date of Service: 5/23/2023 I Hospital Day: 0      Assessment/Plan   * TIA (transient ischemic attack)  Assessment & Plan  Patient presented to ED with right hand numbness, bottom lip numbness, and left visiual disturbance this morning which have resolved  EMS also noted left sided facial droop, however, patient does not agree he had one  CTA head/neck: no acute intracranial pathology, no significant stenosis, occlusion, or aneurysm  · Took aspirin 325 mg and Lipitor 80 mg  · MRI brain ordered  · Obtain ECHO with bubble study  · Check lipid panel and HbA1c  · Monitor on telemetry  · Neuro checks  · Hold home blood pressure medications  · Continue daily aspirin and high dose statin  · Speech, PT/OT consult  · Neurology consult    Essential hypertension  Assessment & Plan  · Patient takes amlodipine, metoprolol-HCTZ, and losartan  · Hold home regimen to allow for permissive hypertension    Type 2 diabetes mellitus with hyperglycemia [facility])  Assessment & Plan  Lab Results   Component Value Date    HGBA1C 6 3 (H) 04/28/2023       Recent Labs     05/23/23  1151   POCGLU 123       Blood Sugar Average: Last 72 hrs:  (P) 123   · On Ozempic outpatient  · SSI and accucheks ac + hs  · Diabetic diet    Seasonal affective disorder (HCC)  Assessment & Plan  · Continue bupropion    Mixed hyperlipidemia  Assessment & Plan  · Continue statin       VTE Pharmacologic Prophylaxis: VTE Score: 3 Moderate Risk (Score 3-4) - Pharmacological DVT Prophylaxis Ordered: enoxaparin (Lovenox)  Code Status: Level 1 - Full Code   Discussion with family: Patient declined call to   Updates his wife  Anticipated Length of Stay: Patient will be admitted on an observation basis with an anticipated length of stay of less than 2 midnights secondary to stroke pathway      Total Time Spent on Date "of Encounter in care of patient: 65 minutes This time was spent on one or more of the following: performing physical exam; counseling and coordination of care; obtaining or reviewing history; documenting in the medical record; reviewing/ordering tests, medications or procedures; communicating with other healthcare professionals and discussing with patient's family/caregivers  Chief Complaint: right hand numbness, lip numbness, visual disturbance    History of Present Illness:  Connie Diez is a 57 y o  male with a PMH of HTN, HLD, T2DM, seasonal affective disorder who presents with visual disturbance in his left eye starting this morning  He reports he had blurry vision in his left eye along with vertigo or \"sea sickness  \" He then developed bottom lip numbness and right hand numbness mostly in his fingers  He called EMS and his symptoms resolved before he arrived to the ED  EMS told him he had left sided facial droop but patient does not believe he did  He reports he had an episode with similar symptoms in the past but never had workup done  He denies headache, weakness, difficulty speaking or swallowing, nausea, vomiting, chest pain, shortness of breath  Review of Systems:  Review of Systems   Constitutional: Negative for chills and fever  HENT: Negative for ear pain and sore throat  Eyes: Positive for visual disturbance (left sided blurry vision)  Negative for pain  Respiratory: Negative for cough and shortness of breath  Cardiovascular: Negative for chest pain and palpitations  Gastrointestinal: Negative for abdominal pain and vomiting  Genitourinary: Negative for dysuria and hematuria  Musculoskeletal: Negative for arthralgias and back pain  Skin: Negative for color change and rash  Neurological: Positive for dizziness and numbness (bottom lip and right hand)  Negative for seizures and syncope  All other systems reviewed and are negative        Past Medical and Surgical History: " Past Medical History:   Diagnosis Date   • Diabetes mellitus ([facility] [address] )    • HTN (hypertension)    • Hyperlipidemia    • Hypertension    • Kidney stone    • Osteomyelitis ([facility] [address] ) 2020   • Seizures ([facility] )     Not since age 13       Past Surgical History:   Procedure Laterality Date   • COLONOSCOPY     • HAND SURGERY     • HERNIA REPAIR     • IR PICC PLACEMENT SINGLE LUMEN  12/08/2020    remove   • VASCULAR SURGERY     • WOUND DEBRIDEMENT N/A 11/30/2020    Procedure: DEBRIDEMENT OF ANTERIOR MANDIBLE BONE BIOPSY, CULTURES AND REMOVAL OF IMPLANT #27, USE OF PRP;  Surgeon: Pedro Pablo Pacheco DDS;  Location: [facility];  Service: Maxillofacial       Meds/Allergies:  Prior to Admission medications    Medication Sig Start Date End Date Taking?  Authorizing Provider   allopurinol (ZYLOPRIM) 100 mg tablet TAKE 1 TABLET BY MOUTH EVERY DAY 2/24/23   Meli Martinez MD   amLODIPine (NORVASC) 5 mg tablet Take 1 tablet (5 mg total) by mouth daily 4/21/23   Meli Martinez MD   Blood Glucose Monitoring Suppl (Contour Next One) JIN Use as directed 1/4/23   Historical Provider, MD   buPROPion (Wellbutrin XL) 150 mg 24 hr tablet Take 1 tablet (150 mg total) by mouth daily 4/21/23   Meli Martinez MD   cholecalciferol (VITAMIN D3) 1,000 units tablet Take 5,000 Units by mouth daily 10,000 units Patient stated that is continuously taking    Historical Provider, MD   Contour Next Test test strip TEST UP TO FOUR TIMES A DAY BEFORE MEALS AND BEDTIME 2/4/23   Jose Juan Jackson MD   glucosamine-chondroitin 500-400 MG tablet Take 2 tablets by mouth daily    Historical Provider, MD   losartan (COZAAR) 100 MG tablet Take 1 tablet (100 mg total) by mouth daily 2/15/23 2/10/24  Jose Juan Jackson MD   metoprolol-hydrochlorothiazide (LOPRESSOR HCT) 100-25 MG per tablet TAKE 1/2 TABLET BY MOUTH EVERY DAY 11/14/22   Meli Martinez MD   Microlet Lancets MISC TEST BLOOD SUGAR UP TO 4 TIMES DAILY (BEFORE MEALS AND AT BEDTIME) 1/5/23   Historical Provider, MD Omega-3 Fatty Acids (Omega-3 Fish Oil) 1000 MG CAPS Take 2,000 mg by mouth daily 22   Sherman Yanez MD   rosuvastatin (CRESTOR) 20 MG tablet Take 1 tablet (20 mg total) by mouth daily 23   Sherman Yanez MD   semaglutide, 0 25 or 0 5 mg/dose, (Ozempic, 0 25 or 0 5 MG/DOSE,) 2 mg/3 mL injection pen Use 0 5mg weekly  Patient taking differently: 0 25 mg every 7 days Sundays 4/22/23   Brittanie Lancaster MD     I have reviewed home medications with patient personally  Allergies: Allergies   Allergen Reactions   • Clindamycin Hives   • Penicillins Hives       Social History:  Marital Status: /Civil Union  Patient Pre-hospital Living Situation: Home  Patient Pre-hospital Level of Mobility: walks  Patient Pre-hospital Diet Restrictions: diabetic diet  Substance Use History:   Social History     Substance and Sexual Activity   Alcohol Use Yes   • Alcohol/week: 5 0 standard drinks   • Types: 5 Cans of beer per week     Social History     Tobacco Use   Smoking Status Former   • Packs/day: 3 00   • Types: Cigarettes, Pipe, Cigars   • Start date:    • Quit date: 2000   • Years since quittin 3   Smokeless Tobacco Never     Social History     Substance and Sexual Activity   Drug Use Never       Family History:  Family History   Problem Relation Age of Onset   • Cancer Mother    • Kidney disease Maternal Uncle        Physical Exam:     Vitals:   Blood Pressure: 160/82 (23 1337)  Pulse: 63 (23 1337)  Temperature: 98 2 °F (36 8 °C) (23 1153)  Temp Source: Tympanic (23 1124)  Respirations: 18 (23 1337)  Weight - Scale: 125 kg (275 lb 9 2 oz) (23 1124)  SpO2: 98 % (23 1337)    Physical Exam  Vitals and nursing note reviewed  Constitutional:       General: He is not in acute distress  Appearance: He is well-developed  HENT:      Head: Normocephalic and atraumatic     Eyes:      Conjunctiva/sclera: Conjunctivae normal    Cardiovascular:      Rate and Rhythm: Normal rate and regular rhythm  Heart sounds: No murmur heard  Pulmonary:      Effort: Pulmonary effort is normal  No respiratory distress  Breath sounds: Normal breath sounds  Abdominal:      Palpations: Abdomen is soft  Tenderness: There is no abdominal tenderness  Musculoskeletal:         General: No swelling  Cervical back: Neck supple  Skin:     General: Skin is warm and dry  Capillary Refill: Capillary refill takes less than 2 seconds  Neurological:      Mental Status: He is alert  Psychiatric:         Mood and Affect: Mood normal          Additional Data:     Lab Results:  Results from last 7 days   Lab Units 05/23/23  1131   WBC Thousand/uL 3 78*   HEMOGLOBIN g/dL 16 3   HEMATOCRIT % 45 9   PLATELETS Thousands/uL 179   NEUTROS PCT % 52   LYMPHS PCT % 30   MONOS PCT % 12   EOS PCT % 5     Results from last 7 days   Lab Units 05/23/23  1131   SODIUM mmol/L 136   POTASSIUM mmol/L 4 4   CHLORIDE mmol/L 103   CO2 mmol/L 24   BUN mg/dL 16   CREATININE mg/dL 1 00   ANION GAP mmol/L 9   CALCIUM mg/dL 9 2   ALBUMIN g/dL 4 3   TOTAL BILIRUBIN mg/dL 0 58   ALK PHOS U/L 56   ALT U/L 41   AST U/L 25   GLUCOSE RANDOM mg/dL 132         Results from last 7 days   Lab Units 05/23/23  1151   POC GLUCOSE mg/dl 123               Lines/Drains:  Invasive Devices     Peripheral Intravenous Line  Duration           Peripheral IV 05/23/23 Left; Lower Arm <1 day                    Imaging: Reviewed radiology reports from this admission including: CT head  CTA head and neck with and without contrast   Final Result by WILVER Gill MD (05/23 1209)      No acute intracranial pathology  No significant stenosis of the cervical carotid or vertebral arteries   No significant intracranial stenosis, large vessel occlusion or aneurysm                    Workstation performed: [id number]         XR chest 1 view portable    (Results Pending)   MRI Inpatient Order    (Results Pending)       EKG and Other Studies Reviewed on Admission:   · EKG: Sinus Bradycardia  HR 56     ** Please Note: This note has been constructed using a voice recognition system   **

## 2023-05-23 NOTE — ASSESSMENT & PLAN NOTE
Lab Results   Component Value Date    HGBA1C 6 3 (H) 04/28/2023       Recent Labs     05/23/23  1151   POCGLU 123       Blood Sugar Average: Last 72 hrs:  (P) 123   · On Ozempic outpatient  · SSI and accucheks ac + hs  · Diabetic diet

## 2023-05-24 ENCOUNTER — APPOINTMENT (OUTPATIENT)
Dept: RADIOLOGY | Facility: HOSPITAL | Age: 63
End: 2023-05-24

## 2023-05-24 ENCOUNTER — APPOINTMENT (OUTPATIENT)
Dept: NON INVASIVE DIAGNOSTICS | Facility: HOSPITAL | Age: 63
End: 2023-05-24

## 2023-05-24 VITALS
RESPIRATION RATE: 18 BRPM | HEIGHT: 74 IN | HEART RATE: 72 BPM | OXYGEN SATURATION: 95 % | SYSTOLIC BLOOD PRESSURE: 147 MMHG | TEMPERATURE: 97.8 F | DIASTOLIC BLOOD PRESSURE: 87 MMHG | BODY MASS INDEX: 35.37 KG/M2 | WEIGHT: 275.57 LBS

## 2023-05-24 LAB
ANION GAP SERPL CALCULATED.3IONS-SCNC: 8 MMOL/L (ref 4–13)
AORTIC ROOT: 3.6 CM
BUN SERPL-MCNC: 15 MG/DL (ref 5–25)
CALCIUM SERPL-MCNC: 8.8 MG/DL (ref 8.4–10.2)
CHLORIDE SERPL-SCNC: 103 MMOL/L (ref 96–108)
CHOLEST SERPL-MCNC: 200 MG/DL
CO2 SERPL-SCNC: 25 MMOL/L (ref 21–32)
CREAT SERPL-MCNC: 0.93 MG/DL (ref 0.6–1.3)
E WAVE DECELERATION TIME: 185 MS
ERYTHROCYTE [DISTWIDTH] IN BLOOD BY AUTOMATED COUNT: 12.4 % (ref 11.6–15.1)
EST. AVERAGE GLUCOSE BLD GHB EST-MCNC: 134 MG/DL
FRACTIONAL SHORTENING: 39 % (ref 28–44)
GFR SERPL CREATININE-BSD FRML MDRD: 86 ML/MIN/1.73SQ M
GLUCOSE P FAST SERPL-MCNC: 133 MG/DL (ref 65–99)
GLUCOSE SERPL-MCNC: 133 MG/DL (ref 65–140)
GLUCOSE SERPL-MCNC: 134 MG/DL (ref 65–140)
GLUCOSE SERPL-MCNC: 145 MG/DL (ref 65–140)
GLUCOSE SERPL-MCNC: 147 MG/DL (ref 65–140)
HBA1C MFR BLD: 6.3 %
HCT VFR BLD AUTO: 45.1 % (ref 36.5–49.3)
HDLC SERPL-MCNC: 28 MG/DL
HGB BLD-MCNC: 16.2 G/DL (ref 12–17)
INTERVENTRICULAR SEPTUM IN DIASTOLE (PARASTERNAL SHORT AXIS VIEW): 1.1 CM
INTERVENTRICULAR SEPTUM: 1.1 CM (ref 0.6–1.1)
LAAS-AP2: 29.2 CM2
LAAS-AP4: 25.1 CM2
LDLC SERPL DIRECT ASSAY-MCNC: 107 MG/DL (ref 0–100)
LEFT ATRIUM SIZE: 3.8 CM
LEFT INTERNAL DIMENSION IN SYSTOLE: 3 CM (ref 2.1–4)
LEFT VENTRICULAR INTERNAL DIMENSION IN DIASTOLE: 4.9 CM (ref 3.5–6)
LEFT VENTRICULAR POSTERIOR WALL IN END DIASTOLE: 1.2 CM
LEFT VENTRICULAR STROKE VOLUME: 75 ML
LVSV (TEICH): 75 ML
MCH RBC QN AUTO: 29.6 PG (ref 26.8–34.3)
MCHC RBC AUTO-ENTMCNC: 35.9 G/DL (ref 31.4–37.4)
MCV RBC AUTO: 82 FL (ref 82–98)
MV E'TISSUE VEL-SEP: 10 CM/S
MV PEAK A VEL: 0.91 M/S
MV PEAK E VEL: 104 CM/S
MV STENOSIS PRESSURE HALF TIME: 54 MS
MV VALVE AREA P 1/2 METHOD: 4.07 CM2
PLATELET # BLD AUTO: 180 THOUSANDS/UL (ref 149–390)
PMV BLD AUTO: 9.9 FL (ref 8.9–12.7)
POTASSIUM SERPL-SCNC: 4.2 MMOL/L (ref 3.5–5.3)
RBC # BLD AUTO: 5.47 MILLION/UL (ref 3.88–5.62)
RIGHT ATRIUM AREA SYSTOLE A4C: 19.2 CM2
RIGHT VENTRICLE ID DIMENSION: 3.9 CM
SL CV LEFT ATRIUM LENGTH A2C: 6.4 CM
SL CV LV EF: 60
SL CV PED ECHO LEFT VENTRICLE DIASTOLIC VOLUME (MOD BIPLANE) 2D: 111 ML
SL CV PED ECHO LEFT VENTRICLE SYSTOLIC VOLUME (MOD BIPLANE) 2D: 36 ML
SODIUM SERPL-SCNC: 136 MMOL/L (ref 135–147)
TR MAX PG: 25 MMHG
TR PEAK VELOCITY: 2.5 M/S
TRICUSPID ANNULAR PLANE SYSTOLIC EXCURSION: 2.4 CM
TRICUSPID VALVE PEAK REGURGITATION VELOCITY: 2.5 M/S
TRIGL SERPL-MCNC: 528 MG/DL
WBC # BLD AUTO: 4.26 THOUSAND/UL (ref 4.31–10.16)

## 2023-05-24 RX ORDER — ASPIRIN 81 MG/1
81 TABLET, CHEWABLE ORAL DAILY
Qty: 30 TABLET | Refills: 0 | Status: SHIPPED | OUTPATIENT
Start: 2023-05-25 | End: 2023-05-24

## 2023-05-24 RX ORDER — ROSUVASTATIN CALCIUM 20 MG/1
20 TABLET, COATED ORAL DAILY
Qty: 30 TABLET | Refills: 0 | Status: SHIPPED | OUTPATIENT
Start: 2023-05-24 | End: 2023-06-23

## 2023-05-24 RX ORDER — LORAZEPAM 2 MG/ML
1 INJECTION INTRAMUSCULAR ONCE
Status: COMPLETED | OUTPATIENT
Start: 2023-05-24 | End: 2023-05-24

## 2023-05-24 RX ADMIN — ATORVASTATIN CALCIUM 40 MG: 40 TABLET, FILM COATED ORAL at 17:42

## 2023-05-24 RX ADMIN — BUPROPION HYDROCHLORIDE 150 MG: 150 TABLET, EXTENDED RELEASE ORAL at 08:00

## 2023-05-24 RX ADMIN — ALLOPURINOL 100 MG: 100 TABLET ORAL at 08:01

## 2023-05-24 RX ADMIN — ASPIRIN 81 MG CHEWABLE TABLET 81 MG: 81 TABLET CHEWABLE at 08:00

## 2023-05-24 RX ADMIN — Medication 5000 UNITS: at 08:09

## 2023-05-24 RX ADMIN — OMEGA-3 FATTY ACIDS CAP 1000 MG 2000 MG: 1000 CAP at 08:01

## 2023-05-24 RX ADMIN — ENOXAPARIN SODIUM 40 MG: 40 INJECTION SUBCUTANEOUS at 08:02

## 2023-05-24 RX ADMIN — LORAZEPAM 1 MG: 2 INJECTION INTRAMUSCULAR; INTRAVENOUS at 09:23

## 2023-05-24 RX ADMIN — INSULIN LISPRO 2 UNITS: 100 INJECTION, SOLUTION INTRAVENOUS; SUBCUTANEOUS at 07:23

## 2023-05-24 NOTE — ASSESSMENT & PLAN NOTE
Lab Results   Component Value Date    HGBA1C 6 3 (H) 05/24/2023       Recent Labs     05/23/23 2050 05/24/23  0709 05/24/23  1112 05/24/23  1605   POCGLU 101 147* 134 145*       Blood Sugar Average: Last 72 hrs:  (P) 837 [id number]   · On Ozempic outpatient  · Diabetic diet

## 2023-05-24 NOTE — CONSULTS
[address]   Neurology Initial Consult    Vincenzo Jackson is a 57 y o  male  [address] [place] and [place]-*          Information obtained from:   Chief Complaint   Patient presents with   • Numbness     Pt reports of r arm numbness started about an hour and a half ago, pt's symptoms cleared upon arrival          Assessment/Plan:    1  Suspected TIA  2  H/O Vertigo  3  HTN  4  HLD  5  DM  6  Possible sleep apnea    -Monitor on telemetry  -Neurological assessments  -Baby aspirin 81 mg daily continue upon discharge  -Lipitor 80 mg daily-patient can restart Crestor 20 mg daily -Has been off of this medication for multiple months due to lack of refilling prior prescriptions  -MRI of the brain completed and negative for acute ischemic event  -Echocardiogram shunt study-notes 60% ejection fraction, mildly dilated left atrium with no atrial septal defect or PFO seen  Right atrium is normal in size  -Lipid profile and A1c  -Recheck lipid profile in 3 months postdischarge  -PT/OT/ST    Patient is a 59-year-old male with PMH of BPH, HTN, HLD, DM, childhood seizures and possibly DEBRA being worked up in the outpatient setting by sleep medicine was brought to the ER with sudden onset of visual changes  Patient noted having haziness to the left eye with loss of peripheral vision  He then developed a numbness and tingling in the left face from his forehead down into his lips  Noted that the numbness started to extend down around his lips into the center portion  He noted shortly thereafter he developed some numbness and tingling to his fingers on his right hand  He noted that they were called and has indicated positive history for Raynaud's phenomenon  Patient denied having any headache however did report having a vertiginous type sensation such as seasickness during this event  Patient noted having history of vertigo mostly with visual changes    He had a new prescription with bifocal glasses, ordered transition lenses however the first 2 times he tried to wear the lenses he developed vertigo  He returned him to the optometrist and got bifocals with lines and has had no issues since  Patient does have hypercholesterolemia with an LDL of 107  Discussed his vascular goal ranges to be less than 70  Patient stated he was on Crestor 20 however it seems to have fallen off of his medication list through his pharmacy therefore was not refilled for some time  He did get it reordered and is currently sitting at the pharmacy for him to   Patient is not on any AP therapy but he does take multiple antihypertensives for uncontrolled hypertension  Neurological evaluation with no significant focal deficits  He had equal strength sensation and reflexes throughout  He believes that his symptoms did resolve he is back to his baseline  Patient had no visual disruptions, his periphery was intact, no visual extinction  His EOM I and CN II through XII are intact  Patient's gait was steady with the exception of tandem gait which was mildly ataxic  No ataxia noted on coordination testing  Patient went for an MRI with negative for any ischemic event  He had his echocardiogram which does show some mildly dilated left atrium no PFO or shunt was seen  Patient has no history of migraine headaches, he does have a brief history of vertigo  He has had recent visual changes with corrective glasses received  Given his comorbidities with HTN, HLD and DM, cannot completely rule out the possibility of TIA for which she should remain on baby aspirin and restart on his Crestor dosing with modified lifestyle changes  PT/OT/ST recommendations, follow-up in the outpatient clinic for further management  Bill Mckeon will need follow up in 8-10 weeks with general attending or advance practitioner  He will not require outpatient neurological testing  HPI:  Adal Rossi is a 54yo male with PMH of BPH, HTN, HLD, DM, seizure as a child and DEBRA-suspected  Patient reported having sudden onset of haziness to his left eye visual fields  Reports then loss of peripheral vision to the left followed by left facial numbness starting from the top of his face down to around his left lower lip extending and centrally  Patient reported shortly after that he developed some right hand numbness, mostly around his fingers with cold sensation  Patient reports having history of Raynaud's phenomenon therefore not sure if this was related to that or something else neurologically  Patient denied having any significant headache however did report having a seasickness type sensation  Patient does report a history of vertigo after noting some visual changes for which he received new prescription on his eyeglasses  Patient was prescribed bifocal glasses and started on transitions  These caused him first onset of vertigo he took them off the vertigo settled  He did try to wear them again and instantly had another bout of vertigo therefore stopped wearing the transition glasses and return to optometrist for blind bifocals  Patient does better with his current set of glasses that are not transitions  Patient was concerned with regards to his symptoms therefore was brought to the ER  Upon arriving to the ER he had a blood pressure of 187/91  He did report having episode of chest heaviness as well  His last known normal was 9 AM, no stroke alert was called as patient had fair resolution of his symptoms  Patient was taken for CTA of the head and neck which showed positive mucosal thickening with mild ICA stenosis noted  Patient received a baby aspirin in the ER as he was not previously on AP therapy, Lipitor 80 mg and was admitted under the stroke pathway for further evaluation and treatment  Patient went for an MRI of the brain earlier this morning  Was negative for any acute ischemic event  Showed mild chronic microangiopathy    Patient's cholesterol was 200,   A1c 6 3  Note A1c dated 12/6/2022, was 10 8 therefore he has had significant improvement over the last 6 months  Patient is on multiple medications including antihypertensives of Norvasc, Cozaar, Lopressor and hydrochlorothiazide  He was on Crestor 20 mg however stopped taking the medication as it did seem to have dropped off his medication listing at the pharmacy and was not refilled  He states that it was just renewed and reordered, he has not yet picked it up from the pharmacy to start it  Patient's neurological assessment is nonfocal   He has equal strength, sensation and reflexes  He has no ataxia noted on coordination testing  EOM is intact as well as remaining CN II through XII  Patient's gait is steady with the exception of some mildly ataxic tandem gait  Patient is negative for acute ischemic event however he does have significant vascular risk factors for these  Denies any headache that would be considered for complex migraine  Therefore question the possibility of TIA however patient symptomology are both right and left-sided, consider the possibility of 2 separate events  Would recommend patient remain on baby aspirin 81 mg daily, restart on his Crestor 20 mg daily and follow-up lipid profile in 3 months  Encouraged to maintain normotensive blood pressures and euglycemia          Past Medical History:   Diagnosis Date   • Diabetes mellitus ([facility] )    • HTN (hypertension)    • Hyperlipidemia    • Hypertension    • Kidney stone    • Osteomyelitis ([facility] [address] ) 2020   • Seizures ([facility] )     Not since age 13       Past Surgical History:   Procedure Laterality Date   • COLONOSCOPY     • HAND SURGERY     • HERNIA REPAIR     • IR PICC PLACEMENT SINGLE LUMEN  12/08/2020    remove   • VASCULAR SURGERY     • WOUND DEBRIDEMENT N/A 11/30/2020    Procedure: DEBRIDEMENT OF ANTERIOR MANDIBLE BONE BIOPSY, CULTURES AND REMOVAL OF IMPLANT #27, USE OF PRP;  Surgeon: Shantelle Srinivasan DDS;  Location: [facility];  Service: Maxillofacial       Allergies   Allergen Reactions   • Clindamycin Hives   • Penicillins Hives         Current Facility-Administered Medications:   •  allopurinol (ZYLOPRIM) tablet 100 mg, 100 mg, Oral, Daily, Marjorie Bermudez PA-C, 100 mg at 23 0801  •  aspirin chewable tablet 81 mg, 81 mg, Oral, Daily, Marjorie Bermudez PA-C, 81 mg at 23 0800  •  atorvastatin (LIPITOR) tablet 40 mg, 40 mg, Oral, QPM, Marjorie Bermudez PA-C  •  buPROPion (WELLBUTRIN XL) 24 hr tablet 150 mg, 150 mg, Oral, Daily, Marjorie Bermudez PA-C, 150 mg at 23 0800  •  cholecalciferol (VITAMIN D3) tablet 5,000 Units, 5,000 Units, Oral, Daily, Marjorie Bermudez PA-C, 5,000 Units at 23 0809  •  enoxaparin (LOVENOX) subcutaneous injection 40 mg, 40 mg, Subcutaneous, Daily, Marjorie Bermudez PA-C, 40 mg at 23 8484  •  fish oil capsule 2,000 mg, 2,000 mg, Oral, Daily, Marjorie Bermudez PA-C, 2,000 mg at 23 0801  •  insulin lispro (HumaLOG) 100 units/mL subcutaneous injection 1-6 Units, 1-6 Units, Subcutaneous, HS, Marjorie Bermudez PA-C  •  insulin lispro (HumaLOG) 100 units/mL subcutaneous injection 2-12 Units, 2-12 Units, Subcutaneous, TID AC, 2 Units at 23 0723 **AND** Fingerstick Glucose (POCT), , , TID AC, Marjorie Bermudez PA-C    Social History     Socioeconomic History   • Marital status: /Civil Union     Spouse name: Not on file   • Number of children: Not on file   • Years of education: Not on file   • Highest education level: Not on file   Occupational History   • Not on file   Tobacco Use   • Smoking status: Former     Packs/day: 3 00     Types: Cigarettes, Pipe, Cigars     Start date: 54     Quit date: 2000     Years since quittin 3   • Smokeless tobacco: Never   Vaping Use   • Vaping Use: Never used   Substance and Sexual Activity   • Alcohol use: Not Currently     Alcohol/week: 5 0 standard drinks of alcohol     Types: 5 Cans of beer per week   • Drug use: Never   • Sexual activity: Not "Currently   Other Topics Concern   • Not on file   Social History Narrative   • Not on file     Social Determinants of Health     Financial Resource Strain: Not on file   Food Insecurity: No Food Insecurity (5/24/2023)    Hunger Vital Sign    • Worried About Running Out of Food in the Last Year: Never true    • Ran Out of Food in the Last Year: Never true   Transportation Needs: No Transportation Needs (5/24/2023)    PRAPARE - Transportation    • Lack of Transportation (Medical): No    • Lack of Transportation (Non-Medical): No   Physical Activity: Not on file   Stress: Not on file   Social Connections: Not on file   Intimate Partner Violence: Not on file   Housing Stability: Low Risk  (5/24/2023)    Housing Stability Vital Sign    • Unable to Pay for Housing in the Last Year: No    • Number of Places Lived in the Last Year: 1    • Unstable Housing in the Last Year: No       Family History   Problem Relation Age of Onset   • Cancer Mother    • Kidney disease Maternal Uncle          Review of systems:  Please see HPI for positive symptoms  Constitutional: No fever, no chills, no weight change    + Visually stimulated vertigo  Ocular: No diplopia, no blurred vision, spots/zigzag lines  + Lazy eye   HEENT:  No sore throat, headache or congestion  COR:  No chest pain  No palpitations  Lungs:  no sob  GI:  no  nausea, no vomiting, no diarrhea, no constipation, no anorexia  :  No dysuria, frequency, or urgency  No hematuria  Musculoskeletal:  No joint pain or swelling   Skin:  No rash or itching  Psychiatric:  no anxiety, no depression  Endocrine:  No polyuria or polydipsia  Physical examination:  /87 (BP Location: Right arm)   Pulse 72   Temp 97 8 °F (36 6 °C) (Oral)   Resp 18   Ht 6' 2\" (1 88 m)   Wt 125 kg (275 lb 9 2 oz)   SpO2 95%   BMI 35 38 kg/m²     GENERAL APPEARANCE:  The patient is alert, oriented  HEENT:  Head is normocephalic  Pupils are equal and reactive      NECK:  " Supple without lymphadenopathy  HEART:  Regular rate and rhythm  LUNGS: No audible wheezing or stridor heard  ABDOMEN:  Soft, nontender, nondistended    EXTREMITIES:  Without cyanosis, clubbing or edema  Mental status: The patient is alert, attentive, and oriented  Speech is clear and fluent, good repetition, comprehension, and naming  Cranial nerves:  CN II: Visual fields are full to confrontation  Fundoscopic exam is normal with sharp discs and no vascular changes  Pupils are 3 mm and briskly reactive to light  CN III, IV, VI: At primary gaze, there is no eye deviation  CN V: Facial sensation is intact to pinprick in all 3 divisions bilaterally  Corneal responses are intact  CN VII: Face is symmetric with normal eye closure and smile  CN VIII: Hearing is normal to rubbing fingers  CN IX, X: Palate elevates symmetrically  Phonation is normal   CN XI: Head turning and shoulder shrug are intact  CN XII: Tongue is midline with normal movements and no atrophy  Motor: There is no pronator drift of out-stretched arms  Muscle bulk and tone are normal    Muscle exam  Arm Right Left Leg Right Left   Deltoid 5/5 5/5 Iliopsoas 5/5 5/5   Biceps 5/5 5/5 Quads 5/5 5/5   Triceps 5/5 5/5 Hamstrings 5/5 5/5   Wrist Extension 5/5 5/5 Ankle Dorsi Flexion 5/5 5/5   Wrist Flexion 5/5 5/5 Ankle Plantar Flexion 5/5 5/5        Reflexes    RJ BJ TJ KJ AJ Plantars Hope's   Right 2+ 2+ 2+ 2+ 2+ Downgoing Not present   Left 2+ 2+ 2+ 2+ 2+ Downgoing Not present      Sensory:  Light touch, Temperature, position sense, and vibration sense are intact in fingers and toes  Coordination:  Rapid alternating movements and fine finger movements are intact  There is no dysmetria on finger-to-nose and heel-knee-shin  There are no abnormal or extraneous movements  Romberg negative  Gait/Stance:  Normal heel/toe walking with mildly ataxic tandem gait      Lab Results   Component Value Date    HCT 45 1 05/24/2023    HGB 16 2 05/24/2023    MCV 82 05/24/2023     05/24/2023    WBC 4 26 (L) 05/24/2023     Lab Results   Component Value Date    HGBA1C 6 3 (H) 05/24/2023     Lab Results   Component Value Date    ALKPHOS 56 05/23/2023    ALT 41 05/23/2023    AST 25 05/23/2023    BILITOT 0 4 10/08/2014     Lab Results   Component Value Date    BUN 15 05/24/2023    CALCIUM 8 8 05/24/2023     05/24/2023    CO2 25 05/24/2023    CREATININE 0 93 05/24/2023    GLUCOSE 127 10/08/2014    K 4 2 05/24/2023     10/08/2014     Cholesterol 200      Review of reports and notes reveal:  Independent Interpretation of images or specimens:  MRI brain wo contrast    Result Date: 5/24/2023  No acute ischemia  Mild chronic microangiopathy  Workstation performed: [id number]     XR chest 1 view portable    Result Date: 5/24/2023  No active pulmonary disease  Workstation performed: [id number]     CTA head and neck with and without contrast    Result Date: 5/23/2023  No acute intracranial pathology  No significant stenosis of the cervical carotid or vertebral arteries No significant intracranial stenosis, large vessel occlusion or aneurysm  Workstation performed: [id number]           Thank you for this consult  Total time of encounter: 70 Minutes  More than 50% of time was spent in counseling and coordination of care of patient  Reviewed patient signs and symptoms, diagnostic studies, treatment recommendations and follow-up care with patient at bedside    Discussed with medical team and attending neurology MD

## 2023-05-24 NOTE — ASSESSMENT & PLAN NOTE
· Continue statin  · As noted above patient reported that he ran out of his meds about 2 months ago, reordered for him, will continue with Crestor 20 mg p o  daily    · Recommend repeat lipid panel in approximately 3 months with PCP  · Patient will be discharged home today

## 2023-05-24 NOTE — ASSESSMENT & PLAN NOTE
· Patient takes amlodipine, metoprolol-HCTZ, and losartan  · Resume  · Follow-up outpatient PCP 1 to 2 weeks post discharge

## 2023-05-24 NOTE — NURSING NOTE
Patient discharged home with spouse and paperwork  Patient to follow up with cardiology  Pt given script for stress test  IV removed intact and tolerated well

## 2023-05-24 NOTE — ASSESSMENT & PLAN NOTE
Patient presented to ED with right hand numbness, bottom lip numbness, and left visiual disturbance this morning which have resolved  EMS also noted left sided facial droop, however, patient does not agree he had one    CTA head/neck: no acute intracranial pathology, no significant stenosis, occlusion, or aneurysm  · Took aspirin 325 mg and Lipitor 80 mg  · MRI brain negative  · ECHO with bubble study negative for PFO  · Check lipid panel and HbA1c/panel elevated hemoglobin A1c 6 3  · Reports that he ran out of his Crestor 2 months ago, will resume on discharge  · Telemetry showed no events overnight, discontinued  · Neuro checks  · Hold home blood pressure medications  · Continue daily aspirin and high dose statin  · Speech, PT/OT consult, no needs identified  · Neurology consulted, appreciate recommendations  · Patient will be discharged to home today and follow-up with his outpatient PCP 1 to 2 weeks post discharge

## 2023-05-24 NOTE — CASE MANAGEMENT
Case Management Assessment & Discharge Planning Note    Patient name Cecilio Beverage  Location [facility] Dianne Vital [facility]-* MRN [id number]  : 1960 Date 2023       Current Admission Date: 2023  Current Admission Diagnosis:TIA (transient ischemic attack)   Patient Active Problem List    Diagnosis Date Noted   • TIA (transient ischemic attack) 2023   • Visual disturbance 2023   • Aphasia 10/25/2022   • Type 2 diabetes mellitus with hyperglycemia ([facility] ) 2022   • Seasonal affective disorder ([facility] ) 2022   • Palpitation 2022   • Right foot pain 2022   • Adhesive capsulitis of right shoulder 2021   • Balance problem 2021   • Wellness examination 2021   • Essential hypertension 2021   • Mixed hyperlipidemia 2021   • Idiopathic chronic gout 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 2021   • Leukopenia 2021   • Transaminitis 2021   • Long term (current) use of antibiotics 2020   • Osteomyelitis, jaw acute 2020   • Streptococcal infection 2020   • Candida glabrata infection 2020   • Trigeminal neuritis 2020      LOS (days): 0  Geometric Mean LOS (GMLOS) (days):   Days to GMLOS:     OBJECTIVE:            Current admission status: Observation  Referral Reason: Stroke    Preferred Pharmacy:   [facility]/pharmacy #[id number] [address]  Phone: [phone] Fax: [phone]    Primary Care Provider: Ayaz San MD    Primary Insurance: [facility]  Secondary Insurance:     ASSESSMENT:  Markus Lucero Proxies    There are no active Health Care Proxies on file         Readmission Root Cause  30 Day Readmission: No    Patient Information  Admitted from[de-identified] Home  Mental Status: Alert  During Assessment patient was accompanied by: Not accompanied during assessment  Assessment information provided by[de-identified] Patient  Support Systems: Spouse/significant other  [place] of Residence: [address] do you live in?: [facility] entry access options   Select all that apply : No steps to enter home  Type of Current Residence: [place]  In the last 12 months, was there a time when you were not able to pay the mortgage or rent on time?: No  In the last 12 months, how many places have you lived?: 1  In the last 12 months, was there a time when you did not have a steady place to sleep or slept in a shelter (including now)?: No  Homeless/housing insecurity resource given?: N/A  Living Arrangements: Lives w/ Spouse/significant other    Activities of Daily Living Prior to Admission  Functional Status: Independent  Completes ADLs independently?: Yes  Ambulates independently?: Yes  Does patient use assisted devices?: No  Does patient currently own DME?: Yes  What DME does the patient currently own?: [employer] Horton  Does patient have a history of Outpatient Therapy (PT/OT)?: Yes ([facility] on [facility])  Does the patient have a history of Short-Term Rehab?: No  Does patient have a history of HHC?: No  Does patient currently have [address]?: No     Patient Information Continued  Does patient have prescription coverage?: Yes  Within the past 12 months, you worried that your food would run out before you got the money to buy more : Never true  Within the past 12 months, the food you bought just didn't last and you didn't have money to get more : Never true  Food insecurity resource given?: N/A  Does patient receive dialysis treatments?: No     Means of Transportation  Means of Transport to Appts[de-identified] Drives Self  In the past 12 months, has lack of transportation kept you from medical appointments or from getting medications?: No  In the past 12 months, has lack of transportation kept you from meetings, work, or from getting things needed for daily living?: No  Was application for public transport provided?: N/A    DISCHARGE DETAILS:    Discharge planning discussed with[de-identified] Patient     Other Referral/Resources/Interventions Provided:  Interventions: None Indicated  Referral Comments: CM met with patient at bedside, introduced self, role, complete assessment and discharge planning  Patient stated he is indepedent with adls and iadls and denied having any concerns regarding prescriptions, food, housing, and finance  He drives himself to medical appts  He has a cane and walker at home and will use if needed  Has a good support system at home, wife assists as needed and so do children  No CM needs indicated at this time

## 2023-05-24 NOTE — SPEECH THERAPY NOTE
Speech/Language Pathology  Assessment    Patient Name: Kwaku Livingston  FMLCM'Q Date: 5/24/2023     Problem List  Principal Problem:    TIA (transient ischemic attack)  Active Problems:    Essential hypertension    Mixed hyperlipidemia    Seasonal affective disorder ([facility] )    Type 2 diabetes mellitus with hyperglycemia ([facility] )    Past Medical History  Past Medical History:   Diagnosis Date   • Diabetes mellitus ([facility] )    • HTN (hypertension)    • Hyperlipidemia    • Hypertension    • Kidney stone    • [facility] ) 2020   • Seizures ([facility] )     Not since age 13     Past Surgical History  Past Surgical History:   Procedure Laterality Date   • COLONOSCOPY     • HAND SURGERY     • HERNIA REPAIR     • IR PICC PLACEMENT SINGLE LUMEN  12/08/2020    remove   • VASCULAR SURGERY     • WOUND DEBRIDEMENT N/A 11/30/2020    Procedure: DEBRIDEMENT OF ANTERIOR MANDIBLE BONE BIOPSY, CULTURES AND REMOVAL OF IMPLANT #27, USE OF PRP;  Surgeon: Herbert Agustin DDS;  Location: [facility];  Service: Maxillofacial     Consult received  Records reviewed  Pt admitted c symptoms concerning for CVA/TIA  Pt A & O this am and reported sxs resolved  He passed RN Dysphagia Assessment as well as my screening assessment with pancakes and thin liquid  Communication deficits denied and none noted in conversation  Breezy Stauffer MRI of brain pending  No additional inpatient Speech Pathology evaluation appears indicated at this time  Please re-consult if additional concerns arise  Thank you      Alva Denny [phone]

## 2023-05-24 NOTE — OCCUPATIONAL THERAPY NOTE
Occupational Therapy Evaluation    Patient is currently independent in all ADLs and mobility at this time, NO skilled inpatient OT or PT needs at this time  05/24/23 1055   Note Type   Note type Evaluation   Pain Assessment   Pain Assessment Tool 0-10   Pain Score No Pain   Home Living   Type of Home House   Home Layout One level  (No ELIN)   Bathroom Shower/Tub Walk-in shower   Bathroom Toilet Standard   Bathroom Equipment Built-in shower seat   Home Equipment Cane   Prior Function   Level of Pep Independent with ADLs; Independent with functional mobility; Independent with IADLS   Lives With Spouse   Receives Help From Family   IADLs Independent with driving; Independent with meal prep; Independent with medication management   Comments PTA patient was independent in all ADLs and mobility without AD   General   Additional Pertinent History Patient presented to hospital with c/o sudden onset visual disturbance and R hand and lip numbness, reports all symptoms resolved prior to admission to ED, MRI negative for acute intracranial pathology   ADL   Eating Assistance 7  Independent   Grooming Assistance 7  Independent   UB Bathing Assistance 7  Independent   LB [address] 7  Independent   LB Dressing Deficit Don/doff R shoe;Don/doff L shoe   Toileting Assistance  7  Independent   Bed Mobility   Supine to Sit 7  Independent   Transfers   Sit to Stand 7  Independent   Stand to [address] [place] transfer 7  Independent   Additional items Standard toilet  (Ambulatory transfer, no AD)   Functional Mobility   Functional Mobility 7  Independent   Additional Comments Ambulated short household distance in room without AD   Balance   Static Sitting Good   Dynamic Sitting Good   Static Standing Good   Dynamic Standing Good   Activity Tolerance   Activity Tolerance Patient tolerated treatment well   RUE Assessment   RUE Assessment WFL   LUE Assessment   LUE Assessment WFL   Hand Function   Gross Motor Coordination Functional   Fine Motor Coordination Functional   Sensation   Light Touch No apparent deficits   Proprioception   Proprioception No apparent deficits   Vision-Basic Assessment   Current Vision Wears glasses only for reading   Vision - Complex Assessment   Ocular Range of Motion Intact   Tracking Intact   Acuity Able to read employee name badge without difficulty   Cognition   Overall Cognitive Status [facility]   Arousal/Participation Alert; Cooperative   Attention Within functional limits   Orientation Level Oriented X4   Memory Within functional limits   Following Commands Follows all commands and directions without difficulty   Assessment   Prognosis Good   Assessment Patient evaluated by Occupational Therapy  Patient admitted with TIA (transient ischemic attack)  The patients occupational profile, medical and therapy history includes a brief history with review of medical and/or therapy records related to the presenting problem  Comorbidities affecting functional mobility and ADLS include: HTN, HLD, seizures, osteomyelitis, DM  Prior to admission, patient was independent with functional mobility without assistive device, independent with ADLS and independent with IADLS  The evaluation identifies the following performance deficits: no deficits, that result in activity limitations and/or participation restrictions  This evaluation requires clinical decision making of low complexity, because the patients presents with no comorbidities that affect occupational performance and required no modification of tasks or assistance with consideration of a limited number of treatment options  The Barthel Index was used as a functional outcome tool presenting with a score of Barthel Index Score: 100, indicating no limitations of functional mobility and ADLS    The patient's raw score on the -PAC Daily Activity Inpatient Short Form is 24  A raw score of greater than or equal to 19 suggests the patient may benefit from discharge to home  Please refer to the recommendation of the Occupational Therapist for safe discharge planning  Patient is currently independent in all ADLs and mobility without AD; no neurological symptoms at this time, no skilled inpatient OT services indicated at this time     Goals   Patient Goals Go home   Plan   OT Frequency Eval only   Recommendation   OT Discharge Recommendation No rehabilitation needs   AM-PAC Daily Activity Inpatient   Lower Body Dressing 4   Bathing 4   Toileting 4   Upper Body Dressing 4   Grooming 4   Eating 4   Daily Activity Raw Score 24   Daily Activity Standardized Score (Calc for Raw Score >=11) 57 54   AM-PAC Applied Cognition Inpatient   Following a Speech/Presentation 4   Understanding Ordinary Conversation 4   Taking Medications 4   Remembering Where Things Are Placed or Put Away 4   Remembering List of 4-5 Errands 4   Taking Care of Complicated Tasks 4   Applied Cognition Raw Score 24   Applied Cognition Standardized Score 62 21   Barthel Index   Feeding 10   Bathing 5   Grooming Score 5   Dressing Score 10   Bladder Score 10   Bowels Score 10   Toilet Use Score 10   Transfers (Bed/Chair) Score 15   Mobility (Level Surface) Score 15   Stairs Score 10   Barthel Index Score 615   Licensure   [place] License Number  Trace Chavo OTR/L [id number]

## 2023-05-24 NOTE — UTILIZATION REVIEW
"Initial Clinical Review    Admission: Date/Time/Statement:   Admission Orders (From admission, onward)     Ordered        05/23/23 1259  Place in Observation  Once                      Orders Placed This Encounter   Procedures   • Place in Observation     Standing Status:   Standing     Number of Occurrences:   1     Order Specific Question:   Level of Care     Answer:   Med Surg [16]     ED Arrival Information     Expected   -    Arrival   5/23/2023 11:21    Acuity   Emergent            Means of arrival   Ambulance    Escorted by   [facility]    Service   Hospitalist    Admission type   Emergency            Arrival complaint   stroke alert           Chief Complaint   Patient presents with   • Numbness     Pt reports of r arm numbness started about an hour and a half ago, pt's symptoms cleared upon arrival        Initial Presentation: 57 y o  male , presented to the ED @ [facility], from home via EMS  Admitted as Observation due to TIA  Date: 05/23/2023   Presents with visual disturbance in his left eye starting this morning  He reports he had blurry vision in his left eye along with vertigo or \"sea sickness  \" He then developed bottom lip numbness and right hand numbness mostly in his fingers  He called EMS and his symptoms resolved before he arrived to the ED  EMS told him he had left sided facial droop but patient does not believe he did  He reports he had an episode with similar symptoms in the past but never had workup done  He denies headache, weakness, difficulty speaking or swallowing, nausea, vomiting, chest pain, shortness of breath  CTA head/neck: no acute intracranial pathology, no significant stenosis, occlusion, or aneurysm  Took aspirin 325 mg and Lipitor 80 mg  Continue daily aspirin and high dose statin  MRI brain ordered  Obtain ECHO with bubble study  Check lipid panel and HbA1c  Monitor on telemetry  Neuro checks  Hold home blood pressure medications  Speech, PT/OT consult    " Neurology consult  Day 2: 05/24/2023   PT/OT/ST      05/24/2023 Consult Neuro:  1  Suspected TIA  2  H/O Vertigo  3  HTN  4   HLD  5   DM   6   Possible sleep apnea    -Monitor on telemetry  -Neurological assessments  -Baby aspirin 81 mg daily continue upon discharge  -Lipitor 80 mg daily-patient can restart Crestor 20 mg daily -Has been off of this medication for multiple months due to lack of refilling prior prescriptions  -MRI of the brain completed and negative for acute ischemic event  -Echocardiogram shunt study-notes 60% ejection fraction, mildly dilated left atrium with no atrial septal defect or PFO seen    Right atrium is normal in size  -Lipid profile and A1c  -Recheck lipid profile in 3 months postdischarge  -PT/OT/ST    ED Triage Vitals   Temperature Pulse Respirations Blood Pressure SpO2   05/23/23 1153 05/23/23 1124 05/23/23 1124 05/23/23 1124 05/23/23 1124   98 2 °F (36 8 °C) 58 20 (!) 187/91 98 %      Temp Source Heart Rate Source Patient Position - Orthostatic VS BP Location FiO2 (%)   05/23/23 1124 05/23/23 1124 -- 05/23/23 1337 --   Tympanic Monitor  Right arm       Pain Score       05/23/23 1721       No Pain          Wt Readings from Last 1 Encounters:   05/24/23 125 kg (275 lb 9 2 oz)     Additional Vital Signs:   Date/Time Temp Pulse Resp BP MAP (mmHg) SpO2 O2 Device   05/24/23 1240 98 2 °F (36 8 °C) 71 18 147/85 106 95 % None (Room air)   05/24/23 09:14:23 98 1 °F (36 7 °C) 61 18 148/80 103 95 % --   05/24/23 0840 98 2 °F (36 8 °C) 70 18 142/79 100 95 % None (Room air)   05/24/23 0733 -- 58 -- 148/80 -- 95 % --   05/24/23 0440 -- 54 Abnormal  20 143/81 102 92 % --   05/24/23 0240 98 1 °F (36 7 °C) 57 16 148/83 105 95 % --   05/24/23 0040 98 5 °F (36 9 °C) 59 16 118/59 79 93 % --   05/23/23 2240 98 7 °F (37 1 °C) 65 20 139/89 106 94 % --   05/23/23 2040 -- 61 16 139/81 100 94 % --   05/23/23 19:26:15 99 °F (37 2 °C) 59 18 137/82 100 94 % --   05/23/23 1900 98 °F (36 7 °C) 58 18 141/68 -- 96 % --   05/23/23 1800 98 °F (36 7 °C) 67 18 144/76 -- 95 % --   05/23/23 1740 -- -- -- -- -- -- None (Room air)   05/23/23 17:21:50 97 5 °F (36 4 °C) 57 18 145/76 99 96 % --   05/23/23 1337 -- 63 18 160/82 -- 98 % None (Room air)   05/23/23 1230 -- 56 20 148/83 110 95 % --   05/23/23 1215 -- 57 14 151/83 110 95 % --   05/23/23 1153 98 2 °F (36 8 °C) -- -- -- -- -- --   05/23/23 1135 -- -- -- -- -- -- None (Room air)   05/23/23 1130 -- 57 16 187/91 Abnormal  127 99 % --     Date and Time Eye Opening Best Verbal Response Best Motor Response [place] Coma Scale Score   05/24/23 1240 4 5 6 15   05/24/23 0840 4 5 6 15   05/24/23 0800 4 5 6 15   05/24/23 0440 4 5 6 15   05/24/23 0240 4 5 6 15   05/24/23 0040 4 5 6 15   05/23/23 2345 4 5 6 15   05/23/23 2240 4 5 6 15   05/23/23 2040 4 5 6 15   05/23/23 1940 4 5 6 15   05/23/23 1135 4 5 6 15           Pertinent Labs/Diagnostic Test Results:   XR chest 1 view portable   Final Result by Shahida Schaeffer MD (05/24 9194)      No active pulmonary disease  CTA head and neck with and without contrast   Final Result by WILVER Mosqueda MD (05/23 1208)      No acute intracranial pathology  No significant stenosis of the cervical carotid or vertebral arteries   No significant intracranial stenosis, large vessel occlusion or aneurysm        MRI brain wo contrast    (Results Pending)     Results from last 7 days   Lab Units 05/24/23  0618 05/23/23  1131   HEMATOCRIT % 45 1 45 9   HEMOGLOBIN g/dL 16 2 16 3   NEUTROS ABS Thousands/µL  --  1 91   PLATELETS Thousands/uL 180 179   WBC Thousand/uL 4 26* 3 78*     Results from last 7 days   Lab Units 05/24/23  0618 05/23/23  1131   ANION GAP mmol/L 8 9   BUN mg/dL 15 16   CALCIUM mg/dL 8 8 9 2   CHLORIDE mmol/L 103 103   CO2 mmol/L 25 24   CREATININE mg/dL 0 93 1 00   EGFR ml/min/1 73sq m 86 79   POTASSIUM mmol/L 4 2 4 4   MAGNESIUM mg/dL  --  1 9   SODIUM mmol/L 136 136     Results from last 7 days   Lab Units 05/23/23  1131 ALBUMIN g/dL 4 3   ALK PHOS U/L 56   ALT U/L 41   AST U/L 25   TOTAL BILIRUBIN mg/dL 0 58   TOTAL PROTEIN g/dL 6 9     Results from last 7 days   Lab Units 05/24/23  0709 05/23/23  2050 05/23/23  1741 05/23/23  1151   POC GLUCOSE mg/dl 147* 101 134 123     Results from last 7 days   Lab Units 05/24/23  0618 05/23/23  1131   GLUCOSE RANDOM mg/dL 133 132     Results from last 7 days   Lab Units 05/23/23  1341 05/23/23  1131   HS TNI 0HR ng/L  --  3   HS TNI 2HR ng/L 3  --    HSTNI D2 ng/L 0  --      ED Treatment:   Medication Administration from 05/23/2023 1121 to 05/23/2023 1713       Date/Time Order Dose Route Action     05/23/2023 1142 EDT iohexol (OMNIPAQUE) 350 MG/ML injection (SINGLE-DOSE) 85 mL 85 mL Intravenous Given     05/23/2023 1337 EDT atorvastatin (LIPITOR) tablet 80 mg 80 mg Oral Given        Past Medical History:   Diagnosis Date   • Diabetes mellitus ([place] [address] )    • HTN (hypertension)    • Hyperlipidemia    • Hypertension    • Kidney stone    • Osteomyelitis ([place] [address] ) 2020   • Seizures ([place] [address] )     Not since age 13     Present on Admission:  • Essential hypertension  • Type 2 diabetes mellitus with hyperglycemia ([place] [address] )  • Mixed hyperlipidemia  • Seasonal affective disorder ([place] [address] )      Admitting Diagnosis: TIA (transient ischemic attack) [G45 9]  Numbness [R20 0]  Visual disturbance [H53 9]  Age/Sex: 57 y o  male  Admission Orders:  Dysphagia Assessment > Umesh/CHO Controlled diet  Up & OOB as tolerated  Telemetry  Neuro checks  q1h x4, q2h x8, [id number]  Obtain ECHO  Obtain MRI Brain  PT/OT  Salvador SCDs    Scheduled Medications:  allopurinol, 100 mg, Oral, Daily  aspirin, 81 mg, Oral, Daily  atorvastatin, 40 mg, Oral, QPM  buPROPion, 150 mg, Oral, Daily  cholecalciferol, 5,000 Units, Oral, Daily  enoxaparin, 40 mg, Subcutaneous, Daily  fish oil, 2,000 mg, Oral, Daily  insulin lispro, 1-6 Units, Subcutaneous, HS  insulin lispro, 2-12 Units, Subcutaneous, TID AC      Continuous IV Infusions:     PRN Meds:       IP CONSULT TO NEUROLOGY  IP CONSULT TO CASE MANAGEMENT  IP CONSULT TO NUTRITION SERVICES    Network Utilization Review Department  ATTENTION: Please call with any questions or concerns to [phone] and carefully listen to the prompts so that you are directed to the right person  All voicemails are confidential   Fabiana Ayala all requests for admission clinical reviews, approved or denied determinations and any other requests to dedicated fax number below belonging to the campus where the patient is receiving treatment   List of dedicated fax numbers for the Facilities:  [address] DENIALS (Administrative/Medical Necessity) [phone]   [address] (Maternity/NICU/Pediatrics) [phone]   [address] Ena Godwin [phone]   [place] [address] [phone]   [address] [place] [address] [facility] [phone]   [address] [facility] 134 5 [facility] [phone]

## 2023-05-24 NOTE — PLAN OF CARE
Problem: PAIN - ADULT  Goal: Verbalizes/displays adequate comfort level or baseline comfort level  Description: Interventions:  - Encourage patient to monitor pain and request assistance  - Assess pain using appropriate pain scale    Outcome: Progressing     Problem: SAFETY ADULT  Goal: Maintains/Returns to pre admission functional level  Description: INTERVENTIONS:  - Perform BMAT or MOVE assessment daily    - Set and communicate daily mobility goal to care team and patient/family/caregiver  - Out of bed for toileting  - Record patient progress and toleration of activity level   Outcome: Progressing     Problem: DISCHARGE PLANNING  Goal: Discharge to home or other facility with appropriate resources  Description: INTERVENTIONS:  - Identify barriers to discharge w/patient and caregiver  - Arrange for needed discharge resources and transportation as appropriate    Outcome: Progressing     Problem: Knowledge Deficit  Goal: Patient/family/caregiver demonstrates understanding of disease process, treatment plan, medications, and discharge instructions  Description: Complete learning assessment and assess knowledge base  Interventions:  - Provide teaching at level of understanding  - Provide teaching via preferred learning methods  Outcome: Progressing     Problem: NEUROSENSORY - ADULT  Goal: Achieves stable or improved neurological status  Description: INTERVENTIONS  - Monitor and report changes in neurological status  - Monitor vital signs such as temperature, blood pressure, glucose, and any other labs ordered   - Initiate measures to prevent increased intracranial pressure  - Monitor for seizure activity and implement precautions if appropriate      Outcome: Progressing     Problem: Neurological Deficit  Goal: Neurological status is stable or improving  Description: Interventions:  - Monitor and assess patient's level of consciousness, motor function, sensory function, and level of assistance needed for ADLs  - Monitor and report changes from baseline  Collaborate with interdisciplinary team to initiate plan and implement interventions as ordered  - Provide and maintain a safe environment  - Consider seizure precautions  - Consider fall precautions  - Consider aspiration precautions  - Consider bleeding precautions    Outcome: Progressing

## 2023-05-24 NOTE — DISCHARGE SUMMARY
[address]  Discharge- Kwaku Livingston 1960, 57 y o  male MRN: [id number]  Unit/Bed#: [address] [place] [address] Encounter: [id number]  Primary Care Provider: Claudene Chapman, MD   Date and time admitted to hospital: 5/23/2023 11:23 AM    * TIA (transient ischemic attack)  Assessment & Plan  Patient presented to ED with right hand numbness, bottom lip numbness, and left visiual disturbance this morning which have resolved  EMS also noted left sided facial droop, however, patient does not agree he had one  CTA head/neck: no acute intracranial pathology, no significant stenosis, occlusion, or aneurysm  · Took aspirin 325 mg and Lipitor 80 mg  · MRI brain negative  · ECHO with bubble study negative for PFO  · Check lipid panel and HbA1c/panel elevated hemoglobin A1c 6 3  · Reports that he ran out of his Crestor 2 months ago, will resume on discharge  · Telemetry showed no events overnight, discontinued  · Neuro checks  · Hold home blood pressure medications  · Continue daily aspirin and high dose statin  · Speech, PT/OT consult, no needs identified  · Neurology consulted, appreciate recommendations  · Patient will be discharged to home today and follow-up with his outpatient PCP 1 to 2 weeks post discharge    Mixed hyperlipidemia  Assessment & Plan  · Continue statin  · As noted above patient reported that he ran out of his meds about 2 months ago, reordered for him, will continue with Crestor 20 mg p o  daily    · Recommend repeat lipid panel in approximately 3 months with PCP  · Patient will be discharged home today    Essential hypertension  Assessment & Plan  · Patient takes amlodipine, metoprolol-HCTZ, and losartan  · Resume  · Follow-up outpatient PCP 1 to 2 weeks post discharge    Type 2 diabetes mellitus with hyperglycemia [facility])  Assessment & Plan  Lab Results   Component Value Date    HGBA1C 6 3 (H) 05/24/2023       Recent Labs     05/23/23  2050 05/24/23  0709 05/24/23  1112 05/24/23  1605 POCGLU 101 147* 134 145*       Blood Sugar Average: Last 72 hrs:  (P) 111 [id number]   · On Ozempic outpatient  · Diabetic diet    Seasonal affective disorder [facility])  Assessment & Plan  · Continue bupropion  · Supportive care  · Follow-up outpatient PCP        Medical Problems     Resolved Problems  Date Reviewed: 5/24/2023   None       Discharging Physician / Practitioner: Ralph Myers, [facility]  PCP: Truman Christianson MD  Admission Date:   Admission Orders (From admission, onward)     Ordered        05/23/23 1259  Place in Observation  Once                      Discharge Date: 05/24/23    Consultations During Hospital Stay:  · Neurology    Procedures Performed:   · Echocardiogram performed showed borderline concentric hypertrophy, left ventricular EF is 15%, systolic function normal, wall motion normal, diastolic function normal for age, mild Trevon Jones dilated left atrium, no PFO, mild regurgitation mitral valve with moderate annular calcification and mild regurgitation tricuspid valve with pulmonary artery pressure around 30 mmHg    Significant Findings / Test Results:   · TIA of head and neck performed on 5/23 showed no acute intracranial pathology, no significant stenosis of the cervical carotid or vertebral arteries, no significant intracranial stenosis, large vessel occlusion or aneurysm as per radiology report  · Chest x-ray performed 5/24 shows no active pulmonary disease as per radiology report  · MRI of the brain performed on 5/24 shows no acute ischemia, mild chronic microangiopathic as per radiology report    Incidental Findings:   · None      Test Results Pending at Discharge (will require follow up):    · None     Outpatient Tests Requested:  · Should have repeat lipid panel performed with PCP in about 3 months as he had stopped taking his Crestor about 2 months ago    Complications: None    Reason for Admission: Strokelike symptoms    Hospital Course:   Last Gonzalez is a 57 y o  male patient "past medical history of HTN, HLD, type 2 diabetes, seasonal affective disorder who originally presented to the hospital on 5/23/2023 due to GERD symptoms  He reported he had blurry vision in his left eye along with vertigo or \"seasickness\"  He then developed bottom lip numbness and right hand numbness mostly in his fingers  He called EMS and his symptoms resolved before he arrived to the ED  EMS told him he had left-sided facial droop but patient does not believe he did  He reports he had an episode with similar symptoms in the past but never had a work-up done  Patient was admitted underneath the stroke pathway  CTA of head and neck was performed which was negative  Neurochecks remained intact  Patient underwent MRI of the brain which was negative  He also underwent an echocardiogram which was negative for PFO  Neurology was consulted, recommending patient to be discharged on aspirin 325 mg and to resume patient's Crestor 20 mg p o  on discharge as patient has not been compliant with taking this medication for the past 2 months (he ran out and forgot to call for more)  Patient was noted to have some ST abnormalities on his initial EKG, outpatient stress test is ordered and outpatient referral to cardiology is also made  Patient is advised to follow-up with his primary care physician in 1 to 2 weeks  Is also recommended that in approximately 3 months he get a repeat lipid profile with his PCP  He is also advised with therapeutic lifestyle modifications including increased exercise, heart healthy diet  He verbalized understanding and is agreeable to the plan  PT/OT evaluation was performed and indicated that patient does not have any needs  Patient will be discharged home today  Please see above list of diagnoses and related plan for additional information       Condition at Discharge: good    Discharge Day Visit / Exam:   Subjective: Patient seen sitting up in bed resting comfortably reports " "that he does not have any of the symptoms that he had prior to presentation, indicated that all of his symptoms had actually resolved prior to him getting to the ED  Slept well, good appetite no nausea or vomiting  Is hopeful that he will be able to go home today after his MRI and being seen by neurology  Vitals: Blood Pressure: 147/87 (05/24/23 1640)  Pulse: 72 (05/24/23 1640)  Temperature: 97 8 °F (36 6 °C) (05/24/23 1640)  Temp Source: Oral (05/24/23 1640)  Respirations: 18 (05/24/23 1640)  Height: 6' 2\" (188 cm) (05/24/23 0367)  Weight - Scale: 125 kg (275 lb 9 2 oz) (05/24/23 1097)  SpO2: 95 % (05/24/23 1640)     Exam:   Physical Exam  Vitals and nursing note reviewed  Constitutional:       Appearance: Normal appearance  HENT:      Head: Normocephalic  Nose: Nose normal       Mouth/Throat:      Mouth: Mucous membranes are moist    Eyes:      Extraocular Movements: Extraocular movements intact  Conjunctiva/sclera: Conjunctivae normal       Pupils: Pupils are equal, round, and reactive to light  Cardiovascular:      Rate and Rhythm: Normal rate and regular rhythm  Pulses: Normal pulses  Heart sounds: Normal heart sounds  Pulmonary:      Effort: Pulmonary effort is normal       Breath sounds: Normal breath sounds  Abdominal:      General: Bowel sounds are normal  There is no distension  Palpations: Abdomen is soft  Tenderness: There is no abdominal tenderness  Genitourinary:     Comments: Voiding spontaneously  Musculoskeletal:         General: Normal range of motion  Cervical back: Normal range of motion  Skin:     General: Skin is warm and dry  Capillary Refill: Capillary refill takes less than 2 seconds  Neurological:      General: No focal deficit present  Mental Status: He is alert and oriented to person, place, and time  Psychiatric:         Mood and Affect: Mood normal          Behavior: Behavior normal          Thought Content:  Thought " content normal          Judgment: Judgment normal           Discussion with Family: Patient indicated that he would communicate with his family  Gurinder Choi Discharge instructions/Information to patient and family:   See after visit summary for information provided to patient and family  Provisions for Follow-Up Care:  See after visit summary for information related to follow-up care and any pertinent home health orders  Disposition:   Home    Planned Readmission: No     Discharge Statement:  I spent greater than 45 minutes discharging the patient  This time was spent on the day of discharge  I had direct contact with the patient on the day of discharge  Greater than 50% of the total time was spent examining patient, answering all patient questions, arranging and discussing plan of care with patient as well as directly providing post-discharge instructions  Additional time then spent on discharge activities  Discharge Medications:  See after visit summary for reconciled discharge medications provided to patient and/or family        **Please Note: This note may have been constructed using a voice recognition system**

## 2023-05-25 LAB
ATRIAL RATE: 56 BPM
P AXIS: 53 DEGREES
PR INTERVAL: 126 MS
QRS AXIS: 68 DEGREES
QRSD INTERVAL: 102 MS
QT INTERVAL: 420 MS
QTC INTERVAL: 405 MS
T WAVE AXIS: -15 DEGREES
VENTRICULAR RATE: 56 BPM

## 2023-05-26 ENCOUNTER — OFFICE VISIT (OUTPATIENT)
Dept: ENDOCRINOLOGY | Facility: CLINIC | Age: 63
End: 2023-05-26

## 2023-05-26 VITALS
HEART RATE: 60 BPM | OXYGEN SATURATION: 98 % | WEIGHT: 275 LBS | TEMPERATURE: 98 F | SYSTOLIC BLOOD PRESSURE: 142 MMHG | HEIGHT: 74 IN | BODY MASS INDEX: 35.29 KG/M2 | DIASTOLIC BLOOD PRESSURE: 88 MMHG

## 2023-05-26 DIAGNOSIS — E11.65 TYPE 2 DIABETES MELLITUS WITH HYPERGLYCEMIA, WITHOUT LONG-TERM CURRENT USE OF INSULIN (HCC): Primary | ICD-10-CM

## 2023-05-26 DIAGNOSIS — E66.01 CLASS 2 SEVERE OBESITY WITH SERIOUS COMORBIDITY AND BODY MASS INDEX (BMI) OF 35.0 TO 35.9 IN ADULT, UNSPECIFIED OBESITY TYPE (HCC): ICD-10-CM

## 2023-05-26 DIAGNOSIS — E78.2 MIXED HYPERLIPIDEMIA: ICD-10-CM

## 2023-05-26 NOTE — PROGRESS NOTES
Follow-up Patient Progress Note      CC:hyperglycemia     History of Present Illness:   13-year-old male with type 2 diabetes since 2018, macroalbuminuria, HTN, HLD, NAFLD, seasonal affective disorder, history of osteomyelitis of jaw 2019, DJD, gout, obesity, TIA and vitamin D deficiency  Last visit was 12/28/2022     Since last visit, gained 9 lbs      Max adult weight: 305lbs  Lowest adult weight 185lbs  Current 247 lbs     Home blood glucose monitoring: checks 2 times a day fasting 120-130mg/dl and postprandial     Current meds: metformin -intolerance  Ozempic 1mg weekly      Opthamology: yes, 2 weeks ago  Podiatry: No  Vaccination: yes   Dental:  Pancreatitis: No     Ace/ARB: Losartan  Statin: Crestor 20 mg nightly  Thyroid issues: no     FH: Mother had type 2 diabetes   Sister - diabetes    Patient Active Problem List   Diagnosis   • Trigeminal neuritis   • Osteomyelitis, jaw acute   • Streptococcal infection   • Candida glabrata infection   • Long term (current) use of antibiotics   • Leukopenia   • Transaminitis   • Essential hypertension   • Mixed hyperlipidemia   • Idiopathic chronic gout 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   • Wellness examination   • Adhesive capsulitis of right shoulder   • Balance problem   • Seasonal affective disorder (HCC)   • Palpitation   • Right foot pain   • Type 2 diabetes mellitus with hyperglycemia (HCC)   • Aphasia   • Visual disturbance   • TIA (transient ischemic attack)     Past Medical History:   Diagnosis Date   • Diabetes mellitus ([facility] [address] )    • HTN (hypertension)    • Hyperlipidemia    • Hypertension    • Kidney stone    • Osteomyelitis ([facility] [address] ) 2020   • Seizures ([facility] [address] )     Not since age 13      Past Surgical History:   Procedure Laterality Date   • COLONOSCOPY     • HAND SURGERY     • HERNIA REPAIR     • IR PICC PLACEMENT SINGLE LUMEN  12/08/2020    remove   • VASCULAR SURGERY     • WOUND DEBRIDEMENT N/A 11/30/2020    Procedure: DEBRIDEMENT OF ANTERIOR MANDIBLE BONE BIOPSY, CULTURES AND REMOVAL OF IMPLANT #27, USE OF PRP;  Surgeon: Jovanny Ortiz DDS;  Location: [facility];  Service: Maxillofacial      Family History   Problem Relation Age of Onset   • Cancer Mother    • Kidney disease Maternal Uncle      Social History     Tobacco Use   • Smoking status: Former     Packs/day: 2 00     Years: 20 00     Total pack years: 40 00     Types: Cigarettes     Start date: 1978     Quit date: 2000     Years since quittin 4   • Smokeless tobacco: Never   • Tobacco comments:     filthy habit  Substance Use Topics   • Alcohol use:  Yes     Alcohol/week: 1 0 standard drink of alcohol     Types: 1 Standard drinks or equivalent per week     Comment: Not regularily     Allergies   Allergen Reactions   • Clindamycin Hives   • Penicillins Hives         Current Outpatient Medications:   •  allopurinol (ZYLOPRIM) 100 mg tablet, TAKE 1 TABLET BY MOUTH EVERY DAY, Disp: 90 tablet, Rfl: 3  •  amLODIPine (NORVASC) 5 mg tablet, Take 1 tablet (5 mg total) by mouth daily, Disp: 90 tablet, Rfl: 3  •  aspirin (ECOTRIN) 325 mg EC tablet, Take 1 tablet (325 mg total) by mouth daily, Disp: 30 tablet, Rfl: 0  •  buPROPion (Wellbutrin XL) 150 mg 24 hr tablet, Take 1 tablet (150 mg total) by mouth daily, Disp: 90 tablet, Rfl: 3  •  cholecalciferol (VITAMIN D3) 1,000 units tablet, Take 5,000 Units by mouth daily 10,000 units Patient stated that is continuously taking, Disp: , Rfl:   •  glucosamine-chondroitin 500-400 MG tablet, Take 2 tablets by mouth daily, Disp: , Rfl:   •  losartan (COZAAR) 100 MG tablet, Take 1 tablet (100 mg total) by mouth daily, Disp: 90 tablet, Rfl: 3  •  metoprolol-hydrochlorothiazide (LOPRESSOR HCT) 100-25 MG per tablet, TAKE 1/2 TABLET BY MOUTH EVERY DAY, Disp: 45 tablet, Rfl: 7  •  Omega-3 Fatty Acids (Omega-3 Fish Oil) 1000 MG CAPS, Take 2,000 mg by mouth daily, Disp: 180 capsule, Rfl: 3  •  rosuvastatin (CRESTOR) 20 MG tablet, Take 1 tablet (20 mg total) by mouth daily, Disp: "30 tablet, Rfl: 0  •  semaglutide, 0 25 or 0 5 mg/dose, (Ozempic, 0 25 or 0 5 MG/DOSE,) 2 mg/3 mL injection pen, Use 0 5mg weekly (Patient taking differently: 0 25 mg every 7 days Sundays), Disp: 6 mL, Rfl: 0  •  Blood Glucose Monitoring Suppl (Contour Next One) JIN, Use as directed, Disp: , Rfl:   •  Contour Next Test test strip, TEST UP TO FOUR TIMES A DAY BEFORE MEALS AND BEDTIME, Disp: 100 strip, Rfl: 1  •  Microlet Lancets MISC, TEST BLOOD SUGAR UP TO 4 TIMES DAILY (BEFORE MEALS AND AT BEDTIME), Disp: , Rfl:     Review of Systems   Constitutional: Positive for activity change and appetite change  HENT: Negative  Eyes: Negative  Respiratory: Negative  Cardiovascular: Negative for chest pain  Gastrointestinal: Negative  Endocrine: Negative  Genitourinary: Negative  Musculoskeletal: Negative  Skin: Negative  Allergic/Immunologic: Negative  Neurological: Negative  Hematological: Negative  Psychiatric/Behavioral: Negative  All other systems reviewed and are negative  Physical Exam:  Body mass index is 35 31 kg/m²  /88 (BP Location: Left arm, Patient Position: Sitting, Cuff Size: Adult)   Pulse 60   Temp 98 °F (36 7 °C) (Tympanic)   Ht 6' 2\" (1 88 m)   Wt 125 kg (275 lb)   SpO2 98%   BMI 35 31 kg/m²    Vitals:    05/26/23 0826   Weight: 125 kg (275 lb)        Physical Exam  Constitutional:       General: He is not in acute distress  Appearance: He is well-developed  He is not ill-appearing  HENT:      Head: Normocephalic and atraumatic  Nose: Nose normal       Mouth/Throat:      Pharynx: Oropharynx is clear  Eyes:      Extraocular Movements: Extraocular movements intact  Conjunctiva/sclera: Conjunctivae normal    Neck:      Thyroid: No thyromegaly  Cardiovascular:      Rate and Rhythm: Normal rate  Pulmonary:      Effort: Pulmonary effort is normal    Musculoskeletal:         General: No deformity        Cervical back: Normal range of " motion  Skin:     Capillary Refill: Capillary refill takes less than 2 seconds  Coloration: Skin is not pale  Findings: No rash  Neurological:      Mental Status: He is alert and oriented to person, place, and time  Psychiatric:         Behavior: Behavior normal          Labs:   Lab Results   Component Value Date    HGBA1C 6 3 (H) 05/24/2023       Lab Results   Component Value Date    [id number] 1 483 09/08/2022       Lab Results   Component Value Date    BUN 15 05/24/2023     05/24/2023    CO2 25 05/24/2023    CREATININE 0 93 05/24/2023    CREATININE 1 00 05/23/2023    CREATININE 1 04 04/28/2023    K 4 2 05/24/2023     10/08/2014     eGFR   Date Value Ref Range Status   05/24/2023 86 ml/min/1 73sq m Final       Lab Results   Component Value Date    ALKPHOS 56 05/23/2023    ALT 41 05/23/2023    AST 25 05/23/2023    BILITOT 0 4 10/08/2014       Lab Results   Component Value Date    CHOLESTEROL 200 (H) 05/24/2023    CHOLESTEROL 162 09/08/2022    CHOLESTEROL 156 03/22/2021     Lab Results   Component Value Date    HDL 28 (L) 05/24/2023    HDL 29 (L) 09/08/2022    HDL 28 (L) 03/22/2021     Lab Results   Component Value Date    TRIG 528 (H) 05/24/2023    TRIG 491 (H) 09/08/2022    TRIG 315 (H) 03/22/2021     Lab Results   Component Value Date    [place] [address] 03/22/2021         Impression:  1  Type 2 diabetes mellitus with hyperglycemia, without long-term current use of insulin ([address] )    2  Mixed hyperlipidemia    3  Class 2 severe obesity with serious comorbidity and body mass index (BMI) of 35 0 to 35 9 in adult, unspecified obesity type [facility])         Plan:    Andrea Rueda was seen today for follow-up and diabetes type 1  Diagnoses and all orders for this visit:    Type 2 diabetes mellitus with hyperglycemia, without long-term current use of insulin ([address] )  He is significantly improved with A1c 6 3% from 10 4%  He has made dietary changes with portion control but not enough exercise yet    He gained 9 lbs since last visit partly due to lack of Ozempic availability  Today we discussed options and agreed to increase to Ozempic 1 mg weekly dose  Given high ASCVD risk score, will consider Jardiance in future  Follow-up in 6 months with goal weight<250 lbs  -     Hemoglobin A1C; Future  -     Albumin / creatinine urine ratio; Future  -     Hemoglobin A1C  -     Albumin / creatinine urine ratio    Mixed hyperlipidemia  Significant hypercholesterolemia and hypertriglyceridemia as well as low HDL  Continue Crestor 20 mg nightly  Class 2 severe obesity with serious comorbidity and body mass index (BMI) of 35 0 to 35 9 in adult, unspecified obesity type ([address] )  He gained weight since last visit  Today we discussed options and agreed to continue portion control with diet, increase exercise up to 150 min/week with 50% of this time spent and muscle strengthening and also building activity  After achieving regular exercise for about 4 to 6 weeks, may initiate fasting for 12-16 hours typically achieved with early dinner and late breakfast     Follow-up in 6 months        I have spent 32 minutes with patient today in which greater than 50% of this time was spent in counseling/coordination of care  Discussed with the patient and all questioned fully answered  He will call me if any problems arise  Educated/ Counseled patient on diagnostic test results, prognosis, risk vs benefit of treatment options, importance of treatment compliance, healthy life and lifestyle choices        [address]

## 2023-06-07 ENCOUNTER — HOSPITAL ENCOUNTER (OUTPATIENT)
Dept: NON INVASIVE DIAGNOSTICS | Facility: HOSPITAL | Age: 63
Discharge: HOME | End: 2023-06-07
Attending: INTERNAL MEDICINE

## 2023-06-08 ENCOUNTER — TELEPHONE (OUTPATIENT)
Dept: NEUROLOGY | Facility: CLINIC | Age: 63
End: 2023-06-08

## 2023-06-08 NOTE — TELEPHONE ENCOUNTER
1ST ATTEMPT- Called patient LMOM to call back and schedule HFU     HFU/SLW/ 5/23-5/24/ Suspected TIA    Note:Sergio Field will need follow up in 8-10 weeks with general attending or advance practitioner  He will not require outpatient neurological testing

## 2023-06-15 ENCOUNTER — CONSULT (OUTPATIENT)
Dept: CARDIOLOGY CLINIC | Facility: CLINIC | Age: 63
End: 2023-06-15
Payer: COMMERCIAL

## 2023-06-15 VITALS
SYSTOLIC BLOOD PRESSURE: 128 MMHG | BODY MASS INDEX: 34.52 KG/M2 | WEIGHT: 269 LBS | HEART RATE: 55 BPM | DIASTOLIC BLOOD PRESSURE: 70 MMHG | HEIGHT: 74 IN | OXYGEN SATURATION: 100 %

## 2023-06-15 DIAGNOSIS — E78.2 MIXED HYPERLIPIDEMIA: ICD-10-CM

## 2023-06-15 DIAGNOSIS — R06.09 DYSPNEA ON EXERTION: ICD-10-CM

## 2023-06-15 DIAGNOSIS — Z86.73 HISTORY OF TRANSIENT ISCHEMIC ATTACK (TIA): ICD-10-CM

## 2023-06-15 DIAGNOSIS — E11.00 TYPE 2 DIABETES MELLITUS WITH HYPEROSMOLARITY WITHOUT COMA, WITHOUT LONG-TERM CURRENT USE OF INSULIN (HCC): ICD-10-CM

## 2023-06-15 DIAGNOSIS — R94.31 ABNORMAL EKG: Primary | ICD-10-CM

## 2023-06-15 DIAGNOSIS — I10 ESSENTIAL HYPERTENSION: ICD-10-CM

## 2023-06-15 PROCEDURE — 99203 OFFICE O/P NEW LOW 30 MIN: CPT | Performed by: INTERNAL MEDICINE

## 2023-06-15 PROCEDURE — 93000 ELECTROCARDIOGRAM COMPLETE: CPT | Performed by: INTERNAL MEDICINE

## 2023-06-15 NOTE — PATIENT INSTRUCTIONS
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

## 2023-06-16 NOTE — PROGRESS NOTES
Cardiology Office Consultation   Nayla Ruano 57 y o  male MRN: [id number]  06/15/23  8:03 PM        Assessment/Plan     1  Chronic exertional dyspnea with abnormal EKG showing variable inferolateral and high lateral T inversion  2   CAD risk factors include recent TIA, high risk mixed dyslipidemia, essential hypertension, type 2 diabetes mellitus, age and gender  3   Controlled essential hypertension  4   Controlled type 2 diabetes mellitus with recent A1c of 6 3%  5   High risk mixed dyslipidemia, treated  6   Class 1 obesity  7   History of gout of right foot  8   History of resection of right popliteal artery aneurysm in 2009   9   History of COVID-19 infections, not requiring hospitalization  8   Family history of EGD at advanced ages for both of his parents  11   Chronic bilateral knee and hip pain from primary osteoarthritis, limiting patient's ability to ambulate  12   Seasonal affective disorder  Plan and   Patient Instructions     1  We have ordered and will assist you in scheduling a Lexiscan nuclear stress study to be done at [place] [address], which is a nonexercise method of  imaging the blood flow pattern to the heart  2  We have also ordered a repeat lipid panel to recheck cholesterol and triglycerides, which you can do at the time of your nuclear stress study  This should be fasting after midnight with plenty of water drinking  3  We recommend you get a referral from Dr Maria E Braun for vascular surgery follow-up regarding your right popliteal artery aneurysm repair, which is nonurgent  4  Continue present medication  5  We will contact you with the results of your testing        Current Outpatient Medications   Medication Sig Dispense Refill   • allopurinol (ZYLOPRIM) 100 mg tablet TAKE 1 TABLET BY MOUTH EVERY DAY 90 tablet 3   • amLODIPine (NORVASC) 5 mg tablet Take 1 tablet (5 mg total) by mouth daily 90 tablet 3   • aspirin (ECOTRIN) 325 mg EC tablet Take 1 tablet (325 mg total) by mouth daily 30 tablet 0   • Blood Glucose Monitoring Suppl (Contour Next One) JIN Use as directed     • buPROPion (Wellbutrin XL) 150 mg 24 hr tablet Take 1 tablet (150 mg total) by mouth daily 90 tablet 3   • Contour Next Test test strip TEST UP TO FOUR TIMES A DAY BEFORE MEALS AND BEDTIME 100 strip 1   • glucosamine-chondroitin 500-400 MG tablet Take 2 tablets by mouth daily     • losartan (COZAAR) 100 MG tablet Take 1 tablet (100 mg total) by mouth daily 90 tablet 3   • metoprolol-hydrochlorothiazide (LOPRESSOR HCT) 100-25 MG per tablet TAKE 1/2 TABLET BY MOUTH EVERY DAY 45 tablet 7   • Microlet Lancets MISC TEST BLOOD SUGAR UP TO 4 TIMES DAILY (BEFORE MEALS AND AT BEDTIME)     • Omega-3 Fatty Acids (Omega-3 Fish Oil) 1000 MG CAPS Take 2,000 mg by mouth daily 180 capsule 3   • rosuvastatin (CRESTOR) 20 MG tablet Take 1 tablet (20 mg total) by mouth daily 30 tablet 0   • semaglutide, 1 mg/dose, (Ozempic, 1 MG/DOSE,) 4 mg/3 mL injection pen Inject 0 75 mL (1 mg total) under the skin every 7 days 9 mL 1   • cholecalciferol (VITAMIN D3) 1,000 units tablet Take 5,000 Units by mouth daily 10,000 units Patient stated that is continuously taking (Patient not taking: Reported on 6/15/2023)       No current facility-administered medications for this visit  History of Present Illness     Physician Requesting Consult:  Brian Holm MD/  [facility] hospitalist      Reason for Consult / Principal Problem: Follow-up of abnormal EKG and exertional dyspnea      HPI:     Tamanna Christianson is a 57y o  year old male who presents for cardiology consultation and follow-up of recent hospitalization at [address] [place] [address] from 5/23 through 5/20/2023 for TIA characterized by numbness of right hand, bottom lip, left visual disturbance and questionable left-sided facial droop with no specific etiology uncovered    Patient has known mixed dyslipidemia with high 10-year ASCVD risk of 33 7%, essential hypertension, type 2 diabetes mellitus with recent A1c of 6 3%, seasonal affective disorder, chronic gout of right foot, history of sebaceous cyst     This patient has a 12-year history of essential hypertension, dyslipidemia, history of childhood seizure disorder with none since age 13, history of osteomyelitis diagnosed and treated in 2020  The patient denies any history of recent chest discomfort but admits to exertional dyspnea if he has to walk uphill or walk any distance  He denies orthopnea, paroxysmal nocturnal dyspnea, palpitations, syncope, dizziness, lightheadedness, peripheral edema, cough, sputum production, wheezing, asthma, fever, chills  He has had 2 or 3 episodes of mild COVID-19 infection  This patient is fundamentally sedentary, working from home part-time and 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[employer], for whom he has worked for 23 years and sells to garden stores  He does do some fishing from a boat in his free time  He also notices exertional dyspnea when he has to move his boat  He denies any tobacco use for 23 years  He admits to 1 beer every other night and rarely will have a cocktail  He denies any illicit drug use  He lives home with his wife and has 3 grown children with 11 grandchildren ranging in age from 1 5 years to age 12  The patient apparently had a right popliteal artery aneurysm repair in   Family history included father undergoing CABG in his [de-identified] but living to the age of 80  His mother also had CABG in her late 76s and  at the age of 80  He has 1 brother with cardiac dysrhythmia problem  He has 4 other siblings with no other cardiovascular disease              Historical Information   Past Medical History:   Diagnosis Date   • Diabetes mellitus ([facility] )    • HTN (hypertension)    • Hyperlipidemia    • Hypertension    • Kidney stone    • Osteomyelitis ([facility] )    • Seizures ([address] )     Not since age 13       Past Surgical History:  Past Surgical History:   Procedure Laterality Date   • COLONOSCOPY     • HAND SURGERY     • HERNIA REPAIR     • IR PICC PLACEMENT SINGLE LUMEN  2020    remove   • VASCULAR SURGERY     • WOUND DEBRIDEMENT N/A 2020    Procedure: DEBRIDEMENT OF ANTERIOR MANDIBLE BONE BIOPSY, CULTURES AND REMOVAL OF IMPLANT #27, USE OF PRP;  Surgeon: Dana David DDS;  Location: [facility];  Service: Maxillofacial     Social History     Substance and Sexual Activity   Alcohol Use Yes   • Alcohol/week: 1 0 standard drink of alcohol   • Types: 1 Standard drinks or equivalent per week    Comment: Not regularily     Social History     Substance and Sexual Activity   Drug Use Never     Social History     Tobacco Use   Smoking Status Former   • Packs/day: 2 00   • Years: 20 00   • Total pack years: 40 00   • Types: Cigarettes   • Start date: 1978   • Quit date: 2000   • Years since quittin 4   Smokeless Tobacco Never   Tobacco Comments    filthy habit  Family History   Problem Relation Age of Onset   • Cancer Mother    • Kidney disease Maternal Uncle        Meds/Allergies   Prior to Admission medications    Medication Sig Start Date End Date Taking?  Authorizing Provider   allopurinol (ZYLOPRIM) 100 mg tablet TAKE 1 TABLET BY MOUTH EVERY DAY 23  Yes Malka Henderson MD   amLODIPine (NORVASC) 5 mg tablet Take 1 tablet (5 mg total) by mouth daily 23  Yes Malka Henderson MD   aspirin (ECOTRIN) 325 mg EC tablet Take 1 tablet (325 mg total) by mouth daily 23  Yes Hildegarde Mask, CRNP   Blood Glucose Monitoring Suppl (Contour Next One) JIN Use as directed 23  Yes Historical Provider, MD   buPROPion (Wellbutrin XL) 150 mg 24 hr tablet Take 1 tablet (150 mg total) by mouth daily 23  Yes Malka Henderson MD   Contour Next Test test strip TEST UP TO FOUR TIMES A DAY BEFORE MEALS AND BEDTIME 23  Yes Evelio Espino "MD Rox   glucosamine-chondroitin 500-400 MG tablet Take 2 tablets by mouth daily   Yes Historical Provider, MD   losartan (COZAAR) 100 MG tablet Take 1 tablet (100 mg total) by mouth daily 2/15/23 2/10/24 Yes Bravo Quezada MD   metoprolol-hydrochlorothiazide (LOPRESSOR HCT) 100-25 MG per tablet TAKE 1/2 TABLET BY MOUTH EVERY DAY 11/14/22  Yes Bacilio Isaacs MD   Microlet Lancets MISC TEST BLOOD SUGAR UP TO 4 TIMES DAILY (BEFORE MEALS AND AT BEDTIME) 1/5/23  Yes Historical Provider, MD   Omega-3 Fatty Acids (Omega-3 Fish Oil) 1000 MG CAPS Take 2,000 mg by mouth daily 9/9/22  Yes Bacilio Isaacs MD   rosuvastatin (CRESTOR) 20 MG tablet Take 1 tablet (20 mg total) by mouth daily 5/24/23 6/23/23 Yes Wende Essex, CRNP   semaglutide, 1 mg/dose, (Ozempic, 1 MG/DOSE,) 4 mg/3 mL injection pen Inject 0 75 mL (1 mg total) under the skin every 7 days 5/26/23  Yes Bravo Quezada MD   cholecalciferol (VITAMIN D3) 1,000 units tablet Take 5,000 Units by mouth daily 10,000 units Patient stated that is continuously taking  Patient not taking: Reported on 6/15/2023    Historical Provider, MD     Allergies   Allergen Reactions   • Clindamycin Hives   • Penicillins Hives       Cardiovascular ROS:   More than 10 systems reviewed and all systems negative, except as noted in history of present illness    Objective     VITALS:   Blood pressure 128/70, pulse 55, height 6' 2\" (1 88 m), weight 122 kg (269 lb), SpO2 100 %  Body mass index is 34 54 kg/m²  6 6 pound weight loss in approximately 3 weeks    Physical Exam      General-Well-developed well-nourished mildly to moderately obese  male  in no acute distress  Patient is alert, oriented X3 and cooperative  Skin-Warm and dry with no pallor, rashes, ecchymoses, lesions  HEENT-Normocephalic  Pupils equally round and reactive to light and accommodation  Extraocular muscles intact  Mucous membranes pink and moist  Sclerae nonicteric   Optic fundi " poorly seen  Neck-No jugular venous distention, AJR, masses, thyromegaly, adenopathy, bruits  Lungs-No rales, rhonchi, wheezes  Heart-No murmur, gallop, rub, click, heave, thrill  Abdomen-Normal bowel sounds with no masses, organomegaly, guarding, tenderness, or rigidity  Moderate abdominal obesity noted  Extremities-No cyanosis, clubbing, edema, pulse decrease  Neurological-No focal neurological signs  EKG 06/15/23:     Sinus bradycardia at 55 bpm  Nonspecific T inversions in leads I and aVL  Abnormal ECG  Resolved inferolateral T inversion with new T inversion in leads I and aVL compared to 5/23/2023    Current ECG is quite similar to one dated 4/28/2023      IMAGING:    Portable chest x-ray, 1 view 5/23/2023:    Tiny calcified granuloma at right lung apex  No active cardiopulmonary disease    MRI brain 5/20/2023:    No acute ischemia  Mild chronic microangiopathy    CTA head and neck 5/23/2023:    No acute intracranial pathology  No significant stenosis of cervical carotid or vertebral arteries  No significant intracranial stenosis, large vessel occlusion or aneurysm    Transthoracic echocardiogram 5/20/2023:    Mild concentric LVH with 60% LVEF, normal wall motion and diastolic function  Mild left atrial enlargement  No atrial septal defect by saline contrast or patent foramen ovale  Mild to moderate MAC with 1+ MR  1+ TR with normal PA systolic pressure approximately 30 mmHg      LAB REVIEW:    Lab Results   Component Value Date    ALKPHOS 56 05/23/2023    ALT 41 05/23/2023    ANIONGAP 8 10/08/2014    AST 25 05/23/2023    BILITOT 0 4 10/08/2014    BUN 15 05/24/2023    CALCIUM 8 8 05/24/2023     05/24/2023    CO2 25 05/24/2023    CREATININE 0 93 05/24/2023    EGFR 86 05/24/2023    GLUCOSE 127 10/08/2014    GLUF 133 (H) 05/24/2023    K 4 2 05/24/2023    PROT 7 9 10/08/2014    SODIUM 136 05/24/2023   Glucose 5/23/2023: 132 and otherwise normal CMP  Lab Results "  Component Value Date    CHOLESTEROL 200 (H) 05/24/2023    CHOLESTEROL 162 09/08/2022    CHOLESTEROL 156 03/22/2021     Lab Results   Component Value Date    HDL 28 (L) 05/24/2023    HDL 29 (L) 09/08/2022    HDL 28 (L) 03/22/2021     No results found for: \"LDLCHOLEST\"  Lab Results   Component Value Date    [facility]  05/24/2023      Comment:      Calculated LDL invalid, triglycerides >400 mg/dl    LDLCALC  09/08/2022      Comment:      Calculated LDL invalid, triglycerides >400 mg/dl    LDLCALC 65 03/22/2021       Lab Results   Component Value Date    HJH3DSEAHDLM 1 483 09/08/2022     Lab Results   Component Value Date    TRIG 528 (H) 05/24/2023    TRIG 491 (H) 09/08/2022    TRIG 315 (H) 03/22/2021     10-year ASCVD RISK: 33 7%, very high risk    CBC 5/24/2023: WBC-4 26, slightly low, with normal H/H and platelets  K2A and estimated average glucose 5/20/2023: 6 3% and 134  LDL direct 5/24/2023: 107      Total office time spent today 61  minutes           Rosaura Turner MD  "

## 2023-07-06 ENCOUNTER — RA CDI HCC (OUTPATIENT)
Dept: OTHER | Facility: HOSPITAL | Age: 63
End: 2023-07-06

## 2023-07-06 NOTE — PROGRESS NOTES
[address] [facility] coding opportunities          Chart Reviewed number of suggestions sent to Provider: 1  E11.22     Patients Insurance        Commercial Insurance: [employer]

## 2023-07-07 ENCOUNTER — HOSPITAL ENCOUNTER (OUTPATIENT)
Dept: RADIOLOGY | Facility: HOSPITAL | Age: 63
Discharge: HOME | End: 2023-07-07
Attending: INTERNAL MEDICINE
Payer: COMMERCIAL

## 2023-07-07 ENCOUNTER — HOSPITAL ENCOUNTER (OUTPATIENT)
Dept: NON INVASIVE DIAGNOSTICS | Facility: HOSPITAL | Age: 63
Discharge: HOME | End: 2023-07-07
Attending: INTERNAL MEDICINE
Payer: COMMERCIAL

## 2023-07-07 DIAGNOSIS — R06.09 DYSPNEA ON EXERTION: ICD-10-CM

## 2023-07-07 DIAGNOSIS — R94.31 ABNORMAL EKG: ICD-10-CM

## 2023-07-07 DIAGNOSIS — E78.2 MIXED HYPERLIPIDEMIA: ICD-10-CM

## 2023-07-07 DIAGNOSIS — I10 ESSENTIAL HYPERTENSION: ICD-10-CM

## 2023-07-07 DIAGNOSIS — E11.00 TYPE 2 DIABETES MELLITUS WITH HYPEROSMOLARITY WITHOUT COMA, WITHOUT LONG-TERM CURRENT USE OF INSULIN (HCC): ICD-10-CM

## 2023-07-07 LAB
CHEST PAIN STATEMENT: NORMAL
MAX DIASTOLIC BP: 80 MMHG
MAX HEART RATE: 90 BPM
MAX HR PERCENT: 57 %
MAX HR: 90 BPM
MAX PREDICTED HEART RATE: 156 BPM
MAX. SYSTOLIC BP: 150 MMHG
NUC STRESS EJECTION FRACTION: 54 %
PROTOCOL NAME: NORMAL
RATE PRESSURE PRODUCT: NORMAL
REASON FOR TERMINATION: NORMAL
SL CV REST NUCLEAR ISOTOPE DOSE: 16 MCI
SL CV STRESS NUCLEAR ISOTOPE DOSE: 46.7 MCI
SL CV STRESS RECOVERY BP: NORMAL MMHG
SL CV STRESS RECOVERY HR: 72 BPM
SL CV STRESS RECOVERY O2 SAT: 98 %
STRESS ANGINA INDEX: 0
STRESS BASELINE BP: NORMAL MMHG
STRESS BASELINE HR: 62 BPM
STRESS O2 SAT REST: 96 %
STRESS PEAK HR: 86 BPM
STRESS POST ESTIMATED WORKLOAD: 1.6 METS
STRESS POST EXERCISE DUR MIN: 3 MIN
STRESS POST O2 SAT PEAK: 100 %
STRESS POST PEAK BP: 120 MMHG
TARGET HR FORMULA: NORMAL
TIME IN EXERCISE PHASE: NORMAL

## 2023-07-07 PROCEDURE — 78452 HT MUSCLE IMAGE SPECT MULT: CPT

## 2023-07-07 PROCEDURE — 93017 CV STRESS TEST TRACING ONLY: CPT

## 2023-07-07 PROCEDURE — 93016 CV STRESS TEST SUPVJ ONLY: CPT | Performed by: INTERNAL MEDICINE

## 2023-07-07 PROCEDURE — 78452 HT MUSCLE IMAGE SPECT MULT: CPT | Performed by: INTERNAL MEDICINE

## 2023-07-07 PROCEDURE — 93018 CV STRESS TEST I&R ONLY: CPT | Performed by: INTERNAL MEDICINE

## 2023-07-07 PROCEDURE — G1004 CDSM NDSC: HCPCS

## 2023-07-07 PROCEDURE — A9502 TC99M TETROFOSMIN: HCPCS

## 2023-07-07 RX ORDER — REGADENOSON 0.08 MG/ML
0.4 INJECTION, SOLUTION INTRAVENOUS ONCE
Status: COMPLETED | OUTPATIENT
Start: 2023-07-07 | End: 2023-07-07

## 2023-07-07 RX ORDER — LOSARTAN POTASSIUM 100 MG/1
100 TABLET ORAL DAILY
Qty: 90 TABLET | Refills: 0 | Status: SHIPPED | OUTPATIENT
Start: 2023-07-07 | End: 2024-07-01

## 2023-07-07 RX ADMIN — REGADENOSON 0.4 MG: 0.08 INJECTION, SOLUTION INTRAVENOUS at 09:37

## 2023-07-11 ENCOUNTER — TELEPHONE (OUTPATIENT)
Dept: CARDIOLOGY CLINIC | Facility: CLINIC | Age: 63
End: 2023-07-11

## 2023-07-11 NOTE — RESULT ENCOUNTER NOTE
Message to patient sent on 7/10/2023:      Cristian Cortés,    Your nuclear stress study on 7/7/2023 was normal.    Let us know if you have any questions or concerns.     Dr. Chris Moon

## 2023-07-11 NOTE — TELEPHONE ENCOUNTER
----- Message from Stephenie Castellanos MD sent at 7/10/2023  8:39 PM EDT -----  Message to patient sent on 7/10/2023:      Rahul Miller,    Your nuclear stress study on 7/7/2023 was normal.    Let us know if you have any questions or concerns.     Dr. Iglesias Asp

## 2023-07-14 ENCOUNTER — OFFICE VISIT (OUTPATIENT)
Dept: INTERNAL MEDICINE CLINIC | Facility: CLINIC | Age: 63
End: 2023-07-14
Payer: COMMERCIAL

## 2023-07-14 VITALS
BODY MASS INDEX: 33.48 KG/M2 | SYSTOLIC BLOOD PRESSURE: 118 MMHG | WEIGHT: 260.8 LBS | HEART RATE: 72 BPM | DIASTOLIC BLOOD PRESSURE: 72 MMHG

## 2023-07-14 DIAGNOSIS — E11.00 TYPE 2 DIABETES MELLITUS WITH HYPEROSMOLARITY WITHOUT COMA, WITHOUT LONG-TERM CURRENT USE OF INSULIN (HCC): Primary | ICD-10-CM

## 2023-07-14 DIAGNOSIS — F33.8 SEASONAL AFFECTIVE DISORDER (HCC): ICD-10-CM

## 2023-07-14 DIAGNOSIS — E78.2 MIXED HYPERLIPIDEMIA: ICD-10-CM

## 2023-07-14 DIAGNOSIS — I10 ESSENTIAL HYPERTENSION: ICD-10-CM

## 2023-07-14 DIAGNOSIS — G45.9 TIA (TRANSIENT ISCHEMIC ATTACK): ICD-10-CM

## 2023-07-14 DIAGNOSIS — I73.9 PAD (PERIPHERAL ARTERY DISEASE) (HCC): ICD-10-CM

## 2023-07-14 PROCEDURE — 99214 OFFICE O/P EST MOD 30 MIN: CPT | Performed by: INTERNAL MEDICINE

## 2023-07-14 NOTE — ASSESSMENT & PLAN NOTE
Last hemoglobin A1c great at 6.3, continue medication along with healthy diet and exercise.   Patient is going to start lifting weights soon  Lab Results   Component Value Date    HGBA1C 6.3 (H) 05/24/2023

## 2023-07-14 NOTE — ASSESSMENT & PLAN NOTE
Chief Complaint  Right knee prepatellar bursitis    History Of Presenting Illness  Heath Armando 1972 presents with  Right prepatellar bursa was treated by aspiration injection August 4th, 2021  Patient presents for follow-up  Patient has complete resolution of his swelling and pain slight discomfort at the bursal side      Current Medications  Current Outpatient Medications   Medication Sig Dispense Refill    acetaminophen (TYLENOL) 500 mg tablet Take 1,000 mg by mouth every 6 (six) hours as needed for mild pain      amLODIPine (NORVASC) 5 mg tablet Take 1 tablet (5 mg total) by mouth daily 30 tablet 0    ibuprofen (MOTRIN) 800 mg tablet Take 1 tablet (800 mg total) by mouth every 6 (six) hours as needed for mild pain 30 tablet 0    methocarbamol (ROBAXIN) 500 mg tablet Take 2 tablets (1,000 mg total) by mouth 2 (two) times a day 30 tablet 0    Multiple Vitamins-Minerals (multivitamin with minerals) tablet Take 1 tablet by mouth daily       No current facility-administered medications for this visit         Current Problems    Active Problems:   Patient Active Problem List    Diagnosis Date Noted    Hypertension     Acute left-sided low back pain with sciatica 03/07/2021    Leukocytosis 03/07/2021    Methamphetamine use ([facility] ) 03/07/2021         Review of Systems:    General: negative for - chills, fatigue, fever,  weight gain or weight loss  Psychological: negative for - anxiety, behavioral disorder, concentration difficulties  Ophthalmic: negative for - blurry vision, decreased vision, double vision,      Past Medical History:   Past Medical History:   Diagnosis Date    Hyperlipidemia     Hypertension        Past Surgical History:   Past Surgical History:   Procedure Laterality Date    FEMUR FRACTURE SURGERY Right 1985       Family History:  Family history reviewed and non-contributory  Family History   Problem Relation Age of Onset    Heart disease Mother     Cancer Father        Social Discharge Summary


Discharge Summary


_


DATE OF ADMISSION: 12/10/2018 





DATE OF DISCHARGE: 12/14/2018








DISCHARGED BY: Dr. Cabrera 





REASON FOR ADMISSION: 


37 years old male with past medical history of insulin-dependent diabetes 

mellitus, COPD, Chiari malformation, CVA, seizure disorder, schizoaffective 

disorder, presented from the skilled nursing facility with hyperglycemia.


Vital signs are stable.  


Laboratory workup revealed no leukocytosis, stable hemoglobin hematocrit.  


Glucose 527.  Sable anion gap ,potassium and CO2.  


Magnesium 1.4


Urinalysis revealed +4glucose , but  was negative for ketones.





CONSULTANTS:


pulmonary Dr. Steward


nephrologist Dr. Tuttle 


psychiatrist Dr. Armstrong


endocrinologist Dr. Denis


pain specialist Dr. Franco


 


HOSPITAL COURSE: 


Patient was admitted and started on generous IV hydration  and blood sugar 

management.  


Endocrinologist consulted.  


Blood sugar was managed with long-acting Levemir and pre-meal insulin.  Sliding 

scale of insulin was implemented on as needed basis.  


Hemoglobin A1c - 9.9 ,clearly not at goal.  Patient apparently noncompliant 

with treatment  at the  facility.  


Patient was counseled on compliance with   medication regimen and diabetic 

diet.  


Supplemental oxygen provided as needed to keep pulse oximetry above 92%.  


Pulmonary toilet was on standby as needed.  


Respiratory status was stable , with no evidence of COPD exacerbation.  


Nephrologist followed.  Renal parameters and electrolytes were closely 

monitored.  


Electrolytes corrected as needed.  Nephrotoxins were avoided.


TSH within normal limits.  


Lipid panel  with stable LDL and elevated triglycerides 335 .


Patient was educated on low-fat low-cholesterol diabetic diet.  


Hyponatremia was likely due to dehydration and resolved with IV hydration.  

Prior to discharge sodium 137.  


DVT and  GI prophylaxis provided.  


Patient with degenerative disc disease of cervical and lumbar spine as well as 

the arthritis, peripheral neuropathy and Chiari malformation type II.  


Pain management was provided as per pain specialist recommendations.  Pain was 

controlled.   


Psychiatrist followed.  Psychiatrist diagnosed patient with schizoaffective 

disorder bipolar type.  


Psychiatric medication regimen  was optimized.  Patient was on Seroquel, 

Depakote and Celexa.  Patient received cognitive behavioral therapy.


Patient clinically stabilized, blood sugar controlled. 


Patient  was ready for transfer back to [facility] nursing [facility] for 

continuation of care





FINAL DIAGNOSES: 


Hyperglycemia due to type 2 diabetes mellitus


Noncompliance


COPD


Chiari malformation type II


Degenerative disc disease, cervical


Lumbar degenerative disc disease


Arthritis, lumbar spine


Peripheral neuropathy


Hyponatremia


History of CVA


Schizoaffective disorder bipolar typeSchizo





DISCHARGE MEDICATIONS:


See Medication Reconciliation list.





DISCHARGE INSTRUCTIONS:


Patient was discharged to the skilled nursing facility. 


Follow up with medical doctor at the facility.








I have been assigned to dictate discharge summary for this account. 


I was not involved in the patient's management.











Shilpa Quezada NP Dec 17, 2018 09:25 Pt asking about adding Rexulti for anger issues (patient reports he has a friend that reports is being used a lot at the [facility] for this purpose). I have never prescribed this medication, I recommend patient check with psychiatry if he wants to look into this possibility. History:  Social History     Socioeconomic History    Marital status: Single     Spouse name: None    Number of children: None    Years of education: None    Highest education level: None   Occupational History    None   Tobacco Use    Smoking status: Current Every Day Smoker     Packs/day: 1 00    Smokeless tobacco: Never Used   Vaping Use    Vaping Use: Never used   Substance and Sexual Activity    Alcohol use: Never    Drug use: Yes     Types: Methamphetamines     Comment: IV meth    Sexual activity: None   Other Topics Concern    None   Social History Narrative    None     Social Determinants of Health     Financial Resource Strain:     Difficulty of Paying Living Expenses:    Food Insecurity:     Worried About Running Out of Food in the Last Year:     Ran Out of Food in the Last Year:    Transportation Needs:     Lack of Transportation (Medical):  Lack of Transportation (Non-Medical):    Physical Activity:     Days of Exercise per Week:     Minutes of Exercise per Session:    Stress:     Feeling of Stress :    Social Connections:     Frequency of Communication with Friends and Family:     Frequency of Social Gatherings with Friends and Family:     Attends Tenri[employer] Services:     Active Member of Clubs or Organizations:     Attends Club or Organization Meetings:     Marital Status:    Intimate Partner Violence:     Fear of Current or Ex-Partner:     Emotionally Abused:     Physically Abused:     Sexually Abused: Allergies:   No Known Allergies        Physical ExaminationHt 5' 8" (1 727 m)   BMI 25 85 kg/m²   Gen: Alert and oriented to person, place, time  HEENT: EOMI, eyes clear, moist mucus membranes, hearing intact      Orthopedic Exam   right knee no swelling excellent range of pain-free motion no evidence of infection          Impression   resolved right prepatellar bursitis      Plan     patient allowed all activities as tolerated   follow-up zachary Noonan Theadore Kocher, MD        Portions of the record may have been created with voice recognition software  Occasional wrong word or "sound a like" substitutions may have occurred due to the inherent limitations of voice recognition software  Read the chart carefully and recognize, using context, where substitutions have occurred

## 2023-07-14 NOTE — PATIENT INSTRUCTIONS
Problem List Items Addressed This Visit          Endocrine    Type 2 diabetes mellitus with hyperosmolarity without coma, without long-term current use of insulin ([facility]) - Primary     Last hemoglobin A1c great at 6.3, continue medication along with healthy diet and exercise. Patient is going to start lifting weights soon  Lab Results   Component Value Date    HGBA1C 6.3 (H) 05/24/2023               Cardiovascular and Mediastinum    Essential hypertension     Patient has not been taking losartan over the past few months. With him having diabetes, discussed role of angiotensin receptor blockers and helping to prevent progression of microscopic protein leaking in the urine, so I recommend he restart the losartan, and can stop the amlodipine to see how his blood pressure does         TIA (transient ischemic attack)     Patient being treated for this, discussed the possibility of this being related to atypical migraine, but recommend continuing all of the stroke preventative measures         PAD (peripheral artery disease) ([address])     Patient reports she had aneurysm surgery on the right leg behind the knee in the past.  I recommend follow-up vascular. He does report some claudication symptoms in the right leg, will put an order to check lower extremity arterial Dopplers also. Relevant Orders    VAS lower limb arterial duplex, complete bilateral    Ambulatory Referral to Vascular Surgery       Other    Mixed hyperlipidemia     Continue rosuvastatin along with healthy diet and exercise         Seasonal affective disorder ([address])     Pt asking about adding Rexulti for anger issues (patient reports he has a friend that reports is being used a lot at the [facility] for this purpose). I have never prescribed this medication, I recommend patient check with psychiatry if he wants to look into this possibility.

## 2023-07-14 NOTE — PROGRESS NOTES
Name: Melita Pelaez      : 1960      MRN: [id number]  Encounter Provider: Mira Doss MD  Encounter Date: 2023   Encounter department: [facility]     1. Type 2 diabetes mellitus with hyperosmolarity without coma, without long-term current use of insulin (HCC)  Assessment & Plan:  Last hemoglobin A1c great at 6.3, continue medication along with healthy diet and exercise. Patient is going to start lifting weights soon  Lab Results   Component Value Date    HGBA1C 6.3 (H) 2023         2. Mixed hyperlipidemia  Assessment & Plan:  Continue rosuvastatin along with healthy diet and exercise      3. TIA (transient ischemic attack)  Assessment & Plan:  Patient being treated for this, discussed the possibility of this being related to atypical migraine, but recommend continuing all of the stroke preventative measures      4. Essential hypertension  Assessment & Plan:  Patient has not been taking losartan over the past few months. With him having diabetes, discussed role of angiotensin receptor blockers and helping to prevent progression of microscopic protein leaking in the urine, so I recommend he restart the losartan, and can stop the amlodipine to see how his blood pressure does      5. Seasonal affective disorder [facility])  Assessment & Plan:  Pt asking about adding Rexulti for anger issues (patient reports he has a friend that reports is being used a lot at the [facility] for this purpose). I have never prescribed this medication, I recommend patient check with psychiatry if he wants to look into this possibility. 6. 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  I recommend follow-up vascular. He does report some claudication symptoms in the right leg, will put an order to check lower extremity arterial Dopplers also.     Orders:  -     VAS lower limb arterial duplex, complete bilateral; Future; Expected date: 07/14/2023  -     Ambulatory Referral to Vascular Surgery; Future           Subjective     Patient here for regular follow-up and some concerns. Pt had a "TIA" about a month ago with numbness left side of lips and numbness right hand and visual disturbance left eye "like vertigo," symptoms lasted about an hour. Review of Systems   Constitutional: Negative for chills, fatigue and fever. HENT: Negative for congestion, nosebleeds, postnasal drip, sore throat and trouble swallowing. Eyes: Negative for pain. Respiratory: Negative for cough, chest tightness, shortness of breath and wheezing. Cardiovascular: Negative for chest pain, palpitations and leg swelling. Gastrointestinal: Negative for abdominal pain, constipation, diarrhea, nausea and vomiting. Endocrine: Negative for polydipsia and polyuria. Genitourinary: Negative for dysuria, flank pain and hematuria. Musculoskeletal: Negative for arthralgias. Skin: Negative for rash. Neurological: Negative for dizziness, tremors, light-headedness and headaches. Hematological: Does not bruise/bleed easily. Psychiatric/Behavioral: Negative for confusion and dysphoric mood. The patient is not nervous/anxious.         Past Medical History:   Diagnosis Date   • Diabetes mellitus ([address])    • HTN (hypertension)    • Hyperlipidemia    • Hypertension    • Kidney stone    • Osteomyelitis ([address]) 2020   • Seizures ([address])     Not since age 13     Past Surgical History:   Procedure Laterality Date   • COLONOSCOPY     • HAND SURGERY     • HERNIA REPAIR     • IR PICC PLACEMENT SINGLE LUMEN  12/08/2020    remove   • VASCULAR SURGERY     • WOUND DEBRIDEMENT N/A 11/30/2020    Procedure: DEBRIDEMENT OF ANTERIOR MANDIBLE BONE BIOPSY, CULTURES AND REMOVAL OF IMPLANT #27, USE OF PRP;  Surgeon: Stacey Bearden DDS;  Location: [facility];  Service: Maxillofacial     Family History   Problem Relation Age of Onset   • Cancer Mother    • Kidney disease Maternal Uncle      Social History     Socioeconomic History   • Marital status: /Civil Union     Spouse name: None   • Number of children: None   • Years of education: None   • Highest education level: None   Occupational History   • None   Tobacco Use   • Smoking status: Former     Packs/day: 2.00     Years: 20.00     Total pack years: 40.00     Types: Cigarettes     Start date: 1978     Quit date: 2000     Years since quittin.5   • Smokeless tobacco: Never   • Tobacco comments:     filthy habit. Vaping Use   • Vaping Use: Never used   Substance and Sexual Activity   • Alcohol use: Yes     Alcohol/week: 1.0 standard drink of alcohol     Types: 1 Standard drinks or equivalent per week     Comment: Not regularily   • Drug use: Never   • Sexual activity: Not Currently     Partners: Female     Birth control/protection: None     Comment: This is private   Other Topics Concern   • None   Social History Narrative   • None     Social Determinants of Health     Financial Resource Strain: Not on file   Food Insecurity: No Food Insecurity (2023)    Hunger Vital Sign    • Worried About Running Out of Food in the Last Year: Never true    • Ran Out of Food in the Last Year: Never true   Transportation Needs: No Transportation Needs (2023)    PRAPARE - Transportation    • Lack of Transportation (Medical): No    • Lack of Transportation (Non-Medical):  No   Physical Activity: Not on file   Stress: Not on file   Social Connections: Not on file   Intimate Partner Violence: Not on file   Housing Stability: Low Risk  (2023)    Housing Stability Vital Sign    • Unable to Pay for Housing in the Last Year: No    • Number of Places Lived in the Last Year: 1    • Unstable Housing in the Last Year: No     Current Outpatient Medications on File Prior to Visit   Medication Sig   • allopurinol (ZYLOPRIM) 100 mg tablet TAKE 1 TABLET BY MOUTH EVERY DAY   • aspirin (ECOTRIN) 325 mg EC tablet Take 1 tablet (325 mg total) by mouth daily   • Blood Glucose Monitoring Suppl (Contour Next One) JIN Use as directed   • buPROPion (Wellbutrin XL) 150 mg 24 hr tablet Take 1 tablet (150 mg total) by mouth daily   • Contour Next Test test strip TEST UP TO FOUR TIMES A DAY BEFORE MEALS AND BEDTIME   • glucosamine-chondroitin 500-400 MG tablet Take 2 tablets by mouth daily   • losartan (COZAAR) 100 MG tablet Take 1 tablet (100 mg total) by mouth daily   • metoprolol-hydrochlorothiazide (LOPRESSOR HCT) 100-25 MG per tablet TAKE 1/2 TABLET BY MOUTH EVERY DAY   • Microlet Lancets MISC TEST BLOOD SUGAR UP TO 4 TIMES DAILY (BEFORE MEALS AND AT BEDTIME)   • Omega-3 Fatty Acids (Omega-3 Fish Oil) 1000 MG CAPS Take 2,000 mg by mouth daily   • rosuvastatin (CRESTOR) 20 MG tablet Take 1 tablet (20 mg total) by mouth daily   • semaglutide, 1 mg/dose, (Ozempic, 1 MG/DOSE,) 4 mg/3 mL injection pen Inject 0.75 mL (1 mg total) under the skin every 7 days   • [DISCONTINUED] amLODIPine (NORVASC) 5 mg tablet Take 1 tablet (5 mg total) by mouth daily   • cholecalciferol (VITAMIN D3) 1,000 units tablet Take 5,000 Units by mouth daily 10,000 units Patient stated that is continuously taking (Patient not taking: Reported on 6/15/2023)     Allergies   Allergen Reactions   • Clindamycin Hives   • Penicillins Hives     Immunization History   Administered Date(s) Administered   • COVID-19 PFIZER VACCINE 0.3 ML IM 04/01/2021, 04/23/2021, 12/28/2021   • INFLUENZA 10/07/2011, 05/18/2012, 10/05/2012, 10/26/2015, 10/11/2016, 11/05/2020, 10/25/2022   • Influenza, recombinant, quadrivalent,injectable, preservative free 10/25/2021, 10/25/2022   • Pneumococcal Conjugate 13-Valent 10/26/2015   • Tdap 07/17/2017   • Zoster Vaccine Recombinant 07/06/2021, 10/25/2021       Objective     /72   Pulse 72   Wt 118 kg (260 lb 12.8 oz)   BMI 33.48 kg/m²     Physical Exam  Vitals reviewed.    Constitutional:       General: He is not in acute distress. Appearance: Normal appearance. He is well-developed. HENT:      Head: Normocephalic and atraumatic. Right Ear: External ear normal.      Left Ear: External ear normal.   Eyes:      General: No scleral icterus. Conjunctiva/sclera: Conjunctivae normal.   Neck:      Thyroid: No thyromegaly. Trachea: No tracheal deviation. Cardiovascular:      Rate and Rhythm: Normal rate and regular rhythm. Heart sounds: Normal heart sounds. No murmur heard. Pulmonary:      Effort: Pulmonary effort is normal. No respiratory distress. Breath sounds: Normal breath sounds. No wheezing or rales. Abdominal:      General: Bowel sounds are normal.      Palpations: Abdomen is soft. Tenderness: There is no abdominal tenderness. There is no guarding or rebound. Musculoskeletal:      Cervical back: Normal range of motion and neck supple. Right lower leg: No edema. Left lower leg: No edema. Lymphadenopathy:      Cervical: No cervical adenopathy. Skin:     Coloration: Skin is not jaundiced or pale. Neurological:      General: No focal deficit present. Mental Status: He is alert and oriented to person, place, and time. Psychiatric:         Mood and Affect: Mood normal.         Behavior: Behavior normal.         Thought Content:  Thought content normal.         Judgment: Judgment normal.       Pamela Petit MD

## 2023-07-14 NOTE — ASSESSMENT & PLAN NOTE
Patient being treated for this, discussed the possibility of this being related to atypical migraine, but recommend continuing all of the stroke preventative measures

## 2023-07-14 NOTE — ASSESSMENT & PLAN NOTE
Patient has not been taking losartan over the past few months.   With him having diabetes, discussed role of angiotensin receptor blockers and helping to prevent progression of microscopic protein leaking in the urine, so I recommend he restart the losartan, and can stop the amlodipine to see how his blood pressure does

## 2023-07-14 NOTE — ASSESSMENT & PLAN NOTE
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  I recommend follow-up vascular. He does report some claudication symptoms in the right leg, will put an order to check lower extremity arterial Dopplers also.

## 2023-07-18 ENCOUNTER — TELEPHONE (OUTPATIENT)
Dept: CARDIOLOGY CLINIC | Facility: CLINIC | Age: 63
End: 2023-07-18

## 2023-07-18 NOTE — TELEPHONE ENCOUNTER
Please reach out to patient by phone and relayed the message I sent to my chart on 7/10/2023 listed below:     Message to patient sent on 7/10/2023:           Daljit Barrientos,       Your nuclear stress study on 7/7/2023 was normal.       Let us know if you have any questions or concerns.       Dr. Darek Marcus                 ------Left message for patient to return call and discuss results

## 2023-07-20 ENCOUNTER — CONSULT (OUTPATIENT)
Dept: SURGERY | Facility: CLINIC | Age: 63
End: 2023-07-20
Payer: COMMERCIAL

## 2023-07-20 VITALS
DIASTOLIC BLOOD PRESSURE: 80 MMHG | RESPIRATION RATE: 18 BRPM | HEART RATE: 69 BPM | BODY MASS INDEX: 33.88 KG/M2 | OXYGEN SATURATION: 98 % | HEIGHT: 74 IN | WEIGHT: 264 LBS | SYSTOLIC BLOOD PRESSURE: 132 MMHG | TEMPERATURE: 96.9 F

## 2023-07-20 DIAGNOSIS — L72.3 SEBACEOUS CYST: Primary | ICD-10-CM

## 2023-07-20 PROCEDURE — 10060 I&D ABSCESS SIMPLE/SINGLE: CPT | Performed by: SURGERY

## 2023-07-20 PROCEDURE — 10061 I&D ABSCESS COMP/MULTIPLE: CPT | Performed by: SURGERY

## 2023-07-20 PROCEDURE — 99202 OFFICE O/P NEW SF 15 MIN: CPT | Performed by: SURGERY

## 2023-07-20 RX ORDER — LEVOFLOXACIN 750 MG/1
750 TABLET ORAL EVERY 24 HOURS
Qty: 5 TABLET | Refills: 0 | Status: SHIPPED | OUTPATIENT
Start: 2023-07-20 | End: 2023-07-25

## 2023-07-20 NOTE — PATIENT INSTRUCTIONS
Take Advil or Aleve for pain. Take the packing out in the shower tomorrow. 2 showers a day for a few days then 1 shower a day and let the water wash out the wounds. Dry dressing afterwards.   Return when able for consideration for formal excision

## 2023-07-20 NOTE — PROGRESS NOTES
The patient is a 20-year-old male who comes in with a complaint of 4 sebaceous cysts on his back. Seems that he is prone to these and he tells me he has had 14 of these in the past.  In any case, he states that both the right upper back and the left mid back once have been getting larger and are hurting him. He also states that the 1 on the right side popped spontaneously. On examination he indeed has a 2 to 2-1/2 cm sebaceous cyst to the right of the midline in the upper back. There is 2 mm to 3 mm punctum in the middle of this which is draining pus. On the left side he has a 5 cm lesion which is fluctuant and reddened. This has not started to drain as of yet. I told him that both of these are infected and he absolutely needs to have these drained and be put on antibiotics. I also told him that due to the size it would be improbable if I can get these out here under local.  Accordingly, incision and drainage was done    Incision and Drainage    Date/Time: 7/20/2023 8:15 AM    Performed by: Merari Pride MD  Authorized by: Merari Pride MD  Universal Protocol:  Consent: Written consent obtained. Patient identity confirmed: verbally with patient      Patient location:  Clinic  Location:     Type:  Abscess and cyst    Location:  Trunk    Trunk location:  Back  Pre-procedure details:     Skin preparation:  Betadine  Anesthesia (see MAR for exact dosages): Anesthesia method:  Local infiltration    Local anesthetic:  Lidocaine 1% WITH epi  Procedure details:     Complexity:  Intermediate    Incision types:  Elliptical    Scalpel blade:  15    Approach:  Open    Incision depth:  Subcutaneous    Wound management:  Probed and deloculated    Drainage:  Purulent    Drainage amount:  Copious    Wound treatment:  Packing placed    Packing materials:  1/4 in gauze  Post-procedure details:     Patient tolerance of procedure: Tolerated well, no immediate complications  Comments:       The patient was identified by me and placed in the prone position upon the operating room table. The area was marked and prepped and draped in a normal surgical manner. Neutralized lidocaine with epinephrine is infused around and under the area in question and an elliptical skin incision is now made. I got out a fair amount of pus along with sebum and a small amount of cyst wall. The wound was probed and then packed and a dressing was applied. This was the right upper back locus  Incision and Drainage    Date/Time: 7/20/2023 8:15 AM    Performed by: Calli Draper MD  Authorized by: Calli Draper MD  Universal Protocol:  Consent: Written consent obtained. Patient identity confirmed: verbally with patient      Patient location:  Clinic  Location:     Type:  Abscess and cyst    Location:  Trunk    Trunk location:  Back  Anesthesia (see MAR for exact dosages): Anesthesia method:  Local infiltration    Local anesthetic:  Lidocaine 1% WITH epi  Procedure details:     Complexity:  Complex    Incision types:  Elliptical    Scalpel blade:  15    Approach:  Open    Incision depth:  Subcutaneous    Wound management:  Probed and deloculated    Drainage:  Purulent    Drainage amount:  Copious    Packing materials:  1/2 in gauze  Post-procedure details:     Patient tolerance of procedure: Tolerated well, no immediate complications  Comments: The patient was already lying in the prone position upon the operating room table after I had drained the right-sided abscess. I now prepped and draped on the left side, injected with local in the most fluctuant area and then made a nice sized elliptical incision. I was able to express out a copious amount of both pus and sebum.   The area was probed and packed and a dressing was applied

## 2023-07-21 ENCOUNTER — NURSE TRIAGE (OUTPATIENT)
Dept: OTHER | Facility: OTHER | Age: 63
End: 2023-07-21

## 2023-07-21 ENCOUNTER — TELEPHONE (OUTPATIENT)
Dept: OTHER | Facility: HOSPITAL | Age: 63
End: 2023-07-21

## 2023-07-21 DIAGNOSIS — L08.9 INFECTED SEBACEOUS CYST: Primary | ICD-10-CM

## 2023-07-21 DIAGNOSIS — L72.3 INFECTED SEBACEOUS CYST: Primary | ICD-10-CM

## 2023-07-21 RX ORDER — SULFAMETHOXAZOLE AND TRIMETHOPRIM 800; 160 MG/1; MG/1
1 TABLET ORAL EVERY 12 HOURS SCHEDULED
Qty: 10 TABLET | Refills: 0 | Status: SHIPPED | OUTPATIENT
Start: 2023-07-21 | End: 2023-07-26

## 2023-07-21 NOTE — TELEPHONE ENCOUNTER
had an I&D of Sebaceous cyst with Dr. Alysia Phillips yesterday. He has an allergy to Clindmycin so was prescribed Levaquin. Patient notes he has taken 2 doses so far and about 20min after each dose he gets very dizzy, lightheaded and shaky. Questioning if another abx can be prescribed. On call provider notified    Reason for Disposition  • [1] Caller has URGENT medicine question about med that PCP or specialist prescribed AND [2] triager unable to answer question    Answer Assessment - Initial Assessment Questions  1. NAME of MEDICATION: "What medicine are you calling about?"      Levaquin  2. QUESTION: "What is your question?" (e.g., medication refill, side effect)      Can another abx be prescribed? 3. PRESCRIBING HCP: "Who prescribed it?" Reason: if prescribed by specialist, call should be referred to that group. Dr. Alysia Phillips  4.  SYMPTOMS: "Do you have any symptoms?"      Dizzy, lightheaded, shaky    Protocols used: MEDICATION QUESTION CALL-ADULT-

## 2023-07-21 NOTE — TELEPHONE ENCOUNTER
Regarding: requesting a different antibiotic  ----- Message from Tomy Carlos sent at 7/21/2023  4:49 PM EDT -----  " I was prescribed an antibiotic and it isn't agreeing with me and I wanted to know if I can get something else."

## 2023-08-07 ENCOUNTER — OFFICE VISIT (OUTPATIENT)
Dept: SURGERY | Facility: CLINIC | Age: 63
End: 2023-08-07

## 2023-08-07 VITALS
BODY MASS INDEX: 33.37 KG/M2 | OXYGEN SATURATION: 98 % | SYSTOLIC BLOOD PRESSURE: 128 MMHG | HEIGHT: 74 IN | HEART RATE: 74 BPM | DIASTOLIC BLOOD PRESSURE: 82 MMHG | TEMPERATURE: 97.5 F | RESPIRATION RATE: 18 BRPM | WEIGHT: 260 LBS

## 2023-08-07 DIAGNOSIS — I10 ESSENTIAL HYPERTENSION: ICD-10-CM

## 2023-08-07 DIAGNOSIS — L72.3 SEBACEOUS CYST: Primary | ICD-10-CM

## 2023-08-07 PROCEDURE — 99024 POSTOP FOLLOW-UP VISIT: CPT | Performed by: SURGERY

## 2023-08-07 NOTE — LETTER
August 7, 2023     [place] Cruz, [address] [address]    Patient: Olivia Urbina   YOB: 1960   Date of Visit: 8/7/2023       Dear Dr. Corina Davidson: Thank you for referring Nhi Cifuentes to me for evaluation. Below are my notes for this consultation. If you have questions, please do not hesitate to call me. I look forward to following your patient along with you. Sincerely,        Dany Ricks MD        CC: No Recipients    Dany Ricks MD  8/7/2023  1:44 PM  Incomplete  Postop after 2 I&D's on his back. Both of those were large sebaceous cyst which were infected. The top was totally closed off and the bottom is pretty close.   I told him its possible he could have residual cysts and he will return in a couple months time when he is back in town to get the 6

## 2023-08-07 NOTE — PROGRESS NOTES
Postop after 2 I&D's on his back. Both of those were large sebaceous cyst which were infected. The top was totally closed off and the bottom is pretty close.   I told him its possible he could have residual cysts and he will return in a couple months time when he is back in town to get the 6

## 2023-08-09 RX ORDER — LOSARTAN POTASSIUM 100 MG/1
100 TABLET ORAL DAILY
Qty: 90 TABLET | Refills: 0 | OUTPATIENT
Start: 2023-08-09

## 2023-08-10 DIAGNOSIS — I10 ESSENTIAL HYPERTENSION: ICD-10-CM

## 2023-08-10 RX ORDER — LOSARTAN POTASSIUM 100 MG/1
100 TABLET ORAL DAILY
Qty: 90 TABLET | Refills: 3 | Status: SHIPPED | OUTPATIENT
Start: 2023-08-10 | End: 2024-08-04

## 2023-09-12 ENCOUNTER — TELEPHONE (OUTPATIENT)
Dept: INFECTIOUS DISEASES | Facility: CLINIC | Age: 63
End: 2023-09-12

## 2023-09-12 DIAGNOSIS — Z98.890 H/O ORAL SURGERY: Primary | ICD-10-CM

## 2023-09-12 RX ORDER — CEPHALEXIN 500 MG/1
500 CAPSULE ORAL EVERY 8 HOURS SCHEDULED
Qty: 42 CAPSULE | Refills: 0 | Status: SHIPPED | OUTPATIENT
Start: 2023-09-12 | End: 2023-09-26

## 2023-09-12 RX ORDER — OXYCODONE HYDROCHLORIDE AND ACETAMINOPHEN 5; 325 MG/1; MG/1
1 TABLET ORAL EVERY 4 HOURS PRN
Qty: 21 TABLET | Refills: 0 | Status: SHIPPED | OUTPATIENT
Start: 2023-09-12

## 2023-09-12 NOTE — TELEPHONE ENCOUNTER
Patient calls the office today. Patient states he was a patient of Dr. Donnalee Snellen in June 2021 and was treated for osteomyelitis of mandible. Patient states one month ago from today he had dental implants done. Patient states since then he has pain that has not gone away. Patient states he currently has Jaw and facial pain. Patient denies any fevers or chills. Patient states he is currently taking Tylenol for the pain. Patient is concerned of an infection again.      CB:[phone]

## 2023-09-13 NOTE — TELEPHONE ENCOUNTER
Called and lmom for patient today. Informed patient to call the office back. Per Dr. Yoly Cook patient would first need to be evaluated by provider who put in the dental implants or oral surgery.

## 2023-09-26 ENCOUNTER — TELEPHONE (OUTPATIENT)
Dept: ENDOCRINOLOGY | Facility: CLINIC | Age: 63
End: 2023-09-26

## 2023-09-26 NOTE — TELEPHONE ENCOUNTER
Pt is on the [facility] until next week and is out of Ozempic. He is have a lot of trouble obtaining the medication and wants to know if there is something else we can put him on so he does not loose the progress he has made. He mentioned remembering that him and Dr. Luis Guerra have discussed Eulice Fail at some point in the past. Please advise.

## 2023-09-28 DIAGNOSIS — E11.65 TYPE 2 DIABETES MELLITUS WITH HYPERGLYCEMIA, WITHOUT LONG-TERM CURRENT USE OF INSULIN (HCC): Primary | ICD-10-CM

## 2023-11-13 ENCOUNTER — RA CDI HCC (OUTPATIENT)
Dept: OTHER | Facility: HOSPITAL | Age: 63
End: 2023-11-13

## 2023-11-13 NOTE — PROGRESS NOTES
[address] [facility] coding opportunities          Chart Reviewed number of suggestions sent to Provider: 2  E11.22  E11.51     Patients Insurance        Commercial Insurance: [address]

## 2023-11-27 DIAGNOSIS — I10 ESSENTIAL HYPERTENSION: ICD-10-CM

## 2023-11-27 RX ORDER — METOPROLOL TARTRATE AND HYDROCHLOROTHIAZIDE 100; 25 MG/1; MG/1
TABLET ORAL
Qty: 45 TABLET | Refills: 0 | Status: SHIPPED | OUTPATIENT
Start: 2023-11-27

## 2023-12-01 ENCOUNTER — OFFICE VISIT (OUTPATIENT)
Dept: INTERNAL MEDICINE CLINIC | Facility: CLINIC | Age: 63
End: 2023-12-01
Payer: COMMERCIAL

## 2023-12-01 VITALS
SYSTOLIC BLOOD PRESSURE: 142 MMHG | OXYGEN SATURATION: 99 % | WEIGHT: 266.2 LBS | HEART RATE: 73 BPM | BODY MASS INDEX: 34.18 KG/M2 | DIASTOLIC BLOOD PRESSURE: 82 MMHG

## 2023-12-01 DIAGNOSIS — E78.2 MIXED HYPERLIPIDEMIA: ICD-10-CM

## 2023-12-01 DIAGNOSIS — I10 ESSENTIAL HYPERTENSION: ICD-10-CM

## 2023-12-01 DIAGNOSIS — E11.65 TYPE 2 DIABETES MELLITUS WITH HYPERGLYCEMIA, WITHOUT LONG-TERM CURRENT USE OF INSULIN (HCC): Primary | ICD-10-CM

## 2023-12-01 PROCEDURE — 99214 OFFICE O/P EST MOD 30 MIN: CPT | Performed by: INTERNAL MEDICINE

## 2023-12-01 PROCEDURE — 83036 HEMOGLOBIN GLYCOSYLATED A1C: CPT | Performed by: INTERNAL MEDICINE

## 2023-12-01 RX ORDER — ROSUVASTATIN CALCIUM 20 MG/1
20 TABLET, COATED ORAL DAILY
Qty: 90 TABLET | Refills: 3 | Status: SHIPPED | OUTPATIENT
Start: 2023-12-01

## 2023-12-01 NOTE — ASSESSMENT & PLAN NOTE
Continue Rybelsus along with monitoring, will check eye exam and A1c  Lab Results   Component Value Date    HGBA1C 6.3 (H) 05/24/2023

## 2023-12-01 NOTE — PROGRESS NOTES
Name: Leandro Parkinson      : 1960      MRN: [id number]  Encounter Provider: Caswell Angelucci, MD  Encounter Date: 2023   Encounter department: [facility]    Assessment & Plan     1. Type 2 diabetes mellitus with hyperglycemia, without long-term current use of insulin (HCC)  Assessment & Plan:  Continue Rybelsus along with monitoring, will check eye exam and A1c  Lab Results   Component Value Date    HGBA1C 6.3 (H) 2023       Orders:  -     POCT hemoglobin A1c    2. Essential hypertension  Assessment & Plan:  Borderline, continue meds along with healthy diet and exercise      3. Mixed hyperlipidemia  Assessment & Plan:  Patient on fish oil, he stopped taking rosuvastatin, I recommend restarting    Orders:  -     rosuvastatin (CRESTOR) 20 MG tablet; Take 1 tablet (20 mg total) by mouth daily           Subjective     Patient here for regular follow-up, he is also just started Medicare, so he will likely be due for welcome to Medicare in the very near future patient also has COVID, with onset of symptoms 5 days ago. Review of Systems   Constitutional:  Negative for chills, fatigue and fever. HENT:  Negative for congestion, nosebleeds, postnasal drip, sore throat and trouble swallowing. Eyes:  Negative for pain. Respiratory:  Negative for cough, chest tightness, shortness of breath and wheezing. Cardiovascular:  Negative for chest pain, palpitations and leg swelling. Gastrointestinal:  Negative for abdominal pain, constipation, diarrhea, nausea and vomiting. Endocrine: Negative for polydipsia and polyuria. Genitourinary:  Negative for dysuria, flank pain and hematuria. Musculoskeletal:  Negative for arthralgias, back pain and myalgias. Skin:  Negative for rash. Neurological:  Negative for dizziness, tremors, light-headedness and headaches. Hematological:  Does not bruise/bleed easily. Psychiatric/Behavioral:  Negative for confusion and dysphoric mood. The patient is not nervous/anxious. Past Medical History:   Diagnosis Date   • Diabetes mellitus ([address])    • HTN (hypertension)    • Hyperlipidemia    • Hypertension    • Kidney stone    • Osteomyelitis ([address])    • Seizures ([address])     Not since age 13     Past Surgical History:   Procedure Laterality Date   • COLONOSCOPY     • HAND SURGERY     • HERNIA REPAIR     • IR PICC PLACEMENT SINGLE LUMEN  2020    remove   • VASCULAR SURGERY     • WOUND DEBRIDEMENT N/A 2020    Procedure: DEBRIDEMENT OF ANTERIOR MANDIBLE BONE BIOPSY, CULTURES AND REMOVAL OF IMPLANT #27, USE OF PRP;  Surgeon: Martin Roche DDS;  Location: [facility];  Service: Maxillofacial     Family History   Problem Relation Age of Onset   • Cancer Mother    • Kidney disease Maternal Uncle      Social History     Socioeconomic History   • Marital status: /Civil Union     Spouse name: None   • Number of children: None   • Years of education: None   • Highest education level: None   Occupational History   • None   Tobacco Use   • Smoking status: Former     Packs/day: 2.00     Years: 20.00     Total pack years: 40.00     Types: Cigarettes     Start date: 1978     Quit date: 2000     Years since quittin.9   • Smokeless tobacco: Never   Vaping Use   • Vaping Use: Never used   Substance and Sexual Activity   • Alcohol use: Yes     Alcohol/week: 5.0 standard drinks of alcohol     Types: 5 Cans of beer per week     Comment: Not regularily   • Drug use: Never   • Sexual activity: Not Currently     Partners: Female     Birth control/protection: None     Comment:  This is private   Other Topics Concern   • None   Social History Narrative   • None     Social Determinants of Health     Financial Resource Strain: Not on file   Food Insecurity: No Food Insecurity (2023)    Hunger Vital Sign    • Worried About Running Out of Food in the Last Year: Never true    • Ran Out of Food in the Last Year: Never true   Transportation Needs: No Transportation Needs (5/24/2023)    PRAPARE - Transportation    • Lack of Transportation (Medical): No    • Lack of Transportation (Non-Medical):  No   Physical Activity: Not on file   Stress: Not on file   Social Connections: Not on file   Intimate Partner Violence: Not on file   Housing Stability: Low Risk  (5/24/2023)    Housing Stability Vital Sign    • Unable to Pay for Housing in the Last Year: No    • Number of Places Lived in the Last Year: 1    • Unstable Housing in the Last Year: No     Current Outpatient Medications on File Prior to Visit   Medication Sig   • allopurinol (ZYLOPRIM) 100 mg tablet TAKE 1 TABLET BY MOUTH EVERY DAY   • aspirin (ECOTRIN) 325 mg EC tablet Take 1 tablet (325 mg total) by mouth daily   • Blood Glucose Monitoring Suppl (Contour Next One) JIN Use as directed   • buPROPion (Wellbutrin XL) 150 mg 24 hr tablet Take 1 tablet (150 mg total) by mouth daily   • cholecalciferol (VITAMIN D3) 1,000 units tablet Take 5,000 Units by mouth daily 10,000 units Patient stated that is continuously taking   • Contour Next Test test strip TEST UP TO FOUR TIMES A DAY BEFORE MEALS AND BEDTIME   • glucosamine-chondroitin 500-400 MG tablet Take 2 tablets by mouth daily   • losartan (COZAAR) 100 MG tablet Take 1 tablet (100 mg total) by mouth daily   • metoprolol-hydrochlorothiazide (LOPRESSOR HCT) 100-25 MG per tablet TAKE 1/2 TABLET BY MOUTH EVERY DAY   • Microlet Lancets MISC TEST BLOOD SUGAR UP TO 4 TIMES DAILY (BEFORE MEALS AND AT BEDTIME)   • Omega-3 Fatty Acids (Omega-3 Fish Oil) 1000 MG CAPS Take 2,000 mg by mouth daily (Patient taking differently: Take 2,400 mg by mouth daily)   • oxyCODONE-acetaminophen (Percocet) 5-325 mg per tablet Take 1 tablet by mouth every 4 (four) hours as needed for moderate pain Max Daily Amount: 6 tablets (Patient not taking: Reported on 12/1/2023)   • semaglutide (Rybelsus) 14 MG tablet Take 1 tablet (14 mg total) by mouth daily before breakfast (Patient taking differently: Take 14 mg by mouth daily before breakfast The patient is taking 0.5 tablet a day)   • [DISCONTINUED] rosuvastatin (CRESTOR) 20 MG tablet Take 1 tablet (20 mg total) by mouth daily     Allergies   Allergen Reactions   • Clindamycin Hives   • Penicillins Hives     Immunization History   Administered Date(s) Administered   • COVID-19 PFIZER VACCINE 0.3 ML IM 04/01/2021, 04/23/2021, 12/28/2021   • INFLUENZA 10/07/2011, 05/18/2012, 10/05/2012, 10/26/2015, 10/11/2016, 11/05/2020, 10/25/2022   • Influenza, recombinant, quadrivalent,injectable, preservative free 10/25/2021, 10/25/2022   • Pneumococcal Conjugate 13-Valent 10/26/2015   • Tdap 07/17/2017   • Zoster Vaccine Recombinant 07/06/2021, 10/25/2021       Objective     /82   Pulse 73   Wt 121 kg (266 lb 3.2 oz)   SpO2 99%   BMI 34.18 kg/m²     Physical Exam  Vitals reviewed. Constitutional:       General: He is not in acute distress. Appearance: He is well-developed. HENT:      Head: Normocephalic and atraumatic. Right Ear: External ear normal.      Left Ear: External ear normal.   Eyes:      General: No scleral icterus. Conjunctiva/sclera: Conjunctivae normal.   Neck:      Thyroid: No thyromegaly. Trachea: No tracheal deviation. Cardiovascular:      Rate and Rhythm: Normal rate and regular rhythm. Heart sounds: Normal heart sounds. Pulmonary:      Effort: Pulmonary effort is normal. No respiratory distress. Breath sounds: Normal breath sounds. No wheezing or rales. Musculoskeletal:      Cervical back: Normal range of motion and neck supple. Right lower leg: No edema. Left lower leg: No edema. Lymphadenopathy:      Cervical: No cervical adenopathy. Skin:     Coloration: Skin is not jaundiced or pale. Neurological:      Mental Status: He is alert and oriented to person, place, and time. Psychiatric:         Behavior: Behavior normal.         Thought Content:  Thought content normal. Judgment: Judgment normal.       Vangie Barragan MD

## 2023-12-01 NOTE — PATIENT INSTRUCTIONS
Problem List Items Addressed This Visit          Endocrine    Type 2 diabetes mellitus with hyperglycemia ([address]) - Primary     Continue Rybelsus along with monitoring, will check eye exam and A1c  Lab Results   Component Value Date    HGBA1C 6.3 (H) 05/24/2023          Relevant Orders    POCT hemoglobin A1c       Cardiovascular and Mediastinum    Essential hypertension     Borderline, continue meds along with healthy diet and exercise            Other    Mixed hyperlipidemia     Patient on fish oil, he stopped taking rosuvastatin, I recommend restarting         Relevant Medications    rosuvastatin (CRESTOR) 20 MG tablet

## 2023-12-04 ENCOUNTER — TELEPHONE (OUTPATIENT)
Age: 63
End: 2023-12-04

## 2023-12-04 LAB — SL AMB POCT HEMOGLOBIN AIC: 7.9 (ref ?–6.5)

## 2023-12-04 NOTE — TELEPHONE ENCOUNTER
Patient stated he missed a call. Do not see telephone encounter. Reviewed chart. Result note with a message left for the patient to call back . Can not edit the result note. Relayed message to the patient .  Aware of results and PCP message

## 2024-02-23 DIAGNOSIS — M1A.0710 IDIOPATHIC CHRONIC GOUT OF RIGHT FOOT WITHOUT TOPHUS: ICD-10-CM

## 2024-02-23 RX ORDER — ALLOPURINOL 100 MG/1
TABLET ORAL
Qty: 90 TABLET | Refills: 1 | Status: SHIPPED | OUTPATIENT
Start: 2024-02-23

## 2024-03-01 DIAGNOSIS — I10 ESSENTIAL HYPERTENSION: ICD-10-CM

## 2024-03-01 DIAGNOSIS — M1A.0710 IDIOPATHIC CHRONIC GOUT OF RIGHT FOOT WITHOUT TOPHUS: ICD-10-CM

## 2024-03-01 DIAGNOSIS — E78.2 MIXED HYPERLIPIDEMIA: ICD-10-CM

## 2024-03-01 RX ORDER — LOSARTAN POTASSIUM 100 MG/1
100 TABLET ORAL DAILY
Qty: 90 TABLET | Refills: 1 | Status: SHIPPED | OUTPATIENT
Start: 2024-03-01 | End: 2025-02-24

## 2024-03-01 RX ORDER — ALLOPURINOL 100 MG/1
100 TABLET ORAL DAILY
Qty: 90 TABLET | Refills: 3 | OUTPATIENT
Start: 2024-03-01

## 2024-03-01 RX ORDER — ROSUVASTATIN CALCIUM 20 MG/1
20 TABLET, COATED ORAL DAILY
Qty: 90 TABLET | Refills: 1 | Status: SHIPPED | OUTPATIENT
Start: 2024-03-01

## 2024-03-01 RX ORDER — METOPROLOL TARTRATE AND HYDROCHLOROTHIAZIDE 100; 25 MG/1; MG/1
0.5 TABLET ORAL DAILY
Qty: 45 TABLET | Refills: 1 | Status: SHIPPED | OUTPATIENT
Start: 2024-03-01

## 2024-03-09 DIAGNOSIS — E11.65 TYPE 2 DIABETES MELLITUS WITH HYPERGLYCEMIA, WITHOUT LONG-TERM CURRENT USE OF INSULIN (HCC): ICD-10-CM

## 2024-03-18 DIAGNOSIS — F33.8 SEASONAL AFFECTIVE DISORDER (HCC): ICD-10-CM

## 2024-03-18 RX ORDER — BUPROPION HYDROCHLORIDE 150 MG/1
150 TABLET ORAL DAILY
Qty: 90 TABLET | Refills: 1 | Status: SHIPPED | OUTPATIENT
Start: 2024-03-18

## 2024-03-22 ENCOUNTER — TELEPHONE (OUTPATIENT)
Dept: ENDOCRINOLOGY | Facility: CLINIC | Age: 64
End: 2024-03-22

## 2024-03-23 ENCOUNTER — NURSE TRIAGE (OUTPATIENT)
Dept: OTHER | Facility: OTHER | Age: 64
End: 2024-03-23

## 2024-03-23 NOTE — TELEPHONE ENCOUNTER
"Regarding: Rybelsus needs to be sent to [facility]  ----- Message from Marika Spence sent at 3/23/2024  9:54 AM EDT -----  Pt called \"I need my Rybelsus sent to [facility] pharmacy.\"    "

## 2024-03-23 NOTE — TELEPHONE ENCOUNTER
"Pt calling, he states that [facility] pharmacy still has not received fax for his Rybelsus.  He has two pills left for Sunday and Monday.  RN offered to send in emergency 7 day rx to local pharm.  Pt state that will cost too much, he really wants to receive it through [facility] because that is what his insurance will cover.  Please f/u on Monday when the office is open.  Pt instructed to call back on Monday morning, as well.       Reason for Disposition   Prescription prescribed recently is not at pharmacy and triager has access to patient's EMR and prescription is recorded in the EMR    Answer Assessment - Initial Assessment Questions  1. NAME of MEDICATION: \"What medicine are you calling about?\"      Rybelsus  2. QUESTION: \"What is your question?\" (e.g., medication refill, side effect)      I have two pills left and [facility] still has not received a fax for it.   3. PRESCRIBING HCP: \"Who prescribed it?\" Reason: if prescribed by specialist, call should be referred to that group.      Dr. Gramajo  4. SYMPTOMS: \"Do you have any symptoms?\"      no  5. SEVERITY: If symptoms are present, ask \"Are they mild, moderate or severe?\"      no  6. PREGNANCY:  \"Is there any chance that you are pregnant?\" \"When was your last menstrual period?\"      no    Protocols used: Medication Question Call-ADULT-[place]    "

## 2024-03-25 DIAGNOSIS — M1A.0710 IDIOPATHIC CHRONIC GOUT OF RIGHT FOOT WITHOUT TOPHUS: ICD-10-CM

## 2024-03-25 RX ORDER — ALLOPURINOL 100 MG/1
100 TABLET ORAL DAILY
Qty: 90 TABLET | Refills: 3 | Status: SHIPPED | OUTPATIENT
Start: 2024-03-25

## 2024-03-26 ENCOUNTER — TELEPHONE (OUTPATIENT)
Age: 64
End: 2024-03-26

## 2024-03-26 DIAGNOSIS — E11.65 TYPE 2 DIABETES MELLITUS WITH HYPERGLYCEMIA, WITHOUT LONG-TERM CURRENT USE OF INSULIN (HCC): ICD-10-CM

## 2024-03-26 NOTE — TELEPHONE ENCOUNTER
Changed pharmacy on pending order to [facility]. Pt stopped into office to ask for samples. Could not provide since we did not have prescribed 14mg dose.

## 2024-03-26 NOTE — TELEPHONE ENCOUNTER
This pt needs rybelsus sent to [facility] pharmacy, 90 day ,  He is out of it  Pt asking for  samples.      Change pharmacy on pended order.

## 2024-03-27 NOTE — TELEPHONE ENCOUNTER
Called [facility] to see if I could get estimated day of arrival for Rybels shipment. Expedited shipping was requested and medication should arrive 3/29 at the earliest, 3/30 at the latest. Called pt to inform him. Said he will be okay without samples until then. No further action needed.

## 2024-04-01 ENCOUNTER — TELEPHONE (OUTPATIENT)
Dept: INTERNAL MEDICINE CLINIC | Facility: CLINIC | Age: 64
End: 2024-04-01

## 2024-04-01 NOTE — TELEPHONE ENCOUNTER
Reached patient to schedule Welcome to Medicare for 5/28 and patient asked if you would put orders in for appropriate labs needed? Patient asked if we would mail to his home?    Review and advise

## 2024-04-02 DIAGNOSIS — Z12.5 SCREENING FOR PROSTATE CANCER: ICD-10-CM

## 2024-04-02 DIAGNOSIS — E11.65 TYPE 2 DIABETES MELLITUS WITH HYPERGLYCEMIA, WITHOUT LONG-TERM CURRENT USE OF INSULIN (HCC): Primary | ICD-10-CM

## 2024-04-02 DIAGNOSIS — E55.9 VITAMIN D DEFICIENCY: ICD-10-CM

## 2024-04-05 ENCOUNTER — TELEPHONE (OUTPATIENT)
Age: 64
End: 2024-04-05

## 2024-04-05 NOTE — TELEPHONE ENCOUNTER
Patient called wanting to know if he can go for his lab work on Monday because he has an appointment Tuesday. He stated he was just notified of the labs. I confirmed with patient to get them done as soon as he could if he went Monday. Verbalized understanding. No further questions.

## 2024-04-06 ENCOUNTER — APPOINTMENT (OUTPATIENT)
Dept: LAB | Facility: CLINIC | Age: 64
End: 2024-04-06
Payer: MEDICARE

## 2024-04-06 DIAGNOSIS — E11.65 TYPE 2 DIABETES MELLITUS WITH HYPERGLYCEMIA, WITHOUT LONG-TERM CURRENT USE OF INSULIN (HCC): ICD-10-CM

## 2024-04-06 DIAGNOSIS — Z12.5 SCREENING FOR PROSTATE CANCER: ICD-10-CM

## 2024-04-06 DIAGNOSIS — E55.9 VITAMIN D DEFICIENCY: ICD-10-CM

## 2024-04-06 DIAGNOSIS — E11.65 INADEQUATELY CONTROLLED DIABETES MELLITUS (HCC): ICD-10-CM

## 2024-04-06 LAB
25(OH)D3 SERPL-MCNC: 46.3 NG/ML (ref 30–100)
ALBUMIN SERPL BCP-MCNC: 4.4 G/DL (ref 3.5–5)
ALP SERPL-CCNC: 64 U/L (ref 34–104)
ALT SERPL W P-5'-P-CCNC: 64 U/L (ref 7–52)
ANION GAP SERPL CALCULATED.3IONS-SCNC: 7 MMOL/L (ref 4–13)
AST SERPL W P-5'-P-CCNC: 41 U/L (ref 13–39)
BASOPHILS # BLD AUTO: 0.05 THOUSANDS/ÂΜL (ref 0–0.1)
BASOPHILS NFR BLD AUTO: 1 % (ref 0–1)
BILIRUB SERPL-MCNC: 0.61 MG/DL (ref 0.2–1)
BUN SERPL-MCNC: 11 MG/DL (ref 5–25)
CALCIUM SERPL-MCNC: 9.3 MG/DL (ref 8.4–10.2)
CHLORIDE SERPL-SCNC: 100 MMOL/L (ref 96–108)
CHOLEST SERPL-MCNC: 140 MG/DL
CO2 SERPL-SCNC: 28 MMOL/L (ref 21–32)
CREAT SERPL-MCNC: 0.87 MG/DL (ref 0.6–1.3)
CREAT UR-MCNC: 219.9 MG/DL
EOSINOPHIL # BLD AUTO: 0.23 THOUSAND/ÂΜL (ref 0–0.61)
EOSINOPHIL NFR BLD AUTO: 7 % (ref 0–6)
ERYTHROCYTE [DISTWIDTH] IN BLOOD BY AUTOMATED COUNT: 12.4 % (ref 11.6–15.1)
EST. AVERAGE GLUCOSE BLD GHB EST-MCNC: 318 MG/DL
GFR SERPL CREATININE-BSD FRML MDRD: 90 ML/MIN/1.73SQ M
GLUCOSE P FAST SERPL-MCNC: 214 MG/DL (ref 65–99)
HBA1C MFR BLD: 12.7 %
HCT VFR BLD AUTO: 45.7 % (ref 36.5–49.3)
HDLC SERPL-MCNC: 31 MG/DL
HGB BLD-MCNC: 15.8 G/DL (ref 12–17)
IMM GRANULOCYTES # BLD AUTO: 0.01 THOUSAND/UL (ref 0–0.2)
IMM GRANULOCYTES NFR BLD AUTO: 0 % (ref 0–2)
LDLC SERPL CALC-MCNC: 35 MG/DL (ref 0–100)
LYMPHOCYTES # BLD AUTO: 1.06 THOUSANDS/ÂΜL (ref 0.6–4.47)
LYMPHOCYTES NFR BLD AUTO: 31 % (ref 14–44)
MCH RBC QN AUTO: 29 PG (ref 26.8–34.3)
MCHC RBC AUTO-ENTMCNC: 34.6 G/DL (ref 31.4–37.4)
MCV RBC AUTO: 84 FL (ref 82–98)
MICROALBUMIN UR-MCNC: 89.1 MG/L
MICROALBUMIN/CREAT 24H UR: 41 MG/G CREATININE (ref 0–30)
MONOCYTES # BLD AUTO: 0.43 THOUSAND/ÂΜL (ref 0.17–1.22)
MONOCYTES NFR BLD AUTO: 12 % (ref 4–12)
NEUTROPHILS # BLD AUTO: 1.69 THOUSANDS/ÂΜL (ref 1.85–7.62)
NEUTS SEG NFR BLD AUTO: 49 % (ref 43–75)
NRBC BLD AUTO-RTO: 0 /100 WBCS
PLATELET # BLD AUTO: 174 THOUSANDS/UL (ref 149–390)
PMV BLD AUTO: 10 FL (ref 8.9–12.7)
POTASSIUM SERPL-SCNC: 4.2 MMOL/L (ref 3.5–5.3)
PROT SERPL-MCNC: 7.1 G/DL (ref 6.4–8.4)
PSA SERPL-MCNC: 0.5 NG/ML (ref 0–4)
RBC # BLD AUTO: 5.44 MILLION/UL (ref 3.88–5.62)
SODIUM SERPL-SCNC: 135 MMOL/L (ref 135–147)
TRIGL SERPL-MCNC: 369 MG/DL
TSH SERPL DL<=0.05 MIU/L-ACNC: 1.31 UIU/ML (ref 0.45–4.5)
WBC # BLD AUTO: 3.47 THOUSAND/UL (ref 4.31–10.16)

## 2024-04-06 PROCEDURE — 80061 LIPID PANEL: CPT

## 2024-04-06 PROCEDURE — 80053 COMPREHEN METABOLIC PANEL: CPT

## 2024-04-06 PROCEDURE — 84443 ASSAY THYROID STIM HORMONE: CPT

## 2024-04-06 PROCEDURE — 82043 UR ALBUMIN QUANTITATIVE: CPT

## 2024-04-06 PROCEDURE — 82570 ASSAY OF URINE CREATININE: CPT

## 2024-04-06 PROCEDURE — 85025 COMPLETE CBC W/AUTO DIFF WBC: CPT

## 2024-04-06 PROCEDURE — 36415 COLL VENOUS BLD VENIPUNCTURE: CPT

## 2024-04-06 PROCEDURE — 82306 VITAMIN D 25 HYDROXY: CPT

## 2024-04-06 PROCEDURE — G0103 PSA SCREENING: HCPCS

## 2024-04-06 PROCEDURE — 83036 HEMOGLOBIN GLYCOSYLATED A1C: CPT

## 2024-04-09 ENCOUNTER — OFFICE VISIT (OUTPATIENT)
Dept: ENDOCRINOLOGY | Facility: CLINIC | Age: 64
End: 2024-04-09
Payer: MEDICARE

## 2024-04-09 VITALS
SYSTOLIC BLOOD PRESSURE: 130 MMHG | HEART RATE: 64 BPM | BODY MASS INDEX: 33.75 KG/M2 | DIASTOLIC BLOOD PRESSURE: 82 MMHG | TEMPERATURE: 96.4 F | WEIGHT: 263 LBS | OXYGEN SATURATION: 97 % | HEIGHT: 74 IN

## 2024-04-09 DIAGNOSIS — E11.65 TYPE 2 DIABETES MELLITUS WITH HYPERGLYCEMIA, WITHOUT LONG-TERM CURRENT USE OF INSULIN (HCC): Primary | ICD-10-CM

## 2024-04-09 DIAGNOSIS — E66.01 CLASS 2 SEVERE OBESITY WITH SERIOUS COMORBIDITY AND BODY MASS INDEX (BMI) OF 35.0 TO 35.9 IN ADULT, UNSPECIFIED OBESITY TYPE (HCC): ICD-10-CM

## 2024-04-09 DIAGNOSIS — E78.2 MIXED HYPERLIPIDEMIA: ICD-10-CM

## 2024-04-09 PROBLEM — E11.00 TYPE 2 DIABETES MELLITUS WITH HYPEROSMOLARITY WITHOUT COMA, WITHOUT LONG-TERM CURRENT USE OF INSULIN (HCC): Status: RESOLVED | Noted: 2021-03-29 | Resolved: 2024-04-09

## 2024-04-09 PROBLEM — E66.812 CLASS 2 SEVERE OBESITY WITH SERIOUS COMORBIDITY AND BODY MASS INDEX (BMI) OF 35.0 TO 35.9 IN ADULT, UNSPECIFIED OBESITY TYPE (HCC): Status: ACTIVE | Noted: 2024-04-09

## 2024-04-09 PROCEDURE — 99214 OFFICE O/P EST MOD 30 MIN: CPT | Performed by: INTERNAL MEDICINE

## 2024-04-09 PROCEDURE — G2211 COMPLEX E/M VISIT ADD ON: HCPCS | Performed by: INTERNAL MEDICINE

## 2024-04-09 RX ORDER — GLIMEPIRIDE 2 MG/1
2 TABLET ORAL
Qty: 90 TABLET | Refills: 1 | Status: SHIPPED | OUTPATIENT
Start: 2024-04-09

## 2024-04-09 NOTE — PROGRESS NOTES
"  Follow-up Patient Progress Note      CC:hyperglycemia     History of Present Illness:   62-year-old male with type 2 diabetes since 2018, macroalbuminuria, HTN, HLD, NAFLD, seasonal affective disorder, history of osteomyelitis of jaw 2019, DJD, gout, obesity, TIA and vitamin D deficiency.  Last visit was 5/6/2023.     Since last visit, lost 12 lbs.     Max adult weight: 305lbs. Lowest adult weight 185lbs. Current 247 lbs.     Home blood glucose monitoring: checks 2 times a day fasting 120-130mg/dl and postprandial     Current meds: metformin -intolerance  Ozempic 1mg weekly      Opthamology: yes, 2 weeks ago  Podiatry: No  Vaccination: yes   Dental:  Pancreatitis: No     Ace/ARB: Losartan  Statin: Crestor 20 mg nightly  Thyroid issues: no     FH: Mother had type 2 diabetes. Sister - diabetes    Physical Exam:  Body mass index is 33.77 kg/m².  /82 (BP Location: Left arm, Patient Position: Sitting, Cuff Size: Large)   Pulse 64   Temp (!) 96.4 °F (35.8 °C) (Tympanic)   Ht 6' 2\" (1.88 m)   Wt 119 kg (263 lb)   SpO2 97%   BMI 33.77 kg/m²    Vitals:    04/09/24 0932   Weight: 119 kg (263 lb)        Physical Exam  Constitutional:       General: He is not in acute distress.     Appearance: He is well-developed.   HENT:      Head: Normocephalic and atraumatic.      Nose: Nose normal.   Eyes:      Conjunctiva/sclera: Conjunctivae normal.   Pulmonary:      Effort: Pulmonary effort is normal.   Abdominal:      General: There is no distension.   Musculoskeletal:      Cervical back: Normal range of motion and neck supple.   Skin:     Findings: No rash.      Comments: No icterus   Neurological:      Mental Status: He is alert and oriented to person, place, and time.       Labs:   Lab Results   Component Value Date    HGBA1C 12.7 (H) 04/06/2024       Lab Results   Component Value Date    KWE4RYVTMHGM 1.310 04/06/2024    TSH 1.73 04/01/2019       Lab Results   Component Value Date    CREATININE 0.87 04/06/2024    " CREATININE 0.93 05/24/2023    CREATININE 1.00 05/23/2023    BUN 11 04/06/2024     10/08/2014    K 4.2 04/06/2024     04/06/2024    CO2 28 04/06/2024     GFR, Calculated   Date Value Ref Range Status   01/04/2021 91 >60 mL/min/1.73m2 Final     Comment:     mL/min per 1.73 square meters                                            Normal Function or Mild Renal    Disease (if clinically at risk):  >or=60  Moderately Decreased:                30-59  Severely Decreased:                  15-29  Renal Failure:                         <15                                            -American GFR: multiply reported GFR by 1.16    Please note that the eGFR is based on the CKD-EPI calculation, and is not intended to be used for drug dosing.                                            Note: Calculated GFR may not be an accurate indicator of renal function if the patient's renal function is not in a steady state.     eGFR   Date Value Ref Range Status   04/06/2024 90 ml/min/1.73sq m Final       Lab Results   Component Value Date    ALT 64 (H) 04/06/2024    AST 41 (H) 04/06/2024    ALKPHOS 64 04/06/2024    BILITOT 0.4 10/08/2014       Lab Results   Component Value Date    CHOLESTEROL 140 04/06/2024    CHOLESTEROL 200 (H) 05/24/2023    CHOLESTEROL 162 09/08/2022     Lab Results   Component Value Date    HDL 31 (L) 04/06/2024    HDL 28 (L) 05/24/2023    HDL 29 (L) 09/08/2022     Lab Results   Component Value Date    TRIG 369 (H) 04/06/2024    TRIG 528 (H) 05/24/2023    TRIG 491 (H) 09/08/2022     Lab Results   Component Value Date    NONHDLC 128 03/22/2021         Assessment/Plan:    1. Type 2 diabetes mellitus with hyperglycemia, without long-term current use of insulin ([facility])  Assessment & Plan:  He has lost control from prior A1c 7% range. Reported poor diet due to travelling/ loss of dentures and unavailability of medications etc.    Cannot tolerate metformin and hopes to avoid injections as has previous syncopal  episodes with injections.    We agreed to add back lifestyle and dietary interventions, add glimepiride and continue increase rybelsus 14mg qdaily(had been taking 0.5tabs daily)    Discussed cgm but pt not keen at this time.  Follow up in 6 months.      Lab Results   Component Value Date    HGBA1C 12.7 (H) 04/06/2024       Orders:  -     Hemoglobin A1C; Future  -     Albumin / creatinine urine ratio; Future  -     glimepiride (AMARYL) 2 mg tablet; Take 1 tablet (2 mg total) by mouth daily with breakfast    2. Mixed hyperlipidemia  Assessment & Plan:  Continue crestor.      3. Class 2 severe obesity with serious comorbidity and body mass index (BMI) of 35.0 to 35.9 in adult, unspecified obesity type (HCC)  Assessment & Plan:  Today we discussed all aspects of obesity and metabolism including pathophysiology, risk factors, complications, goal of sustaining weight loss long term, usual propensity to regain weight with short term approaches, follow up needs and medications - efficacy and limitations.   Discussed role of endocrinopathies, inflammatory conditions, sleep disorders, mental health disorders, lifestyle issues and polypharmacy and weight gain.  Briefly discussed bariatric surgery.  Diet: carb controlled diet <1500cal/day/ VLCD, 64oz fluids/day   lifestyle modifications: intermittent fasting and 10,000 steps/day  medical fitness training: muscle building  education: nutrition input    Goal weight is 240 lbs over next 6 months.            I have spent a total time of 32 minutes on 04/09/24 in caring for this patient including greater than 50% of this time was spent in counseling/coordination of care as listed above.       Discussed with the patient and all questioned fully answered. He will contact me with concerns.    Deric Gramajo

## 2024-04-09 NOTE — ASSESSMENT & PLAN NOTE
He has lost control from prior A1c 7% range. Reported poor diet due to travelling/ loss of dentures and unavailability of medications etc.    Cannot tolerate metformin and hopes to avoid injections as has previous syncopal episodes with injections.    We agreed to add back lifestyle and dietary interventions, add glimepiride and continue increase rybelsus 14mg qdaily(had been taking 0.5tabs daily)    Discussed cgm but pt not keen at this time.  Follow up in 6 months.      Lab Results   Component Value Date    HGBA1C 12.7 (H) 04/06/2024

## 2024-04-09 NOTE — ASSESSMENT & PLAN NOTE
Today we discussed all aspects of obesity and metabolism including pathophysiology, risk factors, complications, goal of sustaining weight loss long term, usual propensity to regain weight with short term approaches, follow up needs and medications - efficacy and limitations.   Discussed role of endocrinopathies, inflammatory conditions, sleep disorders, mental health disorders, lifestyle issues and polypharmacy and weight gain.  Briefly discussed bariatric surgery.  Diet: carb controlled diet <1500cal/day/ VLCD, 64oz fluids/day   lifestyle modifications: intermittent fasting and 10,000 steps/day  medical fitness training: muscle building  education: nutrition input    Goal weight is 240 lbs over next 6 months.

## 2024-05-09 DIAGNOSIS — F33.8 SEASONAL AFFECTIVE DISORDER (HCC): ICD-10-CM

## 2024-05-09 RX ORDER — BUPROPION HYDROCHLORIDE 150 MG/1
150 TABLET ORAL DAILY
Qty: 90 TABLET | Refills: 3 | Status: SHIPPED | OUTPATIENT
Start: 2024-05-09

## 2024-05-21 ENCOUNTER — OFFICE VISIT (OUTPATIENT)
Dept: INTERNAL MEDICINE CLINIC | Facility: CLINIC | Age: 64
End: 2024-05-21
Payer: MEDICARE

## 2024-05-21 ENCOUNTER — NURSE TRIAGE (OUTPATIENT)
Age: 64
End: 2024-05-21

## 2024-05-21 VITALS
HEIGHT: 74 IN | WEIGHT: 266 LBS | HEART RATE: 85 BPM | SYSTOLIC BLOOD PRESSURE: 160 MMHG | BODY MASS INDEX: 34.14 KG/M2 | DIASTOLIC BLOOD PRESSURE: 100 MMHG | OXYGEN SATURATION: 100 %

## 2024-05-21 DIAGNOSIS — R07.89 OTHER CHEST PAIN: Primary | ICD-10-CM

## 2024-05-21 DIAGNOSIS — R39.9 UTI SYMPTOMS: ICD-10-CM

## 2024-05-21 DIAGNOSIS — E11.65 TYPE 2 DIABETES MELLITUS WITH HYPERGLYCEMIA, WITHOUT LONG-TERM CURRENT USE OF INSULIN (HCC): ICD-10-CM

## 2024-05-21 DIAGNOSIS — I73.9 PAD (PERIPHERAL ARTERY DISEASE) (HCC): ICD-10-CM

## 2024-05-21 DIAGNOSIS — F33.8 SEASONAL AFFECTIVE DISORDER (HCC): ICD-10-CM

## 2024-05-21 DIAGNOSIS — I10 ESSENTIAL HYPERTENSION: ICD-10-CM

## 2024-05-21 DIAGNOSIS — E78.2 MIXED HYPERLIPIDEMIA: ICD-10-CM

## 2024-05-21 LAB
BACTERIA UR QL AUTO: ABNORMAL /HPF
BILIRUB UR QL STRIP: NEGATIVE
CAOX CRY URNS QL MICRO: ABNORMAL /HPF
CLARITY UR: CLEAR
COLOR UR: YELLOW
GLUCOSE UR STRIP-MCNC: ABNORMAL MG/DL
HGB UR QL STRIP.AUTO: ABNORMAL
KETONES UR STRIP-MCNC: NEGATIVE MG/DL
LEFT EYE DIABETIC RETINOPATHY: NORMAL
LEFT EYE IMAGE QUALITY: NORMAL
LEFT EYE MACULAR EDEMA: NORMAL
LEFT EYE OTHER RETINOPATHY: NORMAL
LEUKOCYTE ESTERASE UR QL STRIP: NEGATIVE
NITRITE UR QL STRIP: NEGATIVE
NON-SQ EPI CELLS URNS QL MICRO: ABNORMAL /HPF
PH UR STRIP.AUTO: 5.5 [PH]
PROT UR STRIP-MCNC: ABNORMAL MG/DL
RBC #/AREA URNS AUTO: ABNORMAL /HPF
RIGHT EYE DIABETIC RETINOPATHY: NORMAL
RIGHT EYE IMAGE QUALITY: NORMAL
RIGHT EYE MACULAR EDEMA: NORMAL
RIGHT EYE OTHER RETINOPATHY: NORMAL
SEVERITY (EYE EXAM): NORMAL
SP GR UR STRIP.AUTO: 1.02 (ref 1–1.03)
UROBILINOGEN UR STRIP-ACNC: <2 MG/DL
WBC #/AREA URNS AUTO: ABNORMAL /HPF

## 2024-05-21 PROCEDURE — 92250 FUNDUS PHOTOGRAPHY W/I&R: CPT | Performed by: INTERNAL MEDICINE

## 2024-05-21 PROCEDURE — 93000 ELECTROCARDIOGRAM COMPLETE: CPT | Performed by: INTERNAL MEDICINE

## 2024-05-21 PROCEDURE — 81001 URINALYSIS AUTO W/SCOPE: CPT | Performed by: INTERNAL MEDICINE

## 2024-05-21 PROCEDURE — 99214 OFFICE O/P EST MOD 30 MIN: CPT | Performed by: INTERNAL MEDICINE

## 2024-05-21 NOTE — PATIENT INSTRUCTIONS
Problem List Items Addressed This Visit          Cardiovascular and Mediastinum    Essential hypertension     Elevated today, but patient stressed out about his chest pain, will continue to monitor this, continue current meds         PAD (peripheral artery disease) ([facility])     Stay active with walking            Endocrine    Type 2 diabetes mellitus with hyperglycemia ([facility])     Most recent A1c was very high compared to previous ones, patient did see endocrinology with interventions done including going back on Rybelsus 14 mg daily, he was on a lower dose previously, and to watch diet.  Patient reports he has not tolerated metformin in the past, and is trying to avoid injections  Lab Results   Component Value Date    HGBA1C 12.7 (H) 04/06/2024            Relevant Orders    IRIS Diabetic eye exam       Urinary    UTI symptoms     Will check urinalysis         Relevant Orders    UA w Reflex to Microscopic w Reflex to Culture       Behavioral Health    Seasonal affective disorder ([facility])     Stay active outdoors            Surgery/Wound/Pain    Other chest pain - Primary     I recommend checking EKG, and to see Cardiology.  Will check cardiac enzymes.  If worsening, then go to ED. patient did have a normal nuclear medicine stress test July 7, 2023.  Discussed that patient could take the Prilosec OTC daily for a couple of weeks to see if he gets improvement.  Musculoskeletal pain also possibility as pain seems to be reproducible, can try short course of over-the-counter anti-inflammatory to see if he gets relief.    EKG today shows Sinus bradycardia 51 bpm, normal axis, no ischemic changes, no significant change from old EKG from 6/15/2023         Relevant Orders    POCT ECG (Completed)    High Sensitivity Troponin I Random       Other    Mixed hyperlipidemia     Continue pravastatin

## 2024-05-21 NOTE — TELEPHONE ENCOUNTER
"Pt calling in stating that for over the past two months he has been experiencing intermittent chest pain. Pt states that it gets worse when he lays down. Pt states that the pain feels like mild pressure that will radiate up to his neck. Will have SOB at times when sleeping. Denies other symptoms. Pt has office appt today at 9am.     Reason for Disposition   Chest pain or 'angina' comes and goes and is happening more often (increasing in frequency) or getting worse (increasing in severity) (Exception: chest pains that last only a few seconds)    Answer Assessment - Initial Assessment Questions  1. LOCATION: \"Where does it hurt?\"        Chest, up to neck and jaw  2. RADIATION: \"Does the pain go anywhere else?\" (e.g., into neck, jaw, arms, back)      Up to neck and jaw  3. ONSET: \"When did the chest pain begin?\" (Minutes, hours or days)       Two months ago  4. PATTERN \"Does the pain come and go, or has it been constant since it started?\"  \"Does it get worse with exertion?\"       Comes and goes. Mostly there.  5. DURATION: \"How long does it last\" (e.g., seconds, minutes, hours)      Always there  6. SEVERITY: \"How bad is the pain?\"  (e.g., Scale 1-10; mild, moderate, or severe)     - MILD (1-3): doesn't interfere with normal activities      - MODERATE (4-7): interferes with normal activities or awakens from sleep     - SEVERE (8-10): excruciating pain, unable to do any normal activities        Mild  7. CARDIAC RISK FACTORS: \"Do you have any history of heart problems or risk factors for heart disease?\" (e.g., angina, prior heart attack; diabetes, high blood pressure, high cholesterol, smoker, or strong family history of heart disease)      TIA  8. PULMONARY RISK FACTORS: \"Do you have any history of lung disease?\"  (e.g., blood clots in lung, asthma, emphysema, birth control pills)      NA  9. CAUSE: \"What do you think is causing the chest pain?\"      Unsure  10. OTHER SYMPTOMS: \"Do you have any other symptoms?\" (e.g., " "dizziness, nausea, vomiting, sweating, fever, difficulty breathing, cough)        Denies  11. PREGNANCY: \"Is there any chance you are pregnant?\" \"When was your last menstrual period?\"        NA    Protocols used: Chest Pain-ADULT-[place]    "

## 2024-05-21 NOTE — PROGRESS NOTES
Ambulatory Visit  Name: Sergio Ahumada      : 1960      MRN: [id number]  Encounter Provider: Steve Nguyen MD  Encounter Date: 2024   Encounter department: [facility]    Assessment & Plan   1. Other chest pain  Assessment & Plan:  I recommend checking EKG, and to see Cardiology.  Will check cardiac enzymes.  If worsening, then go to ED. patient did have a normal nuclear medicine stress test 2023.  Discussed that patient could take the Prilosec OTC daily for a couple of weeks to see if he gets improvement.  Musculoskeletal pain also possibility as pain seems to be reproducible, can try short course of over-the-counter anti-inflammatory to see if he gets relief.    EKG today shows Sinus bradycardia 51 bpm, normal axis, no ischemic changes, no significant change from old EKG from 6/15/2023  Orders:  -     POCT ECG  -     High Sensitivity Troponin I Random; Future  2. UTI symptoms  Assessment & Plan:  Will check urinalysis  Orders:  -     UA w Reflex to Microscopic w Reflex to Culture; Future  3. Seasonal affective disorder (HCC)  Assessment & Plan:  Stay active outdoors  4. PAD (peripheral artery disease) ([facility])  Assessment & Plan:  Stay active with walking  5. Type 2 diabetes mellitus with hyperglycemia, without long-term current use of insulin ([facility])  Assessment & Plan:  Most recent A1c was very high compared to previous ones, patient did see endocrinology with interventions done including going back on Rybelsus 14 mg daily, he was on a lower dose previously, and to watch diet.  Patient reports he has not tolerated metformin in the past, and is trying to avoid injections  Lab Results   Component Value Date    HGBA1C 12.7 (H) 2024     Orders:  -     IRIS Diabetic eye exam  6. Essential hypertension  Assessment & Plan:  Elevated today, but patient stressed out about his chest pain, will continue to monitor this, continue current meds  7. Mixed hyperlipidemia  Assessment &  Plan:  Continue pravastatin         History of Present Illness     Pt reports having upper chest pain, radiating up into neck and jaw, feels a little weak in arms.  He reports he is always SOB, but perhaps a little worse.  Symptoms going on over the past few months, worsening over the past 2 weeks.  Symptoms are interfering with his sleep now.  He does also have GERD and uses famotidine for this. Symptoms of chest pain worse at night when he lays down.  He also thinks symptoms may be worse with exertion.  No recent cold symptoms or cough, but allergies have been acting upl.    Pt also thinks he has a UTI    Shortness of Breath  Associated symptoms include chest pain. Pertinent negatives include no coughing, dizziness, fatigue, leg swelling, palpitations, sore throat or wheezing.     Review of Systems   Constitutional:  Negative for chills, fatigue and fever.   HENT:  Negative for congestion, nosebleeds, postnasal drip, sore throat and trouble swallowing.    Eyes:  Negative for pain.   Respiratory:  Positive for chest tightness and shortness of breath. Negative for cough and wheezing.    Cardiovascular:  Positive for chest pain. Negative for palpitations and leg swelling.   Gastrointestinal:  Negative for abdominal pain, constipation, diarrhea, nausea and vomiting.   Endocrine: Negative for polydipsia and polyuria.   Genitourinary:  Positive for frequency. Negative for dysuria, flank pain and hematuria.   Musculoskeletal:  Negative for arthralgias.   Skin:  Negative for rash.   Neurological:  Negative for dizziness, tremors and headaches.   Hematological:  Does not bruise/bleed easily.   Psychiatric/Behavioral:  Negative for confusion and dysphoric mood. The patient is not nervous/anxious.      Past Medical History:   Diagnosis Date   • Diabetes mellitus (HCC)    • HTN (hypertension)    • Hyperlipidemia    • Hypertension    • Kidney stone    • Osteomyelitis (HCC) 2020   • Seizures (HCC)     Not since age 10     Past  Surgical History:   Procedure Laterality Date   • COLONOSCOPY     • HAND SURGERY     • HERNIA REPAIR     • IR PICC PLACEMENT SINGLE LUMEN  2020    remove   • VASCULAR SURGERY     • WOUND DEBRIDEMENT N/A 2020    Procedure: DEBRIDEMENT OF ANTERIOR MANDIBLE BONE BIOPSY, CULTURES AND REMOVAL OF IMPLANT #27, USE OF PRP;  Surgeon: Kiah Ordoñez DDS;  Location: [facility];  Service: Maxillofacial     Family History   Problem Relation Age of Onset   • Cancer Mother    • Kidney disease Maternal Uncle      Social History     Tobacco Use   • Smoking status: Former     Current packs/day: 0.00     Average packs/day: 2.0 packs/day for 21.1 years (42.2 ttl pk-yrs)     Types: Cigarettes     Start date: 1978     Quit date: 2000     Years since quittin.3   • Smokeless tobacco: Never   Vaping Use   • Vaping status: Never Used   Substance and Sexual Activity   • Alcohol use: Yes     Alcohol/week: 5.0 standard drinks of alcohol     Types: 5 Cans of beer per week     Comment: Not regularily   • Drug use: Never   • Sexual activity: Not Currently     Partners: Female     Birth control/protection: None     Comment: This is private     Current Outpatient Medications on File Prior to Visit   Medication Sig   • allopurinol (ZYLOPRIM) 100 mg tablet Take 1 tablet (100 mg total) by mouth daily   • aspirin (ECOTRIN) 325 mg EC tablet Take 1 tablet (325 mg total) by mouth daily (Patient taking differently: Take 81 mg by mouth daily)   • Blood Glucose Monitoring Suppl (Contour Next One) JIN Use as directed   • buPROPion (WELLBUTRIN XL) 150 mg 24 hr tablet Take 1 tablet (150 mg total) by mouth daily   • cholecalciferol (VITAMIN D3) 1,000 units tablet Take 5,000 Units by mouth daily 10,000 units Patient stated that is continuously taking   • Contour Next Test test strip TEST UP TO FOUR TIMES A DAY BEFORE MEALS AND BEDTIME   • glimepiride (AMARYL) 2 mg tablet Take 1 tablet (2 mg total) by mouth daily with breakfast   •  "glucosamine-chondroitin 500-400 MG tablet Take 2 tablets by mouth daily   • losartan (COZAAR) 100 MG tablet Take 1 tablet (100 mg total) by mouth daily   • metoprolol-hydrochlorothiazide (LOPRESSOR HCT) 100-25 MG per tablet Take 0.5 tablets by mouth daily   • Microlet Lancets MISC TEST BLOOD SUGAR UP TO 4 TIMES DAILY (BEFORE MEALS AND AT BEDTIME)   • Omega-3 Fatty Acids (Omega-3 Fish Oil) 1000 MG CAPS Take 2,000 mg by mouth daily (Patient taking differently: Take 2,400 mg by mouth daily)   • oxyCODONE-acetaminophen (Percocet) 5-325 mg per tablet Take 1 tablet by mouth every 4 (four) hours as needed for moderate pain Max Daily Amount: 6 tablets (Patient not taking: Reported on 12/1/2023)   • rosuvastatin (CRESTOR) 20 MG tablet Take 1 tablet (20 mg total) by mouth daily   • semaglutide (Rybelsus) 14 MG tablet Take 1 tablet (14 mg total) by mouth daily before breakfast     Allergies   Allergen Reactions   • Clindamycin Hives   • Penicillins Hives     Immunization History   Administered Date(s) Administered   • COVID-19 PFIZER VACCINE 0.3 ML IM 04/01/2021, 04/23/2021, 12/28/2021   • INFLUENZA 10/07/2011, 05/18/2012, 10/05/2012, 10/26/2015, 10/11/2016, 11/05/2020, 10/25/2022   • Influenza, recombinant, quadrivalent,injectable, preservative free 10/25/2021, 10/25/2022   • Pneumococcal Conjugate 13-Valent 10/26/2015   • Tdap 07/17/2017   • Zoster Vaccine Recombinant 07/06/2021, 10/25/2021     Objective     /100   Pulse 85   Ht 6' 2\" (1.88 m)   Wt 121 kg (266 lb)   SpO2 100%   BMI 34.15 kg/m²   Patient's shoes and socks removed.    Right Foot/Ankle   Right Foot Inspection  Skin Exam: skin normal and skin intact. No dry skin, no warmth, no callus, no erythema, no maceration, no abnormal color, no pre-ulcer, no ulcer and no callus.     Toe Exam: ROM and strength within normal limits. No swelling, no tenderness, erythema and  no right toe deformity    Sensory   Monofilament testing: intact    Vascular  The right DP " pulse is 2+.     Left Foot/Ankle  Left Foot Inspection  Skin Exam: skin normal and skin intact. No dry skin, no warmth, no erythema, no maceration, normal color, no pre-ulcer, no ulcer and no callus.     Toe Exam: ROM and strength within normal limits. No swelling, no tenderness, no erythema and no left toe deformity.     Sensory   Monofilament testing: intact    Vascular  The left DP pulse is 2+.     Assign Risk Category  No deformity present  No loss of protective sensation  No weak pulses  Risk: 0      Physical Exam  Vitals reviewed.   Constitutional:       General: He is not in acute distress.     Appearance: Normal appearance. He is well-developed.   HENT:      Head: Normocephalic and atraumatic.      Right Ear: External ear normal.      Left Ear: External ear normal.      Nose: Nose normal.   Eyes:      General: No scleral icterus.     Conjunctiva/sclera: Conjunctivae normal.   Neck:      Trachea: No tracheal deviation.   Cardiovascular:      Rate and Rhythm: Normal rate and regular rhythm.      Pulses: no weak pulses.           Dorsalis pedis pulses are 2+ on the right side and 2+ on the left side.      Heart sounds: Normal heart sounds. No murmur heard.  Pulmonary:      Effort: Pulmonary effort is normal. No respiratory distress.      Breath sounds: Normal breath sounds. No wheezing or rales.   Musculoskeletal:      Cervical back: Normal range of motion and neck supple.      Right lower leg: No edema.      Left lower leg: No edema.      Comments: Some reproducible pain with pressing along sides of sternum   Feet:      Right foot:      Skin integrity: No ulcer, skin breakdown, erythema, warmth, callus or dry skin.      Left foot:      Skin integrity: No ulcer, skin breakdown, erythema, warmth, callus or dry skin.   Lymphadenopathy:      Cervical: No cervical adenopathy.   Skin:     Coloration: Skin is not jaundiced or pale.   Neurological:      General: No focal deficit present.      Mental Status: He is  alert and oriented to person, place, and time.   Psychiatric:         Mood and Affect: Mood normal.         Behavior: Behavior normal.         Thought Content: Thought content normal.         Judgment: Judgment normal.       Administrative Statements

## 2024-05-21 NOTE — ASSESSMENT & PLAN NOTE
Elevated today, but patient stressed out about his chest pain, will continue to monitor this, continue current meds

## 2024-05-21 NOTE — ASSESSMENT & PLAN NOTE
Most recent A1c was very high compared to previous ones, patient did see endocrinology with interventions done including going back on Rybelsus 14 mg daily, he was on a lower dose previously, and to watch diet.  Patient reports he has not tolerated metformin in the past, and is trying to avoid injections  Lab Results   Component Value Date    HGBA1C 12.7 (H) 04/06/2024

## 2024-05-21 NOTE — TELEPHONE ENCOUNTER
Regarding: Neck, chest pain, SOB  ----- Message from Raquel PILLAI sent at 5/21/2024  8:16 AM EDT -----  Pt called c/o neck, chest pain and sob. I was trying to transfer to [facility] when pt disconnected the call. Please, triage. Pt said symptoms came on slowly over the last few weeks and got worse last night.

## 2024-05-21 NOTE — ASSESSMENT & PLAN NOTE
I recommend checking EKG, and to see Cardiology.  Will check cardiac enzymes.  If worsening, then go to ED. patient did have a normal nuclear medicine stress test July 7, 2023.  Discussed that patient could take the Prilosec OTC daily for a couple of weeks to see if he gets improvement.  Musculoskeletal pain also possibility as pain seems to be reproducible, can try short course of over-the-counter anti-inflammatory to see if he gets relief.    EKG today shows Sinus bradycardia 51 bpm, normal axis, no ischemic changes, no significant change from old EKG from 6/15/2023

## 2024-05-28 ENCOUNTER — OFFICE VISIT (OUTPATIENT)
Dept: INTERNAL MEDICINE CLINIC | Facility: CLINIC | Age: 64
End: 2024-05-28
Payer: MEDICARE

## 2024-05-28 ENCOUNTER — TELEPHONE (OUTPATIENT)
Dept: NEUROLOGY | Facility: CLINIC | Age: 64
End: 2024-05-28

## 2024-05-28 VITALS
BODY MASS INDEX: 34.01 KG/M2 | TEMPERATURE: 97.4 F | DIASTOLIC BLOOD PRESSURE: 80 MMHG | HEIGHT: 74 IN | HEART RATE: 60 BPM | OXYGEN SATURATION: 97 % | SYSTOLIC BLOOD PRESSURE: 138 MMHG | WEIGHT: 265 LBS | RESPIRATION RATE: 18 BRPM

## 2024-05-28 DIAGNOSIS — F33.8 SEASONAL AFFECTIVE DISORDER (HCC): ICD-10-CM

## 2024-05-28 DIAGNOSIS — Z13.6 SCREENING FOR AAA (ABDOMINAL AORTIC ANEURYSM): ICD-10-CM

## 2024-05-28 DIAGNOSIS — H53.132 SUDDEN VISUAL LOSS, LEFT EYE: ICD-10-CM

## 2024-05-28 DIAGNOSIS — Z00.00 WELCOME TO MEDICARE PREVENTIVE VISIT: Primary | ICD-10-CM

## 2024-05-28 DIAGNOSIS — Z23 ENCOUNTER FOR IMMUNIZATION: ICD-10-CM

## 2024-05-28 DIAGNOSIS — Z12.11 SCREENING FOR COLON CANCER: ICD-10-CM

## 2024-05-28 DIAGNOSIS — Z13.6 ENCOUNTER FOR SCREENING FOR STENOSIS OF CAROTID ARTERY: ICD-10-CM

## 2024-05-28 PROCEDURE — G0402 INITIAL PREVENTIVE EXAM: HCPCS | Performed by: INTERNAL MEDICINE

## 2024-05-28 RX ORDER — SERTRALINE HYDROCHLORIDE 25 MG/1
25 TABLET, FILM COATED ORAL DAILY
Qty: 90 TABLET | Refills: 3 | Status: SHIPPED | OUTPATIENT
Start: 2024-05-28

## 2024-05-28 NOTE — ASSESSMENT & PLAN NOTE
Stay active, patient asked about changing bupropion 150 mg daily to sertraline 25 mg daily.  He wants to be on bupropion in the winter, and sertraline in the summer.

## 2024-05-28 NOTE — PROGRESS NOTES
Ambulatory Visit  Name: Sergio Ahumada      : 1960      MRN: [id number]  Encounter Provider: Steve Nguyen MD  Encounter Date: 2024   Encounter department: [facility]    Assessment & Plan   1. Welcome to Medicare preventive visit  Assessment & Plan:  Discussed preventative health, cancer screening, immunizations, and safety issues.  Patient had colonoscopy 2019 with recommendations recheck again in 5 years, so patient due.  I recommend Prevnar 20 today.  Patient had aorta visualized on CAT scan in the past which was unremarkable.  Patient platelet declines EKG as he just had this last week.    2. Encounter for immunization  -     Pneumococcal Conjugate Vaccine 20-valent (Pcv20)  3. Screening for colon cancer  -     Ambulatory Referral to Gastroenterology; Future  4. Seasonal affective disorder (HCC)  Assessment & Plan:  Stay active, patient asked about changing bupropion 150 mg daily to sertraline 25 mg daily.  He wants to be on bupropion in the winter, and sertraline in the summer.  Orders:  -     sertraline (ZOLOFT) 25 mg tablet; Take 1 tablet (25 mg total) by mouth daily  5. Encounter for screening for stenosis of carotid artery  -     VAS carotid complete study; Future; Expected date: 2024  6. Screening for AAA (abdominal aortic aneurysm)  -     US abdominal aorta screening aaa; Future; Expected date: 2024  7. Sudden visual loss, left eye  -     VAS carotid complete study; Future; Expected date: 2024      Depression Screening and Follow-up Plan: Patient's depression screening was positive with a PHQ-9 score of 2.       Preventive health issues were discussed with patient, and age appropriate screening tests were ordered as noted in patient's After Visit Summary. Personalized health advice and appropriate referrals for health education or preventive services given if needed, as noted in patient's After Visit Summary.    History of Present Illness      Patient here for welcome to Medicare visit       Patient Care Team:  Steve Nguyen MD as PCP - General (Internal Medicine)  Chayito Sewell as Diabetes Educator (Diabetes Services)    Review of Systems   Constitutional:  Negative for chills and fever.     Medical History Reviewed by provider this encounter:  Tobacco  Meds       Annual Wellness Visit Questionnaire   Sergio is here for his Welcome to Medicare visit.     Health Risk Assessment:   Patient rates overall health as fair. Patient feels that their physical health rating is same. Patient is satisfied with their life. Eyesight was rated as same. Hearing was rated as same. Patient feels that their emotional and mental health rating is same. Patients states they are never, rarely angry. Patient states they are sometimes unusually tired/fatigued. Pain experienced in the last 7 days has been some. Patient's pain rating has been 7/10. Patient states that he has experienced no weight loss or gain in last 6 months.     Depression Screening:   PHQ-9 Score: 2      Fall Risk Screening:   In the past year, patient has experienced: no history of falling in past year      Home Safety:  Patient has trouble with stairs inside or outside of their home. Patient has working smoke alarms and has working carbon monoxide detector. Home safety hazards include: none.     Nutrition:   Current diet is Regular.     Medications:   Patient is currently taking over-the-counter supplements. OTC medications include: see medication list. Patient is able to manage medications.     Activities of Daily Living (ADLs)/Instrumental Activities of Daily Living (IADLs):   Walk and transfer into and out of bed and chair?: Yes  Dress and groom yourself?: Yes    Bathe or shower yourself?: Yes    Feed yourself? Yes  Do your laundry/housekeeping?: Yes  Manage your money, pay your bills and track your expenses?: Yes  Make your own meals?: Yes    Do your own shopping?: Yes    Previous  Hospitalizations:   Any hospitalizations or ED visits within the last 12 months?: No      Advance Care Planning:   Living will: No    Durable POA for healthcare: No    Advanced directive: No    Advanced directive counseling given: Yes    ACP document given: Yes      Cognitive Screening:   Provider or family/friend/caregiver concerned regarding cognition?: No    PREVENTIVE SCREENINGS      Cardiovascular Screening:    General: Screening Not Indicated, History Lipid Disorder, Risks and Benefits Discussed and Screening Current      Diabetes Screening:     General: Screening Not Indicated, History Diabetes, Risks and Benefits Discussed and Screening Current      Colorectal Cancer Screening:     General: Screening Current      Prostate Cancer Screening:    General: Screening Current and Risks and Benefits Discussed      Osteoporosis Screening:    General: Screening Not Indicated      Abdominal Aortic Aneurysm (AAA) Screening:    Risk factors include: age between 65-74 yo and tobacco use        General: Risks and Benefits Discussed and Screening Current      Lung Cancer Screening:     General: Screening Not Indicated and Risks and Benefits Discussed      Hepatitis C Screening:    General: Screening Current and Risks and Benefits Discussed    Screening, Brief Intervention, and Referral to Treatment (SBIRT)    Screening    Typical number of drinks in a week: 7    AUDIT-C Screenin) How often did you have a drink containing alcohol in the past year? 2 to 3 times a week  2) How many drinks did you have on a typical day when you were drinking in the past year? 1 to 2  3) How often did you have 6 or more drinks on one occasion in the past year? less than monthly    AUDIT-C Score: 4  Interpretation: Score 4-12 (male): POSITIVE screen for alcohol misuse    Brief Intervention  Alcohol & drug use screenings were reviewed. No concerns regarding substance use disorder identified.     Other Counseling Topics:   Car/seat  "belt/driving safety, skin self-exam and sunscreen.     Social Determinants of Health     Food Insecurity: No Food Insecurity (5/28/2024)    Hunger Vital Sign    • Worried About Running Out of Food in the Last Year: Never true    • Ran Out of Food in the Last Year: Never true   Transportation Needs: No Transportation Needs (5/28/2024)    PRAPARE - Transportation    • Lack of Transportation (Medical): No    • Lack of Transportation (Non-Medical): No   Housing Stability: Low Risk  (5/28/2024)    Housing Stability Vital Sign    • Unable to Pay for Housing in the Last Year: No    • Number of Times Moved in the Last Year: 1    • Homeless in the Last Year: No   Utilities: Not At Risk (5/28/2024)    [facility] Utilities    • Threatened with loss of utilities: No     No results found.    Objective     /80   Pulse 60   Temp (!) 97.4 °F (36.3 °C) (Tympanic)   Resp 18   Ht 6' 2\" (1.88 m)   Wt 120 kg (265 lb)   SpO2 97%   BMI 34.02 kg/m²     Physical Exam  Constitutional:       General: He is not in acute distress.     Appearance: Normal appearance.   Eyes:      Comments: 20/20 vision both eyes uncorrected, 20/25 vision left eye uncorrected, 20/40 vision right eye uncorrected   Neurological:      Mental Status: He is alert.       Administrative Statements           "

## 2024-05-28 NOTE — ASSESSMENT & PLAN NOTE
Discussed preventative health, cancer screening, immunizations, and safety issues.  Patient had colonoscopy 4/22/2019 with recommendations recheck again in 5 years, so patient due.  I recommend Prevnar 20 today.  Patient had aorta visualized on CAT scan in the past which was unremarkable.  Patient platelet declines EKG as he just had this last week.

## 2024-05-28 NOTE — PATIENT INSTRUCTIONS
Problem List Items Addressed This Visit          Behavioral Health    Seasonal affective disorder (HCC)     Stay active, patient asked about changing bupropion 150 mg daily to sertraline 25 mg daily.  He wants to be on bupropion in the winter, and sertraline in the summer.         Relevant Medications    sertraline (ZOLOFT) 25 mg tablet       Other    Welcome to Medicare preventive visit - Primary     Discussed preventative health, cancer screening, immunizations, and safety issues.  Patient had colonoscopy 4/22/2019 with recommendations recheck again in 5 years, so patient due.  I recommend Prevnar 20 today.  Patient had aorta visualized on CAT scan in the past which was unremarkable.  Patient platelet declines EKG as he just had this last week.            Other Visit Diagnoses       Encounter for immunization        Relevant Orders    Pneumococcal Conjugate Vaccine 20-valent (Pcv20)    Screening for colon cancer        Relevant Orders    Ambulatory Referral to Gastroenterology            Medicare Preventive Visit Patient Instructions  Thank you for completing your Welcome to Medicare Visit or Medicare Annual Wellness Visit today. Your next wellness visit will be due in one year (5/29/2025).  The screening/preventive services that you may require over the next 5-10 years are detailed below. Some tests may not apply to you based off risk factors and/or age. Screening tests ordered at today's visit but not completed yet may show as past due. Also, please note that scanned in results may not display below.  Preventive Screenings:  Service Recommendations Previous Testing/Comments   Colorectal Cancer Screening  Colonoscopy    Fecal Occult Blood Test (FOBT)/Fecal Immunochemical Test (FIT)  Fecal DNA/Cologuard Test  Flexible Sigmoidoscopy Age: 45-75 years old   Colonoscopy: every 10 years (May be performed more frequently if at higher risk)  OR  FOBT/FIT: every 1 year  OR  Cologuard: every 3 years  OR  Sigmoidoscopy:  every 5 years  Screening may be recommended earlier than age 45 if at higher risk for colorectal cancer. Also, an individualized decision between you and your healthcare provider will decide whether screening between the ages of 76-85 would be appropriate. Colonoscopy: 04/22/2019  FOBT/FIT: Not on file  Cologuard: Not on file  Sigmoidoscopy: Not on file          Prostate Cancer Screening Individualized decision between patient and health care provider in men between ages of 55-69   Medicare will cover every 12 months beginning on the day after your 50th birthday PSA: 0.50 ng/mL           Hepatitis C Screening Once for adults born between 1945 and 1965  More frequently in patients at high risk for Hepatitis C Hep C Antibody: 04/05/2021        Diabetes Screening 1-2 times per year if you're at risk for diabetes or have pre-diabetes Fasting glucose: 214 mg/dL (4/6/2024)  A1C: 12.7 % (4/6/2024)      Cholesterol Screening Once every 5 years if you don't have a lipid disorder. May order more often based on risk factors. Lipid panel: 04/06/2024         Other Preventive Screenings Covered by Medicare:  Abdominal Aortic Aneurysm (AAA) Screening: covered once if your at risk. You're considered to be at risk if you have a family history of AAA or a male between the age of 65-75 who smoking at least 100 cigarettes in your lifetime.  Lung Cancer Screening: covers low dose CT scan once per year if you meet all of the following conditions: (1) Age 55-77; (2) No signs or symptoms of lung cancer; (3) Current smoker or have quit smoking within the last 15 years; (4) You have a tobacco smoking history of at least 20 pack years (packs per day x number of years you smoked); (5) You get a written order from a healthcare provider.  Glaucoma Screening: covered annually if you're considered high risk: (1) You have diabetes OR (2) Family history of glaucoma OR (3)  aged 50 and older OR (4)  American aged 65 and  older  Osteoporosis Screening: covered every 2 years if you meet one of the following conditions: (1) Have a vertebral abnormality; (2) On glucocorticoid therapy for more than 3 months; (3) Have primary hyperparathyroidism; (4) On osteoporosis medications and need to assess response to drug therapy.  HIV Screening: covered annually if you're between the age of 15-65. Also covered annually if you are younger than 15 and older than 65 with risk factors for HIV infection. For pregnant patients, it is covered up to 3 times per pregnancy.    Immunizations:  Immunization Recommendations   Influenza Vaccine Annual influenza vaccination during flu season is recommended for all persons aged >= 6 months who do not have contraindications   Pneumococcal Vaccine   * Pneumococcal conjugate vaccine = PCV13 (Prevnar 13), PCV15 (Vaxneuvance), PCV20 (Prevnar 20)  * Pneumococcal polysaccharide vaccine = PPSV23 (Pneumovax) Adults 19-65 yo with certain risk factors or if 65+ yo  If never received any pneumonia vaccine: recommend Prevnar 20 (PCV20)  Give PCV20 if previously received 1 dose of PCV13 or PPSV23   Hepatitis B Vaccine 3 dose series if at intermediate or high risk (ex: diabetes, end stage renal disease, liver disease)   Respiratory syncytial virus (RSV) Vaccine - COVERED BY MEDICARE PART D  * RSVPreF3 (Arexvy) CDC recommends that adults 60 years of age and older may receive a single dose of RSV vaccine using shared clinical decision-making (SCDM)   Tetanus (Td) Vaccine - COST NOT COVERED BY MEDICARE PART B Following completion of primary series, a booster dose should be given every 10 years to maintain immunity against tetanus. Td may also be given as tetanus wound prophylaxis.   Tdap Vaccine - COST NOT COVERED BY MEDICARE PART B Recommended at least once for all adults. For pregnant patients, recommended with each pregnancy.   Shingles Vaccine (Shingrix) - COST NOT COVERED BY MEDICARE PART B  2 shot series recommended in  those 19 years and older who have or will have weakened immune systems or those 50 years and older     Health Maintenance Due:      Topic Date Due    Colorectal Cancer Screening  04/22/2024    HIV Screening  Completed    Hepatitis C Screening  Completed     Immunizations Due:      Topic Date Due    Pneumococcal Vaccine: 65+ Years (2 of 2 - PPSV23 or PCV20) 12/21/2015    COVID-19 Vaccine (4 - 2023-24 season) 09/01/2023     Advance Directives   What are advance directives?  Advance directives are legal documents that state your wishes and plans for medical care. These plans are made ahead of time in case you lose your ability to make decisions for yourself. Advance directives can apply to any medical decision, such as the treatments you want, and if you want to donate organs.   What are the types of advance directives?  There are many types of advance directives, and each state has rules about how to use them. You may choose a combination of any of the following:  Living will:  This is a written record of the treatment you want. You can also choose which treatments you do not want, which to limit, and which to stop at a certain time. This includes surgery, medicine, IV fluid, and tube feedings.   Durable power of  for healthcare (DPAHC):  This is a written record that states who you want to make healthcare choices for you when you are unable to make them for yourself. This person, called a proxy, is usually a family member or a friend. You may choose more than 1 proxy.  Do not resuscitate (DNR) order:  A DNR order is used in case your heart stops beating or you stop breathing. It is a request not to have certain forms of treatment, such as CPR. A DNR order may be included in other types of advance directives.  Medical directive:  This covers the care that you want if you are in a coma, near death, or unable to make decisions for yourself. You can list the treatments you want for each condition. Treatment may  include pain medicine, surgery, blood transfusions, dialysis, IV or tube feedings, and a ventilator (breathing machine).  Values history:  This document has questions about your views, beliefs, and how you feel and think about life. This information can help others choose the care that you would choose.  Why are advance directives important?  An advance directive helps you control your care. Although spoken wishes may be used, it is better to have your wishes written down. Spoken wishes can be misunderstood, or not followed. Treatments may be given even if you do not want them. An advance directive may make it easier for your family to make difficult choices about your care.   Weight Management   Why it is important to manage your weight:  Being overweight increases your risk of health conditions such as heart disease, high blood pressure, type 2 diabetes, and certain types of cancer. It can also increase your risk for osteoarthritis, sleep apnea, and other respiratory problems. Aim for a slow, steady weight loss. Even a small amount of weight loss can lower your risk of health problems.  How to lose weight safely:  A safe and healthy way to lose weight is to eat fewer calories and get regular exercise. You can lose up about 1 pound a week by decreasing the number of calories you eat by 500 calories each day.   Healthy meal plan for weight management:  A healthy meal plan includes a variety of foods, contains fewer calories, and helps you stay healthy. A healthy meal plan includes the following:  Eat whole-grain foods more often.  A healthy meal plan should contain fiber. Fiber is the part of grains, fruits, and vegetables that is not broken down by your body. Whole-grain foods are healthy and provide extra fiber in your diet. Some examples of whole-grain foods are whole-wheat breads and pastas, oatmeal, brown rice, and bulgur.  Eat a variety of vegetables every day.  Include dark, leafy greens such as spinach,  kale, negrito greens, and mustard greens. Eat yellow and orange vegetables such as carrots, sweet potatoes, and winter squash.   Eat a variety of fruits every day.  Choose fresh or canned fruit (canned in its own juice or light syrup) instead of juice. Fruit juice has very little or no fiber.  Eat low-fat dairy foods.  Drink fat-free (skim) milk or 1% milk. Eat fat-free yogurt and low-fat cottage cheese. Try low-fat cheeses such as mozzarella and other reduced-fat cheeses.  Choose meat and other protein foods that are low in fat.  Choose beans or other legumes such as split peas or lentils. Choose fish, skinless poultry (chicken or turkey), or lean cuts of red meat (beef or pork). Before you cook meat or poultry, cut off any visible fat.   Use less fat and oil.  Try baking foods instead of frying them. Add less fat, such as margarine, sour cream, regular salad dressing and mayonnaise to foods. Eat fewer high-fat foods. Some examples of high-fat foods include french fries, doughnuts, ice cream, and cakes.  Eat fewer sweets.  Limit foods and drinks that are high in sugar. This includes candy, cookies, regular soda, and sweetened drinks.  Exercise:  Exercise at least 30 minutes per day on most days of the week. Some examples of exercise include walking, biking, dancing, and swimming. You can also fit in more physical activity by taking the stairs instead of the elevator or parking farther away from stores. Ask your healthcare provider about the best exercise plan for you.      © Copyright [employer] 2018 Information is for End User's use only and may not be sold, redistributed or otherwise used for commercial purposes. All illustrations and images included in CareNotes® are the copyrighted property of [employer].D[employer]M., Inc. or [employer]

## 2024-06-03 ENCOUNTER — TELEPHONE (OUTPATIENT)
Age: 64
End: 2024-06-03

## 2024-06-03 ENCOUNTER — PREP FOR PROCEDURE (OUTPATIENT)
Age: 64
End: 2024-06-03

## 2024-06-03 DIAGNOSIS — Z12.11 SCREENING FOR COLON CANCER: Primary | ICD-10-CM

## 2024-06-03 NOTE — TELEPHONE ENCOUNTER
Octavio from [facility] called in regards to the patient medical request form that was mailed in . I advise Octavio the Medical request has not been scan into the patient chart . Octavio would fax over the medical request

## 2024-06-03 NOTE — TELEPHONE ENCOUNTER
Scheduled date of colonoscopy (as of today): 10/14/24  Physician performing colonoscopy: MICHAEL  Location of colonoscopy:   GI LAB  Bowel prep reviewed with patient:  ana lilia/yudy  Instructions to [facility]     06/03/24  Screened by: Alicia Eller    Referring Provider     Pre- Screening:     There is no height or weight on file to calculate BMI. 34.02  Has patient been referred for a routine screening Colonoscopy? yes  Is the patient between 45-75 years old? yes      Previous Colonoscopy yes   If yes:    Date: 2019    Facility:  [facility]    Reason:       Does the patient want to see a Gastroenterologist prior to their procedure OR are they having any GI symptoms? no    Has the patient been hospitalized or had abdominal surgery in the past 6 months? no    Does the patient use supplemental oxygen? no    Does the patient take Coumadin, Lovenox, Plavix, Elliquis, Xarelto, or other blood thinning medication? no    Has the patient had a stroke, cardiac event, or stent placed in the past year? no      If patient is between 45yrs - 49yrs, please advise patient that we will have to confirm benefits & coverage with their insurance company for a routine screening colonoscopy.

## 2024-06-10 ENCOUNTER — HOSPITAL ENCOUNTER (OUTPATIENT)
Dept: ULTRASOUND IMAGING | Facility: HOSPITAL | Age: 64
Discharge: HOME | End: 2024-06-10
Payer: MEDICARE

## 2024-06-10 ENCOUNTER — HOSPITAL ENCOUNTER (OUTPATIENT)
Dept: VASCULAR ULTRASOUND | Facility: HOSPITAL | Age: 64
Discharge: HOME | End: 2024-06-10
Payer: MEDICARE

## 2024-06-10 DIAGNOSIS — Z13.6 ENCOUNTER FOR SCREENING FOR STENOSIS OF CAROTID ARTERY: ICD-10-CM

## 2024-06-10 DIAGNOSIS — H53.132 SUDDEN VISUAL LOSS, LEFT EYE: ICD-10-CM

## 2024-06-10 DIAGNOSIS — Z13.6 SCREENING FOR AAA (ABDOMINAL AORTIC ANEURYSM): ICD-10-CM

## 2024-06-10 PROCEDURE — 76706 US ABDL AORTA SCREEN AAA: CPT

## 2024-06-10 PROCEDURE — 93880 EXTRACRANIAL BILAT STUDY: CPT

## 2024-06-10 PROCEDURE — 93880 EXTRACRANIAL BILAT STUDY: CPT | Performed by: SURGERY

## 2024-06-18 ENCOUNTER — TELEPHONE (OUTPATIENT)
Dept: INTERNAL MEDICINE CLINIC | Facility: CLINIC | Age: 64
End: 2024-06-18

## 2024-06-18 DIAGNOSIS — I77.89 ENLARGEMENT OF ABDOMINAL AORTA (HCC): Primary | ICD-10-CM

## 2024-06-27 PROBLEM — Z00.00 WELCOME TO MEDICARE PREVENTIVE VISIT: Status: RESOLVED | Noted: 2021-07-06 | Resolved: 2024-06-27

## 2024-07-05 DIAGNOSIS — E78.2 MIXED HYPERLIPIDEMIA: ICD-10-CM

## 2024-07-05 DIAGNOSIS — I10 ESSENTIAL HYPERTENSION: ICD-10-CM

## 2024-07-05 RX ORDER — ROSUVASTATIN CALCIUM 20 MG/1
20 TABLET, COATED ORAL DAILY
Qty: 90 TABLET | Refills: 1 | Status: SHIPPED | OUTPATIENT
Start: 2024-07-05

## 2024-07-05 RX ORDER — LOSARTAN POTASSIUM 100 MG/1
100 TABLET ORAL DAILY
Qty: 90 TABLET | Refills: 1 | Status: SHIPPED | OUTPATIENT
Start: 2024-07-05

## 2024-07-05 RX ORDER — METOPROLOL TARTRATE AND HYDROCHLOROTHIAZIDE 100; 25 MG/1; MG/1
0.5 TABLET ORAL DAILY
Qty: 45 TABLET | Refills: 1 | Status: SHIPPED | OUTPATIENT
Start: 2024-07-05

## 2024-07-15 ENCOUNTER — OFFICE VISIT (OUTPATIENT)
Dept: INTERNAL MEDICINE CLINIC | Facility: CLINIC | Age: 64
End: 2024-07-15
Payer: MEDICARE

## 2024-07-15 VITALS
HEART RATE: 89 BPM | DIASTOLIC BLOOD PRESSURE: 78 MMHG | BODY MASS INDEX: 34.14 KG/M2 | WEIGHT: 266 LBS | HEIGHT: 74 IN | SYSTOLIC BLOOD PRESSURE: 136 MMHG | OXYGEN SATURATION: 94 %

## 2024-07-15 DIAGNOSIS — K43.9 VENTRAL HERNIA WITHOUT OBSTRUCTION OR GANGRENE: Primary | ICD-10-CM

## 2024-07-15 PROCEDURE — 99213 OFFICE O/P EST LOW 20 MIN: CPT | Performed by: INTERNAL MEDICINE

## 2024-07-15 PROCEDURE — G2211 COMPLEX E/M VISIT ADD ON: HCPCS | Performed by: INTERNAL MEDICINE

## 2024-07-15 NOTE — PROGRESS NOTES
Ambulatory Visit  Name: Sergio Ahumada      : 1960      MRN: [id number]  Encounter Provider: Steve Nguyen MD  Encounter Date: 7/15/2024   [facility]    Assessment & Plan   1. Ventral hernia without obstruction or gangrene  Assessment & Plan:  Will refer to general surgeon for evaluation.  Patient instructed to go to the emergency room if severe pain.  Orders:  -     Ambulatory Referral to General Surgery; Future         History of Present Illness     Patient concerned about possible umbilical hernia or cyst.  No incision in the area of concern.  Bowels moving ok.      Review of Systems   Constitutional:  Negative for chills and fever.   Gastrointestinal:  Positive for abdominal pain (mild).     Past Medical History:   Diagnosis Date   • Diabetes mellitus (HCC)    • HTN (hypertension)    • Hyperlipidemia    • Hypertension    • Kidney stone    • Osteomyelitis (HCC)    • Seizures (HCC)     Not since age 10     Past Surgical History:   Procedure Laterality Date   • COLONOSCOPY     • HAND SURGERY     • HERNIA REPAIR     • IR PICC PLACEMENT SINGLE LUMEN  2020    remove   • VASCULAR SURGERY     • WOUND DEBRIDEMENT N/A 2020    Procedure: DEBRIDEMENT OF ANTERIOR MANDIBLE BONE BIOPSY, CULTURES AND REMOVAL OF IMPLANT #27, USE OF PRP;  Surgeon: Kiah Ordoñez DDS;  Location: [facility];  Service: Maxillofacial     Family History   Problem Relation Age of Onset   • Cancer Mother    • Kidney disease Maternal Uncle      Social History     Tobacco Use   • Smoking status: Former     Current packs/day: 0.00     Average packs/day: 2.0 packs/day for 21.1 years (42.2 ttl pk-yrs)     Types: Cigarettes     Start date: 1978     Quit date: 2000     Years since quittin.5   • Smokeless tobacco: Never   Vaping Use   • Vaping status: Never Used   Substance and Sexual Activity   • Alcohol use: Yes     Alcohol/week: 5.0 standard drinks of alcohol     Types: 5  Cans of beer per week     Comment: Not regularily   • Drug use: Never   • Sexual activity: Not Currently     Partners: Female     Birth control/protection: None     Comment: This is private     Current Outpatient Medications on File Prior to Visit   Medication Sig   • allopurinol (ZYLOPRIM) 100 mg tablet Take 1 tablet (100 mg total) by mouth daily   • buPROPion (WELLBUTRIN XL) 150 mg 24 hr tablet Take 1 tablet (150 mg total) by mouth daily   • glucosamine-chondroitin 500-400 MG tablet Take 2 tablets by mouth daily   • losartan (COZAAR) 100 MG tablet TAKE 1 TABLET EVERY DAY   • metoprolol-hydrochlorothiazide (LOPRESSOR HCT) 100-25 MG per tablet TAKE 1/2 TABLET EVERY DAY   • oxyCODONE-acetaminophen (Percocet) 5-325 mg per tablet Take 1 tablet by mouth every 4 (four) hours as needed for moderate pain Max Daily Amount: 6 tablets (Patient not taking: Reported on 12/1/2023)   • rosuvastatin (CRESTOR) 20 MG tablet TAKE 1 TABLET EVERY DAY   • aspirin (ECOTRIN) 325 mg EC tablet Take 1 tablet (325 mg total) by mouth daily (Patient not taking: Reported on 5/28/2024)   • Blood Glucose Monitoring Suppl (Contour Next One) JIN Use as directed (Patient not taking: Reported on 7/15/2024)   • cholecalciferol (VITAMIN D3) 1,000 units tablet Take 5,000 Units by mouth daily 10,000 units Patient stated that is continuously taking (Patient not taking: Reported on 7/15/2024)   • Contour Next Test test strip TEST UP TO FOUR TIMES A DAY BEFORE MEALS AND BEDTIME (Patient not taking: Reported on 7/15/2024)   • glimepiride (AMARYL) 2 mg tablet Take 1 tablet (2 mg total) by mouth daily with breakfast (Patient not taking: Reported on 7/15/2024)   • Microlet Lancets MISC TEST BLOOD SUGAR UP TO 4 TIMES DAILY (BEFORE MEALS AND AT BEDTIME) (Patient not taking: Reported on 7/15/2024)   • Omega-3 Fatty Acids (Omega-3 Fish Oil) 1000 MG CAPS Take 2,000 mg by mouth daily (Patient not taking: Reported on 5/28/2024)   • semaglutide (Rybelsus) 14 MG tablet  "Take 1 tablet (14 mg total) by mouth daily before breakfast (Patient not taking: Reported on 7/15/2024)   • sertraline (ZOLOFT) 25 mg tablet Take 1 tablet (25 mg total) by mouth daily (Patient not taking: Reported on 7/15/2024)     Allergies   Allergen Reactions   • Clindamycin Hives   • Penicillins Hives     Immunization History   Administered Date(s) Administered   • COVID-19 PFIZER VACCINE 0.3 ML IM 04/01/2021, 04/23/2021, 12/28/2021   • INFLUENZA 10/07/2011, 05/18/2012, 10/05/2012, 10/26/2015, 10/11/2016, 11/05/2020, 10/25/2022   • Influenza, recombinant, quadrivalent,injectable, preservative free 10/25/2021, 10/25/2022   • Pneumococcal Conjugate 13-Valent 10/26/2015   • Tdap 07/17/2017   • Zoster Vaccine Recombinant 07/06/2021, 10/25/2021     Objective     /78   Pulse 89   Ht 6' 2\" (1.88 m)   Wt 121 kg (266 lb)   SpO2 94%   BMI 34.15 kg/m²     Physical Exam  Constitutional:       General: He is not in acute distress.     Appearance: Normal appearance.   Abdominal:      Comments: Some prominence which is easily reducible, no tender, just proximal to umbilicus   Neurological:      Mental Status: He is alert.         "

## 2024-07-15 NOTE — PATIENT INSTRUCTIONS
Problem List Items Addressed This Visit          Other    Ventral hernia without obstruction or gangrene - Primary     Will refer to general surgeon for evaluation.  Patient instructed to go to the emergency room if severe pain.         Relevant Orders    Ambulatory Referral to General Surgery

## 2024-07-15 NOTE — ASSESSMENT & PLAN NOTE
Will refer to general surgeon for evaluation.  Patient instructed to go to the emergency room if severe pain.

## 2024-08-08 ENCOUNTER — APPOINTMENT (EMERGENCY)
Dept: CT IMAGING | Facility: HOSPITAL | Age: 64
End: 2024-08-08
Payer: MEDICARE

## 2024-08-08 ENCOUNTER — NURSE TRIAGE (OUTPATIENT)
Age: 64
End: 2024-08-08

## 2024-08-08 ENCOUNTER — APPOINTMENT (EMERGENCY)
Dept: ULTRASOUND IMAGING | Facility: HOSPITAL | Age: 64
End: 2024-08-08
Payer: MEDICARE

## 2024-08-08 ENCOUNTER — HOSPITAL ENCOUNTER (EMERGENCY)
Facility: HOSPITAL | Age: 64
Discharge: HOME | End: 2024-08-08
Attending: EMERGENCY MEDICINE
Payer: MEDICARE

## 2024-08-08 VITALS
SYSTOLIC BLOOD PRESSURE: 179 MMHG | RESPIRATION RATE: 18 BRPM | OXYGEN SATURATION: 98 % | TEMPERATURE: 97.8 F | HEART RATE: 51 BPM | DIASTOLIC BLOOD PRESSURE: 77 MMHG

## 2024-08-08 DIAGNOSIS — R16.0 HEPATOMEGALY: ICD-10-CM

## 2024-08-08 DIAGNOSIS — R73.9 HYPERGLYCEMIA: ICD-10-CM

## 2024-08-08 DIAGNOSIS — I86.1 LEFT VARICOCELE: ICD-10-CM

## 2024-08-08 DIAGNOSIS — N20.0 NEPHROLITHIASIS: Primary | ICD-10-CM

## 2024-08-08 DIAGNOSIS — E27.8 ADRENAL NODULE (HCC): ICD-10-CM

## 2024-08-08 DIAGNOSIS — E88.89 STEATOSIS (HCC): ICD-10-CM

## 2024-08-08 LAB
ALBUMIN SERPL BCG-MCNC: 4.4 G/DL (ref 3.5–5)
ALP SERPL-CCNC: 62 U/L (ref 34–104)
ALT SERPL W P-5'-P-CCNC: 60 U/L (ref 7–52)
ANION GAP SERPL CALCULATED.3IONS-SCNC: 8 MMOL/L (ref 4–13)
AST SERPL W P-5'-P-CCNC: 35 U/L (ref 13–39)
B-OH-BUTYR SERPL-MCNC: 0.11 MMOL/L (ref 0.02–0.27)
BACTERIA UR QL AUTO: NORMAL /HPF
BASE EX.OXY STD BLDV CALC-SCNC: 66.2 % (ref 60–80)
BASE EXCESS BLDV CALC-SCNC: -0.7 MMOL/L
BASOPHILS # BLD AUTO: 0.05 THOUSANDS/ÂΜL (ref 0–0.1)
BASOPHILS NFR BLD AUTO: 1 % (ref 0–1)
BILIRUB SERPL-MCNC: 0.67 MG/DL (ref 0.2–1)
BILIRUB UR QL STRIP: NEGATIVE
BUN SERPL-MCNC: 16 MG/DL (ref 5–25)
CALCIUM SERPL-MCNC: 9.5 MG/DL (ref 8.4–10.2)
CHLORIDE SERPL-SCNC: 94 MMOL/L (ref 96–108)
CLARITY UR: CLEAR
CO2 SERPL-SCNC: 26 MMOL/L (ref 21–32)
COLOR UR: ABNORMAL
CREAT SERPL-MCNC: 1.07 MG/DL (ref 0.6–1.3)
EOSINOPHIL # BLD AUTO: 0.13 THOUSAND/ÂΜL (ref 0–0.61)
EOSINOPHIL NFR BLD AUTO: 4 % (ref 0–6)
ERYTHROCYTE [DISTWIDTH] IN BLOOD BY AUTOMATED COUNT: 12.6 % (ref 11.6–15.1)
GFR SERPL CREATININE-BSD FRML MDRD: 72 ML/MIN/1.73SQ M
GLUCOSE SERPL-MCNC: 297 MG/DL (ref 65–140)
GLUCOSE SERPL-MCNC: 300 MG/DL (ref 65–140)
GLUCOSE SERPL-MCNC: 457 MG/DL (ref 65–140)
GLUCOSE UR STRIP-MCNC: ABNORMAL MG/DL
HCO3 BLDV-SCNC: 23.9 MMOL/L (ref 24–30)
HCT VFR BLD AUTO: 43.4 % (ref 36.5–49.3)
HGB BLD-MCNC: 15.3 G/DL (ref 12–17)
HGB UR QL STRIP.AUTO: NEGATIVE
IMM GRANULOCYTES # BLD AUTO: 0.02 THOUSAND/UL (ref 0–0.2)
IMM GRANULOCYTES NFR BLD AUTO: 1 % (ref 0–2)
KETONES UR STRIP-MCNC: NEGATIVE MG/DL
LEUKOCYTE ESTERASE UR QL STRIP: ABNORMAL
LIPASE SERPL-CCNC: 95 U/L (ref 11–82)
LYMPHOCYTES # BLD AUTO: 1.13 THOUSANDS/ÂΜL (ref 0.6–4.47)
LYMPHOCYTES NFR BLD AUTO: 31 % (ref 14–44)
MCH RBC QN AUTO: 29.1 PG (ref 26.8–34.3)
MCHC RBC AUTO-ENTMCNC: 35.3 G/DL (ref 31.4–37.4)
MCV RBC AUTO: 83 FL (ref 82–98)
MONOCYTES # BLD AUTO: 0.47 THOUSAND/ÂΜL (ref 0.17–1.22)
MONOCYTES NFR BLD AUTO: 13 % (ref 4–12)
NEUTROPHILS # BLD AUTO: 1.91 THOUSANDS/ÂΜL (ref 1.85–7.62)
NEUTS SEG NFR BLD AUTO: 50 % (ref 43–75)
NITRITE UR QL STRIP: NEGATIVE
NON-SQ EPI CELLS URNS QL MICRO: NORMAL /HPF
NRBC BLD AUTO-RTO: 0 /100 WBCS
O2 CT BLDV-SCNC: 14.3 ML/DL
PCO2 BLDV: 39.6 MM HG (ref 42–50)
PH BLDV: 7.4 [PH] (ref 7.3–7.4)
PH UR STRIP.AUTO: 5.5 [PH]
PLATELET # BLD AUTO: 181 THOUSANDS/UL (ref 149–390)
PMV BLD AUTO: 10.4 FL (ref 8.9–12.7)
PO2 BLDV: 34.6 MM HG (ref 35–45)
POTASSIUM SERPL-SCNC: 4.6 MMOL/L (ref 3.5–5.3)
PROT SERPL-MCNC: 6.9 G/DL (ref 6.4–8.4)
PROT UR STRIP-MCNC: NEGATIVE MG/DL
RBC # BLD AUTO: 5.26 MILLION/UL (ref 3.88–5.62)
RBC #/AREA URNS AUTO: NORMAL /HPF
SODIUM SERPL-SCNC: 128 MMOL/L (ref 135–147)
SP GR UR STRIP.AUTO: 1.04 (ref 1–1.03)
UROBILINOGEN UR STRIP-ACNC: <2 MG/DL
WBC # BLD AUTO: 3.71 THOUSAND/UL (ref 4.31–10.16)
WBC #/AREA URNS AUTO: NORMAL /HPF

## 2024-08-08 PROCEDURE — 80053 COMPREHEN METABOLIC PANEL: CPT | Performed by: EMERGENCY MEDICINE

## 2024-08-08 PROCEDURE — 96361 HYDRATE IV INFUSION ADD-ON: CPT

## 2024-08-08 PROCEDURE — 82805 BLOOD GASES W/O2 SATURATION: CPT

## 2024-08-08 PROCEDURE — 74177 CT ABD & PELVIS W/CONTRAST: CPT

## 2024-08-08 PROCEDURE — 99285 EMERGENCY DEPT VISIT HI MDM: CPT | Performed by: EMERGENCY MEDICINE

## 2024-08-08 PROCEDURE — 36415 COLL VENOUS BLD VENIPUNCTURE: CPT

## 2024-08-08 PROCEDURE — 81001 URINALYSIS AUTO W/SCOPE: CPT | Performed by: EMERGENCY MEDICINE

## 2024-08-08 PROCEDURE — 96360 HYDRATION IV INFUSION INIT: CPT

## 2024-08-08 PROCEDURE — 83690 ASSAY OF LIPASE: CPT | Performed by: EMERGENCY MEDICINE

## 2024-08-08 PROCEDURE — 76870 US EXAM SCROTUM: CPT

## 2024-08-08 PROCEDURE — 82010 KETONE BODYS QUAN: CPT

## 2024-08-08 PROCEDURE — 85025 COMPLETE CBC W/AUTO DIFF WBC: CPT | Performed by: EMERGENCY MEDICINE

## 2024-08-08 PROCEDURE — 99284 EMERGENCY DEPT VISIT MOD MDM: CPT

## 2024-08-08 PROCEDURE — 82948 REAGENT STRIP/BLOOD GLUCOSE: CPT

## 2024-08-08 RX ADMIN — SODIUM CHLORIDE 1000 ML: 0.9 INJECTION, SOLUTION INTRAVENOUS at 15:08

## 2024-08-08 RX ADMIN — IOHEXOL 100 ML: 350 INJECTION, SOLUTION INTRAVENOUS at 15:47

## 2024-08-08 NOTE — TELEPHONE ENCOUNTER
"Reason for Disposition   Sudden onset of severe flank pain and age > 60 years    Answer Assessment - Initial Assessment Questions  1. LOCATION: \"Where does it hurt?\" (e.g., left, right)      Right lower quad (kidney area)  2. ONSET: \"When did the pain start?\"      A month ago ,had been getting increasingly worse    3. SEVERITY: \"How bad is the pain?\" (e.g., Scale 1-10; mild, moderate, or severe)    - MILD (1-3): doesn't interfere with normal activities     - MODERATE (4-7): interferes with normal activities or awakens from sleep     - SEVERE (8-10): excruciating pain and patient unable to do normal activities (stays in bed)        5-6  4. PATTERN: \"Does the pain come and go, or is it constant?\"       Constant   5. CAUSE: \"What do you think is causing the pain?\"      Possible kidney stone ,uti  6. OTHER SYMPTOMS:  \"Do you have any other symptoms?\" (e.g., fever, abdominal pain, vomiting, leg weakness, burning with urination, blood in urine)     Patient c/o frequency with urination  ,denies all other symptoms  No appts at the office  Patient advised to go to ER  for further evaluation  Patient on route to [facility]    Protocols used: Flank Pain-ADULT-[place]    "

## 2024-08-08 NOTE — INCIDENTAL FINDINGS
The following findings require follow up:  Radiographic finding   Finding: Hepatomegaly and steatosis   Follow up required: Routine    Please notify the following clinician to assist with the follow up:   Dr. Steve Nguyen    Incidental finding results were discussed with the Patient by Romel Velasco DO on 08/08/24.   They expressed understanding and all questions answered.

## 2024-08-08 NOTE — INCIDENTAL FINDINGS
The following findings require follow up:  Radiographic finding   Finding: Left varicocele, Bilateral stable adrenal nodules   Follow up required: Primary care physician and urology   Follow up should be done within 1 week(s)    Please notify the following clinician to assist with the follow up:   Dr. Steve Nguyen    Incidental finding results were discussed with the Patient by Romel Velasco DO on 08/08/24.   They expressed understanding and all questions answered.

## 2024-08-11 NOTE — ED ATTENDING ATTESTATION
8/8/2024  I, Mackenzie Arambula MD, saw and evaluated the patient. I have discussed the patient with the resident/non-physician practitioner and agree with the resident's/non-physician practitioner's findings, Plan of Care, and MDM as documented in the resident's/non-physician practitioner's note, except where noted. All available labs and Radiology studies were reviewed.  I was present for key portions of any procedure(s) performed by the resident/non-physician practitioner and I was immediately available to provide assistance.       At this point I agree with the current assessment done in the Emergency Department.  I have conducted an independent evaluation of this patient a history and physical is as follows:    62-year-old male presenting to the ER due to right-sided flank pain.  History of kidney stones.  Feels similar.  No chest pain or trouble breathing.  No nausea vomiting.  No abdominal tenderness.  Has suprapubic abdominal pain. patient is diabetic, does not take his medications because he believes it makes him sick.  Does not want any medications for diabetes.    Agree with workup, CT to eval for stone and intra-abdominal pathology, check urine for signs of infection, check electrolytes, evaluate for DKA due to patient's hyperglycemia, fluids    Patient's blood sugar improving in the ER, he does not want any meds for it, will discharge with outpatient follow-up, he states he already has an endocrinologist and PCP for follow-up.    ED Course         Critical Care Time  Procedures

## 2024-08-14 ENCOUNTER — TELEPHONE (OUTPATIENT)
Age: 64
End: 2024-08-14

## 2024-08-14 NOTE — TELEPHONE ENCOUNTER
New Patient    What is the reason for the patient’s appointment?: NP- ref for Nephrolithiasis, Left varicocele     What office location does the patient prefer?: [place] if possible     Does patient have Imaging/Lab Results:    CT abdomen pelvis w contrast:    IMPRESSION:     Obstructing 5 mm right ureterovesicular junction calculus with resulting upstream moderate hydroureteronephrosis.     1.4 cm left and 1 cm right adrenal nodule.  Both of these nodules are grossly unchanged from the 2014 comparison, and therefore likely represent benign adenomas.  Biochemical analysis can be performed to rule out functioning adenoma.     Hepatomegaly with steatosis     --------------------------------------------------------------------------------  US Scrotum and Testicles:    IMPRESSION:     Normal testes.     Left varicocele.      Have patient records been requested?:  If No, are the records showing in Epic: Records in epic       HISTORY:   Has the patient had any previous Urologist(s)?: In the past unsure of urologist     Lukasz 2017    Was the patient seen in the ED?: 8/8/24     Due to patient having obstructing stone with hydro patient will need a more urgent apt. Please call patient to schedule in appropriate time frame.     CB: [phone]

## 2024-08-16 NOTE — TELEPHONE ENCOUNTER
Patient was returning a call to the office. He could not here them earlier because the program would not stop playing on his phone at the same time they were speaking    He can not do the appt scheduled for him on 8/20/24 at 2 pm. He unfortunately will not get his car back until Wednesday evening. (It's in the shop) He will be available starting Thursday 8/22/2024 at anytime or after    Patient requesting a call back to possibly find another date    Stuart  [phone]

## 2024-08-16 NOTE — TELEPHONE ENCOUNTER
I CALLED THE PT BACK TO SEE IF HE CAN GET A RIDE FROM SOMEBODY ELSE THAT DAY AND HE SAID NO. I TOLD HIM THIS WILL DELAY HIS CARE. PLEASE ADVISE ON NEXT POSSIBILITY OF ANOTHER APPT.

## 2024-08-16 NOTE — TELEPHONE ENCOUNTER
Appt scheduled with sami next week. Please call to confirm appt. Should go to ER with any worsening symptoms prior to appointment

## 2024-08-19 ENCOUNTER — CONSULT (OUTPATIENT)
Dept: SURGERY | Facility: CLINIC | Age: 64
End: 2024-08-19
Payer: MEDICARE

## 2024-08-19 ENCOUNTER — LAB (OUTPATIENT)
Dept: LAB | Facility: CLINIC | Age: 64
End: 2024-08-19
Payer: MEDICARE

## 2024-08-19 VITALS
DIASTOLIC BLOOD PRESSURE: 88 MMHG | BODY MASS INDEX: 34.01 KG/M2 | WEIGHT: 265 LBS | HEIGHT: 74 IN | HEART RATE: 62 BPM | SYSTOLIC BLOOD PRESSURE: 172 MMHG

## 2024-08-19 DIAGNOSIS — E11.65 TYPE 2 DIABETES MELLITUS WITH HYPERGLYCEMIA, WITHOUT LONG-TERM CURRENT USE OF INSULIN (HCC): ICD-10-CM

## 2024-08-19 DIAGNOSIS — R10.32 LEFT INGUINAL PAIN: Primary | ICD-10-CM

## 2024-08-19 DIAGNOSIS — R07.89 OTHER CHEST PAIN: ICD-10-CM

## 2024-08-19 DIAGNOSIS — K43.9 VENTRAL HERNIA WITHOUT OBSTRUCTION OR GANGRENE: ICD-10-CM

## 2024-08-19 DIAGNOSIS — K42.9 UMBILICAL HERNIA WITHOUT OBSTRUCTION OR GANGRENE: ICD-10-CM

## 2024-08-19 LAB
EST. AVERAGE GLUCOSE BLD GHB EST-MCNC: 260 MG/DL
HBA1C MFR BLD: 10.7 %

## 2024-08-19 PROCEDURE — 99204 OFFICE O/P NEW MOD 45 MIN: CPT | Performed by: SURGERY

## 2024-08-19 PROCEDURE — 83036 HEMOGLOBIN GLYCOSYLATED A1C: CPT

## 2024-08-19 PROCEDURE — 36415 COLL VENOUS BLD VENIPUNCTURE: CPT

## 2024-08-19 RX ORDER — TAMSULOSIN HYDROCHLORIDE 0.4 MG/1
0.4 CAPSULE ORAL
Qty: 10 CAPSULE | Refills: 1 | Status: SHIPPED | OUTPATIENT
Start: 2024-08-19 | End: 2024-08-20 | Stop reason: SDUPTHER

## 2024-08-19 NOTE — TELEPHONE ENCOUNTER
I do not have another appt available at this time. Please help monitor any of the local offices for an appointment within the next week or 2 for a NP appt

## 2024-08-19 NOTE — ASSESSMENT & PLAN NOTE
Ventral and umbilical hernia for repair once hemoglobin A1c stabilized  Left inguinal pain.  35 years since last hernia repair.  Negative physical exam.  Will check ultrasound of abdominal wall.

## 2024-08-19 NOTE — PROGRESS NOTES
Ambulatory Visit  Name: Sergio Ahumada      : 1960      MRN: [id number]  Encounter Provider: Yassine Maldonado MD  Encounter Date: 2024   Encounter department: [facility]    Assessment & Plan   1. Left inguinal pain  -     US abdominal wall; Future; Expected date: 2024  Ventral and umbilical hernia for repair once hemoglobin A1c stabilized  Left inguinal pain.  35 years since last hernia repair.  Negative physical exam.  Will check ultrasound of abdominal wall.    History of Present Illness     Sergio Ahumada is a 63 y.o. male who presents for umbilical hernia consult.  States he has had a bulge and intermittent achy pain at his umbilicus for 5 to 6 months.  Limits his activities.   CT abdomen pelvis 2024: ABDOMINAL WALL/INGUINAL REGIONS:  Postsurgical changes of bilateral inguinal region.  Mild midline abdominal diastases with adjacent tiny fat-containing periumbilical hernia.    In addition he has left inguinal pain.  He underwent hernia repair 35 years ago.  Of the last 6 years the discomfort has become more progressive.  It is intermittent.  It is associated with sitting.    Examination there is a ventral and umbilical hernia.  This is reducible.  There is no evidence for left inguinal herniation on exam.    Ultrasound of the abdominal wall is requested to further assess for recurrent left inguinal herniation.  Patient is recently completed a CAT scan of the abdomen and pelvis.  Further recommendations after this test.  A hemoglobin A1c is requested at this time as he will not be able to schedule surgery until this value is further optimized.        Review of Systems   Constitutional: Negative.  Negative for activity change.   HENT: Negative.     Eyes: Negative.    Respiratory: Negative.     Cardiovascular: Negative.    Gastrointestinal:  Positive for abdominal pain.   Endocrine: Negative.    Genitourinary: Negative.    Musculoskeletal: Negative.    Skin:  left message for patient to reschedule appt on 8/11/2022, due to Dr. Velazquez  being out of clinic   "Negative.    Allergic/Immunologic: Negative.    Neurological: Negative.    Psychiatric/Behavioral:  Negative for agitation, behavioral problems and confusion. The patient is not nervous/anxious.          Objective     BP (!) 172/88   Pulse 62   Ht 6' 2\" (1.88 m)   Wt 120 kg (265 lb)   BMI 34.02 kg/m²     Physical Exam  Vitals and nursing note reviewed.   Constitutional:       General: He is not in acute distress.     Appearance: He is well-developed.   HENT:      Head: Normocephalic and atraumatic.   Eyes:      Conjunctiva/sclera: Conjunctivae normal.   Cardiovascular:      Rate and Rhythm: Normal rate.      Heart sounds: No murmur heard.  Pulmonary:      Effort: Pulmonary effort is normal. No respiratory distress.   Abdominal:      Palpations: Abdomen is soft.      Tenderness: There is no abdominal tenderness.      Hernia: A hernia (Ventral and umbilical hernia.  This is reducible.  No evidence for left inguinal herniation.) is present.   Musculoskeletal:         General: No swelling.      Cervical back: Neck supple.   Skin:     General: Skin is warm and dry.      Capillary Refill: Capillary refill takes less than 2 seconds.   Neurological:      Mental Status: He is alert.   Psychiatric:         Mood and Affect: Mood normal.       Administrative Statements           "

## 2024-08-20 ENCOUNTER — TELEPHONE (OUTPATIENT)
Age: 64
End: 2024-08-20

## 2024-08-20 ENCOUNTER — OFFICE VISIT (OUTPATIENT)
Dept: UROLOGY | Facility: CLINIC | Age: 64
End: 2024-08-20
Payer: MEDICARE

## 2024-08-20 ENCOUNTER — DOCUMENTATION (OUTPATIENT)
Dept: SURGERY | Facility: CLINIC | Age: 64
End: 2024-08-20

## 2024-08-20 VITALS
SYSTOLIC BLOOD PRESSURE: 132 MMHG | DIASTOLIC BLOOD PRESSURE: 64 MMHG | HEIGHT: 74 IN | BODY MASS INDEX: 34.14 KG/M2 | WEIGHT: 266 LBS | HEART RATE: 56 BPM | OXYGEN SATURATION: 98 %

## 2024-08-20 DIAGNOSIS — N20.0 NEPHROLITHIASIS: ICD-10-CM

## 2024-08-20 DIAGNOSIS — E11.65 TYPE 2 DIABETES MELLITUS WITH HYPERGLYCEMIA, WITHOUT LONG-TERM CURRENT USE OF INSULIN (HCC): Primary | ICD-10-CM

## 2024-08-20 DIAGNOSIS — I86.1 LEFT VARICOCELE: ICD-10-CM

## 2024-08-20 PROCEDURE — 99203 OFFICE O/P NEW LOW 30 MIN: CPT | Performed by: PHYSICIAN ASSISTANT

## 2024-08-20 RX ORDER — TAMSULOSIN HYDROCHLORIDE 0.4 MG/1
0.4 CAPSULE ORAL
Qty: 10 CAPSULE | Refills: 1 | Status: SHIPPED | OUTPATIENT
Start: 2024-08-20

## 2024-08-20 NOTE — PROGRESS NOTES
UROLOGY CONSULTATION NOTE     Patient Identifiers: Sergio Ahumada (MRN: [id number])  Service Requesting Consultation: Steve Nguyen MD    Service Providing Consultation:  Urology, Rod Everett PA-C  Consults  Date of Service: 8/20/2024    Reason for Consultation: 5 mm right UVJ calculus with hydronephrosis    History of Present Illness:     Sergio Ahumada is a 63 y.o. male with lifelong history of kidney stones.  He has a family history of kidney stones as well.  He reports uric acid stones.  He has a 5 mm right UVJ stone which has been present since June.  He has some urinary urgency and occasional flank pain.  Creatinine has been preserved and is around 1.0.  Unfortunately his hemoglobin A1c is 10.7.  PSA 0.5.    Past Medical, Past Surgical History:     Past Medical History:   Diagnosis Date    Diabetes mellitus (HCC)     HTN (hypertension)     Hyperlipidemia     Hypertension     Kidney stone     Osteomyelitis (HCC) 2020    Seizures (HCC)     Not since age 10   :    Past Surgical History:   Procedure Laterality Date    COLONOSCOPY      HAND SURGERY      HERNIA REPAIR      IR PICC PLACEMENT SINGLE LUMEN  12/08/2020    remove    VASCULAR SURGERY      WOUND DEBRIDEMENT N/A 11/30/2020    Procedure: DEBRIDEMENT OF ANTERIOR MANDIBLE BONE BIOPSY, CULTURES AND REMOVAL OF IMPLANT #27, USE OF PRP;  Surgeon: Kiah Ordoñez DDS;  Location: [facility];  Service: Maxillofacial   :    Medications, Allergies:     Current Outpatient Medications:     allopurinol (ZYLOPRIM) 100 mg tablet, Take 1 tablet (100 mg total) by mouth daily, Disp: 90 tablet, Rfl: 3    glucosamine-chondroitin 500-400 MG tablet, Take 2 tablets by mouth daily, Disp: , Rfl:     losartan (COZAAR) 100 MG tablet, TAKE 1 TABLET EVERY DAY, Disp: 90 tablet, Rfl: 1    metoprolol-hydrochlorothiazide (LOPRESSOR HCT) 100-25 MG per tablet, TAKE 1/2 TABLET EVERY DAY, Disp: 45 tablet, Rfl: 1    rosuvastatin (CRESTOR) 20 MG tablet, TAKE 1 TABLET EVERY DAY, Disp:  90 tablet, Rfl: 1    tamsulosin (FLOMAX) 0.4 mg, Take 1 capsule (0.4 mg total) by mouth daily with dinner Begin 5 days preop, Disp: 10 capsule, Rfl: 1    aspirin (ECOTRIN) 325 mg EC tablet, Take 1 tablet (325 mg total) by mouth daily (Patient not taking: Reported on 5/28/2024), Disp: 30 tablet, Rfl: 0    Blood Glucose Monitoring Suppl (Contour Next One) JIN, Use as directed (Patient not taking: Reported on 7/15/2024), Disp: , Rfl:     cholecalciferol (VITAMIN D3) 1,000 units tablet, Take 5,000 Units by mouth daily 10,000 units Patient stated that is continuously taking (Patient not taking: Reported on 7/15/2024), Disp: , Rfl:     Contour Next Test test strip, TEST UP TO FOUR TIMES A DAY BEFORE MEALS AND BEDTIME (Patient not taking: Reported on 7/15/2024), Disp: 100 strip, Rfl: 1    glimepiride (AMARYL) 2 mg tablet, Take 1 tablet (2 mg total) by mouth daily with breakfast (Patient not taking: Reported on 7/15/2024), Disp: 90 tablet, Rfl: 1    Microlet Lancets MISC, TEST BLOOD SUGAR UP TO 4 TIMES DAILY (BEFORE MEALS AND AT BEDTIME) (Patient not taking: Reported on 7/15/2024), Disp: , Rfl:     Omega-3 Fatty Acids (Omega-3 Fish Oil) 1000 MG CAPS, Take 2,000 mg by mouth daily (Patient not taking: Reported on 5/28/2024), Disp: 180 capsule, Rfl: 3    semaglutide (Rybelsus) 14 MG tablet, Take 1 tablet (14 mg total) by mouth daily before breakfast (Patient not taking: Reported on 7/15/2024), Disp: 90 tablet, Rfl: 0    sertraline (ZOLOFT) 25 mg tablet, Take 1 tablet (25 mg total) by mouth daily (Patient not taking: Reported on 7/15/2024), Disp: 90 tablet, Rfl: 3    Allergies:  Allergies   Allergen Reactions    Clindamycin Hives    Penicillins Hives   :    Social and Family History:   Social History:   Social History     Tobacco Use    Smoking status: Former     Current packs/day: 0.00     Average packs/day: 2.0 packs/day for 21.1 years (42.2 ttl pk-yrs)     Types: Cigarettes     Start date: 12/1/1978     Quit date: 1/6/2000  "    Years since quittin.6    Smokeless tobacco: Never   Vaping Use    Vaping status: Never Used   Substance Use Topics    Alcohol use: Yes     Alcohol/week: 3.0 standard drinks of alcohol     Types: 2 Cans of beer, 1 Shots of liquor per week     Comment: Not regularily    Drug use: Never   .    Social History     Tobacco Use   Smoking Status Former    Current packs/day: 0.00    Average packs/day: 2.0 packs/day for 21.1 years (42.2 ttl pk-yrs)    Types: Cigarettes    Start date: 1978    Quit date: 2000    Years since quittin.6   Smokeless Tobacco Never       Family History:  Family History   Problem Relation Age of Onset    Cancer Mother     Kidney disease Maternal Uncle    :     Review of Systems:     General: Fever, chills, or night sweats: negative  Cardiac: Negative for chest pain.    Pulmonary: Negative for shortness of breath.  Gastrointestinal: Abdominal pain negative.  Nausea, vomiting, or diarrhea negative,  Genitourinary: See HPI above.  Patient does not have hematuria.  All other systems queried were negative.    Physical Exam:   General: Patient is pleasant and in NAD. Awake and alert  /64 (BP Location: Left arm, Patient Position: Sitting, Cuff Size: Standard)   Pulse 56   Ht 6' 2\" (1.88 m)   Wt 121 kg (266 lb)   SpO2 98%   BMI 34.15 kg/m²   HEENT:  Conjunctiva are clear  Constitutional:  pleasant and cooperative     no apparent distress  Cardiac: Peripheral edema: negative  Pulmonary: Non-labored breathing  Abdomen: Soft, non-tender, non-distended.  No surgical scars.  No masses, tenderness, hernias noted.    Genitourinary: Negative CVA tenderness, negative suprapubic tenderness.  Extremities:  Moves all extremities  Neurological:CNII-XII intact. No numbness or tingling. Essentially non focal neurologic exam  Psychiatric:mood affect and behavior normal      Labs:     Lab Results   Component Value Date    HGB 15.3 2024    HCT 43.4 2024    WBC 3.71 (L) 2024 "     08/08/2024   ]    Lab Results   Component Value Date     10/08/2014    K 4.6 08/08/2024    CL 94 (L) 08/08/2024    CO2 26 08/08/2024    BUN 16 08/08/2024    CREATININE 1.07 08/08/2024    CALCIUM 9.5 08/08/2024    GLUCOSE 127 10/08/2014   ]    Imaging:   I personally reviewed the images and report of the following studies, and reviewed them with the patient:  IMPRESSION:     Obstructing 5 mm right ureterovesicular junction calculus with resulting upstream moderate hydroureteronephrosis.     1.4 cm left and 1 cm right adrenal nodule.  Both of these nodules are grossly unchanged from the 2014 comparison, and therefore likely represent benign adenomas.  Biochemical analysis can be performed to rule out functioning adenoma.     Hepatomegaly with steatosis     ASSESSMENT:     1.  5 mm right UVJ calculus with hydronephrosis  #2.  Uncontrolled diabetes    PLAN:   -Discussed findings and options of management with the patient  -Unfortunately is blood sugars are uncontrolled he needs to have his hemoglobin A1c down to 8 prior to having any anesthesia  -He just started Flomax today  -Will schedule a follow-up ultrasound and KUB in about 4 weeks hopefully he will be able to pass the stone by that time  -Discussed referral to nephrology for metabolic testing and medical stone management      Thank you for allowing me to participate in this patients’ care.  Please do not hesitate to call with any additional questions.  Rod Everett PA-C

## 2024-08-20 NOTE — TELEPHONE ENCOUNTER
Patient called because the prescription that Dr. Maldonado ordered was sent to his mail order pharmacy. He asked if the prescription can be [facility] [place] [address].

## 2024-08-20 NOTE — TELEPHONE ENCOUNTER
Advised patient he reports his sugars are slowly coming down and he will monitor for the next 2-3 week will follow up if he needs to be seen at that point. Also working with urology, he will be taking Flomax to help reduce kidney stones.

## 2024-08-20 NOTE — RESULT ENCOUNTER NOTE
As anticipated, her hemoglobin A1c is 2 elevated in order to proceed with elective surgery.  Continue with your diabetes management.  A repeat hemoglobin A1c is requested in 2 months.  I will place an order.  Best regards

## 2024-08-20 NOTE — TELEPHONE ENCOUNTER
Pt called about his A1C results from yesterday ordered by Dr Maldonado. He wanted Dr Nguyen to be aware on it as well.

## 2024-08-28 ENCOUNTER — HOSPITAL ENCOUNTER (OUTPATIENT)
Dept: RADIOLOGY | Age: 64
Discharge: HOME | End: 2024-08-28
Attending: SURGERY
Payer: MEDICARE

## 2024-08-28 DIAGNOSIS — R10.32 LEFT INGUINAL PAIN: ICD-10-CM

## 2024-08-28 PROCEDURE — 76705 ECHO EXAM OF ABDOMEN: CPT

## 2024-09-03 NOTE — RESULT ENCOUNTER NOTE
8 mm opening over the left inguinal region noted on ultrasound.  May or may not be causing symptoms.  She certainly should not be daily symptoms or pain that is severe.  Will discuss with patient whether to approach for repair or continue with observation in addition patient does have ventral as well as umbilical hernias.

## 2024-09-05 DIAGNOSIS — E11.65 TYPE 2 DIABETES MELLITUS WITH HYPERGLYCEMIA, WITHOUT LONG-TERM CURRENT USE OF INSULIN (HCC): ICD-10-CM

## 2024-09-05 NOTE — TELEPHONE ENCOUNTER
Reason for call: Short supply to local pharmacy   Patient state he is going out of town and won't be back until Tuesday, however although he have enough for 3D he won't have enough until he gets his supply from [facility]. Request for 1W supply to local pharmacy.    [x] Refill   [] Prior Auth  [] Other:     Office:   [] PCP/Provider -   [x] Specialty/Provider -     Medication: rybelsus/     Dose/Frequency: 14 mg / daily     Quantity: 90D     Pharmacy: [facility]    Does the patient have enough for 3 days?   [x] Yes   [] No - Send as HP to POD

## 2024-09-06 DIAGNOSIS — E11.65 TYPE 2 DIABETES MELLITUS WITH HYPERGLYCEMIA, WITHOUT LONG-TERM CURRENT USE OF INSULIN (HCC): ICD-10-CM

## 2024-09-06 RX ORDER — ORAL SEMAGLUTIDE 14 MG/1
TABLET ORAL
Qty: 7 TABLET | Refills: 0 | Status: SHIPPED | OUTPATIENT
Start: 2024-09-06

## 2024-09-18 ENCOUNTER — HOSPITAL ENCOUNTER (OUTPATIENT)
Dept: ULTRASOUND IMAGING | Facility: HOSPITAL | Age: 64
Discharge: HOME | End: 2024-09-18
Payer: MEDICARE

## 2024-09-18 DIAGNOSIS — N20.0 NEPHROLITHIASIS: ICD-10-CM

## 2024-09-18 PROCEDURE — 76775 US EXAM ABDO BACK WALL LIM: CPT

## 2024-10-07 ENCOUNTER — OFFICE VISIT (OUTPATIENT)
Dept: INTERNAL MEDICINE CLINIC | Facility: CLINIC | Age: 64
End: 2024-10-07
Payer: MEDICARE

## 2024-10-07 ENCOUNTER — TELEPHONE (OUTPATIENT)
Age: 64
End: 2024-10-07

## 2024-10-07 VITALS
RESPIRATION RATE: 20 BRPM | DIASTOLIC BLOOD PRESSURE: 80 MMHG | OXYGEN SATURATION: 97 % | HEIGHT: 74 IN | BODY MASS INDEX: 33.88 KG/M2 | WEIGHT: 264 LBS | SYSTOLIC BLOOD PRESSURE: 130 MMHG | HEART RATE: 63 BPM

## 2024-10-07 DIAGNOSIS — E78.2 MIXED HYPERLIPIDEMIA: ICD-10-CM

## 2024-10-07 DIAGNOSIS — I10 ESSENTIAL HYPERTENSION: ICD-10-CM

## 2024-10-07 DIAGNOSIS — K42.9 UMBILICAL HERNIA WITHOUT OBSTRUCTION OR GANGRENE: ICD-10-CM

## 2024-10-07 DIAGNOSIS — E11.65 TYPE 2 DIABETES MELLITUS WITH HYPERGLYCEMIA, WITHOUT LONG-TERM CURRENT USE OF INSULIN (HCC): Primary | ICD-10-CM

## 2024-10-07 DIAGNOSIS — Z23 ENCOUNTER FOR IMMUNIZATION: ICD-10-CM

## 2024-10-07 DIAGNOSIS — K43.9 VENTRAL HERNIA WITHOUT OBSTRUCTION OR GANGRENE: ICD-10-CM

## 2024-10-07 DIAGNOSIS — R10.32 LEFT INGUINAL PAIN: ICD-10-CM

## 2024-10-07 LAB — SL AMB POCT HEMOGLOBIN AIC: 7.9 (ref ?–6.5)

## 2024-10-07 PROCEDURE — G0009 ADMIN PNEUMOCOCCAL VACCINE: HCPCS

## 2024-10-07 PROCEDURE — G0008 ADMIN INFLUENZA VIRUS VAC: HCPCS

## 2024-10-07 PROCEDURE — 90677 PCV20 VACCINE IM: CPT

## 2024-10-07 PROCEDURE — 83036 HEMOGLOBIN GLYCOSYLATED A1C: CPT | Performed by: INTERNAL MEDICINE

## 2024-10-07 PROCEDURE — 90662 IIV NO PRSV INCREASED AG IM: CPT

## 2024-10-07 PROCEDURE — 99214 OFFICE O/P EST MOD 30 MIN: CPT | Performed by: INTERNAL MEDICINE

## 2024-10-07 RX ORDER — TAMSULOSIN HYDROCHLORIDE 0.4 MG/1
0.4 CAPSULE ORAL
Qty: 100 CAPSULE | Refills: 3 | Status: SHIPPED | OUTPATIENT
Start: 2024-10-07

## 2024-10-07 NOTE — TELEPHONE ENCOUNTER
The patient called he forgot to ask Dr Nguyen for a prescription for flomax    Dr Maldonado had given him a script for 10 days but he said the patient should be on it on  a regular basis so the patient wanted to ask Dr Nguyen if he would prescribe it for him    he would like the script sent to [facility] pharmacy   please let the patient know thank you

## 2024-10-07 NOTE — TELEPHONE ENCOUNTER
Patient called in to say his A1C is under control and he is ready to schedule hernia surgery.  Spoke to Surgery Sabine Gaytan and she advised he needs to see Dr Maldonado for a follow up before scheduling.  Patient is scheduled for 10/14 for follow up

## 2024-10-07 NOTE — ASSESSMENT & PLAN NOTE
Once he has been coming down from 12.7-10.7, patient was off Rybelsus for a while due to GI side effects, he reports he is currently back on it, he has retired to reduce stress levels and the traveling, and has been watching his diet and being more active, will recheck now as patient is trying to get A1c down below 8, especially since he wants to get surgeries.  Patient has follow-up with endocrinology this week  Lab Results   Component Value Date    HGBA1C 10.7 (H) 08/19/2024       Orders:    POCT hemoglobin A1c

## 2024-10-07 NOTE — ASSESSMENT & PLAN NOTE
Reports he shifted taking his blood pressure meds to lunchtime.  Recheck of blood pressure is better 130/80, continue meds along with healthy diet and exercise

## 2024-10-07 NOTE — PROGRESS NOTES
Ambulatory Visit  Name: Sergio Ahumada      : 1960      MRN: [id number]  Encounter Provider: Steev Nguyen MD  Encounter Date: 10/7/2024   [facility]    Assessment & Plan  Encounter for immunization    Orders:    Pneumococcal Conjugate Vaccine 20-valent (Pcv20)    influenza vaccine, high-dose, PF 0.5 mL (Fluzone High Dose)    Essential hypertension  Reports he shifted taking his blood pressure meds to lunchtime.  Recheck of blood pressure is better 130/80, continue meds along with healthy diet and exercise         Type 2 diabetes mellitus with hyperglycemia, without long-term current use of insulin (HCC)  Once he has been coming down from 12.7-10.7, patient was off Rybelsus for a while due to GI side effects, he reports he is currently back on it, he has retired to reduce stress levels and the traveling, and has been watching his diet and being more active, will recheck now as patient is trying to get A1c down below 8, especially since he wants to get surgeries.  Patient has follow-up with endocrinology this week  Lab Results   Component Value Date    HGBA1C 10.7 (H) 2024       Orders:    POCT hemoglobin A1c    Ventral hernia without obstruction or gangrene  Patient working to get A1c down below 8 so we can get surgery         Umbilical hernia without obstruction or gangrene  Patient working to get A1c down below 8 so we can get surgery         Mixed hyperlipidemia  Continue rosuvastatin along with healthy diet and exercise              History of Present Illness     Patient here for regular follow-up, he is retired, and is reducing his stress levels and reducing his travel for work.  Patient making an effort to watch his diet and stay active as he is trying to get his A1c down so that he can get hernia surgeries.      Review of Systems   Constitutional:  Negative for chills, fatigue and fever.   HENT:  Negative for congestion, nosebleeds, postnasal drip,  sore throat and trouble swallowing.    Eyes:  Negative for pain.   Respiratory:  Negative for cough, chest tightness, shortness of breath and wheezing.    Cardiovascular:  Negative for chest pain, palpitations and leg swelling.   Gastrointestinal:  Negative for abdominal pain, constipation, diarrhea, nausea and vomiting.   Endocrine: Negative for polydipsia and polyuria.   Genitourinary:  Negative for dysuria, flank pain and hematuria.   Musculoskeletal:  Negative for arthralgias.   Skin:  Negative for rash.   Neurological:  Negative for dizziness, tremors, light-headedness and headaches.   Hematological:  Does not bruise/bleed easily.   Psychiatric/Behavioral:  Negative for confusion and dysphoric mood. The patient is not nervous/anxious.      Past Medical History:   Diagnosis Date    Diabetes mellitus (HCC)     HTN (hypertension)     Hyperlipidemia     Hypertension     Kidney stone     Osteomyelitis (HCC)     Seizures (HCC)     Not since age 10     Past Surgical History:   Procedure Laterality Date    COLONOSCOPY      HAND SURGERY      HERNIA REPAIR      IR PICC PLACEMENT SINGLE LUMEN  2020    remove    VASCULAR SURGERY      WOUND DEBRIDEMENT N/A 2020    Procedure: DEBRIDEMENT OF ANTERIOR MANDIBLE BONE BIOPSY, CULTURES AND REMOVAL OF IMPLANT #27, USE OF PRP;  Surgeon: Kiah Ordoñez DDS;  Location: [facility];  Service: Maxillofacial     Family History   Problem Relation Age of Onset    Cancer Mother     Kidney disease Maternal Uncle      Social History     Tobacco Use    Smoking status: Former     Current packs/day: 0.00     Average packs/day: 2.0 packs/day for 21.1 years (42.2 ttl pk-yrs)     Types: Cigarettes     Start date: 1978     Quit date: 2000     Years since quittin.7    Smokeless tobacco: Never   Vaping Use    Vaping status: Never Used   Substance and Sexual Activity    Alcohol use: Yes     Alcohol/week: 3.0 standard drinks of alcohol     Types: 2 Cans of beer, 1 Shots of  liquor per week     Comment: Not regularily    Drug use: Never    Sexual activity: Not Currently     Partners: Female     Birth control/protection: None     Comment: This is private     Current Outpatient Medications on File Prior to Visit   Medication Sig    allopurinol (ZYLOPRIM) 100 mg tablet Take 1 tablet (100 mg total) by mouth daily    aspirin (ECOTRIN) 325 mg EC tablet Take 1 tablet (325 mg total) by mouth daily    Blood Glucose Monitoring Suppl (Contour Next One) JIN Use as directed    cholecalciferol (VITAMIN D3) 1,000 units tablet Take 5,000 Units by mouth daily 10,000 units Patient stated that is continuously taking    glimepiride (AMARYL) 2 mg tablet Take 1 tablet (2 mg total) by mouth daily with breakfast    glucosamine-chondroitin 500-400 MG tablet Take 2 tablets by mouth daily    losartan (COZAAR) 100 MG tablet TAKE 1 TABLET EVERY DAY    metoprolol-hydrochlorothiazide (LOPRESSOR HCT) 100-25 MG per tablet TAKE 1/2 TABLET EVERY DAY    Microlet Lancets MISC     Omega-3 Fatty Acids (Omega-3 Fish Oil) 1000 MG CAPS Take 2,000 mg by mouth daily    rosuvastatin (CRESTOR) 20 MG tablet TAKE 1 TABLET EVERY DAY    semaglutide (Rybelsus) 14 MG tablet Take 1 tablet (14 mg total) by mouth daily before breakfast    Contour Next Test test strip TEST UP TO FOUR TIMES A DAY BEFORE MEALS AND BEDTIME (Patient not taking: Reported on 7/15/2024)    Rybelsus 14 MG tablet TAKE 1 TABLET (14 MG TOTAL) BY MOUTH DAILY BEFORE BREAKFAST (Patient not taking: Reported on 10/7/2024)    sertraline (ZOLOFT) 25 mg tablet Take 1 tablet (25 mg total) by mouth daily (Patient not taking: Reported on 7/15/2024)    tamsulosin (FLOMAX) 0.4 mg Take 1 capsule (0.4 mg total) by mouth daily with dinner Begin 5 days preop (Patient not taking: Reported on 10/7/2024)     Allergies   Allergen Reactions    Clindamycin Hives    Penicillins Hives     Immunization History   Administered Date(s) Administered    COVID-19 PFIZER VACCINE 0.3 ML IM 04/01/2021,  "04/23/2021, 12/28/2021    INFLUENZA 10/07/2011, 05/18/2012, 10/05/2012, 10/26/2015, 10/11/2016, 11/05/2020, 10/25/2022    Influenza, recombinant, quadrivalent,injectable, preservative free 10/25/2021, 10/25/2022    Pneumococcal Conjugate 13-Valent 10/26/2015    Tdap 07/17/2017    Zoster Vaccine Recombinant 07/06/2021, 10/25/2021     Objective     /80   Pulse 63   Resp 20   Ht 6' 2\" (1.88 m)   Wt 120 kg (264 lb)   SpO2 97%   BMI 33.90 kg/m²     Physical Exam  Vitals reviewed.   Constitutional:       General: He is not in acute distress.     Appearance: Normal appearance. He is well-developed.   HENT:      Head: Normocephalic and atraumatic.      Right Ear: External ear normal.      Left Ear: External ear normal.      Nose: Nose normal.   Eyes:      General: No scleral icterus.     Conjunctiva/sclera: Conjunctivae normal.   Neck:      Trachea: No tracheal deviation.   Cardiovascular:      Rate and Rhythm: Normal rate and regular rhythm.      Heart sounds: Normal heart sounds.   Pulmonary:      Effort: Pulmonary effort is normal. No respiratory distress.      Breath sounds: Normal breath sounds. No wheezing or rales.   Musculoskeletal:      Cervical back: Normal range of motion and neck supple.      Right lower leg: No edema.      Left lower leg: No edema.   Lymphadenopathy:      Cervical: No cervical adenopathy.   Skin:     Coloration: Skin is not jaundiced or pale.   Neurological:      General: No focal deficit present.      Mental Status: He is alert and oriented to person, place, and time.   Psychiatric:         Mood and Affect: Mood normal.         Behavior: Behavior normal.         Thought Content: Thought content normal.         Judgment: Judgment normal.         "

## 2024-10-07 NOTE — PATIENT INSTRUCTIONS
Problem List Items Addressed This Visit          Cardiovascular and Mediastinum    Essential hypertension     Reports he shifted taking his blood pressure meds to lunchtime.  Recheck of blood pressure is better 130/80, continue meds along with healthy diet and exercise            Endocrine    Type 2 diabetes mellitus with hyperglycemia (HCC) - Primary     Once he has been coming down from 12.7-10.7, patient was off Rybelsus for a while due to GI side effects, he reports he is currently back on it, he has retired to reduce stress levels and the traveling, and has been watching his diet and being more active, will recheck now as patient is trying to get A1c down below 8, especially since he wants to get surgeries.  Patient has follow-up with endocrinology this week  Lab Results   Component Value Date    HGBA1C 10.7 (H) 08/19/2024            Relevant Orders    POCT hemoglobin A1c       Other    Mixed hyperlipidemia     Continue rosuvastatin along with healthy diet and exercise         Ventral hernia without obstruction or gangrene     Patient working to get A1c down below 8 so we can get surgery         Umbilical hernia without obstruction or gangrene     Patient working to get A1c down below 8 so we can get surgery          Other Visit Diagnoses       Encounter for immunization        Relevant Orders    Pneumococcal Conjugate Vaccine 20-valent (Pcv20)    influenza vaccine, high-dose, PF 0.5 mL (Fluzone High Dose)

## 2024-10-09 ENCOUNTER — OFFICE VISIT (OUTPATIENT)
Dept: ENDOCRINOLOGY | Facility: CLINIC | Age: 64
End: 2024-10-09
Payer: MEDICARE

## 2024-10-09 VITALS
SYSTOLIC BLOOD PRESSURE: 138 MMHG | DIASTOLIC BLOOD PRESSURE: 74 MMHG | BODY MASS INDEX: 34.14 KG/M2 | WEIGHT: 266 LBS | HEIGHT: 74 IN | OXYGEN SATURATION: 98 % | TEMPERATURE: 97.4 F | HEART RATE: 66 BPM

## 2024-10-09 DIAGNOSIS — E66.812 CLASS 2 SEVERE OBESITY WITH SERIOUS COMORBIDITY AND BODY MASS INDEX (BMI) OF 35.0 TO 35.9 IN ADULT, UNSPECIFIED OBESITY TYPE (HCC): ICD-10-CM

## 2024-10-09 DIAGNOSIS — E11.65 TYPE 2 DIABETES MELLITUS WITH HYPERGLYCEMIA, WITHOUT LONG-TERM CURRENT USE OF INSULIN (HCC): Primary | ICD-10-CM

## 2024-10-09 DIAGNOSIS — E78.2 MIXED HYPERLIPIDEMIA: ICD-10-CM

## 2024-10-09 DIAGNOSIS — E66.01 CLASS 2 SEVERE OBESITY WITH SERIOUS COMORBIDITY AND BODY MASS INDEX (BMI) OF 35.0 TO 35.9 IN ADULT, UNSPECIFIED OBESITY TYPE (HCC): ICD-10-CM

## 2024-10-09 PROCEDURE — 99214 OFFICE O/P EST MOD 30 MIN: CPT | Performed by: INTERNAL MEDICINE

## 2024-10-09 PROCEDURE — G2211 COMPLEX E/M VISIT ADD ON: HCPCS | Performed by: INTERNAL MEDICINE

## 2024-10-09 NOTE — ASSESSMENT & PLAN NOTE
He is uncontrolled but improved significantly. He recently retired and trying to get back to healthy lifestyle.    We agreed to add back lifestyle(mostly exercise - I counseled about the benefits) and dietary interventions, add glimepiride and continue increase rybelsus 14mg qdaily(may take 0.5tabs daily for side effects)    Follow up in 6 months.      Lab Results   Component Value Date    HGBA1C 7.9 (A) 10/07/2024

## 2024-10-09 NOTE — ASSESSMENT & PLAN NOTE
Compliant with crestor. Repeat lipid profile and if still high, we may add fenofibrate and increase dose of crestor.

## 2024-10-09 NOTE — PROGRESS NOTES
"    Follow-up Patient Progress Note      CC: diabetes     History of Present Illness:   62-year-old male with type 2 diabetes since 2018, macroalbuminuria, HTN, HLD, NAFLD, seasonal affective disorder, history of osteomyelitis of jaw 2019, DJD, gout, obesity, TIA and vitamin D deficiency.  Last visit was 4/9/2024.     Since last visit, lost gained 3 lbs.     Max adult weight: 305lbs. Lowest adult weight 185lbs. Current 247 lbs.     Home blood glucose monitoring: checks 2 times a day fasting 120-130mg/dl and postprandial     Current meds: metformin -intolerance. Cannot use injection -syncope  Rybelsus 14mg qdaily  Glimepiride 2mg qdaily     Opthamology: yes, 2 weeks ago  Podiatry: No  Vaccination: yes   Dental:  Pancreatitis: No     Ace/ARB: Losartan  Statin: Crestor 20 mg nightly  Thyroid issues: no     FH: Mother had type 2 diabetes. Sister - diabetes         Physical Exam:  Body mass index is 34.15 kg/m².  /74 (BP Location: Left arm, Patient Position: Sitting, Cuff Size: Standard)   Pulse 66   Temp (!) 97.4 °F (36.3 °C) (Temporal)   Ht 6' 2\" (1.88 m)   Wt 121 kg (266 lb)   SpO2 98%   BMI 34.15 kg/m²    Vitals:    10/09/24 0932   Weight: 121 kg (266 lb)        Physical Exam  Constitutional:       General: He is not in acute distress.     Appearance: He is well-developed.   HENT:      Head: Normocephalic and atraumatic.      Nose: Nose normal.   Eyes:      Conjunctiva/sclera: Conjunctivae normal.   Pulmonary:      Effort: Pulmonary effort is normal.   Abdominal:      General: There is no distension.   Musculoskeletal:      Cervical back: Normal range of motion and neck supple.   Skin:     Findings: No rash.      Comments: No icterus   Neurological:      Mental Status: He is alert and oriented to person, place, and time.         Labs:   Lab Results   Component Value Date    HGBA1C 7.9 (A) 10/07/2024       Lab Results   Component Value Date    XNX9CAIUZCNZ 1.310 04/06/2024    TSH 1.73 04/01/2019       Lab " Results   Component Value Date    CREATININE 1.07 08/08/2024    CREATININE 0.87 04/06/2024    CREATININE 0.93 05/24/2023    BUN 16 08/08/2024     10/08/2014    K 4.6 08/08/2024    CL 94 (L) 08/08/2024    CO2 26 08/08/2024     GFR, Calculated   Date Value Ref Range Status   01/04/2021 91 >60 mL/min/1.73m2 Final     Comment:     mL/min per 1.73 square meters                                            Normal Function or Mild Renal    Disease (if clinically at risk):  >or=60  Moderately Decreased:                30-59  Severely Decreased:                  15-29  Renal Failure:                         <15                                            -American GFR: multiply reported GFR by 1.16    Please note that the eGFR is based on the CKD-EPI calculation, and is not intended to be used for drug dosing.                                            Note: Calculated GFR may not be an accurate indicator of renal function if the patient's renal function is not in a steady state.     eGFR   Date Value Ref Range Status   08/08/2024 72 ml/min/1.73sq m Final       Lab Results   Component Value Date    ALT 60 (H) 08/08/2024    AST 35 08/08/2024    ALKPHOS 62 08/08/2024    BILITOT 0.4 10/08/2014       Lab Results   Component Value Date    CHOLESTEROL 140 04/06/2024    CHOLESTEROL 200 (H) 05/24/2023    CHOLESTEROL 162 09/08/2022     Lab Results   Component Value Date    HDL 31 (L) 04/06/2024    HDL 28 (L) 05/24/2023    HDL 29 (L) 09/08/2022     Lab Results   Component Value Date    TRIG 369 (H) 04/06/2024    TRIG 528 (H) 05/24/2023    TRIG 491 (H) 09/08/2022     Lab Results   Component Value Date    NONHDLC 128 03/22/2021         Assessment/Plan:    1. Type 2 diabetes mellitus with hyperglycemia, without long-term current use of insulin (HCC)  Assessment & Plan:  He is uncontrolled but improved significantly. He recently retired and trying to get back to healthy lifestyle.    We agreed to add back lifestyle(mostly  exercise - I counseled about the benefits) and dietary interventions, add glimepiride and continue increase rybelsus 14mg qdaily(may take 0.5tabs daily for side effects)    Follow up in 6 months.      Lab Results   Component Value Date    HGBA1C 7.9 (A) 10/07/2024     Orders:  -     Hemoglobin A1C; Future  -     Albumin / creatinine urine ratio; Future  2. Class 2 severe obesity with serious comorbidity and body mass index (BMI) of 35.0 to 35.9 in adult, unspecified obesity type (HCC)  Assessment & Plan:  Today we discussed all aspects of obesity and metabolism including pathophysiology, risk factors, complications, goal of sustaining weight loss long term, usual propensity to regain weight with short term approaches, follow up needs and medications - efficacy and limitations.   Discussed role of endocrinopathies, inflammatory conditions, sleep disorders, mental health disorders, lifestyle issues and polypharmacy and weight gain.  Briefly discussed bariatric surgery.  Diet: carb controlled diet <1500cal/day/ VLCD, 64oz fluids/day   lifestyle modifications: intermittent fasting and 10,000 steps/day  medical fitness training: muscle building  education: nutrition input    3. Mixed hyperlipidemia  Assessment & Plan:  Compliant with crestor. Repeat lipid profile and if still high, we may add fenofibrate and increase dose of crestor.  Orders:  -     Lipid panel; Future  -     Lipid panel; Future        I have spent a total time of 30 minutes on 10/09/24 in caring for this patient including greater than 50% of this time was spent in counseling/coordination of care as listed above.       Discussed with the patient and all questioned fully answered. He will contact me with concerns.    Deric Gramajo

## 2024-10-09 NOTE — PROGRESS NOTES
[facility] 75  coding opportunities       Chart reviewed, no opportunity found: CHART REVIEWED, NO OPPORTUNITY FOUND        Patients Insurance        Commercial Insurance: Sonia Velez 5

## 2024-10-14 ENCOUNTER — PREP FOR PROCEDURE (OUTPATIENT)
Dept: SURGERY | Facility: CLINIC | Age: 64
End: 2024-10-14

## 2024-10-14 ENCOUNTER — OFFICE VISIT (OUTPATIENT)
Dept: SURGERY | Facility: CLINIC | Age: 64
End: 2024-10-14
Payer: MEDICARE

## 2024-10-14 VITALS
DIASTOLIC BLOOD PRESSURE: 92 MMHG | HEIGHT: 74 IN | SYSTOLIC BLOOD PRESSURE: 155 MMHG | OXYGEN SATURATION: 98 % | HEART RATE: 56 BPM | WEIGHT: 265 LBS | BODY MASS INDEX: 34.01 KG/M2

## 2024-10-14 DIAGNOSIS — E11.65 TYPE 2 DIABETES MELLITUS WITH HYPERGLYCEMIA (HCC): ICD-10-CM

## 2024-10-14 DIAGNOSIS — K42.9 UMBILICAL HERNIA: Primary | ICD-10-CM

## 2024-10-14 DIAGNOSIS — K43.9 VENTRAL HERNIA: ICD-10-CM

## 2024-10-14 DIAGNOSIS — R10.32 LEFT INGUINAL PAIN: Primary | ICD-10-CM

## 2024-10-14 DIAGNOSIS — K42.9 UMBILICAL HERNIA WITHOUT OBSTRUCTION OR GANGRENE: ICD-10-CM

## 2024-10-14 DIAGNOSIS — K40.90 INGUINAL HERNIA: ICD-10-CM

## 2024-10-14 DIAGNOSIS — K43.9 VENTRAL HERNIA WITHOUT OBSTRUCTION OR GANGRENE: ICD-10-CM

## 2024-10-14 PROCEDURE — 99213 OFFICE O/P EST LOW 20 MIN: CPT | Performed by: SURGERY

## 2024-10-14 NOTE — PROGRESS NOTES
"Ambulatory Visit  Name: Sergio Ahumada      : 1960      MRN: [id number]  Encounter Provider: Yassine Maldonado MD  Encounter Date: 10/14/2024   Encounter department: [facility]    Assessment & Plan  Left inguinal pain         Ventral hernia without obstruction or gangrene         Umbilical hernia without obstruction or gangrene           History of Present Illness     Sergio Ahumada is a 64 y.o. male who presents for follow up on ventral, umbilical, and left inguinal hernia to discuss repair.  Patient complains of discomfort at these 3 regions.  He would like to undergo repair.  Operative repair is recommended for the ventral, umbilical, and left inguinal hernia.  Risks and benefits described in detail.  He is agreeable.  Patient is presently on Flomax.             Ultrasound groin/inguinal area 2024  FINDINGS:  A very small left inguinal adipose containing hernia is noted during Valsalva. No obvious bowel involvement. Hernia also noted in upright position. Orifice approximately 0.8 cm.  IMPRESSION:  Small adipose containing left inguinal hernia noted during Valsalva or upright positioning         Review of Systems   Constitutional: Negative.  Negative for activity change.   HENT: Negative.     Eyes: Negative.    Respiratory: Negative.     Cardiovascular: Negative.    Gastrointestinal:  Positive for abdominal pain.   Endocrine: Negative.    Genitourinary: Negative.    Musculoskeletal: Negative.    Skin: Negative.    Allergic/Immunologic: Negative.    Neurological: Negative.    Psychiatric/Behavioral:  Negative for agitation, behavioral problems and confusion. The patient is not nervous/anxious.            Objective     /92   Pulse 56   Ht 6' 2\" (1.88 m)   Wt 120 kg (265 lb)   SpO2 98%   BMI 34.02 kg/m²     Physical Exam  Vitals and nursing note reviewed.   Constitutional:       General: He is not in acute distress.     Appearance: He is well-developed.   HENT:      " Head: Normocephalic and atraumatic.   Eyes:      Conjunctiva/sclera: Conjunctivae normal.   Cardiovascular:      Rate and Rhythm: Normal rate and regular rhythm.      Heart sounds: No murmur heard.  Pulmonary:      Effort: Pulmonary effort is normal. No respiratory distress.      Breath sounds: Normal breath sounds.   Abdominal:      Palpations: Abdomen is soft.      Tenderness: There is no abdominal tenderness.      Hernia: A hernia (Ventral hernia is reducible.  Umbilical hernia is present.  Left inguinal hernia is noted.) is present.   Musculoskeletal:         General: No swelling.      Cervical back: Neck supple.   Skin:     General: Skin is warm and dry.      Capillary Refill: Capillary refill takes less than 2 seconds.   Neurological:      Mental Status: He is alert.   Psychiatric:         Mood and Affect: Mood normal.

## 2024-10-14 NOTE — H&P (VIEW-ONLY)
"Ambulatory Visit  Name: Sergio Ahumada      : 1960      MRN: [id number]  Encounter Provider: Yassine Maldonado MD  Encounter Date: 10/14/2024   Encounter department: [facility]    Assessment & Plan  Left inguinal pain         Ventral hernia without obstruction or gangrene         Umbilical hernia without obstruction or gangrene           History of Present Illness     Serigo Ahumada is a 64 y.o. male who presents for follow up on ventral, umbilical, and left inguinal hernia to discuss repair.  Patient complains of discomfort at these 3 regions.  He would like to undergo repair.  Operative repair is recommended for the ventral, umbilical, and left inguinal hernia.  Risks and benefits described in detail.  He is agreeable.  Patient is presently on Flomax.             Ultrasound groin/inguinal area 2024  FINDINGS:  A very small left inguinal adipose containing hernia is noted during Valsalva. No obvious bowel involvement. Hernia also noted in upright position. Orifice approximately 0.8 cm.  IMPRESSION:  Small adipose containing left inguinal hernia noted during Valsalva or upright positioning         Review of Systems   Constitutional: Negative.  Negative for activity change.   HENT: Negative.     Eyes: Negative.    Respiratory: Negative.     Cardiovascular: Negative.    Gastrointestinal:  Positive for abdominal pain.   Endocrine: Negative.    Genitourinary: Negative.    Musculoskeletal: Negative.    Skin: Negative.    Allergic/Immunologic: Negative.    Neurological: Negative.    Psychiatric/Behavioral:  Negative for agitation, behavioral problems and confusion. The patient is not nervous/anxious.            Objective     /92   Pulse 56   Ht 6' 2\" (1.88 m)   Wt 120 kg (265 lb)   SpO2 98%   BMI 34.02 kg/m²     Physical Exam  Vitals and nursing note reviewed.   Constitutional:       General: He is not in acute distress.     Appearance: He is well-developed.   HENT:      " Head: Normocephalic and atraumatic.   Eyes:      Conjunctiva/sclera: Conjunctivae normal.   Cardiovascular:      Rate and Rhythm: Normal rate and regular rhythm.      Heart sounds: No murmur heard.  Pulmonary:      Effort: Pulmonary effort is normal. No respiratory distress.      Breath sounds: Normal breath sounds.   Abdominal:      Palpations: Abdomen is soft.      Tenderness: There is no abdominal tenderness.      Hernia: A hernia (Ventral hernia is reducible.  Umbilical hernia is present.  Left inguinal hernia is noted.) is present.   Musculoskeletal:         General: No swelling.      Cervical back: Neck supple.   Skin:     General: Skin is warm and dry.      Capillary Refill: Capillary refill takes less than 2 seconds.   Neurological:      Mental Status: He is alert.   Psychiatric:         Mood and Affect: Mood normal.

## 2024-10-14 NOTE — H&P (VIEW-ONLY)
"Ambulatory Visit  Name: Sergio Ahumada      : 1960      MRN: [id number]  Encounter Provider: Yassine Maldonado MD  Encounter Date: 10/14/2024   Encounter department: [facility]    Assessment & Plan  Left inguinal pain         Ventral hernia without obstruction or gangrene         Umbilical hernia without obstruction or gangrene           History of Present Illness     Sergio Ahumada is a 64 y.o. male who presents for follow up on ventral, umbilical, and left inguinal hernia to discuss repair.  Patient complains of discomfort at these 3 regions.  He would like to undergo repair.  Operative repair is recommended for the ventral, umbilical, and left inguinal hernia.  Risks and benefits described in detail.  He is agreeable.  Patient is presently on Flomax.             Ultrasound groin/inguinal area 2024  FINDINGS:  A very small left inguinal adipose containing hernia is noted during Valsalva. No obvious bowel involvement. Hernia also noted in upright position. Orifice approximately 0.8 cm.  IMPRESSION:  Small adipose containing left inguinal hernia noted during Valsalva or upright positioning         Review of Systems   Constitutional: Negative.  Negative for activity change.   HENT: Negative.     Eyes: Negative.    Respiratory: Negative.     Cardiovascular: Negative.    Gastrointestinal:  Positive for abdominal pain.   Endocrine: Negative.    Genitourinary: Negative.    Musculoskeletal: Negative.    Skin: Negative.    Allergic/Immunologic: Negative.    Neurological: Negative.    Psychiatric/Behavioral:  Negative for agitation, behavioral problems and confusion. The patient is not nervous/anxious.            Objective     /92   Pulse 56   Ht 6' 2\" (1.88 m)   Wt 120 kg (265 lb)   SpO2 98%   BMI 34.02 kg/m²     Physical Exam  Vitals and nursing note reviewed.   Constitutional:       General: He is not in acute distress.     Appearance: He is well-developed.   HENT:      " Head: Normocephalic and atraumatic.   Eyes:      Conjunctiva/sclera: Conjunctivae normal.   Cardiovascular:      Rate and Rhythm: Normal rate and regular rhythm.      Heart sounds: No murmur heard.  Pulmonary:      Effort: Pulmonary effort is normal. No respiratory distress.      Breath sounds: Normal breath sounds.   Abdominal:      Palpations: Abdomen is soft.      Tenderness: There is no abdominal tenderness.      Hernia: A hernia (Ventral hernia is reducible.  Umbilical hernia is present.  Left inguinal hernia is noted.) is present.   Musculoskeletal:         General: No swelling.      Cervical back: Neck supple.   Skin:     General: Skin is warm and dry.      Capillary Refill: Capillary refill takes less than 2 seconds.   Neurological:      Mental Status: He is alert.   Psychiatric:         Mood and Affect: Mood normal.

## 2024-10-21 ENCOUNTER — HOSPITAL ENCOUNTER (OUTPATIENT)
Dept: GASTROENTEROLOGY | Facility: HOSPITAL | Age: 64
Setting detail: OUTPATIENT SURGERY
Discharge: HOME | End: 2024-10-21
Attending: COLON & RECTAL SURGERY
Payer: MEDICARE

## 2024-10-21 ENCOUNTER — ANESTHESIA (OUTPATIENT)
Dept: GASTROENTEROLOGY | Facility: HOSPITAL | Age: 64
End: 2024-10-21
Payer: MEDICARE

## 2024-10-21 ENCOUNTER — ANESTHESIA EVENT (OUTPATIENT)
Dept: GASTROENTEROLOGY | Facility: HOSPITAL | Age: 64
End: 2024-10-21
Payer: MEDICARE

## 2024-10-21 VITALS
HEART RATE: 61 BPM | RESPIRATION RATE: 12 BRPM | SYSTOLIC BLOOD PRESSURE: 125 MMHG | DIASTOLIC BLOOD PRESSURE: 70 MMHG | WEIGHT: 263 LBS | OXYGEN SATURATION: 97 % | TEMPERATURE: 97 F | BODY MASS INDEX: 33.75 KG/M2 | HEIGHT: 74 IN

## 2024-10-21 DIAGNOSIS — Z12.11 SCREENING FOR COLON CANCER: ICD-10-CM

## 2024-10-21 PROBLEM — A49.1 STREPTOCOCCAL INFECTION: Status: RESOLVED | Noted: 2020-12-07 | Resolved: 2024-10-21

## 2024-10-21 PROBLEM — B37.9 CANDIDA GLABRATA INFECTION: Status: RESOLVED | Noted: 2020-12-07 | Resolved: 2024-10-21

## 2024-10-21 LAB — GLUCOSE SERPL-MCNC: 148 MG/DL (ref 65–140)

## 2024-10-21 PROCEDURE — 82948 REAGENT STRIP/BLOOD GLUCOSE: CPT

## 2024-10-21 PROCEDURE — 88305 TISSUE EXAM BY PATHOLOGIST: CPT | Performed by: PATHOLOGY

## 2024-10-21 PROCEDURE — 45385 COLONOSCOPY W/LESION REMOVAL: CPT | Performed by: COLON & RECTAL SURGERY

## 2024-10-21 RX ORDER — LIDOCAINE HYDROCHLORIDE 20 MG/ML
INJECTION, SOLUTION EPIDURAL; INFILTRATION; INTRACAUDAL; PERINEURAL AS NEEDED
Status: DISCONTINUED | OUTPATIENT
Start: 2024-10-21 | End: 2024-10-21

## 2024-10-21 RX ORDER — SODIUM CHLORIDE, SODIUM LACTATE, POTASSIUM CHLORIDE, CALCIUM CHLORIDE 600; 310; 30; 20 MG/100ML; MG/100ML; MG/100ML; MG/100ML
INJECTION, SOLUTION INTRAVENOUS CONTINUOUS PRN
Status: DISCONTINUED | OUTPATIENT
Start: 2024-10-21 | End: 2024-10-21

## 2024-10-21 RX ORDER — PROPOFOL 10 MG/ML
INJECTION, EMULSION INTRAVENOUS AS NEEDED
Status: DISCONTINUED | OUTPATIENT
Start: 2024-10-21 | End: 2024-10-21

## 2024-10-21 RX ADMIN — PROPOFOL 150 MG: 10 INJECTION, EMULSION INTRAVENOUS at 08:04

## 2024-10-21 RX ADMIN — SODIUM CHLORIDE, SODIUM LACTATE, POTASSIUM CHLORIDE, AND CALCIUM CHLORIDE: .6; .31; .03; .02 INJECTION, SOLUTION INTRAVENOUS at 07:57

## 2024-10-21 RX ADMIN — PROPOFOL 25 MG: 10 INJECTION, EMULSION INTRAVENOUS at 08:15

## 2024-10-21 RX ADMIN — PROPOFOL 25 MG: 10 INJECTION, EMULSION INTRAVENOUS at 08:09

## 2024-10-21 RX ADMIN — PROPOFOL 25 MG: 10 INJECTION, EMULSION INTRAVENOUS at 08:12

## 2024-10-21 RX ADMIN — PROPOFOL 25 MG: 10 INJECTION, EMULSION INTRAVENOUS at 08:21

## 2024-10-21 RX ADMIN — PROPOFOL 25 MG: 10 INJECTION, EMULSION INTRAVENOUS at 08:13

## 2024-10-21 RX ADMIN — PROPOFOL 25 MG: 10 INJECTION, EMULSION INTRAVENOUS at 08:10

## 2024-10-21 RX ADMIN — PROPOFOL 25 MG: 10 INJECTION, EMULSION INTRAVENOUS at 08:05

## 2024-10-21 RX ADMIN — PROPOFOL 25 MG: 10 INJECTION, EMULSION INTRAVENOUS at 08:08

## 2024-10-21 RX ADMIN — PROPOFOL 25 MG: 10 INJECTION, EMULSION INTRAVENOUS at 08:06

## 2024-10-21 RX ADMIN — PROPOFOL 25 MG: 10 INJECTION, EMULSION INTRAVENOUS at 08:07

## 2024-10-21 RX ADMIN — LIDOCAINE HYDROCHLORIDE 100 MG: 20 INJECTION, SOLUTION EPIDURAL; INFILTRATION; INTRACAUDAL; PERINEURAL at 08:04

## 2024-10-21 NOTE — ANESTHESIA POSTPROCEDURE EVALUATION
Post-Op Assessment Note    Last Filed PACU Vitals:  Vitals Value Taken Time   Temp 97 °F (36.1 °C) 10/21/24 0841   Pulse 61 10/21/24 0841   /70 10/21/24 0841   Resp 12 10/21/24 0841   SpO2 97 % 10/21/24 0841       Modified Juan Carlos:  Activity: 2 (10/21/2024  7:20 AM)  Respiration: 2 (10/21/2024  7:20 AM)  Circulation: 2 (10/21/2024  7:20 AM)  Consciousness: 2 (10/21/2024  7:20 AM)  Oxygen Saturation: 2 (10/21/2024  7:20 AM)  Modified Juan Carlos Score: 10 (10/21/2024  7:20 AM)

## 2024-10-21 NOTE — INTERVAL H&P NOTE
Presents today for screening colonoscopy, personal history of polyps, last 2019 due for recall.  Screening colonoscopy,discussed in a face-to-face, personal, informed consent process, the benefits, alternatives, risks including not limited to bleeding, missed lesion, perforation requiring emergent surgery discussed/understood.    H&P reviewed. After examining the patient I find no changes in the patients condition since the H&P had been written.    Vitals:    10/21/24 0726   BP: 151/77   Pulse: 57   Resp: 22   Temp: (!) 97.4 °F (36.3 °C)   SpO2: 97%

## 2024-10-21 NOTE — ANESTHESIA POSTPROCEDURE EVALUATION
Post-Op Assessment Note    CV Status:  Stable  Pain Score: 0    Pain management: adequate       Mental Status:  Alert and awake   Hydration Status:  Euvolemic   PONV Controlled:  Controlled   Airway Patency:  Patent     Post Op Vitals Reviewed: Yes    No anethesia notable event occurred.    Staff: CRNA           Last Filed PACU Vitals:  Vitals Value Taken Time   Temp 96.7    Pulse 60    /62    Resp 16    SpO2 95        Modified Juan Carlos:  Activity: 2 (10/21/2024  7:20 AM)  Respiration: 2 (10/21/2024  7:20 AM)  Circulation: 2 (10/21/2024  7:20 AM)  Consciousness: 2 (10/21/2024  7:20 AM)  Oxygen Saturation: 2 (10/21/2024  7:20 AM)  Modified Juan Carlos Score: 10 (10/21/2024  7:20 AM)

## 2024-10-21 NOTE — ANESTHESIA PREPROCEDURE EVALUATION
Procedure:  COLONOSCOPY    Relevant Problems   ANESTHESIA   (-) History of anesthesia complications      CARDIO   (+) Essential hypertension   (+) Mixed hyperlipidemia   (+) PAD (peripheral artery disease) (HCC)   (-) Chest pain   (-) GONZALES (dyspnea on exertion)      ENDO   (+) Type 2 diabetes mellitus with hyperglycemia (HCC)      NEURO/PSYCH   (+) Aphasia   (+) Seasonal affective disorder (HCC)   (+) TIA (transient ischemic attack)   (+) Visual disturbance      PULMONARY   (-) Shortness of breath   (-) Sleep apnea   (-) URI (upper respiratory infection)      2023    Left Ventricle: Left ventricular cavity size is normal. Wall thickness is mildly increased. There is borderline concentric hypertrophy. The left ventricular ejection fraction is 60%. Systolic function is normal. Wall motion is normal. Diastolic function is normal for age.    Left Atrium: The atrium is mildly dilated.    Atrial Septum: There is no atrial septal defect by saline contrast. No patent foramen ovale detected using saline contrast injection with provocation by Valsalva.    Mitral Valve: There is mild to moderate annular calcification. There is mild regurgitation.    Tricuspid Valve: There is mild regurgitation.  Pulmonary artery pressure around 30 mmHg.  Physical Exam    Airway    Mallampati score: II  TM Distance: >3 FB  Neck ROM: full     Dental       Cardiovascular      Pulmonary      Other Findings        Anesthesia Plan  ASA Score- 3     Anesthesia Type- IV sedation with anesthesia with ASA Monitors.         Additional Monitors:     Airway Plan:            Plan Factors-Exercise tolerance (METS): >4 METS.    Chart reviewed. EKG reviewed.  Existing labs reviewed. Patient summary reviewed.                  Induction- intravenous.    Postoperative Plan-         Informed Consent- Anesthetic plan and risks discussed with patient.  I personally reviewed this patient with the CRNA. Discussed and agreed on the Anesthesia Plan with the  CRNA..

## 2024-10-24 PROCEDURE — 88305 TISSUE EXAM BY PATHOLOGIST: CPT | Performed by: PATHOLOGY

## 2024-10-25 ENCOUNTER — ANESTHESIA EVENT (OUTPATIENT)
Dept: PERIOP | Facility: AMBULARY SURGERY CENTER | Age: 64
End: 2024-10-25
Payer: MEDICARE

## 2024-10-28 ENCOUNTER — APPOINTMENT (OUTPATIENT)
Dept: LAB | Facility: CLINIC | Age: 64
End: 2024-10-28
Payer: MEDICARE

## 2024-10-28 DIAGNOSIS — E78.2 MIXED HYPERLIPIDEMIA: ICD-10-CM

## 2024-10-28 DIAGNOSIS — E11.65 TYPE 2 DIABETES MELLITUS WITH HYPERGLYCEMIA, WITHOUT LONG-TERM CURRENT USE OF INSULIN (HCC): ICD-10-CM

## 2024-10-28 DIAGNOSIS — K40.90 INGUINAL HERNIA: ICD-10-CM

## 2024-10-28 DIAGNOSIS — K42.9 UMBILICAL HERNIA: ICD-10-CM

## 2024-10-28 DIAGNOSIS — K43.9 VENTRAL HERNIA: ICD-10-CM

## 2024-10-28 DIAGNOSIS — E11.65 TYPE 2 DIABETES MELLITUS WITH HYPERGLYCEMIA (HCC): ICD-10-CM

## 2024-10-28 LAB
ALBUMIN SERPL BCG-MCNC: 4.5 G/DL (ref 3.5–5)
ALP SERPL-CCNC: 54 U/L (ref 34–104)
ALT SERPL W P-5'-P-CCNC: 42 U/L (ref 7–52)
ANION GAP SERPL CALCULATED.3IONS-SCNC: 7 MMOL/L (ref 4–13)
AST SERPL W P-5'-P-CCNC: 23 U/L (ref 13–39)
BILIRUB SERPL-MCNC: 0.51 MG/DL (ref 0.2–1)
BUN SERPL-MCNC: 16 MG/DL (ref 5–25)
CALCIUM SERPL-MCNC: 9.3 MG/DL (ref 8.4–10.2)
CHLORIDE SERPL-SCNC: 102 MMOL/L (ref 96–108)
CO2 SERPL-SCNC: 29 MMOL/L (ref 21–32)
CREAT SERPL-MCNC: 0.88 MG/DL (ref 0.6–1.3)
ERYTHROCYTE [DISTWIDTH] IN BLOOD BY AUTOMATED COUNT: 12.6 % (ref 11.6–15.1)
EST. AVERAGE GLUCOSE BLD GHB EST-MCNC: 157 MG/DL
GFR SERPL CREATININE-BSD FRML MDRD: 89 ML/MIN/1.73SQ M
GLUCOSE P FAST SERPL-MCNC: 147 MG/DL (ref 65–99)
HBA1C MFR BLD: 7.1 %
HCT VFR BLD AUTO: 45.3 % (ref 36.5–49.3)
HGB BLD-MCNC: 15.6 G/DL (ref 12–17)
MCH RBC QN AUTO: 29.3 PG (ref 26.8–34.3)
MCHC RBC AUTO-ENTMCNC: 34.4 G/DL (ref 31.4–37.4)
MCV RBC AUTO: 85 FL (ref 82–98)
PLATELET # BLD AUTO: 164 THOUSANDS/UL (ref 149–390)
PMV BLD AUTO: 10.6 FL (ref 8.9–12.7)
POTASSIUM SERPL-SCNC: 4.4 MMOL/L (ref 3.5–5.3)
PROT SERPL-MCNC: 7 G/DL (ref 6.4–8.4)
RBC # BLD AUTO: 5.32 MILLION/UL (ref 3.88–5.62)
SODIUM SERPL-SCNC: 138 MMOL/L (ref 135–147)
WBC # BLD AUTO: 3.87 THOUSAND/UL (ref 4.31–10.16)

## 2024-10-28 PROCEDURE — 85027 COMPLETE CBC AUTOMATED: CPT

## 2024-10-28 PROCEDURE — 36415 COLL VENOUS BLD VENIPUNCTURE: CPT

## 2024-10-28 PROCEDURE — 80053 COMPREHEN METABOLIC PANEL: CPT

## 2024-10-28 PROCEDURE — 83036 HEMOGLOBIN GLYCOSYLATED A1C: CPT

## 2024-10-29 NOTE — PRE-PROCEDURE INSTRUCTIONS
Pre-Surgery Instructions:   Medication Instructions    allopurinol (ZYLOPRIM) 100 mg tablet Takes in the afternoon- will take once home    aspirin (ECOTRIN) 325 mg EC tablet Stop taking 7 days prior to surgery.    cholecalciferol (VITAMIN D3) 1,000 units tablet Stop taking 7 days prior to surgery.    glimepiride (AMARYL) 2 mg tablet Hold day of surgery.    glucosamine-chondroitin 500-400 MG tablet Stop taking 7 days prior to surgery.    losartan (COZAAR) 100 MG tablet Hold day of surgery.    metoprolol-hydrochlorothiazide (LOPRESSOR HCT) 100-25 MG per tablet Takes in the afternoon- will take once home    Omega-3 Fatty Acids (Omega-3 Fish Oil) 1000 MG CAPS Stop taking 7 days prior to surgery.    rosuvastatin (CRESTOR) 20 MG tablet Takes in the afternoon- will take once home    semaglutide (Rybelsus) 14 MG tablet Hold day of surgery.    tamsulosin (FLOMAX) 0.4 mg Take night before surgery   Medication instructions for day surgery reviewed. Please use only a sip of water to take your instructed medications. Avoid all over the counter vitamins, supplements and NSAIDS for one week prior to surgery per anesthesia guidelines. Tylenol is ok to take as needed.     You will receive a call one business day prior to surgery with an arrival time and hospital directions. If your surgery is scheduled on a Monday, the hospital will be calling you on the Friday prior to your surgery. If you have not heard from anyone by 8pm, please call the hospital supervisor through the hospital  at [phone]. ([place] [phone] or [place] [phone]).    Do not eat or drink anything after midnight the night before your surgery, including candy, mints, lifesavers, or chewing gum. Do not drink alcohol 24hrs before your surgery. Try not to smoke at least 24hrs before your surgery.       Follow the pre surgery showering instructions as listed in the “My Surgical Experience Booklet” or otherwise provided by your surgeon's office. Do  not use a blade to shave the surgical area 1 week before surgery. It is okay to use a clean electric clippers up to 24 hours before surgery. Do not apply any lotions, creams, including makeup, cologne, deodorant, or perfumes after showering on the day of your surgery. Do not use dry shampoo, hair spray, hair gel, or any type of hair products.     No contact lenses, eye make-up, or artificial eyelashes. Remove nail polish, including gel polish, and any artificial, gel, or acrylic nails if possible. Remove all jewelry including rings and body piercing jewelry.     Wear causal clothing that is easy to take on and off. Consider your type of surgery.    Keep any valuables, jewelry, piercings at home. Please bring any specially ordered equipment (sling, braces) if indicated.    Arrange for a responsible person to drive you to and from the hospital on the day of your surgery. Please confirm the visitor policy for the day of your procedure when you receive your phone call with an arrival time.     Call the surgeon's office with any new illnesses, exposures, or additional questions prior to surgery.    Please reference your “My Surgical Experience Booklet” for additional information to prepare for your upcoming surgery.

## 2024-11-06 RX ORDER — OXYCODONE AND ACETAMINOPHEN 5; 325 MG/1; MG/1
1 TABLET ORAL EVERY 4 HOURS PRN
Qty: 10 TABLET | Refills: 0 | Status: SHIPPED | OUTPATIENT
Start: 2024-11-06

## 2024-11-06 NOTE — DISCHARGE INSTR - AVS FIRST PAGE
Please call the office when you leave to schedule an appointment for 2 weeks.              Please call [phone]. [address], [address], [place], [address]. Off [address] between [employer] and [employer].     Activity:    May lift 10 lb as many times as desired the 1st week,       20 lb in 2 weeks,       30 lb in 3 weeks.                Walking is encouraged  Normal daily activities including climbing steps are okay  Do not engage in strenuous activity ( sit-ups or crutches) or contact sports for 4-6 weeks post-operatively    Return to Work:   Okay to return to work when you feel well if you desire.        Diet:   You may return to your normal healthy diet.    Wound Care:  Your wound is closed with dissolvable stitches and glue.  It is okay to shower. Wash incision gently with soap and water and pat dry. Do not soak incisions in bath water or swim for two weeks. Do not apply any creams or ointments.    Pain Medication:   Please take as directed if needed. May use Advil or Motrin in addition.  Recall, the pain medicine and anesthesia is associated with constipation.    No driving while taking narcotic pain medications.      Other:  It is normal to developed a “healing ridge” / firm incision after surgery.  This is your body making scar tissue.  It is a good sign  Constipation is very common after general anesthesia.  Please use milk of magnesia as needed in order to help prevent constipation.  It is normal to get bruising after surgery.  If you have questions after discharge please call the office.    If you have increased pain, fever >101.5, increased drainage, redness or a bad smell at your surgery site, please call us immediately or come directly to the Emergency Room

## 2024-11-08 ENCOUNTER — ANESTHESIA (OUTPATIENT)
Dept: PERIOP | Facility: AMBULARY SURGERY CENTER | Age: 64
End: 2024-11-08
Payer: MEDICARE

## 2024-11-08 ENCOUNTER — HOSPITAL ENCOUNTER (OUTPATIENT)
Facility: AMBULARY SURGERY CENTER | Age: 64
Setting detail: OUTPATIENT SURGERY
Discharge: HOME | End: 2024-11-08
Attending: SURGERY | Admitting: SURGERY
Payer: MEDICARE

## 2024-11-08 VITALS
HEART RATE: 68 BPM | SYSTOLIC BLOOD PRESSURE: 136 MMHG | TEMPERATURE: 97.8 F | OXYGEN SATURATION: 98 % | DIASTOLIC BLOOD PRESSURE: 66 MMHG | RESPIRATION RATE: 20 BRPM

## 2024-11-08 DIAGNOSIS — R10.32 LEFT INGUINAL PAIN: Primary | ICD-10-CM

## 2024-11-08 DIAGNOSIS — K42.9 UMBILICAL HERNIA WITHOUT OBSTRUCTION OR GANGRENE: ICD-10-CM

## 2024-11-08 LAB
GLUCOSE SERPL-MCNC: 205 MG/DL (ref 65–140)
GLUCOSE SERPL-MCNC: 237 MG/DL (ref 65–140)

## 2024-11-08 PROCEDURE — 49593 RPR AA HRN 1ST 3-10 RDC: CPT

## 2024-11-08 PROCEDURE — C1781 MESH (IMPLANTABLE): HCPCS | Performed by: SURGERY

## 2024-11-08 PROCEDURE — 82948 REAGENT STRIP/BLOOD GLUCOSE: CPT

## 2024-11-08 PROCEDURE — 49520 REREPAIR ING HERNIA REDUCE: CPT

## 2024-11-08 PROCEDURE — 49593 RPR AA HRN 1ST 3-10 RDC: CPT | Performed by: SURGERY

## 2024-11-08 PROCEDURE — 49520 REREPAIR ING HERNIA REDUCE: CPT | Performed by: SURGERY

## 2024-11-08 DEVICE — VENTRALEX ST HERNIA PATCH, 6.4 CM (2.5"), CIRCLE
Type: IMPLANTABLE DEVICE | Site: ABDOMEN | Status: FUNCTIONAL
Brand: VENTRALEX

## 2024-11-08 DEVICE — MODIFIED ONFLEX MESH, 3.4" X 5.6" (8.6 CM X 14.2 CM), MEDIUM
Type: IMPLANTABLE DEVICE | Site: INGUINAL | Status: FUNCTIONAL
Brand: ONFLEX

## 2024-11-08 RX ORDER — HYDROMORPHONE HCL/PF 1 MG/ML
0.5 SYRINGE (ML) INJECTION
Status: DISCONTINUED | OUTPATIENT
Start: 2024-11-08 | End: 2024-11-08 | Stop reason: HOSPADM

## 2024-11-08 RX ORDER — BUPIVACAINE HYDROCHLORIDE 2.5 MG/ML
INJECTION, SOLUTION EPIDURAL; INFILTRATION; INTRACAUDAL AS NEEDED
Status: DISCONTINUED | OUTPATIENT
Start: 2024-11-08 | End: 2024-11-08 | Stop reason: HOSPADM

## 2024-11-08 RX ORDER — ALBUTEROL SULFATE 90 UG/1
INHALANT RESPIRATORY (INHALATION) AS NEEDED
Status: DISCONTINUED | OUTPATIENT
Start: 2024-11-08 | End: 2024-11-08

## 2024-11-08 RX ORDER — FENTANYL CITRATE 50 UG/ML
INJECTION, SOLUTION INTRAMUSCULAR; INTRAVENOUS AS NEEDED
Status: DISCONTINUED | OUTPATIENT
Start: 2024-11-08 | End: 2024-11-08

## 2024-11-08 RX ORDER — LIDOCAINE HYDROCHLORIDE 10 MG/ML
INJECTION, SOLUTION EPIDURAL; INFILTRATION; INTRACAUDAL; PERINEURAL AS NEEDED
Status: DISCONTINUED | OUTPATIENT
Start: 2024-11-08 | End: 2024-11-08

## 2024-11-08 RX ORDER — ONDANSETRON 2 MG/ML
INJECTION INTRAMUSCULAR; INTRAVENOUS AS NEEDED
Status: DISCONTINUED | OUTPATIENT
Start: 2024-11-08 | End: 2024-11-08

## 2024-11-08 RX ORDER — PROPOFOL 10 MG/ML
INJECTION, EMULSION INTRAVENOUS AS NEEDED
Status: DISCONTINUED | OUTPATIENT
Start: 2024-11-08 | End: 2024-11-08

## 2024-11-08 RX ORDER — SODIUM CHLORIDE, SODIUM LACTATE, POTASSIUM CHLORIDE, CALCIUM CHLORIDE 600; 310; 30; 20 MG/100ML; MG/100ML; MG/100ML; MG/100ML
75 INJECTION, SOLUTION INTRAVENOUS CONTINUOUS
Status: DISCONTINUED | OUTPATIENT
Start: 2024-11-08 | End: 2024-11-08 | Stop reason: HOSPADM

## 2024-11-08 RX ORDER — DEXAMETHASONE SODIUM PHOSPHATE 10 MG/ML
INJECTION, SOLUTION INTRAMUSCULAR; INTRAVENOUS AS NEEDED
Status: DISCONTINUED | OUTPATIENT
Start: 2024-11-08 | End: 2024-11-08

## 2024-11-08 RX ORDER — ONDANSETRON 2 MG/ML
4 INJECTION INTRAMUSCULAR; INTRAVENOUS EVERY 4 HOURS PRN
Status: DISCONTINUED | OUTPATIENT
Start: 2024-11-08 | End: 2024-11-08 | Stop reason: HOSPADM

## 2024-11-08 RX ORDER — FUROSEMIDE 10 MG/ML
INJECTION INTRAMUSCULAR; INTRAVENOUS AS NEEDED
Status: DISCONTINUED | OUTPATIENT
Start: 2024-11-08 | End: 2024-11-08

## 2024-11-08 RX ORDER — MAGNESIUM HYDROXIDE 1200 MG/15ML
LIQUID ORAL AS NEEDED
Status: DISCONTINUED | OUTPATIENT
Start: 2024-11-08 | End: 2024-11-08 | Stop reason: HOSPADM

## 2024-11-08 RX ORDER — ONDANSETRON 2 MG/ML
4 INJECTION INTRAMUSCULAR; INTRAVENOUS ONCE AS NEEDED
Status: DISCONTINUED | OUTPATIENT
Start: 2024-11-08 | End: 2024-11-08 | Stop reason: HOSPADM

## 2024-11-08 RX ORDER — ACETAMINOPHEN 325 MG/1
650 TABLET ORAL EVERY 4 HOURS PRN
Status: DISCONTINUED | OUTPATIENT
Start: 2024-11-08 | End: 2024-11-08 | Stop reason: HOSPADM

## 2024-11-08 RX ORDER — METOCLOPRAMIDE HYDROCHLORIDE 5 MG/ML
10 INJECTION INTRAMUSCULAR; INTRAVENOUS ONCE AS NEEDED
Status: DISCONTINUED | OUTPATIENT
Start: 2024-11-08 | End: 2024-11-08 | Stop reason: HOSPADM

## 2024-11-08 RX ORDER — SODIUM CHLORIDE, SODIUM LACTATE, POTASSIUM CHLORIDE, CALCIUM CHLORIDE 600; 310; 30; 20 MG/100ML; MG/100ML; MG/100ML; MG/100ML
INJECTION, SOLUTION INTRAVENOUS CONTINUOUS PRN
Status: DISCONTINUED | OUTPATIENT
Start: 2024-11-08 | End: 2024-11-08

## 2024-11-08 RX ORDER — ROCURONIUM BROMIDE 10 MG/ML
INJECTION, SOLUTION INTRAVENOUS AS NEEDED
Status: DISCONTINUED | OUTPATIENT
Start: 2024-11-08 | End: 2024-11-08

## 2024-11-08 RX ORDER — FENTANYL CITRATE/PF 50 MCG/ML
50 SYRINGE (ML) INJECTION
Status: DISCONTINUED | OUTPATIENT
Start: 2024-11-08 | End: 2024-11-08 | Stop reason: HOSPADM

## 2024-11-08 RX ORDER — CEFAZOLIN SODIUM 2 G/50ML
2000 SOLUTION INTRAVENOUS ONCE
Status: COMPLETED | OUTPATIENT
Start: 2024-11-08 | End: 2024-11-08

## 2024-11-08 RX ORDER — SUCCINYLCHOLINE/SOD CL,ISO/PF 100 MG/5ML
SYRINGE (ML) INTRAVENOUS AS NEEDED
Status: DISCONTINUED | OUTPATIENT
Start: 2024-11-08 | End: 2024-11-08

## 2024-11-08 RX ORDER — OXYCODONE AND ACETAMINOPHEN 5; 325 MG/1; MG/1
1 TABLET ORAL EVERY 4 HOURS PRN
Status: DISCONTINUED | OUTPATIENT
Start: 2024-11-08 | End: 2024-11-08 | Stop reason: HOSPADM

## 2024-11-08 RX ORDER — EPHEDRINE SULFATE 50 MG/ML
INJECTION INTRAVENOUS AS NEEDED
Status: DISCONTINUED | OUTPATIENT
Start: 2024-11-08 | End: 2024-11-08

## 2024-11-08 RX ORDER — MIDAZOLAM HYDROCHLORIDE 2 MG/2ML
INJECTION, SOLUTION INTRAMUSCULAR; INTRAVENOUS AS NEEDED
Status: DISCONTINUED | OUTPATIENT
Start: 2024-11-08 | End: 2024-11-08

## 2024-11-08 RX ORDER — ALBUTEROL SULFATE 0.83 MG/ML
2.5 SOLUTION RESPIRATORY (INHALATION) ONCE AS NEEDED
Status: DISCONTINUED | OUTPATIENT
Start: 2024-11-08 | End: 2024-11-08 | Stop reason: HOSPADM

## 2024-11-08 RX ORDER — KETOROLAC TROMETHAMINE 30 MG/ML
INJECTION, SOLUTION INTRAMUSCULAR; INTRAVENOUS AS NEEDED
Status: DISCONTINUED | OUTPATIENT
Start: 2024-11-08 | End: 2024-11-08

## 2024-11-08 RX ADMIN — ALBUTEROL SULFATE 2 PUFF: 90 AEROSOL, METERED RESPIRATORY (INHALATION) at 11:05

## 2024-11-08 RX ADMIN — MIDAZOLAM 2 MG: 1 INJECTION INTRAMUSCULAR; INTRAVENOUS at 09:14

## 2024-11-08 RX ADMIN — FENTANYL CITRATE 50 MCG: 50 INJECTION INTRAMUSCULAR; INTRAVENOUS at 10:36

## 2024-11-08 RX ADMIN — PROPOFOL 200 MG: 10 INJECTION, EMULSION INTRAVENOUS at 09:51

## 2024-11-08 RX ADMIN — PROPOFOL 100 MG: 10 INJECTION, EMULSION INTRAVENOUS at 09:21

## 2024-11-08 RX ADMIN — SODIUM CHLORIDE, SODIUM LACTATE, POTASSIUM CHLORIDE, AND CALCIUM CHLORIDE: .6; .31; .03; .02 INJECTION, SOLUTION INTRAVENOUS at 09:14

## 2024-11-08 RX ADMIN — DEXAMETHASONE SODIUM PHOSPHATE 5 MG: 10 INJECTION, SOLUTION INTRAMUSCULAR; INTRAVENOUS at 10:01

## 2024-11-08 RX ADMIN — FENTANYL CITRATE 50 MCG: 50 INJECTION INTRAMUSCULAR; INTRAVENOUS at 11:47

## 2024-11-08 RX ADMIN — ONDANSETRON 4 MG: 2 INJECTION INTRAMUSCULAR; INTRAVENOUS at 10:01

## 2024-11-08 RX ADMIN — KETOROLAC TROMETHAMINE 15 MG: 30 INJECTION, SOLUTION INTRAMUSCULAR; INTRAVENOUS at 10:49

## 2024-11-08 RX ADMIN — OXYCODONE HYDROCHLORIDE AND ACETAMINOPHEN 1 TABLET: 5; 325 TABLET ORAL at 14:24

## 2024-11-08 RX ADMIN — EPHEDRINE SULFATE 5 MG: 50 INJECTION INTRAVENOUS at 10:01

## 2024-11-08 RX ADMIN — EPHEDRINE SULFATE 5 MG: 50 INJECTION INTRAVENOUS at 10:05

## 2024-11-08 RX ADMIN — FENTANYL CITRATE 50 MCG: 50 INJECTION INTRAMUSCULAR; INTRAVENOUS at 11:42

## 2024-11-08 RX ADMIN — Medication 120 MG: at 09:51

## 2024-11-08 RX ADMIN — PROPOFOL 300 MG: 10 INJECTION, EMULSION INTRAVENOUS at 09:19

## 2024-11-08 RX ADMIN — CEFAZOLIN SODIUM 2000 MG: 2 SOLUTION INTRAVENOUS at 09:22

## 2024-11-08 RX ADMIN — EPHEDRINE SULFATE 10 MG: 50 INJECTION INTRAVENOUS at 09:39

## 2024-11-08 RX ADMIN — FUROSEMIDE 10 MG: 10 INJECTION, SOLUTION INTRAMUSCULAR; INTRAVENOUS at 11:03

## 2024-11-08 RX ADMIN — LIDOCAINE HYDROCHLORIDE 50 MG: 10 INJECTION, SOLUTION EPIDURAL; INFILTRATION; INTRACAUDAL at 09:19

## 2024-11-08 RX ADMIN — FENTANYL CITRATE 50 MCG: 50 INJECTION INTRAMUSCULAR; INTRAVENOUS at 09:19

## 2024-11-08 RX ADMIN — ROCURONIUM 50 MG: 50 INJECTION, SOLUTION INTRAVENOUS at 09:56

## 2024-11-08 RX ADMIN — SUGAMMADEX 200 MG: 100 INJECTION, SOLUTION INTRAVENOUS at 11:03

## 2024-11-08 RX ADMIN — SODIUM CHLORIDE, SODIUM LACTATE, POTASSIUM CHLORIDE, AND CALCIUM CHLORIDE: .6; .31; .03; .02 INJECTION, SOLUTION INTRAVENOUS at 10:51

## 2024-11-08 NOTE — ANESTHESIA POSTPROCEDURE EVALUATION
Post-Op Assessment Note    CV Status:  Stable    Pain management: adequate       Mental Status:  Alert and awake   Hydration Status:  Euvolemic   PONV Controlled:  Controlled   Airway Patency:  Patent     Post Op Vitals Reviewed: Yes    No anethesia notable event occurred.    Staff: CRNA       Last Filed PACU Vitals:  Vitals Value Taken Time   Temp 97.5    Pulse 70 11/08/24 1121   /77 11/08/24 1119   Resp 25 11/08/24 1121   SpO2 96 % 11/08/24 1121   Vitals shown include unfiled device data.    Modified Juan Carlos:  No data recorded

## 2024-11-08 NOTE — ANESTHESIA PREPROCEDURE EVALUATION
Procedure:  REPAIR HERNIA UMBILICAL (Abdomen)  REPAIR HERNIA VENTRAL (Abdomen)  REPAIR HERNIA INGUINAL (Left: Groin)    Relevant Problems   CARDIO   (+) Essential hypertension   (+) Mixed hyperlipidemia   (+) PAD (peripheral artery disease) (HCC)      ENDO   (+) Type 2 diabetes mellitus with hyperglycemia (HCC)      MUSCULOSKELETAL   (+) Adhesive capsulitis of right shoulder   (+) Idiopathic chronic gout 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      NEURO/PSYCH   (+) Aphasia   (+) Seasonal affective disorder (HCC)   (+) TIA (transient ischemic attack)   (+) Visual disturbance      Neurology/Sleep   (+) Trigeminal neuritis      Other   (+) Class 2 severe obesity with serious comorbidity and body mass index (BMI) of 35.0 to 35.9 in adult, unspecified obesity type ([facility])   (+) Osteomyelitis, jaw acute        Physical Exam    Airway    Mallampati score: II  TM Distance: >3 FB  Neck ROM: full     Dental    implants    Cardiovascular      Pulmonary      Other Findings        Anesthesia Plan  ASA Score- 2     Anesthesia Type- general with ASA Monitors.         Additional Monitors:     Airway Plan: LMA.           Plan Factors-Exercise tolerance (METS): >4 METS.    Chart reviewed.    Patient summary reviewed.    Patient is not a current smoker.      Obstructive sleep apnea risk education given perioperatively.        Induction- intravenous.    Postoperative Plan- Plan for postoperative opioid use.     Perioperative Resuscitation Plan - Level 1 - Full Code.       Informed Consent- Anesthetic plan and risks discussed with patient.  I personally reviewed this patient with the CRNA. Discussed and agreed on the Anesthesia Plan with the CRNA..

## 2024-11-08 NOTE — INTERVAL H&P NOTE
H&P reviewed. After examining the patient I find no changes in the patients condition since the H&P had been written.    Vitals:    11/08/24 0813   BP: (!) 180/88   Pulse: 55   Resp: 18   Temp: 98.1 °F (36.7 °C)   SpO2: 98%

## 2024-11-08 NOTE — OP NOTE
OPERATIVE REPORT  PATIENT NAME: Sergio Ahumada    :  1960  MRN: [id number]  Pt Location: [facility] OR ROOM 05    SURGERY DATE: 2024    Surgeons and Role:     * Yassine Maldonado MD - Primary     * Karen Coleman PA-C - Assisting I was present for the entire procedure. A qualified resident physician was not available, and a physician assistant was necessary to provide expert assistance in the form of providing optimal exposure with retraction, suturing, and assistance with dissection in order to perform the most efficient operation and in order to optimize patient safety in the absence of a qualified surgical resident.     Preop Diagnosis:  Umbilical hernia K42.9  Ventral hernia K43.9  Inguinal hernia K40.90  Type 2 diabetes mellitus with hyperglycemia (HCC) E11.65    Post-Op Diagnosis Codes:     * Umbilical hernia [K42.9]     * Ventral hernia [K43.9]     * Inguinal hernia [K40.90]     * Type 2 diabetes mellitus with hyperglycemia (HCC) [E11.65]    Procedure(s):  REPAIR HERNIA UMBILICAL  REPAIR HERNIA VENTRAL  Left - REPAIR HERNIA INGUINAL recurrent , with mesh    Specimen(s):  * No specimens in log *    Estimated Blood Loss:   Minimal    Drains:  * No LDAs found *    Anesthesia Type:   General    Operative Indications:  Umbilical hernia K42.9  Ventral hernia K43.9  Inguinal hernia K40.90  Type 2 diabetes mellitus with hyperglycemia (HCC) E11.65      Operative Findings:    Prior to opening the fascia, or reducing the contents of the hernia, the hernia defect was measured. The ventral defect measured 3.1 cm by 3.2 cm. The Umbilical hernia defect was 1.5 x 1.5 This was repaired as described in the body of the report below.      Complications:   None    Procedure and Technique:  Sergio Ahumada is a 64 y.o. male was brought into the operative suite and identified visually and by arm band. The patient was placed in the supine position.  Careful attention was made towards padding and positioning of the extremities.   After sterile prep and drape, a timeout was completed. The prep was dry.  Antibiotics were provided. Athrombic pumps in place.    0.25% Marcaine with epinephrine was utilized throughout the procedure.  An incision was made  within the left inguinal region.  The incision was made over the previous left lower quadrant incision.  This was carried down through the skin and subcutaneous tissue. The superficial epigastric vein was clamped, ligated and  divided. . The external oblique fibers were identified.  The external ring was identified.  The external oblique fibers were then split in the direction of their fibers.  Hemostats were placed on the cut edges.  A self-retaining retractor was then placed.    Exploration of the inguinal canal commenced.  The cremasteric fibers were then divided along the fiber direction of its fibers the cord structures.   There was a previously placed mesh underneath the external oblique fibers.  The cord was encircled in a atraumatic Penrose drain and preserved during dissection.  Identification of a direct inguinal hernia was performed.  Mesh could be placed more inferiorly over the pubic tubercle region.  High dissection was completed at the level of the internal ring.  Preperitoneal dissection commenced identifying and preserving the inferior epigastric vessels.    A preperitoneal mesh was then inserted.  There is very difficult to identify nerves given the recurrence nature of the dissection.  The inguinal floor was then repaired with interrupted figure-of-eight 0 Vicryl suture.  The indirect inguinal ring was repositioned more superiorly while repairing the inguinal transversalis muscular floor.  An onlay mesh was then secured to the tendon overlying the lateral pubic tubercle The external oblique fascia was closed with a running 3-0 PDS suture.  Additional 0.25% Marcaine with epinephrine was injected over the ilioinguinal and iliohypogastric nerves.    3-0 Vicryl subcutaneous suture  was placed into the Trish's fascia .   4-0 Monocryl suture was used for the subcuticular layer. Dermabond was then applied.    Attention was then directed to superior to the umbilicus.  Incision was made and carried out through skin subtendinous tissue.  The ventral hernia was dissected free.  The hernia sac was dissected and then amputated.  Similar fashion the umbilical hernia sac was amputated.    And polypropylene mesh covering both hernia defects was then introduced after pexing the omentum inferiorly.  This was sewn against the anterior abdominal wall.  Fascia was closed with interrupted figure-of-eight #1 Vicryl suture.  Followed by 3-0 Vicryl subcutaneous and 4 Monocryl sutures.  Dermabond was applied.  Patient tolerated this procedure well.  Sponge and instrument count correct x 2.       I was present for the entire procedure.    Patient Disposition:  PACU              SIGNATURE: Yassine Maldonado MD  DATE: November 8, 2024  TIME: 11:35 AM

## 2024-11-08 NOTE — ANESTHESIA POSTPROCEDURE EVALUATION
Post-Op Assessment Note    CV Status:  Stable    Pain management: adequate       Mental Status:  Alert and awake   Hydration Status:  Euvolemic   PONV Controlled:  Controlled   Airway Patency:  Patent     Post Op Vitals Reviewed: Yes    No anethesia notable event occurred.    Staff: Anesthesiologist           Last Filed PACU Vitals:  Vitals Value Taken Time   Temp 98.5 °F (36.9 °C) 11/08/24 1200   Pulse 63 11/08/24 1205   /62 11/08/24 1200   Resp 14 11/08/24 1205   SpO2 93 % 11/08/24 1205   Vitals shown include unfiled device data.    Modified Juan Carlos:  Activity: 2 (11/8/2024 12:00 PM)  Respiration: 2 (11/8/2024 12:00 PM)  Circulation: 2 (11/8/2024 12:00 PM)  Consciousness: 2 (11/8/2024 12:00 PM)  Oxygen Saturation: 1 (11/8/2024 12:00 PM)  Modified Juan Carlos Score: 9 (11/8/2024 12:00 PM)

## 2024-11-25 ENCOUNTER — OFFICE VISIT (OUTPATIENT)
Dept: SURGERY | Facility: CLINIC | Age: 64
End: 2024-11-25

## 2024-11-25 DIAGNOSIS — K42.9 UMBILICAL HERNIA WITHOUT OBSTRUCTION OR GANGRENE: Primary | ICD-10-CM

## 2024-11-25 PROCEDURE — 99024 POSTOP FOLLOW-UP VISIT: CPT | Performed by: SURGERY

## 2024-11-25 NOTE — PROGRESS NOTES
Assessment/Plan: Patient is status post umbilical, ventral and left inguinal hernia repair.  He returns to the office for follow-up visit.  Overall he feels well.  He offers no complaints.  Incision is clean and healing well.  All questions answered.    There are no diagnoses linked to this encounter.    Pathology: None sent      Postoperative restrictions reviewed. All questions answered.       ______________________________________________________  HPI: Patient presents post operatively.  Umbilical, ventral, and left inguinal hernia repair 11/8/2024.               ROS:  General ROS: negative for - chills, fatigue, fever or night sweats, weight loss  Respiratory ROS: no cough, shortness of breath, or wheezing  Cardiovascular ROS: no chest pain or dyspnea on exertion  Genito-Urinary ROS: no dysuria, trouble voiding, or hematuria  Musculoskeletal ROS: negative for - gait disturbance, joint pain or muscle pain  Neurological ROS: no TIA or stroke symptoms  GI ROS: see HPI  Skin ROS: no new rashes or lesions   Lymphatic ROS: no new adenopathy noted by pt.   GYN ROS: see HPI, no new GYN history or bleeding noted  Psy ROS: no new mental or behavioral disturbances         Patient Active Problem List   Diagnosis    Trigeminal neuritis    Osteomyelitis, jaw acute    Long term (current) use of antibiotics    Leukopenia    Transaminitis    Essential hypertension    Mixed hyperlipidemia    Idiopathic chronic gout 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    Adhesive capsulitis of right shoulder    Balance problem    Seasonal affective disorder (HCC)    Palpitation    Right foot pain    Type 2 diabetes mellitus with hyperglycemia ([facility])    Aphasia    Visual disturbance    TIA (transient ischemic attack)    Abnormal EKG    PAD (peripheral artery disease) ([facility])    Sebaceous cyst    Class 2 severe obesity with serious comorbidity and body mass index (BMI) of 35.0 to 35.9 in adult, unspecified obesity type ([facility])    UTI symptoms    Ventral hernia  without obstruction or gangrene    Umbilical hernia without obstruction or gangrene    Left inguinal pain       Allergies:  Clindamycin and Penicillins      Current Outpatient Medications:     allopurinol (ZYLOPRIM) 100 mg tablet, Take 1 tablet (100 mg total) by mouth daily, Disp: 90 tablet, Rfl: 3    aspirin (ECOTRIN) 325 mg EC tablet, Take 1 tablet (325 mg total) by mouth daily, Disp: 30 tablet, Rfl: 0    Blood Glucose Monitoring Suppl (Contour Next One) JIN, Use as directed, Disp: , Rfl:     cholecalciferol (VITAMIN D3) 1,000 units tablet, Take 5,000 Units by mouth daily 10,000 units Patient stated that is continuously taking, Disp: , Rfl:     Contour Next Test test strip, TEST UP TO FOUR TIMES A DAY BEFORE MEALS AND BEDTIME, Disp: 100 strip, Rfl: 1    glimepiride (AMARYL) 2 mg tablet, Take 1 tablet (2 mg total) by mouth daily with breakfast, Disp: 90 tablet, Rfl: 1    glucosamine-chondroitin 500-400 MG tablet, Take 2 tablets by mouth daily, Disp: , Rfl:     losartan (COZAAR) 100 MG tablet, TAKE 1 TABLET EVERY DAY, Disp: 90 tablet, Rfl: 1    metoprolol-hydrochlorothiazide (LOPRESSOR HCT) 100-25 MG per tablet, TAKE 1/2 TABLET EVERY DAY, Disp: 45 tablet, Rfl: 1    Microlet Lancets MISC, , Disp: , Rfl:     Omega-3 Fatty Acids (Omega-3 Fish Oil) 1000 MG CAPS, Take 2,000 mg by mouth daily, Disp: 180 capsule, Rfl: 3    oxyCODONE-acetaminophen (PERCOCET) 5-325 mg per tablet, Take 1 tablet by mouth every 4 (four) hours as needed for moderate pain for up to 10 doses Max Daily Amount: 6 tablets, Disp: 10 tablet, Rfl: 0    rosuvastatin (CRESTOR) 20 MG tablet, TAKE 1 TABLET EVERY DAY, Disp: 90 tablet, Rfl: 1    semaglutide (Rybelsus) 14 MG tablet, Take 1 tablet (14 mg total) by mouth daily before breakfast, Disp: 90 tablet, Rfl: 1    tamsulosin (FLOMAX) 0.4 mg, Take 1 capsule (0.4 mg total) by mouth daily with dinner Begin 5 days preop, Disp: 100 capsule, Rfl: 3    Past Medical History:   Diagnosis Date    Diabetes mellitus  (HCC)     Gout     HTN (hypertension)     Hyperlipidemia     Hypertension     Kidney stone     Osteomyelitis (HCC) 2020    Seizures (HCC)     Not since age 10       Past Surgical History:   Procedure Laterality Date    COLONOSCOPY      HAND SURGERY      HERNIA REPAIR      IR PICC PLACEMENT SINGLE LUMEN  12/08/2020    remove    NV RPR 1ST INGUN HRNA AGE 3 YRS/> REDUCIBLE Left 11/8/2024    Procedure: REPAIR HERNIA INGUINAL;  Surgeon: Yassine Maldonado MD;  [facility];  Service: General    NV RPR AA HERNIA 1ST < 3 CM REDUCIBLE N/A 11/8/2024    Procedure: REPAIR HERNIA UMBILICAL;  Surgeon: Yassine Maldonado MD;  [facility];  Service: General    NV RPR AA HERNIA 1ST < 3 CM REDUCIBLE N/A 11/8/2024    Procedure: REPAIR HERNIA VENTRAL;  Surgeon: Yassine Maldonado MD;  [facility];  Service: General    VASCULAR SURGERY      WOUND DEBRIDEMENT N/A 11/30/2020    Procedure: DEBRIDEMENT OF ANTERIOR MANDIBLE BONE BIOPSY, CULTURES AND REMOVAL OF IMPLANT #27, USE OF PRP;  Surgeon: Kiah Ordoñez DDS;  Location: [facility];  Service: Maxillofacial       Family History   Problem Relation Age of Onset    Cancer Mother     Kidney disease Maternal Uncle         reports that he quit smoking about 24 years ago. His smoking use included cigarettes. He started smoking about 46 years ago. He has a 42.2 pack-year smoking history. He has never used smokeless tobacco. He reports current alcohol use of about 21.0 standard drinks of alcohol per week. He reports that he does not use drugs.    PHYSICAL EXAM    There were no vitals taken for this visit.    General: normal, cooperative, no distress  Abdominal: soft, nondistended, or nontender  Incision: clean, dry, and intact and healing well      Yassine Maldonado MD    Date: 11/25/2024 Time: 9:58 AM

## 2024-12-23 ENCOUNTER — TELEPHONE (OUTPATIENT)
Age: 64
End: 2024-12-23

## 2024-12-23 NOTE — TELEPHONE ENCOUNTER
Patient calling stating he received a call from [facility] stating they are no longer going to cover the cost of his Rybelsus.     Patient states he would like to stay on the Rybelsus as he has had success with it. Patient states they gave him two different options    We can request an exception for the patient for his Rybelsus or we can send in an alternative.     I asked if it needed a prior authorization and he said no we would just have to file for an exception.    I asked patient if they provided the names of any alternatives that would be covered by his plan. He had mentioned Januvia.     Patient states he does not want to go back to injections.     Patient states this needs to be figured out before the beginning of the new year.     Please give patient a call back.

## 2024-12-26 NOTE — TELEPHONE ENCOUNTER
PA for Rybelsus 14 MG tablet  NOT REQUIRED     Reason called insurance. This is a cost situation. Patient has a cost of 699.00 to pay for 90 days          Patient advised by          [] [email or web address] Message  [] Phone call   []LMOM  []L/M to call office as no active Communication consent on file  []Unable to leave detailed message as VM not approved on Communication consent       Pharmacy advised by    []Fax  []Phone call    FORWARDED TO OFFICE

## 2024-12-30 DIAGNOSIS — E78.2 MIXED HYPERLIPIDEMIA: ICD-10-CM

## 2024-12-30 DIAGNOSIS — I10 ESSENTIAL HYPERTENSION: ICD-10-CM

## 2024-12-31 RX ORDER — METOPROLOL TARTRATE AND HYDROCHLOROTHIAZIDE 100; 25 MG/1; MG/1
0.5 TABLET ORAL DAILY
Qty: 45 TABLET | Refills: 1 | Status: SHIPPED | OUTPATIENT
Start: 2024-12-31

## 2024-12-31 RX ORDER — ROSUVASTATIN CALCIUM 20 MG/1
20 TABLET, COATED ORAL DAILY
Qty: 90 TABLET | Refills: 1 | Status: SHIPPED | OUTPATIENT
Start: 2024-12-31

## 2024-12-31 RX ORDER — LOSARTAN POTASSIUM 100 MG/1
100 TABLET ORAL DAILY
Qty: 90 TABLET | Refills: 1 | Status: SHIPPED | OUTPATIENT
Start: 2024-12-31

## 2024-12-31 NOTE — TELEPHONE ENCOUNTER
I spoke to patient and he states that [facility] pharmacy from [facility] told patient that they will give him an exception, but they would like to hear that patient needs this medication from doctor via call, phone number [phone]. Let me know if/when you would like me to make this call.  Thanks!

## 2025-01-21 ENCOUNTER — TELEPHONE (OUTPATIENT)
Age: 65
End: 2025-01-21

## 2025-01-21 NOTE — TELEPHONE ENCOUNTER
Patient called in about his Rybelsus, please refer to 12/23 notes. Patient has not heard anything, but looks like per note that per [employer] it needs a prior auth. Please start prior auth.    Also, patient has question for provider.  If Rybelsus is not an option can he take Januvia?  He said cost is much lower.  Please advise and call patient.

## 2025-01-22 NOTE — TELEPHONE ENCOUNTER
Patient calling again, he said he has been calling for 2 weeks.  He would like Dr Gramajo to call him today.  Please call patient to discuss.

## 2025-01-25 ENCOUNTER — PATIENT MESSAGE (OUTPATIENT)
Dept: ENDOCRINOLOGY | Facility: CLINIC | Age: 65
End: 2025-01-25

## 2025-02-04 DIAGNOSIS — F41.9 ANXIETY: Primary | ICD-10-CM

## 2025-02-04 RX ORDER — BUPROPION HYDROCHLORIDE 150 MG/1
150 TABLET ORAL EVERY MORNING
Qty: 100 TABLET | Refills: 3 | Status: SHIPPED | OUTPATIENT
Start: 2025-02-04 | End: 2025-08-03

## 2025-02-04 NOTE — PATIENT COMMUNICATION
Patient called the RX Refill Line. Message is being forwarded to the office.     Patient is requesting a script for Rybelsus to be sent to him. Patient states he can get Rybelsus for 1/3 of the cost from [facility].     Please contact patient at [phone].

## 2025-02-13 DIAGNOSIS — E11.65 TYPE 2 DIABETES MELLITUS WITH HYPERGLYCEMIA, WITHOUT LONG-TERM CURRENT USE OF INSULIN (HCC): ICD-10-CM

## 2025-02-16 DIAGNOSIS — E11.65 TYPE 2 DIABETES MELLITUS WITH HYPERGLYCEMIA, WITHOUT LONG-TERM CURRENT USE OF INSULIN (HCC): ICD-10-CM

## 2025-02-17 ENCOUNTER — TELEPHONE (OUTPATIENT)
Age: 65
End: 2025-02-17

## 2025-02-17 NOTE — TELEPHONE ENCOUNTER
CHECK ONBASE FOR ATTACHMENT: Upper GI endoscopy.pdf    Please click on link to see image.     Wife called office to check if there were any openings to schedule . I informed her we have some openings sooner in other locations but in [place] the next available is for 1/21/2026.    Wife scheduled at [place] and patient was added onto cancellation list.

## 2025-03-12 ENCOUNTER — OFFICE VISIT (OUTPATIENT)
Dept: INTERNAL MEDICINE CLINIC | Facility: CLINIC | Age: 65
End: 2025-03-12
Payer: MEDICARE

## 2025-03-12 VITALS
DIASTOLIC BLOOD PRESSURE: 76 MMHG | OXYGEN SATURATION: 99 % | TEMPERATURE: 96.6 F | SYSTOLIC BLOOD PRESSURE: 132 MMHG | WEIGHT: 277.2 LBS | BODY MASS INDEX: 35.59 KG/M2 | HEART RATE: 57 BPM

## 2025-03-12 DIAGNOSIS — E66.812 CLASS 2 SEVERE OBESITY WITH SERIOUS COMORBIDITY AND BODY MASS INDEX (BMI) OF 35.0 TO 35.9 IN ADULT, UNSPECIFIED OBESITY TYPE (HCC): ICD-10-CM

## 2025-03-12 DIAGNOSIS — M1A.0710 IDIOPATHIC CHRONIC GOUT OF RIGHT FOOT WITHOUT TOPHUS: ICD-10-CM

## 2025-03-12 DIAGNOSIS — E66.01 CLASS 2 SEVERE OBESITY WITH SERIOUS COMORBIDITY AND BODY MASS INDEX (BMI) OF 35.0 TO 35.9 IN ADULT, UNSPECIFIED OBESITY TYPE (HCC): ICD-10-CM

## 2025-03-12 DIAGNOSIS — E78.2 MIXED HYPERLIPIDEMIA: ICD-10-CM

## 2025-03-12 DIAGNOSIS — I73.9 PAD (PERIPHERAL ARTERY DISEASE) (HCC): ICD-10-CM

## 2025-03-12 DIAGNOSIS — E88.89 STEATOSIS (HCC): ICD-10-CM

## 2025-03-12 DIAGNOSIS — I10 ESSENTIAL HYPERTENSION: Primary | ICD-10-CM

## 2025-03-12 DIAGNOSIS — E11.65 TYPE 2 DIABETES MELLITUS WITH HYPERGLYCEMIA, WITH LONG-TERM CURRENT USE OF INSULIN (HCC): ICD-10-CM

## 2025-03-12 DIAGNOSIS — Z79.4 TYPE 2 DIABETES MELLITUS WITH HYPERGLYCEMIA, WITH LONG-TERM CURRENT USE OF INSULIN (HCC): ICD-10-CM

## 2025-03-12 LAB
CREAT UR-MCNC: 113 MG/DL
MICROALBUMIN UR-MCNC: 32.8 MG/L
MICROALBUMIN/CREAT 24H UR: 29 MG/G CREATININE (ref 0–30)
SL AMB POCT HEMOGLOBIN AIC: 8.1 (ref ?–6.5)

## 2025-03-12 PROCEDURE — 99214 OFFICE O/P EST MOD 30 MIN: CPT | Performed by: INTERNAL MEDICINE

## 2025-03-12 PROCEDURE — 82570 ASSAY OF URINE CREATININE: CPT | Performed by: INTERNAL MEDICINE

## 2025-03-12 PROCEDURE — 82043 UR ALBUMIN QUANTITATIVE: CPT | Performed by: INTERNAL MEDICINE

## 2025-03-12 PROCEDURE — G2211 COMPLEX E/M VISIT ADD ON: HCPCS | Performed by: INTERNAL MEDICINE

## 2025-03-12 PROCEDURE — 83036 HEMOGLOBIN GLYCOSYLATED A1C: CPT | Performed by: INTERNAL MEDICINE

## 2025-03-12 NOTE — ASSESSMENT & PLAN NOTE
Continue meds along with healthy diet  Lab Results   Component Value Date    HGBA1C 7.1 (H) 10/28/2024     Orders:  •  POCT hemoglobin A1c  •  Albumin / creatinine urine ratio

## 2025-03-12 NOTE — PATIENT INSTRUCTIONS
Problem List Items Addressed This Visit          Cardiovascular and Mediastinum    Essential hypertension - Primary    Blood pressure is good, continue current meds along with healthy diet and exercise         Relevant Orders    Comprehensive metabolic panel    PAD (peripheral artery disease) (HCC)    Stay active with walking            Endocrine    Type 2 diabetes mellitus with hyperglycemia (HCC)    Continue meds along with healthy diet  Lab Results   Component Value Date    HGBA1C 7.1 (H) 10/28/2024            Relevant Orders    POCT hemoglobin A1c    Albumin / creatinine urine ratio       Musculoskeletal and Integument    Idiopathic chronic gout 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    Continue allopurinol, no episodes of gout            Other    Mixed hyperlipidemia    Continue rosuvastatin along with healthy diet         Class 2 severe obesity with serious comorbidity and body mass index (BMI) of 35.0 to 35.9 in adult, unspecified obesity type (HCC)      Continue with healthy diet and exercise, patient also on semaglutide         Steatosis (HCC)    Stay active, avoid excessive alcohol

## 2025-03-12 NOTE — ASSESSMENT & PLAN NOTE
Blood pressure is good, continue current meds along with healthy diet and exercise  Orders:  •  Comprehensive metabolic panel; Future

## 2025-03-12 NOTE — PROGRESS NOTES
Name: Sergio Ahumada      : 1960      MRN: [id number]  Encounter Provider: Steve Nguyen MD  Encounter Date: 3/12/2025   Encounter department: [facility]  :  Assessment & Plan  Type 2 diabetes mellitus with hyperglycemia, with long-term current use of insulin (HCC)  Continue meds along with healthy diet  Lab Results   Component Value Date    HGBA1C 7.1 (H) 10/28/2024     Orders:  •  POCT hemoglobin A1c  •  Albumin / creatinine urine ratio    Essential hypertension  Blood pressure is good, continue current meds along with healthy diet and exercise  Orders:  •  Comprehensive metabolic panel; Future    Mixed hyperlipidemia  Continue rosuvastatin along with healthy diet       Class 2 severe obesity with serious comorbidity and body mass index (BMI) of 35.0 to 35.9 in adult, unspecified obesity type (HCC)    Continue with healthy diet and exercise, patient also on semaglutide       Idiopathic chronic gout 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  Continue allopurinol, no episodes of gout       Steatosis (HCC)  Stay active, avoid excessive alcohol       PAD (peripheral artery disease) (HCC)  Stay active with walking              History of Present Illness   Patient here for regular follow-up      Review of Systems   Constitutional:  Negative for chills, fatigue and fever.   HENT:  Negative for congestion, nosebleeds, postnasal drip, sore throat and trouble swallowing.    Eyes:  Negative for pain.   Respiratory:  Positive for shortness of breath (with exertion, chronic). Negative for cough, chest tightness and wheezing.    Cardiovascular:  Negative for chest pain, palpitations and leg swelling.   Gastrointestinal:  Negative for abdominal pain, constipation, diarrhea, nausea and vomiting.   Endocrine: Negative for polydipsia and polyuria.   Genitourinary:  Negative for dysuria, flank pain and hematuria.   Musculoskeletal:  Positive for arthralgias.   Skin:  Negative for rash.   Neurological:  Negative for  dizziness, tremors, light-headedness and headaches.   Hematological:  Does not bruise/bleed easily.   Psychiatric/Behavioral:  Negative for confusion and dysphoric mood. The patient is not nervous/anxious.        Objective   /76 (BP Location: Left arm, Patient Position: Sitting, Cuff Size: Standard)   Pulse 57   Temp (!) 96.6 °F (35.9 °C) (Tympanic)   Wt 126 kg (277 lb 3.2 oz)   SpO2 99%   BMI 35.59 kg/m²    Patient's shoes and socks removed.    Right Foot/Ankle   Right Foot Inspection  Skin Exam: skin normal and skin intact. No dry skin, no warmth, no callus, no erythema, no maceration, no abnormal color, no pre-ulcer, no ulcer and no callus.     Toe Exam: ROM and strength within normal limits and right toe deformity. No swelling, no tenderness and erythema    Sensory   Monofilament testing: intact    Vascular  The right DP pulse is 2+.     Left Foot/Ankle  Left Foot Inspection  Skin Exam: skin normal and skin intact. No dry skin, no warmth, no erythema, no maceration, normal color, no pre-ulcer, no ulcer and no callus.     Toe Exam: ROM and strength within normal limits and left toe deformity. No swelling, no tenderness and no erythema.     Sensory   Monofilament testing: intact    Vascular  The left DP pulse is 2+.     Assign Risk Category  Deformity present  No loss of protective sensation  No weak pulses  Risk: 0     Physical Exam  Vitals reviewed.   Constitutional:       General: He is not in acute distress.     Appearance: Normal appearance. He is well-developed.   HENT:      Head: Normocephalic and atraumatic.      Right Ear: External ear normal.      Left Ear: External ear normal.      Nose: Nose normal.   Eyes:      General: No scleral icterus.     Conjunctiva/sclera: Conjunctivae normal.   Neck:      Trachea: No tracheal deviation.   Cardiovascular:      Rate and Rhythm: Normal rate and regular rhythm.      Pulses: no weak pulses.           Dorsalis pedis pulses are 2+ on the right side and 2+  on the left side.      Heart sounds: Normal heart sounds. No murmur heard.  Pulmonary:      Effort: Pulmonary effort is normal. No respiratory distress.      Breath sounds: Normal breath sounds. No wheezing or rales.   Musculoskeletal:      Cervical back: Normal range of motion and neck supple.      Right lower leg: No edema.      Left lower leg: No edema.   Feet:      Right foot:      Skin integrity: No ulcer, skin breakdown, erythema, warmth, callus or dry skin.      Left foot:      Skin integrity: No ulcer, skin breakdown, erythema, warmth, callus or dry skin.   Lymphadenopathy:      Cervical: No cervical adenopathy.   Skin:     Coloration: Skin is not jaundiced or pale.   Neurological:      General: No focal deficit present.      Mental Status: He is alert and oriented to person, place, and time.   Psychiatric:         Mood and Affect: Mood normal.         Behavior: Behavior normal.         Thought Content: Thought content normal.         Judgment: Judgment normal.

## 2025-03-13 ENCOUNTER — RESULTS FOLLOW-UP (OUTPATIENT)
Dept: INTERNAL MEDICINE CLINIC | Facility: CLINIC | Age: 65
End: 2025-03-13

## 2025-03-14 ENCOUNTER — OFFICE VISIT (OUTPATIENT)
Dept: UROLOGY | Facility: CLINIC | Age: 65
End: 2025-03-14
Payer: MEDICARE

## 2025-03-14 ENCOUNTER — TELEPHONE (OUTPATIENT)
Age: 65
End: 2025-03-14

## 2025-03-14 VITALS
OXYGEN SATURATION: 98 % | HEART RATE: 76 BPM | SYSTOLIC BLOOD PRESSURE: 136 MMHG | BODY MASS INDEX: 35.16 KG/M2 | DIASTOLIC BLOOD PRESSURE: 78 MMHG | WEIGHT: 274 LBS | HEIGHT: 74 IN

## 2025-03-14 DIAGNOSIS — N20.0 NEPHROLITHIASIS: Primary | ICD-10-CM

## 2025-03-14 LAB
BACTERIA UR QL AUTO: ABNORMAL /HPF
BILIRUB UR QL STRIP: NEGATIVE
CLARITY UR: CLEAR
COLOR UR: ABNORMAL
GLUCOSE UR STRIP-MCNC: ABNORMAL MG/DL
HGB UR QL STRIP.AUTO: ABNORMAL
KETONES UR STRIP-MCNC: ABNORMAL MG/DL
LEUKOCYTE ESTERASE UR QL STRIP: ABNORMAL
MUCOUS THREADS UR QL AUTO: ABNORMAL
NITRITE UR QL STRIP: NEGATIVE
NON-SQ EPI CELLS URNS QL MICRO: ABNORMAL /HPF
PH UR STRIP.AUTO: 6 [PH]
PROT UR STRIP-MCNC: ABNORMAL MG/DL
RBC #/AREA URNS AUTO: ABNORMAL /HPF
SP GR UR STRIP.AUTO: 1.03 (ref 1–1.03)
UROBILINOGEN UR STRIP-ACNC: <2 MG/DL
WBC #/AREA URNS AUTO: ABNORMAL /HPF

## 2025-03-14 PROCEDURE — 81001 URINALYSIS AUTO W/SCOPE: CPT

## 2025-03-14 PROCEDURE — 87086 URINE CULTURE/COLONY COUNT: CPT

## 2025-03-14 PROCEDURE — 99213 OFFICE O/P EST LOW 20 MIN: CPT

## 2025-03-14 RX ORDER — DEXAMETHASONE 4 MG/1
TABLET ORAL
COMMUNITY
Start: 2025-03-13

## 2025-03-14 NOTE — PROGRESS NOTES
Name: Sergio Ahumada      : 1960      MRN: [id number]  Encounter Provider: JOSE L Turner  Encounter Date: 3/14/2025   Encounter department: [facility]  :  Assessment & Plan  Nephrolithiasis  -Previous CT completed on 24 showing hydronephrosis, 5 millimeter obstructing right UVJ calculus.  -Patient had a follow-up renal ultrasound on 24 showing a 0.4 cm nonobstructing left upper pole stone.  Simple left renal sinus cyst measuring 2.3 cm.  Resolution of hydronephrosis, indicative of UVJ stone passage.  -Patient states he has been having bladder discomfort, had back/flank pain last night.  States he felt feverish as well.  -Urine obtained to be sent for micro and culture.  We will notify him of results.  -Patient is currently asymptomatic of flank pain.  He does however state he does have some bladder discomfort.  He denies recurrent UTIs.  He has longstanding history of passing kidney stones.  -We discussed referral to nephrology for metabolic evaluation.  He states he would like to discuss with his wife and get back to us.  If he is interested in seeing nephrology for stone evaluation, will place referral.  -I also ordered a repeat renal ultrasound for patient to complete now.  We will notify him of results.  -All questions addressed.  Please do not hesitate to reach out with further questions or concerns.  Orders:    US kidney and bladder with pvr; Future    Urine culture    Urinalysis with microscopic        History of Present Illness   Sergio Ahumada is a 64 y.o. male who presents for follow-up of nephrolithiasis.  Patient completed a renal ultrasound on 24 showing a 0.4 cm nonobstructing left upper pole stone.  Simple left renal sinus cyst measuring 2.3 cm.  Patient was last seen in the office on 24.  He has a lifelong history of kidney stones.      Creatinine 0.88, GFR 89 completed on 10-28-24.    Previous CT completed on 24 showing hydronephrosis, 5  "millimeter obstructing right UVJ calculus.      Patient is a side sleeper and sleeps on both sides, states when he had pain was unsure was which flank was coming from.  States he does have a history of arthritis in his sacral area.  Patient did state he took sequential 600 mg of ibuprofen last night when he experienced pain and felt feverish.  He states he has not needed to take any pain medication today.  He currently denies flank and back pain.  He states however he does feel bladder discomfort as well as urgency with standing up.        Review of Systems   Constitutional:  Negative for activity change, chills, fatigue and fever.   HENT:  Negative for congestion, rhinorrhea and sore throat.    Eyes:  Negative for photophobia, redness and visual disturbance.   Respiratory:  Negative for cough, shortness of breath and wheezing.    Cardiovascular:  Negative for chest pain, palpitations and leg swelling.   Gastrointestinal:  Negative for abdominal pain, diarrhea, nausea and vomiting.   Genitourinary:  Positive for dysuria, flank pain and urgency. Negative for difficulty urinating, frequency and hematuria.   Neurological:  Negative for weakness, light-headedness and headaches.          Objective   /78 (BP Location: Left arm, Patient Position: Sitting, Cuff Size: Standard)   Pulse 76   Ht 6' 2\" (1.88 m)   Wt 124 kg (274 lb)   SpO2 98%   BMI 35.18 kg/m²     Physical Exam  Vitals and nursing note reviewed.   Constitutional:       General: He is not in acute distress.     Appearance: He is well-developed.   HENT:      Head: Normocephalic and atraumatic.   Eyes:      Conjunctiva/sclera: Conjunctivae normal.   Cardiovascular:      Rate and Rhythm: Normal rate and regular rhythm.      Heart sounds: No murmur heard.  Pulmonary:      Effort: Pulmonary effort is normal. No respiratory distress.      Breath sounds: Normal breath sounds.   Abdominal:      Palpations: Abdomen is soft.      Tenderness: There is no " abdominal tenderness.   Musculoskeletal:         General: No swelling.      Cervical back: Neck supple.   Skin:     General: Skin is warm and dry.      Capillary Refill: Capillary refill takes less than 2 seconds.   Neurological:      Mental Status: He is alert.   Psychiatric:         Mood and Affect: Mood normal.          Results   Lab Results   Component Value Date    PSA 0.50 04/06/2024    PSA 0.4 09/08/2022     Lab Results   Component Value Date    GLUCOSE 127 10/08/2014    CALCIUM 9.3 10/28/2024     10/08/2014    K 4.4 10/28/2024    CO2 29 10/28/2024     10/28/2024    BUN 16 10/28/2024    CREATININE 0.88 10/28/2024     Lab Results   Component Value Date    WBC 3.87 (L) 10/28/2024    HGB 15.6 10/28/2024    HCT 45.3 10/28/2024    MCV 85 10/28/2024     10/28/2024       Office Urine Dip  No results found for this or any previous visit (from the past hour).

## 2025-03-14 NOTE — TELEPHONE ENCOUNTER
Pt called to schedule an appt for kidney stone   Pt had imaging done 9/2024 and showed that he has stone   Pt now is in pain and can not sleep bc of the pain.     Appt scheduled today at 12:20

## 2025-03-15 ENCOUNTER — HOSPITAL ENCOUNTER (OUTPATIENT)
Dept: ULTRASOUND IMAGING | Facility: HOSPITAL | Age: 65
Discharge: HOME | End: 2025-03-15
Payer: MEDICARE

## 2025-03-15 DIAGNOSIS — N20.0 NEPHROLITHIASIS: ICD-10-CM

## 2025-03-15 LAB — BACTERIA UR CULT: NORMAL

## 2025-03-15 PROCEDURE — 76770 US EXAM ABDO BACK WALL COMP: CPT

## 2025-03-17 DIAGNOSIS — M1A.0710 IDIOPATHIC CHRONIC GOUT OF RIGHT FOOT WITHOUT TOPHUS: ICD-10-CM

## 2025-03-17 RX ORDER — ALLOPURINOL 100 MG/1
100 TABLET ORAL DAILY
Qty: 90 TABLET | Refills: 3 | Status: SHIPPED | OUTPATIENT
Start: 2025-03-17

## 2025-03-18 ENCOUNTER — RESULTS FOLLOW-UP (OUTPATIENT)
Dept: UROLOGY | Facility: CLINIC | Age: 65
End: 2025-03-18

## 2025-03-21 DIAGNOSIS — E11.65 TYPE 2 DIABETES MELLITUS WITH HYPERGLYCEMIA, WITHOUT LONG-TERM CURRENT USE OF INSULIN (HCC): ICD-10-CM

## 2025-03-21 RX ORDER — GLIMEPIRIDE 2 MG/1
TABLET ORAL
Qty: 90 TABLET | Refills: 1 | Status: SHIPPED | OUTPATIENT
Start: 2025-03-21

## 2025-03-27 NOTE — TELEPHONE ENCOUNTER
Last office visit with Sylvia MOSHER 03/14/25;   Patient wants provider to be aware that symptoms have not changed since office visit.    2 months of ongoing bladder discomfort;  Lower middle bladder discomfort/ache.   Voiding clear yellow urine; denies fever, hematuria, or flank pain;     Hydration / ER precautions;      Patient would like to know if there is any other testing he can do at this time;

## 2025-04-04 ENCOUNTER — HOSPITAL ENCOUNTER (OUTPATIENT)
Dept: CT IMAGING | Facility: HOSPITAL | Age: 65
Discharge: HOME | End: 2025-04-04
Payer: MEDICARE

## 2025-04-04 DIAGNOSIS — N20.0 NEPHROLITHIASIS: ICD-10-CM

## 2025-04-04 PROCEDURE — 74176 CT ABD & PELVIS W/O CONTRAST: CPT

## 2025-04-08 ENCOUNTER — PREP FOR PROCEDURE (OUTPATIENT)
Dept: UROLOGY | Facility: CLINIC | Age: 65
End: 2025-04-08

## 2025-04-08 DIAGNOSIS — N20.0 NEPHROLITHIASIS: Primary | ICD-10-CM

## 2025-04-09 ENCOUNTER — TELEPHONE (OUTPATIENT)
Dept: UROLOGY | Facility: CLINIC | Age: 65
End: 2025-04-09

## 2025-04-09 ENCOUNTER — PREP FOR PROCEDURE (OUTPATIENT)
Dept: UROLOGY | Facility: CLINIC | Age: 65
End: 2025-04-09

## 2025-04-09 DIAGNOSIS — R39.89 SUSPECTED UTI: ICD-10-CM

## 2025-04-09 DIAGNOSIS — Z01.812 PRE-OPERATIVE LABORATORY EXAMINATION: ICD-10-CM

## 2025-04-09 DIAGNOSIS — N20.0 NEPHROLITHIASIS: Primary | ICD-10-CM

## 2025-04-09 DIAGNOSIS — Z01.810 PRE-OPERATIVE CARDIOVASCULAR EXAMINATION: ICD-10-CM

## 2025-04-09 DIAGNOSIS — E11.9 TYPE 2 DIABETES MELLITUS WITHOUT COMPLICATION, WITHOUT LONG-TERM CURRENT USE OF INSULIN (HCC): ICD-10-CM

## 2025-04-09 NOTE — TELEPHONE ENCOUNTER
"Per Chayito KELSEY/RN \"if pt takes Rybelsus daily-he does not have to stop this med prior to surgery\"  "

## 2025-04-09 NOTE — TELEPHONE ENCOUNTER
Spoke with patient and confirmed surgery date of:4/15/25  Type of surgery:# 5 RIGHT FAST TRACK  Operating physician: Dr. Green  Location of surgery: [facility]    Verbally went over prep with patient on: 4/9/25  NPO  Bowel prep? No  Hospital calls afternoon prior with arrival time -Calls Friday afternoon for Monday surgeries  Patient needs ride to and from surgery (outpatient/inpatient)   Pre-op testing to be done 2 weeks prior to surgery/PRE OP LABS, A1C, URINE CULTURE , EKG ORDERED FOR 4/10/25  (list labs needed)  Blood thinners:   List blood thinner/how long needs to held or no hold needed  Clearances needed: (Medical/Cardiac/None)    Mailed/emailed to patient on:EMAILED TO PT 4/10/25  Copy of packet scanned into Media on:4/10/25  Labs in packet  Soap / Bowel prep in packet  Pre-op & Post-op in packet  Dates of H&P and post-op if needed    Consent: in Media or on admit  If needed:ON ADMIT    Medical/Cardiac Clearance:  Appt with:  Appt date and time:  Date clearance form faxed:  Best fax number:    Medication Suspension of: GERARDO PT IS AWARE TO HOLD PRIOR TO SURGERYMedication Name / how many days  Ordering provider:  Faxed Medication Suspension form on:  Best fax number:    Gautam/Sandoval:  Faxed form to pharmacy on:    RBC blood bank done on:    Rep info:  Emailed rep on date:

## 2025-04-09 NOTE — PRE-PROCEDURE INSTRUCTIONS
Pre-Surgery Instructions:   Medication Instructions    allopurinol (ZYLOPRIM) 100 mg tablet Take day of surgery.    aspirin (ECOTRIN) 325 mg EC tablet Stop taking 7 days prior to surgery.    buPROPion (WELLBUTRIN XL) 150 mg 24 hr tablet Take day of surgery.    Cinnamon 500 MG TABS Stop taking 7 days prior to surgery.    glimepiride (AMARYL) 2 mg tablet Hold day of surgery.    glucosamine-chondroitin 500-400 MG tablet Stop taking 7 days prior to surgery.    losartan (COZAAR) 100 MG tablet Hold day of surgery.    metoprolol-hydrochlorothiazide (LOPRESSOR HCT) 100-25 MG per tablet Take day of surgery.    Omega-3 Fatty Acids (Omega-3 Fish Oil) 1000 MG CAPS Stop taking 7 days prior to surgery.    rosuvastatin (CRESTOR) 20 MG tablet Take night before surgery    semaglutide (Rybelsus) 14 MG tablet Take day of surgery.    tamsulosin (FLOMAX) 0.4 mg Take night before surgery    Medication instructions for day of surgery reviewed. Please take all instructed medications with only a sip of water.       You will receive a call one business day prior to surgery with an arrival time and hospital directions. If your surgery is scheduled on a Monday, the hospital will be calling you on the Friday prior to your surgery. If you have not heard from anyone by 8pm, please call the hospital supervisor through the hospital  at [phone]. ([place] [phone] or [place] [phone]).    Do not eat or drink anything after midnight the night before your surgery, including candy, mints, lifesavers, or chewing gum. Do not drink alcohol 24hrs before your surgery. Try not to smoke at least 24hrs before your surgery.       Follow the pre surgery showering instructions as listed in the “My Surgical Experience Booklet” or otherwise provided by your surgeon's office. Do not use a blade to shave the surgical area 1 week before surgery. It is okay to use a clean electric clippers up to 24 hours before surgery. Do not apply any lotions,  creams, including makeup, cologne, deodorant, or perfumes after showering on the day of your surgery. Do not use dry shampoo, hair spray, hair gel, or any type of hair products.     No contact lenses, eye make-up, or artificial eyelashes. Remove nail polish, including gel polish, and any artificial, gel, or acrylic nails if possible. Remove all jewelry including rings and body piercing jewelry.     Wear causal clothing that is easy to take on and off. Consider your type of surgery.    Keep any valuables, jewelry, piercings at home. Please bring any specially ordered equipment (sling, braces) if indicated.    Arrange for a responsible person to drive you to and from the hospital on the day of your surgery. Please confirm the visitor policy for the day of your procedure when you receive your phone call with an arrival time.     Call the surgeon's office with any new illnesses, exposures, or additional questions prior to surgery.    Please reference your “My Surgical Experience Booklet” for additional information to prepare for your upcoming surgery.

## 2025-04-10 ENCOUNTER — APPOINTMENT (OUTPATIENT)
Dept: LAB | Facility: AMBULARY SURGERY CENTER | Age: 65
End: 2025-04-10
Payer: MEDICARE

## 2025-04-10 ENCOUNTER — OFFICE VISIT (OUTPATIENT)
Dept: LAB | Facility: AMBULARY SURGERY CENTER | Age: 65
End: 2025-04-10
Attending: UROLOGY
Payer: MEDICARE

## 2025-04-10 DIAGNOSIS — N20.0 NEPHROLITHIASIS: ICD-10-CM

## 2025-04-10 DIAGNOSIS — Z01.812 PRE-OPERATIVE LABORATORY EXAMINATION: ICD-10-CM

## 2025-04-10 DIAGNOSIS — I10 ESSENTIAL HYPERTENSION: ICD-10-CM

## 2025-04-10 DIAGNOSIS — E11.9 TYPE 2 DIABETES MELLITUS WITHOUT COMPLICATION, WITHOUT LONG-TERM CURRENT USE OF INSULIN (HCC): ICD-10-CM

## 2025-04-10 DIAGNOSIS — R39.89 SUSPECTED UTI: ICD-10-CM

## 2025-04-10 DIAGNOSIS — N20.0 NEPHROLITHIASIS: Primary | ICD-10-CM

## 2025-04-10 DIAGNOSIS — Z01.810 PRE-OPERATIVE CARDIOVASCULAR EXAMINATION: ICD-10-CM

## 2025-04-10 LAB
ALBUMIN SERPL BCG-MCNC: 4.6 G/DL (ref 3.5–5)
ALP SERPL-CCNC: 62 U/L (ref 34–104)
ALT SERPL W P-5'-P-CCNC: 68 U/L (ref 7–52)
ANION GAP SERPL CALCULATED.3IONS-SCNC: 12 MMOL/L (ref 4–13)
AST SERPL W P-5'-P-CCNC: 40 U/L (ref 13–39)
ATRIAL RATE: 68 BPM
BASOPHILS # BLD AUTO: 0.07 THOUSANDS/ÂΜL (ref 0–0.1)
BASOPHILS NFR BLD AUTO: 2 % (ref 0–1)
BILIRUB SERPL-MCNC: 0.54 MG/DL (ref 0.2–1)
BUN SERPL-MCNC: 18 MG/DL (ref 5–25)
CALCIUM SERPL-MCNC: 9.4 MG/DL (ref 8.4–10.2)
CHLORIDE SERPL-SCNC: 96 MMOL/L (ref 96–108)
CO2 SERPL-SCNC: 26 MMOL/L (ref 21–32)
CREAT SERPL-MCNC: 0.99 MG/DL (ref 0.6–1.3)
EOSINOPHIL # BLD AUTO: 0.19 THOUSAND/ÂΜL (ref 0–0.61)
EOSINOPHIL NFR BLD AUTO: 4 % (ref 0–6)
ERYTHROCYTE [DISTWIDTH] IN BLOOD BY AUTOMATED COUNT: 12 % (ref 11.6–15.1)
EST. AVERAGE GLUCOSE BLD GHB EST-MCNC: 226 MG/DL
GFR SERPL CREATININE-BSD FRML MDRD: 79 ML/MIN/1.73SQ M
GLUCOSE P FAST SERPL-MCNC: 287 MG/DL (ref 65–99)
HBA1C MFR BLD: 9.5 %
HCT VFR BLD AUTO: 44.9 % (ref 36.5–49.3)
HGB BLD-MCNC: 15.5 G/DL (ref 12–17)
IMM GRANULOCYTES # BLD AUTO: 0.01 THOUSAND/UL (ref 0–0.2)
IMM GRANULOCYTES NFR BLD AUTO: 0 % (ref 0–2)
LYMPHOCYTES # BLD AUTO: 1.14 THOUSANDS/ÂΜL (ref 0.6–4.47)
LYMPHOCYTES NFR BLD AUTO: 26 % (ref 14–44)
MCH RBC QN AUTO: 29 PG (ref 26.8–34.3)
MCHC RBC AUTO-ENTMCNC: 34.5 G/DL (ref 31.4–37.4)
MCV RBC AUTO: 84 FL (ref 82–98)
MONOCYTES # BLD AUTO: 0.54 THOUSAND/ÂΜL (ref 0.17–1.22)
MONOCYTES NFR BLD AUTO: 12 % (ref 4–12)
NEUTROPHILS # BLD AUTO: 2.49 THOUSANDS/ÂΜL (ref 1.85–7.62)
NEUTS SEG NFR BLD AUTO: 56 % (ref 43–75)
NRBC BLD AUTO-RTO: 0 /100 WBCS
P AXIS: 51 DEGREES
PLATELET # BLD AUTO: 193 THOUSANDS/UL (ref 149–390)
PMV BLD AUTO: 10.7 FL (ref 8.9–12.7)
POTASSIUM SERPL-SCNC: 4.7 MMOL/L (ref 3.5–5.3)
PR INTERVAL: 124 MS
PROT SERPL-MCNC: 7 G/DL (ref 6.4–8.4)
QRS AXIS: 35 DEGREES
QRSD INTERVAL: 102 MS
QT INTERVAL: 396 MS
QTC INTERVAL: 422 MS
RBC # BLD AUTO: 5.35 MILLION/UL (ref 3.88–5.62)
SODIUM SERPL-SCNC: 134 MMOL/L (ref 135–147)
T WAVE AXIS: 31 DEGREES
VENTRICULAR RATE: 68 BPM
WBC # BLD AUTO: 4.44 THOUSAND/UL (ref 4.31–10.16)

## 2025-04-10 PROCEDURE — 85025 COMPLETE CBC W/AUTO DIFF WBC: CPT

## 2025-04-10 PROCEDURE — 83036 HEMOGLOBIN GLYCOSYLATED A1C: CPT

## 2025-04-10 PROCEDURE — 87086 URINE CULTURE/COLONY COUNT: CPT

## 2025-04-10 PROCEDURE — 93010 ELECTROCARDIOGRAM REPORT: CPT | Performed by: INTERNAL MEDICINE

## 2025-04-10 PROCEDURE — 93005 ELECTROCARDIOGRAM TRACING: CPT

## 2025-04-10 PROCEDURE — 36415 COLL VENOUS BLD VENIPUNCTURE: CPT

## 2025-04-10 PROCEDURE — 80053 COMPREHEN METABOLIC PANEL: CPT

## 2025-04-10 RX ORDER — OXYCODONE AND ACETAMINOPHEN 5; 325 MG/1; MG/1
1 TABLET ORAL EVERY 6 HOURS PRN
Qty: 6 TABLET | Refills: 0 | Status: SHIPPED | OUTPATIENT
Start: 2025-04-10 | End: 2025-04-15

## 2025-04-10 NOTE — TELEPHONE ENCOUNTER
Called patient to triage. He reports he is having increased pain since yesterday. It is not severe. He took a Tylenol about an hour ago and it has yet to help him. He was told to stop Ibuprofen. Denies any fever or chills. Advised if pain becomes severe he will need to go to the ER. He is scheduled for surgery next week.

## 2025-04-10 NOTE — TELEPHONE ENCOUNTER
Patient called stating he is in a lot of pain, he's having a lot of frequency and tylenol OTC is not doing anything for his pain. Would like to procedure date to be moved up if possible.       Pt callback: [phone]

## 2025-04-11 ENCOUNTER — ANESTHESIA EVENT (OUTPATIENT)
Dept: PERIOP | Facility: HOSPITAL | Age: 65
End: 2025-04-11
Payer: MEDICARE

## 2025-04-11 LAB — BACTERIA UR CULT: NORMAL

## 2025-04-14 PROBLEM — N20.1 RIGHT URETERAL STONE: Status: ACTIVE | Noted: 2025-04-14

## 2025-04-14 NOTE — DISCHARGE INSTR - AVS FIRST PAGE
Don,    Today you underwent ureteral stent placement for drainage of your kidney and ureter.  This went well.  After surgery like this it is not uncommon to have urgency and frequency of urination along with some blood in the urine.  You may also feel some discomfort in your kidney when urinating.    You will need better control of your A1c prior to stone surgery for removal of your stone (anesthesia is concerned about your A1c and, indeed, an elevated A1c like yours increases your chance of infection after stone surgery).    Please stay well-hydrated as you recover.      Our office will help to arrange your second stone surgery.    We wish you a rapid and uneventful recovery,    Dr. Green

## 2025-04-15 ENCOUNTER — TELEPHONE (OUTPATIENT)
Dept: UROLOGY | Facility: CLINIC | Age: 65
End: 2025-04-15

## 2025-04-15 ENCOUNTER — PREP FOR PROCEDURE (OUTPATIENT)
Dept: UROLOGY | Facility: HOSPITAL | Age: 65
End: 2025-04-15

## 2025-04-15 ENCOUNTER — APPOINTMENT (OUTPATIENT)
Dept: RADIOLOGY | Facility: HOSPITAL | Age: 65
End: 2025-04-15
Payer: MEDICARE

## 2025-04-15 ENCOUNTER — ANESTHESIA (OUTPATIENT)
Dept: PERIOP | Facility: HOSPITAL | Age: 65
End: 2025-04-15
Payer: MEDICARE

## 2025-04-15 ENCOUNTER — HOSPITAL ENCOUNTER (OUTPATIENT)
Facility: HOSPITAL | Age: 65
Setting detail: OUTPATIENT SURGERY
Discharge: HOME | End: 2025-04-15
Attending: UROLOGY | Admitting: UROLOGY
Payer: MEDICARE

## 2025-04-15 VITALS
OXYGEN SATURATION: 98 % | TEMPERATURE: 97.1 F | RESPIRATION RATE: 20 BRPM | HEART RATE: 68 BPM | SYSTOLIC BLOOD PRESSURE: 129 MMHG | DIASTOLIC BLOOD PRESSURE: 65 MMHG

## 2025-04-15 DIAGNOSIS — N20.1 RIGHT URETERAL STONE: Primary | ICD-10-CM

## 2025-04-15 DIAGNOSIS — N20.0 NEPHROLITHIASIS: ICD-10-CM

## 2025-04-15 LAB
ANION GAP SERPL CALCULATED.3IONS-SCNC: 8 MMOL/L (ref 4–13)
BASOPHILS # BLD AUTO: 0.06 THOUSANDS/ÂΜL (ref 0–0.1)
BASOPHILS NFR BLD AUTO: 2 % (ref 0–1)
BUN SERPL-MCNC: 16 MG/DL (ref 5–25)
CALCIUM SERPL-MCNC: 9 MG/DL (ref 8.4–10.2)
CHLORIDE SERPL-SCNC: 98 MMOL/L (ref 96–108)
CO2 SERPL-SCNC: 27 MMOL/L (ref 21–32)
CREAT SERPL-MCNC: 0.95 MG/DL (ref 0.6–1.3)
EOSINOPHIL # BLD AUTO: 0.14 THOUSAND/ÂΜL (ref 0–0.61)
EOSINOPHIL NFR BLD AUTO: 4 % (ref 0–6)
ERYTHROCYTE [DISTWIDTH] IN BLOOD BY AUTOMATED COUNT: 12.1 % (ref 11.6–15.1)
GFR SERPL CREATININE-BSD FRML MDRD: 83 ML/MIN/1.73SQ M
GLUCOSE P FAST SERPL-MCNC: 295 MG/DL (ref 65–99)
GLUCOSE SERPL-MCNC: 295 MG/DL (ref 65–140)
GLUCOSE SERPL-MCNC: 295 MG/DL (ref 65–140)
GLUCOSE SERPL-MCNC: 311 MG/DL (ref 65–140)
HCT VFR BLD AUTO: 42.9 % (ref 36.5–49.3)
HGB BLD-MCNC: 15 G/DL (ref 12–17)
IMM GRANULOCYTES # BLD AUTO: 0.01 THOUSAND/UL (ref 0–0.2)
IMM GRANULOCYTES NFR BLD AUTO: 0 % (ref 0–2)
LYMPHOCYTES # BLD AUTO: 1.1 THOUSANDS/ÂΜL (ref 0.6–4.47)
LYMPHOCYTES NFR BLD AUTO: 33 % (ref 14–44)
MCH RBC QN AUTO: 29.3 PG (ref 26.8–34.3)
MCHC RBC AUTO-ENTMCNC: 35 G/DL (ref 31.4–37.4)
MCV RBC AUTO: 84 FL (ref 82–98)
MONOCYTES # BLD AUTO: 0.44 THOUSAND/ÂΜL (ref 0.17–1.22)
MONOCYTES NFR BLD AUTO: 13 % (ref 4–12)
NEUTROPHILS # BLD AUTO: 1.57 THOUSANDS/ÂΜL (ref 1.85–7.62)
NEUTS SEG NFR BLD AUTO: 48 % (ref 43–75)
NRBC BLD AUTO-RTO: 0 /100 WBCS
PLATELET # BLD AUTO: 162 THOUSANDS/UL (ref 149–390)
PMV BLD AUTO: 10.1 FL (ref 8.9–12.7)
POTASSIUM SERPL-SCNC: 4.3 MMOL/L (ref 3.5–5.3)
RBC # BLD AUTO: 5.12 MILLION/UL (ref 3.88–5.62)
SODIUM SERPL-SCNC: 133 MMOL/L (ref 135–147)
WBC # BLD AUTO: 3.32 THOUSAND/UL (ref 4.31–10.16)

## 2025-04-15 PROCEDURE — 80048 BASIC METABOLIC PNL TOTAL CA: CPT

## 2025-04-15 PROCEDURE — 82948 REAGENT STRIP/BLOOD GLUCOSE: CPT

## 2025-04-15 PROCEDURE — NC001 PR NO CHARGE: Performed by: UROLOGY

## 2025-04-15 PROCEDURE — 85025 COMPLETE CBC W/AUTO DIFF WBC: CPT

## 2025-04-15 PROCEDURE — C1769 GUIDE WIRE: HCPCS | Performed by: UROLOGY

## 2025-04-15 PROCEDURE — C2625 STENT, NON-COR, TEM W/DEL SY: HCPCS | Performed by: UROLOGY

## 2025-04-15 PROCEDURE — 52332 CYSTOSCOPY AND TREATMENT: CPT | Performed by: UROLOGY

## 2025-04-15 PROCEDURE — C1758 CATHETER, URETERAL: HCPCS | Performed by: UROLOGY

## 2025-04-15 PROCEDURE — 74420 UROGRAPHY RTRGR +-KUB: CPT

## 2025-04-15 DEVICE — INLAY OPTIMA URETERAL STENT W/O GUIDEWIRE
Type: IMPLANTABLE DEVICE | Status: FUNCTIONAL
Brand: BARD® INLAY OPTIMA® URETERAL STENT

## 2025-04-15 RX ORDER — PROPOFOL 10 MG/ML
INJECTION, EMULSION INTRAVENOUS AS NEEDED
Status: DISCONTINUED | OUTPATIENT
Start: 2025-04-15 | End: 2025-04-15

## 2025-04-15 RX ORDER — ONDANSETRON 2 MG/ML
4 INJECTION INTRAMUSCULAR; INTRAVENOUS ONCE AS NEEDED
Status: DISCONTINUED | OUTPATIENT
Start: 2025-04-15 | End: 2025-04-15 | Stop reason: HOSPADM

## 2025-04-15 RX ORDER — SODIUM CHLORIDE, SODIUM LACTATE, POTASSIUM CHLORIDE, CALCIUM CHLORIDE 600; 310; 30; 20 MG/100ML; MG/100ML; MG/100ML; MG/100ML
125 INJECTION, SOLUTION INTRAVENOUS CONTINUOUS
Status: CANCELLED | OUTPATIENT
Start: 2025-04-15

## 2025-04-15 RX ORDER — DICLOFENAC POTASSIUM 50 MG/1
50 TABLET, FILM COATED ORAL 2 TIMES DAILY
Qty: 10 TABLET | Refills: 0 | Status: SHIPPED | OUTPATIENT
Start: 2025-04-15 | End: 2025-04-20

## 2025-04-15 RX ORDER — ONDANSETRON 2 MG/ML
INJECTION INTRAMUSCULAR; INTRAVENOUS AS NEEDED
Status: DISCONTINUED | OUTPATIENT
Start: 2025-04-15 | End: 2025-04-15

## 2025-04-15 RX ORDER — METOCLOPRAMIDE HYDROCHLORIDE 5 MG/ML
10 INJECTION INTRAMUSCULAR; INTRAVENOUS ONCE AS NEEDED
Status: DISCONTINUED | OUTPATIENT
Start: 2025-04-15 | End: 2025-04-15 | Stop reason: HOSPADM

## 2025-04-15 RX ORDER — FENTANYL CITRATE/PF 50 MCG/ML
50 SYRINGE (ML) INJECTION
Status: DISCONTINUED | OUTPATIENT
Start: 2025-04-15 | End: 2025-04-15 | Stop reason: HOSPADM

## 2025-04-15 RX ORDER — CEFAZOLIN SODIUM 2 G/50ML
2000 SOLUTION INTRAVENOUS ONCE
Status: COMPLETED | OUTPATIENT
Start: 2025-04-15 | End: 2025-04-15

## 2025-04-15 RX ORDER — SODIUM CHLORIDE, SODIUM LACTATE, POTASSIUM CHLORIDE, CALCIUM CHLORIDE 600; 310; 30; 20 MG/100ML; MG/100ML; MG/100ML; MG/100ML
INJECTION, SOLUTION INTRAVENOUS CONTINUOUS PRN
Status: DISCONTINUED | OUTPATIENT
Start: 2025-04-15 | End: 2025-04-15

## 2025-04-15 RX ORDER — LIDOCAINE HYDROCHLORIDE 10 MG/ML
INJECTION, SOLUTION EPIDURAL; INFILTRATION; INTRACAUDAL; PERINEURAL AS NEEDED
Status: DISCONTINUED | OUTPATIENT
Start: 2025-04-15 | End: 2025-04-15

## 2025-04-15 RX ORDER — FENTANYL CITRATE 50 UG/ML
INJECTION, SOLUTION INTRAMUSCULAR; INTRAVENOUS AS NEEDED
Status: DISCONTINUED | OUTPATIENT
Start: 2025-04-15 | End: 2025-04-15

## 2025-04-15 RX ORDER — HYDROMORPHONE HCL/PF 1 MG/ML
0.5 SYRINGE (ML) INJECTION EVERY 2 HOUR PRN
Refills: 0 | Status: CANCELLED | OUTPATIENT
Start: 2025-04-15 | End: 2025-04-17

## 2025-04-15 RX ORDER — GLYCOPYRROLATE 0.2 MG/ML
INJECTION INTRAMUSCULAR; INTRAVENOUS AS NEEDED
Status: DISCONTINUED | OUTPATIENT
Start: 2025-04-15 | End: 2025-04-15

## 2025-04-15 RX ORDER — ACETAMINOPHEN 500 MG
500 TABLET ORAL EVERY 6 HOURS
Qty: 20 TABLET | Refills: 0 | Status: SHIPPED | OUTPATIENT
Start: 2025-04-15 | End: 2025-04-20

## 2025-04-15 RX ORDER — EPHEDRINE SULFATE 50 MG/ML
INJECTION INTRAVENOUS AS NEEDED
Status: DISCONTINUED | OUTPATIENT
Start: 2025-04-15 | End: 2025-04-15

## 2025-04-15 RX ORDER — PHENAZOPYRIDINE HYDROCHLORIDE 100 MG/1
200 TABLET, FILM COATED ORAL
Status: CANCELLED | OUTPATIENT
Start: 2025-04-15

## 2025-04-15 RX ORDER — ONDANSETRON 2 MG/ML
4 INJECTION INTRAMUSCULAR; INTRAVENOUS EVERY 6 HOURS PRN
Status: CANCELLED | OUTPATIENT
Start: 2025-04-15

## 2025-04-15 RX ORDER — OXYCODONE HYDROCHLORIDE 5 MG/1
5 TABLET ORAL EVERY 4 HOURS PRN
Refills: 0 | Status: CANCELLED | OUTPATIENT
Start: 2025-04-15

## 2025-04-15 RX ORDER — ACETAMINOPHEN 325 MG/1
650 TABLET ORAL EVERY 6 HOURS SCHEDULED
Status: CANCELLED | OUTPATIENT
Start: 2025-04-15

## 2025-04-15 RX ORDER — MIDAZOLAM HYDROCHLORIDE 2 MG/2ML
INJECTION, SOLUTION INTRAMUSCULAR; INTRAVENOUS AS NEEDED
Status: DISCONTINUED | OUTPATIENT
Start: 2025-04-15 | End: 2025-04-15

## 2025-04-15 RX ORDER — CEFAZOLIN SODIUM 1 G/50ML
1000 SOLUTION INTRAVENOUS ONCE
Status: COMPLETED | OUTPATIENT
Start: 2025-04-15 | End: 2025-04-15

## 2025-04-15 RX ORDER — MAGNESIUM HYDROXIDE/ALUMINUM HYDROXICE/SIMETHICONE 120; 1200; 1200 MG/30ML; MG/30ML; MG/30ML
30 SUSPENSION ORAL EVERY 6 HOURS PRN
Status: CANCELLED | OUTPATIENT
Start: 2025-04-15

## 2025-04-15 RX ADMIN — FENTANYL CITRATE 50 MCG: 50 INJECTION INTRAMUSCULAR; INTRAVENOUS at 10:07

## 2025-04-15 RX ADMIN — MIDAZOLAM 2 MG: 1 INJECTION INTRAMUSCULAR; INTRAVENOUS at 09:55

## 2025-04-15 RX ADMIN — FENTANYL CITRATE 50 MCG: 50 INJECTION INTRAMUSCULAR; INTRAVENOUS at 10:04

## 2025-04-15 RX ADMIN — SODIUM CHLORIDE, SODIUM LACTATE, POTASSIUM CHLORIDE, AND CALCIUM CHLORIDE: .6; .31; .03; .02 INJECTION, SOLUTION INTRAVENOUS at 08:58

## 2025-04-15 RX ADMIN — CEFAZOLIN SODIUM 2000 MG: 2 SOLUTION INTRAVENOUS at 10:01

## 2025-04-15 RX ADMIN — PROPOFOL 30 MG: 10 INJECTION, EMULSION INTRAVENOUS at 10:18

## 2025-04-15 RX ADMIN — CEFAZOLIN SODIUM 1000 MG: 1 SOLUTION INTRAVENOUS at 10:07

## 2025-04-15 RX ADMIN — PROPOFOL 30 MG: 10 INJECTION, EMULSION INTRAVENOUS at 10:10

## 2025-04-15 RX ADMIN — GLYCOPYRROLATE 0.1 MG: 0.2 INJECTION INTRAMUSCULAR; INTRAVENOUS at 10:07

## 2025-04-15 RX ADMIN — INSULIN HUMAN 6 UNITS: 100 INJECTION, SOLUTION PARENTERAL at 10:51

## 2025-04-15 RX ADMIN — PROPOFOL 200 MG: 10 INJECTION, EMULSION INTRAVENOUS at 10:02

## 2025-04-15 RX ADMIN — PROPOFOL 40 MG: 10 INJECTION, EMULSION INTRAVENOUS at 10:03

## 2025-04-15 RX ADMIN — ONDANSETRON 4 MG: 2 INJECTION INTRAMUSCULAR; INTRAVENOUS at 10:14

## 2025-04-15 RX ADMIN — LIDOCAINE HYDROCHLORIDE 50 MG: 10 INJECTION, SOLUTION EPIDURAL; INFILTRATION; INTRACAUDAL at 10:02

## 2025-04-15 RX ADMIN — EPHEDRINE SULFATE 10 MG: 50 INJECTION INTRAVENOUS at 10:14

## 2025-04-15 NOTE — TELEPHONE ENCOUNTER
Postoperatively in the PACU patient reports he passed a stone, unable to collect.     Repeat CT scan ordered in 2 weeks.  If stone is still present can proceed with outpatient ureteroscopy.  If no stone visualized he can be set up for outpatient cystoscopy, stent removal

## 2025-04-15 NOTE — ANESTHESIA POSTPROCEDURE EVALUATION
Post-Op Assessment Note    CV Status:  Stable  Pain Score: 0    Pain management: adequate       Mental Status:  Alert   Hydration Status:  Stable   PONV Controlled:  None   Airway Patency:  Patent  Two or more mitigation strategies used for obstructive sleep apnea   Post Op Vitals Reviewed: Yes    No anethesia notable event occurred.    Staff: CRNA, Anesthesiologist           Last Filed PACU Vitals:  Vitals Value Taken Time   Temp 97    Pulse 68    /74    Resp 14    SpO2 99        Modified Juan Carlos:     Vitals Value Taken Time   Activity 2 04/15/25 1059   Respiration 2 04/15/25 1059   Circulation 2 04/15/25 1059   Consciousness 2 04/15/25 1059   Oxygen Saturation 2 04/15/25 1059     Modified Juan Carlos Score: 10

## 2025-04-15 NOTE — ANESTHESIA POSTPROCEDURE EVALUATION
Post-Op Assessment Note    CV Status:  Stable  Pain Score: 0    Pain management: adequate       Mental Status:  Alert   Hydration Status:  Stable   PONV Controlled:  None   Airway Patency:  Patent     Post Op Vitals Reviewed: Yes    No anethesia notable event occurred.    Staff: CRNA           Last Filed PACU Vitals:  Vitals Value Taken Time   Temp 97    Pulse 68    /74    Resp 14    SpO2 99

## 2025-04-15 NOTE — TELEPHONE ENCOUNTER
Status post placement of a 6 Estonian by 28 cm right ureteral stent.  Please arrange for subsequent ureteroscopic stone intervention in roughly 8 weeks.  Please be sure that he works with his primary care provider to get his A1c under better control prior to his ureteroscopy as per anesthesia/surgical optimization center recommendations

## 2025-04-15 NOTE — OP NOTE
OPERATIVE REPORT  PATIENT NAME: Sergio Ahumada    :  1960  MRN: [id number]  Pt Location: AN OR ROOM 04    SURGERY DATE: 4/15/2025    Surgeons and Role:     * Garfield Green MD - Primary    Preop Diagnosis:  Nephrolithiasis [N20.0]    Post-Op Diagnosis Codes:     * Nephrolithiasis [N20.0]    Procedure(s):  Right - CYSTOSCOPY. RETROGRADE PYRLOGRAPHY. STENT INSERTION    Specimen(s):  * No specimens in log *    Estimated Blood Loss:   Minimal    Drains:  Ureteral Internal Stent Right ureter 6 Fr. (Active)   Number of days: 0       Anesthesia Type:   General    Operative Indications:  Nephrolithiasis [N20.0]      Operative Findings:  Successful placement of a 6 Faroese by 28 cm right ureteral stent.  Plan for second stage ureteroscopic stone invention once his A1c has decreased      Complications:   None    Procedure and Technique:        PROCEDURES PERFORMED:  1) Cystoscopy  2) right retrograde pyelography with fluoroscopic interpretation  3) rigth ureteral stent placement (6F x 28cm)    SURGEON:  Garfield Green MD    ASSISTANTS:  SHWETHA Grimes    NOTE:  There were no qualified teaching residents to assist with this case    ANESTHESIA: General     COMPLICATIONS:   None    ANTIBIOTICS:  ancef    INTRAOPERATIVE THROMBOEMBOLISM PROPHYLAXIS:  Pneumatic compression stockings     RADIOLOGIC FINDINGS:    1. Retrograde pyelogram was performed on the right side using a 5 Fr open ended catheter.  Approximately 4 mL contrast was used today.    2. The following findings were noted: moderate hydronephrosis        INDICATIONS FOR PROCEDURE:  Sergio Ahumada is an 64 y.o. old male with  a right ureteral stone and UTI symptoms.  After discussing the options, the patient elected to undergo ureteral stent placement.  We discussed the procedure in detail, the alternatives, and the risks, and they signed informed consent to proceed.  The appropriate laterality was marked    PROCEDURE IN DETAIL:   The patient was identified and  brought to the OR.  Antibiotic prophylaxis and DVT prophylaxis were administered.  They were placed in the lithotomy position with care to pad all pressure points.  They were prepared and draped in the usual sterile fashion.      A surgical time out was performed with all in the room in agreement with the correct patient, procedure, indications, and laterality.  A 21-Nigerien rigid cystoscope was used to enter the bladder.  The bladder was inspected in its entirety and there were no lesions noted.  The ureteral orifices were identified in their orthotopic positions.     The right ureteral orifice was identified and a 5 Fr open ended catheter was placed into the ureteral orifice.   The stone was not visible on  fluoroscopic guidance.  A retrograde pyelogram was performed with injection of contrast which demonstrated moderate hydroureteronephrosis.  A wire was passed up to the kidney under fluoroscopic guidance.    A 6 Fr x 28 cm JJ stent was then passed up the wire  under fluoroscopic guidance into the right  kidney with a good curl noted in the kidney and in the bladder.   The bladder was drained.        The patient was placed back in the supine position, awakened from general anesthesia and brought to the recovery room in stable condition.    SPECIMENS:   Order Name Source Comment Collection Info Order Time   BASIC METABOLIC PANEL Arm, Right  Collected By: Cha Perkins RN 4/15/2025  9:17 AM   CBC AND DIFFERENTIAL Arm, Right  Collected By: Cha Perkins RN 4/15/2025  9:17 AM        IMPLANTS:   Implant Name Type Inv. Item Serial No.  Lot No. LRB No. Used Action   STENT URETERAL 6FR 28CM INLAY OPTIMA - [id number]  STENT URETERAL 6FR 28CM INLAY OPTIMA  [employer] [id number] 1 Implanted        COMPLICATIONS: none    DISPOSITION: PACU     PLAN: s/p right ureteral stent placement.  Plan for second stage ureteroscopic stone invention some weeks from now once his hemoglobin A1c has  decreased.       I was present for the entire procedure. and A qualified resident physician was not available.    Patient Disposition:  PACU              SIGNATURE: Garfield Green MD  DATE: April 15, 2025  TIME: 10:27 AM

## 2025-04-15 NOTE — ANESTHESIA PREPROCEDURE EVALUATION
Procedure:  cysto/retro/pyelogram stent insertion (Right: Bladder)    Relevant Problems   CARDIO   (+) Essential hypertension   (+) Mixed hyperlipidemia   (+) PAD (peripheral artery disease) (HCC)      ENDO   (+) Type 2 diabetes mellitus with hyperglycemia (HCC)      MUSCULOSKELETAL   (+) Adhesive capsulitis of right shoulder   (+) Idiopathic chronic gout 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      NEURO/PSYCH   (+) Aphasia   (+) Seasonal affective disorder (HCC)   (+) TIA (transient ischemic attack)   (+) Visual disturbance      Other   (+) Osteomyelitis, jaw acute    Interpretation Summary         Stress ECG: No ST deviation is noted. The ECG was not diagnostic due to pharmacological (vasodilator) stress.    Perfusion: There are no perfusion defects.    Stress Function: Left ventricular function post-stress is normal. Stress ejection fraction is 54 %.    Stress Combined Conclusion: The ECG and SPECT imaging portions of the stress study are concordant with no evidence of stress induced myocardial ischemia.    Physical Exam    Airway    Mallampati score: II  TM Distance: >3 FB  Neck ROM: full     Dental       Cardiovascular      Pulmonary      Other Findings        Anesthesia Plan  ASA Score- 2     Anesthesia Type- general with ASA Monitors.         Additional Monitors:     Airway Plan: LMA.           Plan Factors-    Chart reviewed.              Obstructive sleep apnea risk education given perioperatively.        Induction- intravenous.    Postoperative Plan-         Informed Consent- Anesthetic plan and risks discussed with patient.  I personally reviewed this patient with the CRNA. Discussed and agreed on the Anesthesia Plan with the CRNA..      NPO Status:  No vitals data found for the desired time range.

## 2025-04-15 NOTE — H&P
H&P - Urology   Name: Sergio Ahumada 64 y.o. male I MRN: [id number]  Unit/Bed#: OR [place] I Date of Admission: 4/15/2025   Date of Service: 4/15/2025 I Hospital Day: 0     Assessment & Plan  Right ureteral stone  Plan for right ureteral stent  Follow up for right URS and LL after A1c has been managed  Type 2 diabetes mellitus with hyperglycemia (HCC)    Lab Results   Component Value Date    HGBA1C 9.5 (H) 04/10/2025     UTI symptoms      History of Present Illness   Sergio Ahumada is a 64 y.o. male who presents with a right ureteral stone and UTI symptoms  Here for ureteral stenting  Plan for staged URS and LL    The following portions of the patient's history were reviewed and updated as appropriate: allergies, current medications, past family history, past medical history, past social history, past surgical history and problem list.  .    Review of Systems   Constitutional: Negative.    HENT: Negative.     Eyes: Negative.    Respiratory: Negative.     Cardiovascular: Negative.    Gastrointestinal: Negative.    Endocrine: Negative.    Genitourinary:  Positive for flank pain.   Skin: Negative.    Allergic/Immunologic: Negative.    Neurological: Negative.    Hematological: Negative.    Psychiatric/Behavioral: Negative.           Objective :       I/O       None            Physical Exam  Vitals reviewed.   Constitutional:       General: He is not in acute distress.     Appearance: Normal appearance. He is well-developed. He is obese. He is not ill-appearing, toxic-appearing or diaphoretic.   HENT:      Head: Normocephalic and atraumatic.      Nose: Nose normal.      Mouth/Throat:      Mouth: Mucous membranes are moist.   Eyes:      General: No scleral icterus.        Right eye: No discharge.         Left eye: No discharge.   Neck:      Thyroid: No thyromegaly.      Trachea: No tracheal deviation.   Pulmonary:      Effort: Pulmonary effort is normal.   Chest:      Chest wall: No tenderness.   Abdominal:      General: There  "is no distension.      Palpations: There is no mass.      Tenderness: There is no abdominal tenderness. There is no guarding.      Hernia: No hernia is present.   Musculoskeletal:         General: No deformity or signs of injury. Normal range of motion.      Cervical back: Normal range of motion and neck supple.   Lymphadenopathy:      Cervical: No cervical adenopathy.   Skin:     General: Skin is dry.      Coloration: Skin is not jaundiced or pale.      Findings: No erythema.   Neurological:      Mental Status: He is alert and oriented to person, place, and time.      Cranial Nerves: No cranial nerve deficit.      Motor: No abnormal muscle tone.      Coordination: Coordination normal.   Psychiatric:         Mood and Affect: Mood normal.         Behavior: Behavior normal.         Thought Content: Thought content normal.         Judgment: Judgment normal.            Lab Results: I have reviewed the following results:  No results for input(s): \"WBC\", \"HGB\", \"HCT\", \"PLT\", \"BANDSPCT\", \"SODIUM\", \"K\", \"CL\", \"CO2\", \"BUN\", \"CREATININE\", \"GLUC\", \"CAIONIZED\", \"MG\", \"PHOS\", \"AST\", \"ALT\", \"ALB\", \"TBILI\", \"DBILI\", \"ALKPHOS\", \"PTT\", \"INR\" in the last 72 hours.  Lab Results   Component Value Date    COLORU Light Yellow 03/14/2025    COLORU Yellow 07/18/2017    CLARITYU Clear 03/14/2025    CLARITYU Transparent 07/18/2017    SPECGRAV 1.027 03/14/2025    SPECGRAV 1.015 07/18/2017    PHUR 6.0 03/14/2025    PHUR 7.0 07/18/2017    LEUKOCYTESUR (A) 03/14/2025     Elevated glucose may cause decreased leukocyte values. See urine microscopic for UWBC result    LEUKOCYTESUR Neg 07/18/2017    NITRITE Negative 03/14/2025    NITRITE Neg 07/18/2017    PROTEINUA Neg 07/18/2017    GLUCOSEU >=1000 (1%) (A) 03/14/2025    KETONESU 10 (1+) (A) 03/14/2025    KETONESU Neg 07/18/2017    BILIRUBINUR Negative 03/14/2025    BILIRUBINUR Neg 07/18/2017    BLOODU Moderate (A) 03/14/2025    BLOODU Large 07/18/2017   ,   Lab Results   Component Value Date    " URINECX <10,000 cfu/ml 04/10/2025             VTE Prophylaxis: Sequential compression device (Venodyne)

## 2025-04-17 NOTE — TELEPHONE ENCOUNTER
Patient overall feeling well after surgery. No complaints or concerns. Patient requesting to get CT done sooner then the 2 weeks that was recommended by inpatient team. Advised him I would need to ask the providers to see if that would be ok. Advised him I would call back     Spoke with Arnoldo KURTZ in office and he said ok to get CT this weekend if he would like and we can set him up for an in office stent removal pending the results. Called patient back and relayed this to him. Provided CS number. Will monitor for results and call him once resulted. Patient verbalized understanding. Provided ER precautions in the interim

## 2025-04-19 ENCOUNTER — HOSPITAL ENCOUNTER (OUTPATIENT)
Dept: CT IMAGING | Facility: HOSPITAL | Age: 65
Discharge: HOME | End: 2025-04-19
Payer: MEDICARE

## 2025-04-19 DIAGNOSIS — N20.1 RIGHT URETERAL STONE: ICD-10-CM

## 2025-04-19 PROCEDURE — 74176 CT ABD & PELVIS W/O CONTRAST: CPT

## 2025-04-21 NOTE — TELEPHONE ENCOUNTER
Patient called in, I provided him the CT results. Patient interested in scheduling second stage surgery. Please advise.

## 2025-04-21 NOTE — TELEPHONE ENCOUNTER
I believe this would go to Dr. Green' surgical scheduler as this was done in patient. I do not believe a case request needs to be placed. Not sure who his  is

## 2025-04-28 ENCOUNTER — PREP FOR PROCEDURE (OUTPATIENT)
Dept: UROLOGY | Facility: CLINIC | Age: 65
End: 2025-04-28

## 2025-04-28 DIAGNOSIS — Z01.812 PRE-OPERATIVE LABORATORY EXAMINATION: ICD-10-CM

## 2025-04-28 DIAGNOSIS — N20.0 NEPHROLITHIASIS: Primary | ICD-10-CM

## 2025-04-28 DIAGNOSIS — E11.9 TYPE 2 DIABETES MELLITUS WITHOUT COMPLICATION, WITHOUT LONG-TERM CURRENT USE OF INSULIN (HCC): ICD-10-CM

## 2025-04-28 DIAGNOSIS — R39.89 SUSPECTED UTI: ICD-10-CM

## 2025-04-30 ENCOUNTER — NURSE TRIAGE (OUTPATIENT)
Dept: OTHER | Facility: OTHER | Age: 65
End: 2025-04-30

## 2025-04-30 DIAGNOSIS — R39.9 UTI SYMPTOMS: Primary | ICD-10-CM

## 2025-04-30 RX ORDER — NITROFURANTOIN 25; 75 MG/1; MG/1
100 CAPSULE ORAL 2 TIMES DAILY
Qty: 14 CAPSULE | Refills: 0 | Status: SHIPPED | OUTPATIENT
Start: 2025-04-30 | End: 2025-05-07

## 2025-04-30 NOTE — TELEPHONE ENCOUNTER
Reason for Disposition  • [1] Caller has URGENT question AND [2] triager unable to answer question    Protocols used: Post-Op Symptoms and Questions-Adult-

## 2025-04-30 NOTE — TELEPHONE ENCOUNTER
"Regarding: s/p kidney stone/stent Blood in urine/pain  ----- Message from Zahira COATES sent at 4/30/2025  6:15 PM EDT -----  \"I had recent surgery for my kidney stone and a stent and the dr said to call if any blood in the urine which I have and I am in more pain than before\"    "

## 2025-04-30 NOTE — TELEPHONE ENCOUNTER
"FOLLOW UP: please follow up once culture is resulted, thank you!    REASON FOR CONVERSATION: Post-op Problem    SYMPTOMS: brown urine, 7/10 pain    OTHER: stent placed on 4/15. Yesterday did a little more work yesterday than usual, he did a lot of bending and lifting. Pt went home and went to the bathroom and it was brown. Pt drank a lot of water. This morning he thought his urine looked a better color. But then this evening urine was a brown color again. Pain is worse than its been.    On call provider ordered urine culture and antibiotic. Pt told not to start abx until he does urine test. Pt verbalized understanding. Provider also reports that blood with a stent is common.    DISPOSITION: Call PCP Now        Answer Assessment - Initial Assessment Questions  1. SYMPTOM: \"What's the main symptom you're concerned about?\" (e.g., pain, fever, vomiting)      Pain and brown urine    2. ONSET: \"When did increased pain  start?\"      Increased pain started yesterday        3. SURGERY: \"What surgery did you have?\"      Stent    4. DATE of SURGERY: \"When was the surgery?\"       4/15    7. PAIN: \"Is there any pain?\" If Yes, ask: \"How bad is it?\"  (Scale 1-10; or mild, moderate, severe)      7/10    8. FEVER: \"Do you have a fever?\" If Yes, ask: \"What is your temperature, how was it measured, and when did it start?\"      Denies- 97.3    9. VOMITING: \"Is there any vomiting?\" If Yes, ask: \"How many times?\"      Denies    10. BLEEDING: \"Is there any bleeding?\" If Yes, ask: \"How much?\" and \"Where?\"        Brown urine    11. OTHER SYMPTOMS: \"Do you have any other symptoms?\" (e.g., drainage from wound, painful urination, constipation)        Pt feels like he has a fever but took temp and it was normal.      Yesterday did a little more work yesterday than usual, he did a lot of bending and lifting. Pt went home and went to the bathroom and it was brown. Pt drank a lot of water. This morning he thought his urine looked a better color. " But then this evening urine was a brown color again. Pain is worse than its been.    Pt reporting he has a black toilet and that urine could have been brown the whole time but he used his wifes white toilet yesterday and noticed the brown urine.    Pt wanting an antibiotic.    Protocols used: Post-Op Symptoms and Questions-Adult-

## 2025-05-01 ENCOUNTER — APPOINTMENT (OUTPATIENT)
Dept: LAB | Facility: CLINIC | Age: 65
End: 2025-05-01
Attending: UROLOGY
Payer: MEDICARE

## 2025-05-01 DIAGNOSIS — R39.89 SUSPECTED UTI: ICD-10-CM

## 2025-05-01 DIAGNOSIS — Z01.812 PRE-OPERATIVE LABORATORY EXAMINATION: ICD-10-CM

## 2025-05-01 DIAGNOSIS — N20.0 NEPHROLITHIASIS: ICD-10-CM

## 2025-05-01 DIAGNOSIS — E11.9 TYPE 2 DIABETES MELLITUS WITHOUT COMPLICATION, WITHOUT LONG-TERM CURRENT USE OF INSULIN (HCC): ICD-10-CM

## 2025-05-01 PROCEDURE — 87086 URINE CULTURE/COLONY COUNT: CPT

## 2025-05-01 NOTE — TELEPHONE ENCOUNTER
UC in process.     LM for pt, I did let him know he only has to call back if symptoms worsen. If he calls back, please let him know blood in the urine is expected with stents. He should be staying well hydrated. Please ask if the blood has gotten worse.

## 2025-05-01 NOTE — TELEPHONE ENCOUNTER
Informed patient that   Urine Culture: IN Process;    Denies fever, or pain;   Slight discomfort.  Yellow urine;    Voiding well; feeling like he is emptying;  Will continue Macrobid.      Hydration / ER precautions reviewed.     Will call back with any worsening of symptoms;

## 2025-05-02 LAB — BACTERIA UR CULT: NORMAL

## 2025-05-05 ENCOUNTER — CONSULT (OUTPATIENT)
Dept: INTERNAL MEDICINE CLINIC | Facility: CLINIC | Age: 65
End: 2025-05-05
Payer: MEDICARE

## 2025-05-05 VITALS
OXYGEN SATURATION: 97 % | HEART RATE: 66 BPM | TEMPERATURE: 97 F | BODY MASS INDEX: 35.05 KG/M2 | SYSTOLIC BLOOD PRESSURE: 136 MMHG | WEIGHT: 273 LBS | DIASTOLIC BLOOD PRESSURE: 80 MMHG

## 2025-05-05 DIAGNOSIS — E11.65 TYPE 2 DIABETES MELLITUS WITH HYPERGLYCEMIA, WITHOUT LONG-TERM CURRENT USE OF INSULIN (HCC): ICD-10-CM

## 2025-05-05 DIAGNOSIS — E78.2 MIXED HYPERLIPIDEMIA: ICD-10-CM

## 2025-05-05 DIAGNOSIS — Z01.818 PREOP EXAM FOR INTERNAL MEDICINE: Primary | ICD-10-CM

## 2025-05-05 DIAGNOSIS — I10 ESSENTIAL HYPERTENSION: ICD-10-CM

## 2025-05-05 PROCEDURE — 99213 OFFICE O/P EST LOW 20 MIN: CPT | Performed by: INTERNAL MEDICINE

## 2025-05-05 PROCEDURE — 83036 HEMOGLOBIN GLYCOSYLATED A1C: CPT | Performed by: INTERNAL MEDICINE

## 2025-05-05 NOTE — PATIENT INSTRUCTIONS
Problem List Items Addressed This Visit          Cardiovascular and Mediastinum    Essential hypertension    Slightly elevated at first, then came down to normal range, continue blood pressure meds            Endocrine    Type 2 diabetes mellitus with hyperglycemia (HCC)    Most recent A1c April 10 was 9.5, patient reports he is only taking the Rybelsus 14 mg half a tablet daily, and the glimepiride 2 mg once a day.  Patient reports having tolerability issues with metformin in the past.  Patient also follows with endocrinology.  Hemoglobin A1c has been up much higher in the past, so he is making some progress.  Lab Results   Component Value Date    HGBA1C 9.5 (H) 04/10/2025            Relevant Orders    POCT hemoglobin A1c       Surgery/Wound/Pain    Preop exam for internal medicine - Primary    Patient is medically cleared for surgery, his diabetes is not optimally controlled, but is better than in the past, he does follow with endocrinology, will recheck A1c to see if A1c is now better than previous of 9.5 from April 2025.  No acute cardiopulmonary complaints, other chronic conditions are controlled.  Patient can hold the glimepiride on the morning of surgery, and hold aspirin a week before surgery, and the fish oil a week before surgery            Other    Mixed hyperlipidemia    Continue rosuvastatin

## 2025-05-05 NOTE — ASSESSMENT & PLAN NOTE
Most recent A1c April 10 was 9.5, patient reports he is only taking the Rybelsus 14 mg half a tablet daily, and the glimepiride 2 mg once a day.  Patient reports having tolerability issues with metformin in the past.  Patient also follows with endocrinology.  Hemoglobin A1c has been up much higher in the past, so he is making some progress.  A1c still elevated 9.3, slightly better, recommend increasing glimepiride from 2 mg daily to 2 mg twice a day, and follow-up with endocrinology  Lab Results   Component Value Date    HGBA1C 9.5 (H) 04/10/2025     Orders:  •  POCT hemoglobin A1c

## 2025-05-05 NOTE — PROGRESS NOTES
Name: Sergio Ahumada      : 1960      MRN: [id number]  Encounter Provider: Steve Nguyen MD  Encounter Date: 2025   Encounter department: [facility]  :  Assessment & Plan  Preop exam for internal medicine  Patient is medically cleared for surgery, his diabetes is not optimally controlled, but is better than in the past, he does follow with endocrinology, will recheck A1c to see if A1c is now better than previous of 9.5 from 2025.  No acute cardiopulmonary complaints, other chronic conditions are controlled.  Patient can hold the glimepiride on the morning of surgery, and hold aspirin a week before surgery, and the fish oil a week before surgery       Type 2 diabetes mellitus with hyperglycemia, without long-term current use of insulin ([facility])  Most recent A1c April 10 was 9.5, patient reports he is only taking the Rybelsus 14 mg half a tablet daily, and the glimepiride 2 mg once a day.  Patient reports having tolerability issues with metformin in the past.  Patient also follows with endocrinology.  Hemoglobin A1c has been up much higher in the past, so he is making some progress.  A1c still elevated 9.3, slightly better, recommend increasing glimepiride from 2 mg daily to 2 mg twice a day, and follow-up with endocrinology  Lab Results   Component Value Date    HGBA1C 9.5 (H) 04/10/2025     Orders:  •  POCT hemoglobin A1c    Mixed hyperlipidemia  Continue rosuvastatin       Essential hypertension  Slightly elevated at first, then came down to normal range, continue blood pressure meds              History of Present Illness   Patient here for preop medical clearance for kidney stone surgery.  No acute cardiopulmonary complaints, no chest pain, no shortness of breath, no lightheadedness.  No problems with anesthesia in the past    Pre-op Exam    Pre-operative Risk Factors:    History of cerebrovascular disease: Yes  History of ischemic heart disease: No  Pre-operative treatment  with insulin: No  Pre-operative creatinine >2 mg/dL: No    History of congestive heart failure: No    Review of Systems   Constitutional:  Negative for chills, fatigue and fever.   HENT:  Negative for congestion, nosebleeds, postnasal drip, sore throat and trouble swallowing.    Eyes:  Negative for pain.   Respiratory:  Negative for cough, chest tightness, shortness of breath and wheezing.    Cardiovascular:  Negative for chest pain, palpitations and leg swelling.   Gastrointestinal:  Negative for abdominal pain, constipation, diarrhea, nausea and vomiting.   Endocrine: Negative for polydipsia and polyuria.   Genitourinary:  Negative for dysuria, flank pain and hematuria.   Musculoskeletal:  Negative for arthralgias.   Skin:  Negative for rash.   Neurological:  Negative for dizziness, tremors, light-headedness and headaches.   Hematological:  Does not bruise/bleed easily.   Psychiatric/Behavioral:  Negative for confusion and dysphoric mood. The patient is not nervous/anxious.        Objective   /80   Pulse 66   Temp (!) 97 °F (36.1 °C) (Tympanic)   Wt 124 kg (273 lb)   SpO2 97%   BMI 35.05 kg/m²      Physical Exam  Vitals reviewed.   Constitutional:       General: He is not in acute distress.     Appearance: Normal appearance. He is well-developed.   HENT:      Head: Normocephalic and atraumatic.      Right Ear: External ear normal.      Left Ear: External ear normal.      Nose: Nose normal.   Eyes:      General: No scleral icterus.     Conjunctiva/sclera: Conjunctivae normal.   Neck:      Trachea: No tracheal deviation.   Cardiovascular:      Rate and Rhythm: Normal rate and regular rhythm.      Heart sounds: Normal heart sounds.   Pulmonary:      Effort: Pulmonary effort is normal. No respiratory distress.      Breath sounds: Normal breath sounds. No wheezing or rales.   Musculoskeletal:      Cervical back: Normal range of motion and neck supple.      Right lower leg: No edema.      Left lower leg: No  edema.   Lymphadenopathy:      Cervical: No cervical adenopathy.   Skin:     Coloration: Skin is not jaundiced or pale.   Neurological:      General: No focal deficit present.      Mental Status: He is alert and oriented to person, place, and time.   Psychiatric:         Mood and Affect: Mood normal.         Behavior: Behavior normal.         Thought Content: Thought content normal.         Judgment: Judgment normal.

## 2025-05-05 NOTE — LETTER
May 5, 2025     Garfield Green MD  [address]  [address]  [place] [address]    Patient: Sergio Ahumada   YOB: 1960   Date of Visit: 2025       Dear Dr. Garfield Green MD:    Thank you for referring Sergio Ahumada to me for evaluation. Below are my notes for this consultation.    If you have questions, please do not hesitate to call me. I look forward to following your patient along with you.         Sincerely,        Steve Nguyen MD        CC: No Recipients    Steve Nguyen MD  2025  1:36 PM  Incomplete  Name: Sergio Ahumada      : 1960      MRN: [id number]  Encounter Provider: Steve Nguyen MD  Encounter Date: 2025   Enc[facility]  :  Assessment & Plan  Type 2 diabetes mellitus with hyperglycemia, without long-term current use of insulin ([facility])  Most recent A1c April 10 was 9.5, patient reports he is only taking the Rybelsus 14 mg half a tablet daily, and the glimepiride 2 mg once a day.  Patient reports having tolerability issues with metformin in the past.  Patient also follows with endocrinology.  Hemoglobin A1c has been up much higher in the past, so he is making some progress.  Lab Results   Component Value Date    HGBA1C 9.5 (H) 04/10/2025       Orders:  •  POCT hemoglobin A1c    Mixed hyperlipidemia  Continue rosuvastatin         Essential hypertension  Slightly elevated at first, then came down to normal range, continue blood pressure meds         Preop exam for internal medicine  Patient is medically cleared for surgery, his diabetes is not optimally controlled, but is better than in the past, he does follow with endocrinology, will recheck A1c to see if A1c is now better than previous of 9.5 from 2025.  No acute cardiopulmonary complaints, other chronic conditions are controlled.  Patient can hold the glimepiride on the morning of surgery, and hold aspirin a week before surgery, and the fish oil a week  before surgery                History of Present Illness  Patient here for preop medical clearance for kidney stone surgery.  No acute cardiopulmonary complaints, no chest pain, no shortness of breath, no lightheadedness.  No problems with anesthesia in the past    Pre-op Exam    Pre-operative Risk Factors:    History of cerebrovascular disease: Yes  History of ischemic heart disease: No  Pre-operative treatment with insulin: No  Pre-operative creatinine >2 mg/dL: No    History of congestive heart failure: No    Review of Systems   Constitutional:  Negative for chills, fatigue and fever.   HENT:  Negative for congestion, nosebleeds, postnasal drip, sore throat and trouble swallowing.    Eyes:  Negative for pain.   Respiratory:  Negative for cough, chest tightness, shortness of breath and wheezing.    Cardiovascular:  Negative for chest pain, palpitations and leg swelling.   Gastrointestinal:  Negative for abdominal pain, constipation, diarrhea, nausea and vomiting.   Endocrine: Negative for polydipsia and polyuria.   Genitourinary:  Negative for dysuria, flank pain and hematuria.   Musculoskeletal:  Negative for arthralgias.   Skin:  Negative for rash.   Neurological:  Negative for dizziness, tremors, light-headedness and headaches.   Hematological:  Does not bruise/bleed easily.   Psychiatric/Behavioral:  Negative for confusion and dysphoric mood. The patient is not nervous/anxious.        Objective  /80   Pulse 66   Temp (!) 97 °F (36.1 °C) (Tympanic)   Wt 124 kg (273 lb)   SpO2 97%   BMI 35.05 kg/m²      Physical Exam  Vitals reviewed.   Constitutional:       General: He is not in acute distress.     Appearance: Normal appearance. He is well-developed.   HENT:      Head: Normocephalic and atraumatic.      Right Ear: External ear normal.      Left Ear: External ear normal.      Nose: Nose normal.   Eyes:      General: No scleral icterus.     Conjunctiva/sclera: Conjunctivae normal.   Neck:      Trachea:  No tracheal deviation.   Cardiovascular:      Rate and Rhythm: Normal rate and regular rhythm.      Heart sounds: Normal heart sounds.   Pulmonary:      Effort: Pulmonary effort is normal. No respiratory distress.      Breath sounds: Normal breath sounds. No wheezing or rales.   Musculoskeletal:      Cervical back: Normal range of motion and neck supple.      Right lower leg: No edema.      Left lower leg: No edema.   Lymphadenopathy:      Cervical: No cervical adenopathy.   Skin:     Coloration: Skin is not jaundiced or pale.   Neurological:      General: No focal deficit present.      Mental Status: He is alert and oriented to person, place, and time.   Psychiatric:         Mood and Affect: Mood normal.         Behavior: Behavior normal.         Thought Content: Thought content normal.         Judgment: Judgment normal.

## 2025-05-05 NOTE — ASSESSMENT & PLAN NOTE
Patient is medically cleared for surgery, his diabetes is not optimally controlled, but is better than in the past, he does follow with endocrinology, will recheck A1c to see if A1c is now better than previous of 9.5 from April 2025.  No acute cardiopulmonary complaints, other chronic conditions are controlled.  Patient can hold the glimepiride on the morning of surgery, and hold aspirin a week before surgery, and the fish oil a week before surgery

## 2025-05-07 LAB — SL AMB POCT HEMOGLOBIN AIC: 9.3 (ref ?–6.5)

## 2025-05-13 ENCOUNTER — PATIENT OUTREACH (OUTPATIENT)
Dept: CASE MANAGEMENT | Facility: OTHER | Age: 65
End: 2025-05-13

## 2025-05-13 ENCOUNTER — APPOINTMENT (OUTPATIENT)
Dept: LAB | Facility: CLINIC | Age: 65
End: 2025-05-13
Attending: UROLOGY
Payer: MEDICARE

## 2025-05-13 DIAGNOSIS — R73.09 HEMOGLOBIN A1C GREATER THAN 9%, INDICATING POOR DIABETIC CONTROL: Primary | ICD-10-CM

## 2025-05-13 LAB
EST. AVERAGE GLUCOSE BLD GHB EST-MCNC: 206 MG/DL
HBA1C MFR BLD: 8.8 %

## 2025-05-13 PROCEDURE — 36415 COLL VENOUS BLD VENIPUNCTURE: CPT

## 2025-05-13 PROCEDURE — 83036 HEMOGLOBIN GLYCOSYLATED A1C: CPT

## 2025-05-13 NOTE — PROGRESS NOTES
Outpatient Care Management Note:    New Diabetic Referral received. Patient's last IrR9Z=9/3 on 5/7.     Diabetes:        Type: type 2         Blood sugar glucometer: yes              Insulin Pump: NA    HgbA1c: 9/3 on 5/7/25    Follow up appointments:        PCP: 6/5/25           CM called Stuart and introduced myself. We discussed his elevated HbA1C.  Stuart states that he is working to improve his blood sugars, because he needs surgery to remove a ureteral stent with urology.     He is currently taking his glimepiride 2 mg twice a day as instructed by his PCP. He states that at times he falls off his good diet, but he knows what to eat and eats pretty healthy.  He gets exercise through his work.     He is very frustrated that urology may not do his surgery until his HbA1C is improved. He had a repeat HbA1C completed today.      He will talk with his wife and consider if he has the time to commit to a diabetic education program. He requested CM email him my contact information. He will call me back with an update.     Email sent to [email or web address].

## 2025-05-15 ENCOUNTER — PATIENT OUTREACH (OUTPATIENT)
Dept: CASE MANAGEMENT | Facility: OTHER | Age: 65
End: 2025-05-15

## 2025-05-15 NOTE — PROGRESS NOTES
Outpatient Care Management Note:    Call received from Stuart. He is interested in working with CM to address his blood sugars but he wants to wait until after his upcoming urology procedure. He requested I call back around 6/6.

## 2025-05-16 DIAGNOSIS — M25.569 CHRONIC KNEE PAIN, UNSPECIFIED LATERALITY: Primary | ICD-10-CM

## 2025-05-16 DIAGNOSIS — G89.29 CHRONIC KNEE PAIN, UNSPECIFIED LATERALITY: Primary | ICD-10-CM

## 2025-05-22 ENCOUNTER — APPOINTMENT (OUTPATIENT)
Dept: RADIOLOGY | Age: 65
End: 2025-05-22
Attending: STUDENT IN AN ORGANIZED HEALTH CARE EDUCATION/TRAINING PROGRAM
Payer: MEDICARE

## 2025-05-22 ENCOUNTER — OFFICE VISIT (OUTPATIENT)
Dept: OBGYN CLINIC | Facility: CLINIC | Age: 65
End: 2025-05-22
Attending: INTERNAL MEDICINE

## 2025-05-22 ENCOUNTER — ANESTHESIA EVENT (OUTPATIENT)
Dept: PERIOP | Facility: HOSPITAL | Age: 65
End: 2025-05-22
Payer: MEDICARE

## 2025-05-22 VITALS — BODY MASS INDEX: 35.04 KG/M2 | HEIGHT: 74 IN | WEIGHT: 273 LBS

## 2025-05-22 DIAGNOSIS — M17.0 BILATERAL PRIMARY OSTEOARTHRITIS OF KNEE: ICD-10-CM

## 2025-05-22 DIAGNOSIS — M25.562 PAIN IN BOTH KNEES, UNSPECIFIED CHRONICITY: ICD-10-CM

## 2025-05-22 DIAGNOSIS — M25.562 PAIN IN BOTH KNEES, UNSPECIFIED CHRONICITY: Primary | ICD-10-CM

## 2025-05-22 DIAGNOSIS — M25.561 PAIN IN BOTH KNEES, UNSPECIFIED CHRONICITY: Primary | ICD-10-CM

## 2025-05-22 DIAGNOSIS — M25.561 PAIN IN BOTH KNEES, UNSPECIFIED CHRONICITY: ICD-10-CM

## 2025-05-22 PROCEDURE — 73564 X-RAY EXAM KNEE 4 OR MORE: CPT

## 2025-05-22 RX ORDER — ROPIVACAINE HYDROCHLORIDE 5 MG/ML
2 INJECTION, SOLUTION EPIDURAL; INFILTRATION; PERINEURAL
Status: COMPLETED | OUTPATIENT
Start: 2025-05-22 | End: 2025-05-22

## 2025-05-22 RX ORDER — BETAMETHASONE SODIUM PHOSPHATE AND BETAMETHASONE ACETATE 3; 3 MG/ML; MG/ML
2 INJECTION, SUSPENSION INTRA-ARTICULAR; INTRALESIONAL; INTRAMUSCULAR; SOFT TISSUE
Status: COMPLETED | OUTPATIENT
Start: 2025-05-22 | End: 2025-05-22

## 2025-05-22 RX ADMIN — ROPIVACAINE HYDROCHLORIDE 2 ML: 5 INJECTION, SOLUTION EPIDURAL; INFILTRATION; PERINEURAL at 09:15

## 2025-05-22 RX ADMIN — BETAMETHASONE SODIUM PHOSPHATE AND BETAMETHASONE ACETATE 2 MG: 3; 3 INJECTION, SUSPENSION INTRA-ARTICULAR; INTRALESIONAL; INTRAMUSCULAR; SOFT TISSUE at 09:15

## 2025-05-22 NOTE — PROGRESS NOTES
Date: 25  Sergio Ahumada   MRN# [id number]  : 1960      Chief Complaint: Bilateral knee pain  Assessment & Plan  Pain in both knees, unspecified chronicity    Orders:    XR knee 4+ vw right injury; Future    XR knee 4+ vw left injury; Future    Bilateral primary osteoarthritis of knee  -Right >Left   -WBAT  -Activity modification to limit strain or impact on the joint  -Tylenol 1000mg up to three times daily as needed. Do not exceed 3000mg daily  -Supervised physical therapy. Script provided   -Home exercise program directed by PT  -Weight loss discussed   -Knee sleeve or brace for comfort  -Cane or walker recommended to offload joint  -Corticosteroid injection was offered, accepted, and administered.  Risk benefits and alternatives were discussed prior to injection.  Patient tolerated the procedure well   -Patient may follow up prn for further evaluation and treatment               Subjective:     Knee Pain  Patient is referred to the clinic from his PCP, Dr. Steve Nguyen. Patient complains of bilateral knee pain. This is evaluated as a personal injury. The pain began several years ago. The pain is located medial.  He describes the symptoms as sharp, shooting, and stabbing. Symptoms improve with rest, heat, ice, physical therapy, avoiding painful activities, cortisone injection. The symptoms are worse with activity, stair climbing, weight bearing, sitting for prolonged periods of time. The knee has not given out or felt unstable. The patient can bend and straighten the knee fully.  The patient is active in none. Treatment to date has been ice, heat, Tylenol, NSAID's, knee sleeve/brace, cortisone injection, therapy, without significant relief.    Orthopedic PMHx  Previously medial mensicus arthroscopy about 15 years ago-Dr Islas    External Records Reviewed: historical medical records    Allergy:  Allergies[1]  Medications:  all current active meds have been reviewed  Past Medical History:  Past  "Medical History[2]  Past Surgical History:  Past Surgical History[3]  Family History:  Family History[4]  Social History:  Social History     Substance and Sexual Activity   Alcohol Use Yes    Alcohol/week: 14.0 standard drinks of alcohol    Types: 14 Standard drinks or equivalent per week     Social History     Substance and Sexual Activity   Drug Use Never     Tobacco Use History[5]        ROS:   Review of Systems   Constitutional:  Negative for chills and fever.   HENT:  Negative for ear pain and sore throat.    Eyes:  Negative for pain and visual disturbance.   Respiratory:  Negative for cough and shortness of breath.    Cardiovascular:  Negative for chest pain and palpitations.   Gastrointestinal:  Negative for abdominal pain and vomiting.   Genitourinary:  Negative for dysuria and hematuria.   Musculoskeletal:  Negative for arthralgias and back pain.   Skin:  Negative for color change and rash.   Neurological:  Negative for seizures and syncope.   All other systems reviewed and are negative.       Objective:   BP Readings from Last 1 Encounters:   05/05/25 136/80      Wt Readings from Last 1 Encounters:   05/22/25 124 kg (273 lb)      Pulse Readings from Last 1 Encounters:   05/05/25 66      BMI: Estimated body mass index is 35.05 kg/m² as calculated from the following:    Height as of this encounter: 6' 2\" (1.88 m).    Weight as of this encounter: 124 kg (273 lb).        Physical Exam  Constitutional:       General: He is not in acute distress.  HENT:      Head: Normocephalic and atraumatic.     Eyes:      Conjunctiva/sclera: Conjunctivae normal.       Cardiovascular:      Comments: Extremities well perfused   No LE edema    Pulmonary:      Effort: Pulmonary effort is normal.   Abdominal:      General: Abdomen is flat. Bowel sounds are normal.     Neurological:      Mental Status: He is alert. Mental status is at baseline.          Gait and Station:   Antalgic    Bilateral Lower Extremity:  Left Knee:      " Inspection:  normal color, temperature, turgor and moisture    Overall limb alignment: varus    Effusion: no    ROM 0 to 120 without pain    Extensor Lag: Absent    Palpation: no tenderness to palpation    stable to AP translation at 90 deg    Coronal plane stable in full extension    Coronal plane unstable in mid-flexion     Motor: 5/5 EHL/FHL/TA/GS/Qd/Hs    Vascular: Toes WWP with BCR    Sensory: SILT DP/SP/Irene/Saph/Tib    Right knee:    Inspection:  normal color, temperature, turgor and moisture    Overall limb alignment: varus    Effusion: no    ROM 0 to 120 without pain    Extensor Lag: Absent    Palpation: Medial joint line tenderness to palpation    stable to AP translation at 90 deg    Coronal plane stable in full extension    Coronal plane unstable in mid-flexion     Motor: 5/5 EHL/FHL/TA/GS/Qd/Hs    Vascular: Toes WWP with BCR    Sensory: SILT DP/SP/Irene/Saph/Tib  Large joint arthrocentesis: R knee    Performed by: Lux Royal MD  Authorized by: Lux Royal MD    Universal Protocol:  Consent: Verbal consent obtained  Risks and benefits: risks, benefits and alternatives were discussed  Consent given by: patient  Patient understanding: patient states understanding of the procedure being performed  Patient identity confirmed: verbally with patient  Supporting Documentation  Indications: pain and joint swelling     Is this a Visco injection? NoProcedure Details  Location: knee - R knee  Needle size: 22 G  Ultrasound guidance: no  Approach: lateral  Medications administered: 2 mg betamethasone acetate-betamethasone sodium phosphate 6 (3-3) mg/mL; 2 mL ropivacaine 0.5 %    Patient tolerance: patient tolerated the procedure well with no immediate complications  Dressing:  Sterile dressing applied       Images:    I personally reviewed relevant images in the PACS system and my interpretation is as follows:  X-rays of the bilateral knee reveal moderate degenerative changes with subchondral sclerosis,  joint space narrowing, and osteophyte formation      I have spent a total time of 45 minutes in caring for this patient on the day of the visit/encounter including Diagnostic results, Prognosis, Risks and benefits of tx options, Instructions for management, Patient and family education, Importance of tx compliance, Risk factor reductions, Impressions, Counseling / Coordination of care, Documenting in the medical record, and Obtaining or reviewing history  .      Scribe Attestation      I,:  Chun Dillon am acting as a scribe while in the presence of the attending physician.:       I,:  Lux Royal MD personally performed the services described in this documentation    as scribed in my presence.:              Lux Royal MD  Adult Reconstruction Specialist   [facility]          [1]   Allergies  Allergen Reactions    Clindamycin Hives    Penicillins Hives     Ancef ok   [2]   Past Medical History:  Diagnosis Date    Allergic lifelong    penecillin    Arthritis     Back pain     Depression     Diabetes mellitus (HCC)     Gout     HTN (hypertension)     Hyperlipidemia     Hypertension     Kidney stone     Osteomyelitis (HCC) 2020    Seizures (HCC)     Not since age 10    Shingles     as a kid    Verruca     as a kid   [3]   Past Surgical History:  Procedure Laterality Date    COLONOSCOPY      CYSTOSCOPY W/ URETERAL STENT PLACEMENT Right 4/15/2025    Procedure: CYSTOSCOPY, RETROGRADE PYRLOGRAPHY, STENT INSERTION;  Surgeon: Garfield Green MD;  Location: [facility];  Service: Urology    FL RETROGRADE PYELOGRAM  4/15/2025    HAND SURGERY      HERNIA REPAIR      IR PICC PLACEMENT SINGLE LUMEN  12/08/2020    remove    ID RPR 1ST INGUN HRNA AGE 5 YRS/> REDUCIBLE Left 11/8/2024    Procedure: REPAIR HERNIA INGUINAL;  Surgeon: Yassine Maldonado MD;  [facility];  Service: General    ID RPR AA HERNIA 1ST < 3 CM REDUCIBLE N/A 11/8/2024    Procedure: REPAIR HERNIA UMBILICAL;   Surgeon: Yassine Maldonado MD;  [facility];  Service: General    LA RPR AA HERNIA 1ST < 3 CM REDUCIBLE N/A 2024    Procedure: REPAIR HERNIA VENTRAL;  Surgeon: Yassine Maldonado MD;  [facility];  Service: General    VASCULAR SURGERY      WOUND DEBRIDEMENT N/A 2020    Procedure: DEBRIDEMENT OF ANTERIOR MANDIBLE BONE BIOPSY, CULTURES AND REMOVAL OF IMPLANT #27, USE OF PRP;  Surgeon: Kiah Ordoñez DDS;  Location: [facility];  Service: Maxillofacial   [4]   Family History  Problem Relation Name Age of Onset    Cancer Mother Mother     Heart disease Mother Mother     Hypertension Mother Mother     Insomnia Mother Mother     Diabetes Mother Mother     Arthritis Mother Mother     Kidney disease Maternal Uncle      Heart disease Father BALAJI BERMUDEZ     Restless legs syndrome Father BALAJI BERMUDEZ     Sleep apnea Brother MOON BERMUDEZ    [5]   Social History  Tobacco Use   Smoking Status Former    Current packs/day: 0.00    Average packs/day: 2.0 packs/day for 21.1 years (42.2 ttl pk-yrs)    Types: Cigarettes    Start date: 1978    Quit date: 2000    Years since quittin.3   Smokeless Tobacco Never

## 2025-05-22 NOTE — PRE-PROCEDURE INSTRUCTIONS
Pre-Surgery Instructions:   Medication Instructions    allopurinol (ZYLOPRIM) 100 mg tablet Take day of surgery.    buPROPion (WELLBUTRIN XL) 150 mg 24 hr tablet Take day of surgery.    Cinnamon 500 MG TABS Stop taking 7 days prior to surgery.    glimepiride (AMARYL) 2 mg tablet Hold day of surgery.    glucosamine-chondroitin 500-400 MG tablet Stop taking 7 days prior to surgery.    losartan (COZAAR) 100 MG tablet Hold day of surgery.    metoprolol-hydrochlorothiazide (LOPRESSOR HCT) 100-25 MG per tablet Hold day of surgery.    Omega-3 Fatty Acids (Omega-3 Fish Oil) 1000 MG CAPS Stop taking 7 days prior to surgery.    rosuvastatin (CRESTOR) 20 MG tablet Take day of surgery.    semaglutide (Rybelsus) 14 MG tablet Hold day of surgery.    tamsulosin (FLOMAX) 0.4 mg      Aspirin 325 mg  Take night before surgery    Hold 7 days prior to surgery per Dr Nguyen       Medication instructions for day of surgery reviewed. Please take all instructed medications with only a sip of water.       You will receive a call one business day prior to surgery with an arrival time and hospital directions. If your surgery is scheduled on a Monday, the hospital will be calling you on the Friday prior to your surgery. If you have not heard from anyone by 8pm, please call the hospital supervisor through the hospital  at [phone]. ([place] [phone] or [facility] [phone]).    Do not eat or drink anything after midnight the night before your surgery, including candy, mints, lifesavers, or chewing gum. Do not drink alcohol 24hrs before your surgery. Try not to smoke at least 24hrs before your surgery.       Follow the pre surgery showering instructions as listed in the “My Surgical Experience Booklet” or otherwise provided by your surgeon's office. Do not use a blade to shave the surgical area 1 week before surgery. It is okay to use a clean electric clippers up to 24 hours before surgery. Do not apply any lotions, creams,  including makeup, cologne, deodorant, or perfumes after showering on the day of your surgery. Do not use dry shampoo, hair spray, hair gel, or any type of hair products.     No contact lenses, eye make-up, or artificial eyelashes. Remove nail polish, including gel polish, and any artificial, gel, or acrylic nails if possible. Remove all jewelry including rings and body piercing jewelry.     Wear causal clothing that is easy to take on and off. Consider your type of surgery.    Keep any valuables, jewelry, piercings at home. Please bring any specially ordered equipment (sling, braces) if indicated.    Arrange for a responsible person to drive you to and from the hospital on the day of your surgery. Please confirm the visitor policy for the day of your procedure when you receive your phone call with an arrival time.     Call the surgeon's office with any new illnesses, exposures, or additional questions prior to surgery.    Please reference your “My Surgical Experience Booklet” for additional information to prepare for your upcoming surgery.

## 2025-05-29 ENCOUNTER — RESULTS FOLLOW-UP (OUTPATIENT)
Dept: OBGYN CLINIC | Facility: CLINIC | Age: 65
End: 2025-05-29

## 2025-05-29 DIAGNOSIS — I10 ESSENTIAL HYPERTENSION: ICD-10-CM

## 2025-05-29 RX ORDER — METOPROLOL TARTRATE AND HYDROCHLOROTHIAZIDE 100; 25 MG/1; MG/1
0.5 TABLET ORAL DAILY
Qty: 45 TABLET | Refills: 1 | Status: SHIPPED | OUTPATIENT
Start: 2025-05-29

## 2025-05-29 RX ORDER — LOSARTAN POTASSIUM 100 MG/1
100 TABLET ORAL DAILY
Qty: 90 TABLET | Refills: 1 | Status: SHIPPED | OUTPATIENT
Start: 2025-05-29

## 2025-06-02 PROCEDURE — NC001 PR NO CHARGE: Performed by: UROLOGY

## 2025-06-02 NOTE — ASSESSMENT & PLAN NOTE
Here for right ureteroscopy with laser lithotripsy.  Ready for surgery.  Marked on his right arm.    Proceed to right ureteroscopy with laser lithotripsy and all decayed procedures

## 2025-06-02 NOTE — DISCHARGE INSTR - AVS FIRST PAGE
Don,    Today you underwent ureteroscopy with laser lithotripsy for destruction of stones and unblocking of your kidney and ureter.  This went well.  After surgery like this it is not uncommon to have urgency and frequency of urination along with some blood in the urine.  You may also feel some discomfort in your kidney when urinating.    Please remove your stent in 5 days.  This can be done by removing the clear dressing and then pulling on the black thread to remove a long green tube.  In some cases we do leave the stent for a longer period of time and if you do not see a string coming out of your urethra and our office will call you to arrange for ureteral stent removal by way of cystoscopy and ureteral stent removal in an office setting under local anesthesia.    Please stay well-hydrated as you recover.  We have given you medications to make your recovery less bothersome.    We wish you a rapid and uneventful recovery,    Dr. Green

## 2025-06-02 NOTE — H&P
H&P - Urology   Name: Sergio Ahumada 64 y.o. male I MRN: [id number]  Unit/Bed#:  I Date of Admission: (Not on file)   Date of Service: 6/2/2025 I Hospital Day: 0     Assessment & Plan  Right ureteral stone  Here for right ureteroscopy with laser lithotripsy.  Ready for surgery.  Marked on his right arm.    Proceed to right ureteroscopy with laser lithotripsy and all decayed procedures    History of Present Illness   Sergio Ahumada is a 64 y.o. male who presents with a right ureteral stone.  He is status post ureteral stent placement previously.  Attempts have been made to optimize his hemoglobin A1c.  While this is still above 8, this represents a lower value than previous values and this is likely his optimized hemoglobin A1c.    This being true, this does place him at increased risk for infectious complications after any surgery or ureteroscopic stone intervention.    The following portions of the patient's history were reviewed and updated as appropriate: allergies, current medications, past family history, past medical history, past social history, past surgical history and problem list..    Review of Systems   Constitutional: Negative.    HENT: Negative.     Eyes: Negative.    Respiratory: Negative.     Cardiovascular: Negative.    Gastrointestinal: Negative.    Endocrine: Negative.    Genitourinary: Negative.    Musculoskeletal: Negative.    Skin: Negative.    Allergic/Immunologic: Negative.    Neurological: Negative.    Hematological: Negative.    Psychiatric/Behavioral: Negative.           Objective :       I/O       None            Physical Exam  Vitals reviewed.   Constitutional:       General: He is not in acute distress.     Appearance: Normal appearance. He is well-developed. He is not ill-appearing, toxic-appearing or diaphoretic.   HENT:      Head: Normocephalic and atraumatic.      Nose: Nose normal.      Mouth/Throat:      Mouth: Mucous membranes are moist.     Eyes:      General: No scleral icterus.   "      Right eye: No discharge.         Left eye: No discharge.     Neck:      Thyroid: No thyromegaly.      Trachea: No tracheal deviation.   Pulmonary:      Effort: Pulmonary effort is normal.   Chest:      Chest wall: No tenderness.   Abdominal:      General: There is no distension.      Palpations: There is no mass.      Tenderness: There is no abdominal tenderness. There is no guarding.      Hernia: No hernia is present.     Musculoskeletal:         General: No deformity or signs of injury. Normal range of motion.      Cervical back: Normal range of motion and neck supple.   Lymphadenopathy:      Cervical: No cervical adenopathy.     Skin:     General: Skin is dry.      Coloration: Skin is not jaundiced or pale.      Findings: No erythema.     Neurological:      Mental Status: He is alert and oriented to person, place, and time.      Cranial Nerves: No cranial nerve deficit.      Motor: No abnormal muscle tone.      Coordination: Coordination normal.     Psychiatric:         Mood and Affect: Mood normal.         Behavior: Behavior normal.         Thought Content: Thought content normal.         Judgment: Judgment normal.            Lab Results: I have reviewed the following results:  No results for input(s): \"WBC\", \"HGB\", \"HCT\", \"PLT\", \"BANDSPCT\", \"SODIUM\", \"K\", \"CL\", \"CO2\", \"BUN\", \"CREATININE\", \"GLUC\", \"CAIONIZED\", \"MG\", \"PHOS\", \"AST\", \"ALT\", \"ALB\", \"TBILI\", \"DBILI\", \"ALKPHOS\", \"PTT\", \"INR\" in the last 72 hours.  Lab Results   Component Value Date    COLORU Light Yellow 03/14/2025    COLORU Yellow 07/18/2017    CLARITYU Clear 03/14/2025    CLARITYU Transparent 07/18/2017    SPECGRAV 1.027 03/14/2025    SPECGRAV 1.015 07/18/2017    PHUR 6.0 03/14/2025    PHUR 7.0 07/18/2017    LEUKOCYTESUR (A) 03/14/2025     Elevated glucose may cause decreased leukocyte values. See urine microscopic for UWBC result    LEUKOCYTESUR Neg 07/18/2017    NITRITE Negative 03/14/2025    NITRITE Neg 07/18/2017    PROTEINUA Neg " 07/18/2017    GLUCOSEU >=1000 (1%) (A) 03/14/2025    KETONESU 10 (1+) (A) 03/14/2025    KETONESU Neg 07/18/2017    BILIRUBINUR Negative 03/14/2025    BILIRUBINUR Neg 07/18/2017    BLOODU Moderate (A) 03/14/2025    BLOODU Large 07/18/2017   ,   Lab Results   Component Value Date    URINECX No Growth <1000 cfu/mL 05/01/2025             VTE Prophylaxis: Sequential compression device (Venodyne)

## 2025-06-03 ENCOUNTER — TELEPHONE (OUTPATIENT)
Dept: UROLOGY | Facility: CLINIC | Age: 65
End: 2025-06-03

## 2025-06-03 ENCOUNTER — HOSPITAL ENCOUNTER (OUTPATIENT)
Facility: HOSPITAL | Age: 65
Setting detail: OUTPATIENT SURGERY
Discharge: HOME | End: 2025-06-03
Attending: UROLOGY | Admitting: UROLOGY
Payer: MEDICARE

## 2025-06-03 ENCOUNTER — APPOINTMENT (OUTPATIENT)
Dept: RADIOLOGY | Facility: HOSPITAL | Age: 65
End: 2025-06-03
Payer: MEDICARE

## 2025-06-03 ENCOUNTER — ANESTHESIA (OUTPATIENT)
Dept: PERIOP | Facility: HOSPITAL | Age: 65
End: 2025-06-03
Payer: MEDICARE

## 2025-06-03 VITALS
RESPIRATION RATE: 16 BRPM | TEMPERATURE: 98.6 F | HEART RATE: 64 BPM | WEIGHT: 270 LBS | SYSTOLIC BLOOD PRESSURE: 165 MMHG | OXYGEN SATURATION: 96 % | DIASTOLIC BLOOD PRESSURE: 79 MMHG | BODY MASS INDEX: 33.57 KG/M2 | HEIGHT: 75 IN

## 2025-06-03 DIAGNOSIS — N20.1 RIGHT URETERAL STONE: Primary | ICD-10-CM

## 2025-06-03 LAB
GLUCOSE SERPL-MCNC: 137 MG/DL (ref 65–140)
GLUCOSE SERPL-MCNC: 158 MG/DL (ref 65–140)

## 2025-06-03 PROCEDURE — C1758 CATHETER, URETERAL: HCPCS | Performed by: UROLOGY

## 2025-06-03 PROCEDURE — 82360 CALCULUS ASSAY QUANT: CPT | Performed by: UROLOGY

## 2025-06-03 PROCEDURE — 52332 CYSTOSCOPY AND TREATMENT: CPT | Performed by: UROLOGY

## 2025-06-03 PROCEDURE — C1769 GUIDE WIRE: HCPCS | Performed by: UROLOGY

## 2025-06-03 PROCEDURE — 82948 REAGENT STRIP/BLOOD GLUCOSE: CPT

## 2025-06-03 PROCEDURE — C2625 STENT, NON-COR, TEM W/DEL SY: HCPCS | Performed by: UROLOGY

## 2025-06-03 PROCEDURE — 74420 UROGRAPHY RTRGR +-KUB: CPT

## 2025-06-03 PROCEDURE — 52352 CYSTOURETERO W/STONE REMOVE: CPT | Performed by: UROLOGY

## 2025-06-03 DEVICE — INLAY OPTIMA URETERAL STENT W/O GUIDEWIRE
Type: IMPLANTABLE DEVICE | Site: URETER | Status: FUNCTIONAL
Brand: BARD® INLAY OPTIMA® URETERAL STENT

## 2025-06-03 RX ORDER — HYDROMORPHONE HCL/PF 1 MG/ML
0.5 SYRINGE (ML) INJECTION EVERY 2 HOUR PRN
Status: DISCONTINUED | OUTPATIENT
Start: 2025-06-03 | End: 2025-06-03 | Stop reason: HOSPADM

## 2025-06-03 RX ORDER — OXYCODONE HYDROCHLORIDE 5 MG/1
10 TABLET ORAL EVERY 4 HOURS PRN
Status: DISCONTINUED | OUTPATIENT
Start: 2025-06-03 | End: 2025-06-03 | Stop reason: HOSPADM

## 2025-06-03 RX ORDER — OXYCODONE HYDROCHLORIDE 5 MG/1
5 TABLET ORAL EVERY 4 HOURS PRN
Status: DISCONTINUED | OUTPATIENT
Start: 2025-06-03 | End: 2025-06-03 | Stop reason: HOSPADM

## 2025-06-03 RX ORDER — ONDANSETRON 2 MG/ML
INJECTION INTRAMUSCULAR; INTRAVENOUS AS NEEDED
Status: DISCONTINUED | OUTPATIENT
Start: 2025-06-03 | End: 2025-06-03

## 2025-06-03 RX ORDER — ONDANSETRON 2 MG/ML
4 INJECTION INTRAMUSCULAR; INTRAVENOUS ONCE AS NEEDED
Status: DISCONTINUED | OUTPATIENT
Start: 2025-06-03 | End: 2025-06-03 | Stop reason: HOSPADM

## 2025-06-03 RX ORDER — MIDAZOLAM HYDROCHLORIDE 2 MG/2ML
INJECTION, SOLUTION INTRAMUSCULAR; INTRAVENOUS AS NEEDED
Status: DISCONTINUED | OUTPATIENT
Start: 2025-06-03 | End: 2025-06-03

## 2025-06-03 RX ORDER — FENTANYL CITRATE/PF 50 MCG/ML
25 SYRINGE (ML) INJECTION
Refills: 0 | Status: DISCONTINUED | OUTPATIENT
Start: 2025-06-03 | End: 2025-06-03 | Stop reason: HOSPADM

## 2025-06-03 RX ORDER — DICLOFENAC POTASSIUM 50 MG/1
50 TABLET, FILM COATED ORAL 2 TIMES DAILY
Qty: 10 TABLET | Refills: 0 | Status: SHIPPED | OUTPATIENT
Start: 2025-06-03 | End: 2025-06-08

## 2025-06-03 RX ORDER — ACETAMINOPHEN 325 MG/1
650 TABLET ORAL EVERY 6 HOURS SCHEDULED
Status: DISCONTINUED | OUTPATIENT
Start: 2025-06-03 | End: 2025-06-03 | Stop reason: HOSPADM

## 2025-06-03 RX ORDER — SULFAMETHOXAZOLE AND TRIMETHOPRIM 800; 160 MG/1; MG/1
1 TABLET ORAL EVERY 12 HOURS SCHEDULED
Qty: 2 TABLET | Refills: 0 | Status: SHIPPED | OUTPATIENT
Start: 2025-06-03 | End: 2025-06-04

## 2025-06-03 RX ORDER — SODIUM CHLORIDE, SODIUM LACTATE, POTASSIUM CHLORIDE, CALCIUM CHLORIDE 600; 310; 30; 20 MG/100ML; MG/100ML; MG/100ML; MG/100ML
INJECTION, SOLUTION INTRAVENOUS CONTINUOUS PRN
Status: DISCONTINUED | OUTPATIENT
Start: 2025-06-03 | End: 2025-06-03

## 2025-06-03 RX ORDER — PROPOFOL 10 MG/ML
INJECTION, EMULSION INTRAVENOUS AS NEEDED
Status: DISCONTINUED | OUTPATIENT
Start: 2025-06-03 | End: 2025-06-03

## 2025-06-03 RX ORDER — MAGNESIUM HYDROXIDE/ALUMINUM HYDROXICE/SIMETHICONE 120; 1200; 1200 MG/30ML; MG/30ML; MG/30ML
30 SUSPENSION ORAL EVERY 6 HOURS PRN
Status: DISCONTINUED | OUTPATIENT
Start: 2025-06-03 | End: 2025-06-03 | Stop reason: HOSPADM

## 2025-06-03 RX ORDER — CEFAZOLIN SODIUM 3 G/50ML
3000 SOLUTION INTRAVENOUS ONCE
Status: COMPLETED | OUTPATIENT
Start: 2025-06-03 | End: 2025-06-03

## 2025-06-03 RX ORDER — MAGNESIUM HYDROXIDE 1200 MG/15ML
LIQUID ORAL AS NEEDED
Status: DISCONTINUED | OUTPATIENT
Start: 2025-06-03 | End: 2025-06-03 | Stop reason: HOSPADM

## 2025-06-03 RX ORDER — PHENAZOPYRIDINE HYDROCHLORIDE 100 MG/1
200 TABLET, FILM COATED ORAL
Status: DISCONTINUED | OUTPATIENT
Start: 2025-06-03 | End: 2025-06-03 | Stop reason: HOSPADM

## 2025-06-03 RX ORDER — ONDANSETRON 2 MG/ML
4 INJECTION INTRAMUSCULAR; INTRAVENOUS EVERY 6 HOURS PRN
Status: DISCONTINUED | OUTPATIENT
Start: 2025-06-03 | End: 2025-06-03 | Stop reason: HOSPADM

## 2025-06-03 RX ORDER — LIDOCAINE HYDROCHLORIDE 10 MG/ML
INJECTION, SOLUTION EPIDURAL; INFILTRATION; INTRACAUDAL; PERINEURAL AS NEEDED
Status: DISCONTINUED | OUTPATIENT
Start: 2025-06-03 | End: 2025-06-03

## 2025-06-03 RX ORDER — SODIUM CHLORIDE, SODIUM LACTATE, POTASSIUM CHLORIDE, CALCIUM CHLORIDE 600; 310; 30; 20 MG/100ML; MG/100ML; MG/100ML; MG/100ML
100 INJECTION, SOLUTION INTRAVENOUS CONTINUOUS
Status: DISCONTINUED | OUTPATIENT
Start: 2025-06-03 | End: 2025-06-03 | Stop reason: HOSPADM

## 2025-06-03 RX ORDER — DEXAMETHASONE SODIUM PHOSPHATE 10 MG/ML
INJECTION, SOLUTION INTRAMUSCULAR; INTRAVENOUS AS NEEDED
Status: DISCONTINUED | OUTPATIENT
Start: 2025-06-03 | End: 2025-06-03

## 2025-06-03 RX ORDER — FENTANYL CITRATE 50 UG/ML
INJECTION, SOLUTION INTRAMUSCULAR; INTRAVENOUS AS NEEDED
Status: DISCONTINUED | OUTPATIENT
Start: 2025-06-03 | End: 2025-06-03

## 2025-06-03 RX ORDER — ACETAMINOPHEN 500 MG
500 TABLET ORAL EVERY 6 HOURS
Qty: 20 TABLET | Refills: 0 | Status: SHIPPED | OUTPATIENT
Start: 2025-06-03 | End: 2025-06-08

## 2025-06-03 RX ORDER — LABETALOL HYDROCHLORIDE 5 MG/ML
5 INJECTION, SOLUTION INTRAVENOUS ONCE AS NEEDED
Status: DISCONTINUED | OUTPATIENT
Start: 2025-06-03 | End: 2025-06-03 | Stop reason: HOSPADM

## 2025-06-03 RX ADMIN — CEFAZOLIN SODIUM 3000 MG: 3 SOLUTION INTRAVENOUS at 13:23

## 2025-06-03 RX ADMIN — PROPOFOL 200 MG: 10 INJECTION, EMULSION INTRAVENOUS at 13:20

## 2025-06-03 RX ADMIN — MIDAZOLAM 2 MG: 1 INJECTION INTRAMUSCULAR; INTRAVENOUS at 13:17

## 2025-06-03 RX ADMIN — ONDANSETRON 4 MG: 2 INJECTION INTRAMUSCULAR; INTRAVENOUS at 13:20

## 2025-06-03 RX ADMIN — FENTANYL CITRATE 25 MCG: 50 INJECTION INTRAMUSCULAR; INTRAVENOUS at 13:20

## 2025-06-03 RX ADMIN — SODIUM CHLORIDE, SODIUM LACTATE, POTASSIUM CHLORIDE, AND CALCIUM CHLORIDE: .6; .31; .03; .02 INJECTION, SOLUTION INTRAVENOUS at 13:08

## 2025-06-03 RX ADMIN — FENTANYL CITRATE 75 MCG: 50 INJECTION INTRAMUSCULAR; INTRAVENOUS at 13:27

## 2025-06-03 RX ADMIN — DEXAMETHASONE SODIUM PHOSPHATE 10 MG: 10 INJECTION INTRAMUSCULAR; INTRAVENOUS at 13:20

## 2025-06-03 RX ADMIN — LIDOCAINE HYDROCHLORIDE 50 MG: 10 INJECTION, SOLUTION EPIDURAL; INFILTRATION; INTRACAUDAL at 13:20

## 2025-06-03 NOTE — ANESTHESIA POSTPROCEDURE EVALUATION
Post-Op Assessment Note    CV Status:  Stable  Pain Score: 0    Pain management: adequate       Mental Status:  Sleepy and arousable   Hydration Status:  Stable and euvolemic   PONV Controlled:  None   Airway Patency:  Patent and adequate  Two or more mitigation strategies used for obstructive sleep apnea   Post Op Vitals Reviewed: Yes    No anethesia notable event occurred.    Staff: CRNA           Last Filed PACU Vitals:  Vitals Value Taken Time   Temp 97.6F    Pulse 55    /79    Resp 15    SpO2 96%

## 2025-06-03 NOTE — INTERVAL H&P NOTE
H&P reviewed. After examining the patient I find no changes in the patients condition since the H&P had been written.    Vitals:    06/03/25 1202   BP: 168/85   Pulse: 63   Resp: 18   Temp: (!) 97 °F (36.1 °C)   SpO2: 97%     Proceed to right URS and LL

## 2025-06-03 NOTE — TELEPHONE ENCOUNTER
Status post ureteroscopy with laser lithotripsy and basket stone extraction.  Has a new, 6 Chinese by 28 cm ureteral stent.  Please have him remove this in 5 days.  He can get a 4 weeks renal ultrasound and see a advanced practitioner in 6 weeks, thank you

## 2025-06-03 NOTE — ANESTHESIA PREPROCEDURE EVALUATION
Procedure:  CYSTOSCOPY URETEROSCOPY WITH LITHOTRIPSY HOLMIUM LASER, RETROGRADE PYELOGRAM AND INSERTION STENT URETERAL (Right: Bladder)    Relevant Problems   CARDIO   (+) Essential hypertension   (+) Mixed hyperlipidemia   (+) PAD (peripheral artery disease) (HCC)      ENDO   (+) Type 2 diabetes mellitus with hyperglycemia (HCC)      MUSCULOSKELETAL   (+) Adhesive capsulitis of right shoulder   (+) Idiopathic chronic gout 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      NEURO/PSYCH   (+) Aphasia   (+) Seasonal affective disorder (HCC)   (+) TIA (transient ischemic attack)   (+) Visual disturbance      Other   (+) Osteomyelitis, jaw acute        Physical Exam    Airway     Mallampati score: II  TM Distance: >3 FB  Neck ROM: full      Cardiovascular      Dental       Pulmonary      Neurological      Other Findings        Anesthesia Plan  ASA Score- 3     Anesthesia Type- general with ASA Monitors.         Additional Monitors:     Airway Plan: LMA and LMA.           Plan Factors-Exercise tolerance (METS): >4 METS.    Chart reviewed.   Existing labs reviewed. Patient summary reviewed.                  Induction-     Postoperative Plan- Plan for postoperative opioid use.   Monitoring Plan - Monitoring plan - standard ASA monitoring  Post Operative Pain Plan - plan for postoperative opioid use and multimodal analgesia        Informed Consent- Anesthetic plan and risks discussed with patient.  I personally reviewed this patient with the CRNA. Discussed and agreed on the Anesthesia Plan with the CRNA..      NPO Status:  Vitals Value Taken Time   Date of last liquid 06/02/25 06/03/25 12:03   Time of last liquid 2230 06/03/25 12:03   Date of last solid 06/02/25 06/03/25 12:03   Time of last solid 2200 06/03/25 12:03

## 2025-06-03 NOTE — OP NOTE
OPERATIVE REPORT  PATIENT NAME: Sergio Ahumada    :  1960  MRN: [id number]  Pt Location: AN OR ROOM 01    SURGERY DATE: 6/3/2025    Surgeons and Role:     * Garfield Green MD - Primary    Preop Diagnosis:  Right ureteral stone [N20.1]    Post-Op Diagnosis Codes:     * Right ureteral stone [N20.1]    Procedure(s):  Right - CYSTOSCOPY URETEROSCOPY WITH STONE BASKET EXTRACTION. RETROGRADE PYELOGRAM AND INSERTION STENT URETERAL    Specimen(s):  ID Type Source Tests Collected by Time Destination   A : RIGHT URETERAL STONE Calculus Ureter, Right STONE ANALYSIS Garfield Green MD 6/3/2025 1345        Estimated Blood Loss:   Minimal    Drains:  Ureteral Internal Stent Right ureter 6 Fr. (Active)   Number of days: 49       Ureteral Internal Stent Right ureter 6 Fr. (Active)   Number of days: 0       Anesthesia Type:   General    Operative Indications:  Right ureteral stone [N20.1]      Operative Findings:  Right ureteral stone noted, delivered with 1 pass of a nitinol basket, a new, 6 Syriac by 28 cm ureteral stent was placed on a string, plan dwell time of 5 days       Complications:   None    Procedure and Technique:          PROCEDURES PERFORMED:  1) Cystoscopy  2) Right retrograde pyelography with fluoroscopic interpretation  3) Right ureteroscopy with basket extraction of stone  4) Right ureteral stent exchange (6F x 28cm)    SURGEON:  Garfield Green MD    ASSISTANTS:  None    NOTE:  There were no qualified teaching residents to assist with this case    ANESTHESIA: General     COMPLICATIONS:   None    ANTIBIOTICS:  ancef    INTRAOPERATIVE THROMBOEMBOLISM PROPHYLAXIS:  Pneumatic compression stockings       FINDINGS:    The Right calculus was not radiopaque on plain fluoroscopy.  Retrograde pyelogram was performed on the Right side using a 5 Fr open ended catheter.  Approximately 8 mL contrast was injected.    3. The following findings were noted: moderate hydronephrosis      INDICATIONS FOR PROCEDURE:  Sergio  "Brandyn is an 64 y.o. old male with a Right ureteral calculus s/p previous ureteral stent placement.  After discussing the options for treatment,  the patient elected to undergo ureteroscopy and ureteral stent placement with all indicated procedures.  We discussed the procedure in detail, the alternatives, and the risks, and they signed informed consent to proceed (these are outlined in the surgical consent form).    PROCEDURE IN DETAIL:     The patient was identified by name, date of birth, and MRN  and brought to the OR.  Antibiotic prophylaxis and DVT prophylaxis were administered as per the guidelines.  They were placed in the dorsal lithotomy position with care to pad all pressure points.  They were prepped and draped in the usual sterile fashion.  A surgical time out was performed with all in the room in agreement with the correct patient, procedure, indications, and laterality.  A 21-Yemeni rigid cystoscope was used to enter the bladder.  The bladder was inspected in its entirety and there were no lesions noted.  The ureteral orifices were identified in their normal orthotopic positions.     The Right ureteral orifice was identified and the stent was delivered per meatus.  A wire would not pass.  As such this was removed.  The cystoscope was replaced and a 5 Fr open ended catheter was placed into the ureteral orifice.   A retrograde pyelogram was performed with the findings as described above.  A wire was advanced up to the kidney under fluoroscopic guidance.  Leaving this safety wire in place, the bladder was drained.      A \"7.5 Fr semi-rigid ureteroscope was advanced up the ureter under vision .     The stone was encountered in the distal ureter location.  The stone was not noted to be impacted.  A 1.9 Yemeni zero tip nitinol basket was passed through the ureteroscope.  The stone was basketed out and removed. The ureteroscope was backed down the ureter under vision and there were no residual fragments and " the ureter was noted to be intact with no injury and mild edema where the stone had been located.   A 6 Fr x 28 cm JJ stent was then passed up the wire  under fluoroscopic guidance into the kidney with a good curl noted in the kidney and in the bladder.  The stent string was not removed. The bladder was drained.      The patient was placed back into the supine position, awakened from general anesthesia and brought to recovery room in stable condition.      ESTIMATED BLOOD LOSS:  Minimal      DRAINS:   Ureteral Internal Stent Right ureter 6 Fr. (Active)       Ureteral Internal Stent Right ureter 6 Fr. (Active)   Site Assessment [facility] 06/03/25 1352       SPECIMENS:   Order Name Source Comment Collection Info Order Time   STONE ANALYSIS Ureter, Right  Collected By: Garfield Green MD 6/3/2025  1:46 PM        IMPLANTS:   Implant Name Type Inv. Item Serial No.  Lot No. LRB No. Used Action   STENT URETERAL 6FR 28CM INLAY OPTIMA - [id number]  STENT URETERAL 6FR 28CM INLAY [employer]IM[employer]  [employer] [id number] 1 Explanted   STENT URETERAL 6FR 28CM INLAY [employer]IMA - [id number]  STENT URETERAL 6FR 28CM INLAY [employer]  [employer] [id number] 1 Implanted        COMPLICATIONS: None    DISPOSITION: PACU    PLAN:  The patient is status post URS and basket stone extraction. They can be discharged home later today when meeting criteria. They will remove their ureteral stent in 5 days. Our office will arrange imaging follow up       I was present for the entire procedure. and A qualified resident physician was not available.    Patient Disposition:  PACU          SIGNATURE: Garfield Green MD  DATE: Lindsay 3, 2025  TIME: 1:57 PM

## 2025-06-04 ENCOUNTER — TELEPHONE (OUTPATIENT)
Age: 65
End: 2025-06-04

## 2025-06-04 DIAGNOSIS — F41.9 ANXIETY DUE TO INVASIVE PROCEDURE: Primary | ICD-10-CM

## 2025-06-04 RX ORDER — ALPRAZOLAM 2 MG/1
TABLET ORAL
Qty: 1 TABLET | Refills: 0 | Status: SHIPPED | OUTPATIENT
Start: 2025-06-04

## 2025-06-04 NOTE — TELEPHONE ENCOUNTER
Patient having concerns about surgery that he recently had for kidney stone stent. Patient would like to speak to Dr. Guidry for some advise before reaching out to urology .  Brooklyn Roca

## 2025-06-04 NOTE — TELEPHONE ENCOUNTER
No we do not do this under anesthesia. Sent Xanax to pharmacy to take prior to stent on string removal. Please schedule visit with RN to remove. Ensure he has  for appt.

## 2025-06-04 NOTE — TELEPHONE ENCOUNTER
"Post Op Note    Sergio Ahumada is a 64 y.o. male s/p CYSTOSCOPY URETEROSCOPY WITH STONE BASKET EXTRACTION, RETROGRADE PYELOGRAM AND INSERTION STENT URETERAL (Right: Bladder)  performed 6/3/25.  Sergio Ahumada is a patient of Dr. Dr. Green and is seen at the [place] office.       After I greeted patient he launches into call stating he is very disappointed in the state of the general outcome of the surgery and post op. He reports he cannot understand how we could think he can pull the stent out on his own at home. I offered to schedule him in the office on Monday to have us remove it. He then went on to say that he needs to be put under anesthesia for the removal and that \"he is not being taken seriously\" and that he has serious medical anxiety and \"all of you laugh me off and dont take my concerns seriously.\" And that if we do not put him under anesthesia for his stent and string removal then he will have a medical emergency. He asked if he got a letter from his PCP saying he needs to be put under would that suffice for medical necessity     I advised him that we do not place patients under anesthesia for a stent and string removal however I can ask the providers for an antianxiety med but I told him I could confidently say they will not put him under anesthesia. Patient thinks it is unconscionable that we wouldn't take his concerns seriously and that we would not place him under for the removal     Please review and send anti anxiety medication if appropriate    "

## 2025-06-04 NOTE — TELEPHONE ENCOUNTER
I called pt and addressed his concern about fainting problems with procedures.  He is going to get the stent removed at Urology's office with a benzo to help relax him.

## 2025-06-04 NOTE — TELEPHONE ENCOUNTER
Called and spoke with patient at this time. Reviewed we could not perform this procedure: stent with string removal under anesthesia, but we did send I xanax for the procedure. Reviewed he would need a  to and from appointment. He understood and was amenable to this plan. Appointment was scheduled for Monday. He had no further questions or concerns.

## 2025-06-06 ENCOUNTER — PATIENT OUTREACH (OUTPATIENT)
Dept: CASE MANAGEMENT | Facility: OTHER | Age: 65
End: 2025-06-06

## 2025-06-06 NOTE — PROGRESS NOTES
Outpatient Care Management Note:    Chart reviewed for patient outreach. He had laser lithotripsy and basket stone extraction with ureteral stent placed. He will have stent removed in office on 6/9.     ALEXANDRE called Stuart to follow up about his diabetes and working to improve his blood sugar control. He states that he did have the stone removed and will be having his stent removed in the office on Monday. He has a ride to the office.     Stuart requested CM follow up in 1 week when his urology concerns will be over and he can concentrate on diabetes management.

## 2025-06-07 DIAGNOSIS — E78.2 MIXED HYPERLIPIDEMIA: ICD-10-CM

## 2025-06-08 RX ORDER — ROSUVASTATIN CALCIUM 20 MG/1
20 TABLET, COATED ORAL DAILY
Qty: 90 TABLET | Refills: 0 | Status: SHIPPED | OUTPATIENT
Start: 2025-06-08

## 2025-06-09 ENCOUNTER — PROCEDURE VISIT (OUTPATIENT)
Dept: UROLOGY | Facility: CLINIC | Age: 65
End: 2025-06-09

## 2025-06-09 VITALS
BODY MASS INDEX: 34.2 KG/M2 | SYSTOLIC BLOOD PRESSURE: 124 MMHG | OXYGEN SATURATION: 97 % | HEIGHT: 75 IN | HEART RATE: 60 BPM | DIASTOLIC BLOOD PRESSURE: 72 MMHG

## 2025-06-09 DIAGNOSIS — N20.1 RIGHT URETERAL STONE: Primary | ICD-10-CM

## 2025-06-09 PROCEDURE — 99024 POSTOP FOLLOW-UP VISIT: CPT

## 2025-06-09 NOTE — PROGRESS NOTES
"6/9/2025  Sergio Ahumada is a 64 y.o. male  [id number]        Diagnosis  Chief Complaint    Post-op         Patient is s/p right ureteroscopy with stone extraction on 06/03/2025 with Dr. Green    Plan  Plan to follow up as scheduled       Procedure Stent with String Removal    Vitals:    06/09/25 1014   BP: 124/72   Pulse: 60   SpO2: 97%   Height: 6' 2.5\" (1.892 m)       Stent with string removed intact without difficulty. Reviewed post stent removal symptoms including flank pain, dysuria, and hematuria. Instructed patient to increase oral fluid intake.  Encouraged the use of NSAIDS and other prescribed pain medication as needed for discomfort. Patient instructed to call the office or report to the ER for uncontrolled pain, fever, chills, nausea or vomiting.       Lois Franklin RN   "

## 2025-06-11 LAB
CALCIUM OXALATE DIHYDRATE MFR STONE IR: 80 %
COLOR STONE: NORMAL
COM MFR STONE: 20 %
SIZE STONE: NORMAL MM
SPEC SOURCE SUBJ: NORMAL
STONE ANALYSIS-IMP: NORMAL
WT STONE: 159 MG

## 2025-06-16 ENCOUNTER — PATIENT OUTREACH (OUTPATIENT)
Dept: CASE MANAGEMENT | Facility: OTHER | Age: 65
End: 2025-06-16

## 2025-06-16 ENCOUNTER — TELEPHONE (OUTPATIENT)
Dept: FAMILY MEDICINE CLINIC | Facility: CLINIC | Age: 65
End: 2025-06-16

## 2025-06-16 DIAGNOSIS — E11.65 TYPE 2 DIABETES MELLITUS WITH HYPERGLYCEMIA, WITHOUT LONG-TERM CURRENT USE OF INSULIN (HCC): Primary | ICD-10-CM

## 2025-06-16 NOTE — TELEPHONE ENCOUNTER
Please contact patient to schedule Clinical Pharmacist Appointment. This encounter will be used to track patient outreaches.     Patient will be contacted on 2 separate days before pharmacy referral is canceled.    Reason for appointment: diabetes management   When to schedule with Pharmacist: anytime  What should the patient bring to the appointment: blood sugar readings    [facility]  Appointment Department: [facility]  Pharmacist patient will be seeing: [facility]  Visit Type Preference (ie Phone, Video, In-person): either based on patient preference  In-person visits will be at: [facility] - [address] [place] [address], [address]   Virtual visits will be completed via Video or Phone - please clarify patient preference in appointment notes. If left unspecified, it will be assumed the appointment will be completed via Epic Embedded platform    If the primary interim pharmacist availability does not align with the patient's availability, patient can schedule with one of the following pharmacists:   Appointment Department: [facility] PRIMARY [facility]  In-person visits will be at: [facility] - [address] [place] [address], [address]  Pharmacist associated with this department: Moncho    Appointment Department: [facility] PRIMARY [facility]  In-person visits will be at: [facility] - [address] [place]  Pharmacist associated with this department: Sanna

## 2025-06-16 NOTE — PROGRESS NOTES
"Outpatient Care Management Note:    Voice mail message left for Stuatr, with my contact information, requesting a call back.     Call received from Stuart.      Diabetes:        Type: Type 2.          Blood sugar glucometer: yes        Brand: contour        How often is patient checking: checks blood sugars once a week. CM encouraged him to check it once a day at varying times.         Blood sugar log: No, CM encouraged him to start a blood sugar log.         CGM: no        Brand: NA        Insulin Pump: no    HgbA1c: 8.8 on 5/13/25  Last Blood Sugars as reported by patient: Stuart states his blood sugars are averaging between 125-150 currently.   Signs and symptoms of Hypoglycemia and how to treat: Reviewed: headache, sweaty, shaky or just not feeling well. CM reinforced that he should check his sugar and treat it if low.  We discussed drinking 4-6 ounces of apple/orange juice; or any item with good source of sugar in it. And then rechecking his sugar 15 minutes later to be sure it came back up.     Stuart is following a \"carnivore diet\".  He is not eating any carbohydrates.  CM explained that he should be eating some carbohydrates with each meal. But he states he is trying to lose weight and will continue this diet. He feels he had symptoms of low blood sugar 2 days ago. He did not check his blood sugar.  CM reinforced that if he experiences any more symptoms,the first thing he should do is check his sugar and treat it if low.      Signs and symptoms of Hyperglycemia and how to manage: Reviewed: increased thirst or urination, blurred vision, fatigue, slow healing wounds. He should track his sugars and monitor for trends of rising sugars.   Escalation plan: Stuart should call his PCP with any trending rising blood sugars or episodes of low blood sugars.      Medications Reviewed: yes  Medication adherence and affordability: States he is taking all medications.   Storage of Insulin: NA  Administration of Insulin: NA    Diabetes " Meal Planning: We reviewed plate method of meal planning. I strongly encouraged him to use this method and not totally eliminate all carbohydrates.   Exercise:  We discussed that exercise will also decrease blood sugars and can lead to low blood sugars.     Follow up appointments:        PCP: 6/24       Pharmacist: CM will request referral to clinical pharmacist.        Endocrinology: Patient will call to reschedule appt.        Podiatry: will review on future outreach.        Ophthalmology: will review on future outreach.        Transportation: Drives independently. No difficulty getting to appts.

## 2025-06-17 NOTE — ANESTHESIA POSTPROCEDURE EVALUATION
Post-Op Assessment Note    CV Status:  Stable    Pain management: adequate       Mental Status:  Alert and awake   Hydration Status:  Euvolemic   PONV Controlled:  Controlled   Airway Patency:  Patent     Post Op Vitals Reviewed: Yes    No anethesia notable event occurred.    Staff: Anesthesiologist           Last Filed PACU Vitals:  Vitals Value Taken Time   Temp 98.6 °F (37 °C) 06/03/25 14:30   Pulse 58 06/03/25 14:30   /74 06/03/25 14:30   Resp 12 06/03/25 14:30   SpO2 96 % 06/03/25 14:30       Modified Juan Carlos:     Vitals Value Taken Time   Activity 0 06/03/25 13:52   Respiration 2 06/03/25 13:52   Circulation 2 06/03/25 13:52   Consciousness 0 06/03/25 13:52   Oxygen Saturation 1 06/03/25 13:52     Modified Juan Carlos Score: 5

## 2025-06-24 ENCOUNTER — OFFICE VISIT (OUTPATIENT)
Dept: INTERNAL MEDICINE CLINIC | Facility: CLINIC | Age: 65
End: 2025-06-24
Payer: MEDICARE

## 2025-06-24 ENCOUNTER — TELEMEDICINE (OUTPATIENT)
Dept: FAMILY MEDICINE CLINIC | Facility: CLINIC | Age: 65
End: 2025-06-24

## 2025-06-24 VITALS
TEMPERATURE: 97.6 F | BODY MASS INDEX: 35.16 KG/M2 | DIASTOLIC BLOOD PRESSURE: 78 MMHG | SYSTOLIC BLOOD PRESSURE: 126 MMHG | WEIGHT: 277.6 LBS | OXYGEN SATURATION: 98 % | HEART RATE: 62 BPM

## 2025-06-24 DIAGNOSIS — E78.2 MIXED HYPERLIPIDEMIA: ICD-10-CM

## 2025-06-24 DIAGNOSIS — E11.65 TYPE 2 DIABETES MELLITUS WITH HYPERGLYCEMIA, WITHOUT LONG-TERM CURRENT USE OF INSULIN (HCC): ICD-10-CM

## 2025-06-24 DIAGNOSIS — I10 ESSENTIAL HYPERTENSION: ICD-10-CM

## 2025-06-24 DIAGNOSIS — Z00.00 MEDICARE ANNUAL WELLNESS VISIT, INITIAL: ICD-10-CM

## 2025-06-24 DIAGNOSIS — E11.65 TYPE 2 DIABETES MELLITUS WITH HYPERGLYCEMIA, WITHOUT LONG-TERM CURRENT USE OF INSULIN (HCC): Primary | ICD-10-CM

## 2025-06-24 PROCEDURE — G0438 PPPS, INITIAL VISIT: HCPCS | Performed by: INTERNAL MEDICINE

## 2025-06-24 PROCEDURE — G2211 COMPLEX E/M VISIT ADD ON: HCPCS | Performed by: INTERNAL MEDICINE

## 2025-06-24 PROCEDURE — PBNCHG PB NO CHARGE PLACEHOLDER: Performed by: PHARMACIST

## 2025-06-24 PROCEDURE — 99214 OFFICE O/P EST MOD 30 MIN: CPT | Performed by: INTERNAL MEDICINE

## 2025-06-24 RX ORDER — GLIMEPIRIDE 2 MG/1
2 TABLET ORAL EVERY MORNING
Qty: 100 TABLET | Refills: 3 | Status: SHIPPED | OUTPATIENT
Start: 2025-06-24

## 2025-06-24 NOTE — PATIENT INSTRUCTIONS
Problem List Items Addressed This Visit          Cardiovascular and Mediastinum    Essential hypertension    Blood pressure is good, continue with healthy diet and exercise and medications            Endocrine    Type 2 diabetes mellitus with hyperglycemia (HCC) - Primary    Most recent A1c elevated at 8.8, patient is following with clinical pharmacist, continue meds along with healthy diet and exercise, patient up-to-date with diabetic foot exam and diabetic eye exam.  Lab Results   Component Value Date    HGBA1C 8.8 (H) 05/13/2025               Other    Mixed hyperlipidemia    Continue fish oil and rosuvastatin along with healthy diet         Medicare annual wellness visit, initial    Discussed preventative health, cancer screening, immunizations, and safety issues.  Patient had colonoscopy October 21st 2024 with recommendations to recheck again in 5 years.  Patient had Prevnar 13 and Prevnar 20.  Patient had the Shingrix shots.  Patient had Tdap vaccination 7/17/2017.            Medicare Preventive Visit Patient Instructions  Thank you for completing your Welcome to Medicare Visit or Medicare Annual Wellness Visit today. Your next wellness visit will be due in one year (6/25/2026).  The screening/preventive services that you may require over the next 5-10 years are detailed below. Some tests may not apply to you based off risk factors and/or age. Screening tests ordered at today's visit but not completed yet may show as past due. Also, please note that scanned in results may not display below.  Preventive Screenings:  Service Recommendations Previous Testing/Comments   Colorectal Cancer Screening  Colonoscopy    Fecal Occult Blood Test (FOBT)/Fecal Immunochemical Test (FIT)  Fecal DNA/Cologuard Test  Flexible Sigmoidoscopy Age: 45-75 years old   Colonoscopy: every 10 years (May be performed more frequently if at higher risk)  OR  FOBT/FIT: every 1 year  OR  Cologuard: every 3 years  OR  Sigmoidoscopy: every 5  years  Screening may be recommended earlier than age 45 if at higher risk for colorectal cancer. Also, an individualized decision between you and your healthcare provider will decide whether screening between the ages of 76-85 would be appropriate. Colonoscopy: 10/21/2024  FOBT/FIT: Not on file  Cologuard: Not on file  Sigmoidoscopy: Not on file          Prostate Cancer Screening Individualized decision between patient and health care provider in men between ages of 55-69   Medicare will cover every 12 months beginning on the day after your 50th birthday PSA: 0.50 ng/mL           Hepatitis C Screening Once for adults born between 1945 and 1965  More frequently in patients at high risk for Hepatitis C Hep C Antibody: 04/05/2021        Diabetes Screening 1-2 times per year if you're at risk for diabetes or have pre-diabetes Fasting glucose: 295 mg/dL (4/15/2025)  A1C: 8.8 % (5/13/2025)      Cholesterol Screening Once every 5 years if you don't have a lipid disorder. May order more often based on risk factors. Lipid panel: 04/06/2024         Other Preventive Screenings Covered by Medicare:  Abdominal Aortic Aneurysm (AAA) Screening: covered once if your at risk. You're considered to be at risk if you have a family history of AAA or a male between the age of 65-75 who smoking at least 100 cigarettes in your lifetime.  Lung Cancer Screening: covers low dose CT scan once per year if you meet all of the following conditions: (1) Age 55-77; (2) No signs or symptoms of lung cancer; (3) Current smoker or have quit smoking within the last 15 years; (4) You have a tobacco smoking history of at least 20 pack years (packs per day x number of years you smoked); (5) You get a written order from a healthcare provider.  Glaucoma Screening: covered annually if you're considered high risk: (1) You have diabetes OR (2) Family history of glaucoma OR (3)  aged 50 and older OR (4)  American aged 65 and  older  Osteoporosis Screening: covered every 2 years if you meet one of the following conditions: (1) Have a vertebral abnormality; (2) On glucocorticoid therapy for more than 3 months; (3) Have primary hyperparathyroidism; (4) On osteoporosis medications and need to assess response to drug therapy.  HIV Screening: covered annually if you're between the age of 15-65. Also covered annually if you are younger than 15 and older than 65 with risk factors for HIV infection. For pregnant patients, it is covered up to 3 times per pregnancy.    Immunizations:  Immunization Recommendations   Influenza Vaccine Annual influenza vaccination during flu season is recommended for all persons aged >= 6 months who do not have contraindications   Pneumococcal Vaccine   * Pneumococcal conjugate vaccine = PCV13 (Prevnar 13), PCV15 (Vaxneuvance), PCV20 (Prevnar 20)  * Pneumococcal polysaccharide vaccine = PPSV23 (Pneumovax) Adults 19-63 yo with certain risk factors or if 65+ yo  If never received any pneumonia vaccine: recommend Prevnar 20 (PCV20)  Give PCV20 if previously received 1 dose of PCV13 or PPSV23   Hepatitis B Vaccine 3 dose series if at intermediate or high risk (ex: diabetes, end stage renal disease, liver disease)   Respiratory syncytial virus (RSV) Vaccine - COVERED BY MEDICARE PART D  * RSVPreF3 (Arexvy) CDC recommends that adults 60 years of age and older may receive a single dose of RSV vaccine using shared clinical decision-making (SCDM)   Tetanus (Td) Vaccine - COST NOT COVERED BY MEDICARE PART B Following completion of primary series, a booster dose should be given every 10 years to maintain immunity against tetanus. Td may also be given as tetanus wound prophylaxis.   Tdap Vaccine - COST NOT COVERED BY MEDICARE PART B Recommended at least once for all adults. For pregnant patients, recommended with each pregnancy.   Shingles Vaccine (Shingrix) - COST NOT COVERED BY MEDICARE PART B  2 shot series recommended in  those 19 years and older who have or will have weakened immune systems or those 50 years and older     Health Maintenance Due:      Topic Date Due    Colorectal Cancer Screening  10/20/2029    Hepatitis C Screening  Completed     Immunizations Due:      Topic Date Due    COVID-19 Vaccine (4 - 2024-25 season) 09/01/2024     Advance Directives   What are advance directives?  Advance directives are legal documents that state your wishes and plans for medical care. These plans are made ahead of time in case you lose your ability to make decisions for yourself. Advance directives can apply to any medical decision, such as the treatments you want, and if you want to donate organs.   What are the types of advance directives?  There are many types of advance directives, and each state has rules about how to use them. You may choose a combination of any of the following:  Living will:  This is a written record of the treatment you want. You can also choose which treatments you do not want, which to limit, and which to stop at a certain time. This includes surgery, medicine, IV fluid, and tube feedings.   Durable power of  for healthcare (DPAHC):  This is a written record that states who you want to make healthcare choices for you when you are unable to make them for yourself. This person, called a proxy, is usually a family member or a friend. You may choose more than 1 proxy.  Do not resuscitate (DNR) order:  A DNR order is used in case your heart stops beating or you stop breathing. It is a request not to have certain forms of treatment, such as CPR. A DNR order may be included in other types of advance directives.  Medical directive:  This covers the care that you want if you are in a coma, near death, or unable to make decisions for yourself. You can list the treatments you want for each condition. Treatment may include pain medicine, surgery, blood transfusions, dialysis, IV or tube feedings, and a ventilator  (breathing machine).  Values history:  This document has questions about your views, beliefs, and how you feel and think about life. This information can help others choose the care that you would choose.  Why are advance directives important?  An advance directive helps you control your care. Although spoken wishes may be used, it is better to have your wishes written down. Spoken wishes can be misunderstood, or not followed. Treatments may be given even if you do not want them. An advance directive may make it easier for your family to make difficult choices about your care.   Weight Management   Why it is important to manage your weight:  Being overweight increases your risk of health conditions such as heart disease, high blood pressure, type 2 diabetes, and certain types of cancer. It can also increase your risk for osteoarthritis, sleep apnea, and other respiratory problems. Aim for a slow, steady weight loss. Even a small amount of weight loss can lower your risk of health problems.  How to lose weight safely:  A safe and healthy way to lose weight is to eat fewer calories and get regular exercise. You can lose up about 1 pound a week by decreasing the number of calories you eat by 500 calories each day.   Healthy meal plan for weight management:  A healthy meal plan includes a variety of foods, contains fewer calories, and helps you stay healthy. A healthy meal plan includes the following:  Eat whole-grain foods more often.  A healthy meal plan should contain fiber. Fiber is the part of grains, fruits, and vegetables that is not broken down by your body. Whole-grain foods are healthy and provide extra fiber in your diet. Some examples of whole-grain foods are whole-wheat breads and pastas, oatmeal, brown rice, and bulgur.  Eat a variety of vegetables every day.  Include dark, leafy greens such as spinach, kale, negrito greens, and mustard greens. Eat yellow and orange vegetables such as carrots, sweet  potatoes, and winter squash.   Eat a variety of fruits every day.  Choose fresh or canned fruit (canned in its own juice or light syrup) instead of juice. Fruit juice has very little or no fiber.  Eat low-fat dairy foods.  Drink fat-free (skim) milk or 1% milk. Eat fat-free yogurt and low-fat cottage cheese. Try low-fat cheeses such as mozzarella and other reduced-fat cheeses.  Choose meat and other protein foods that are low in fat.  Choose beans or other legumes such as split peas or lentils. Choose fish, skinless poultry (chicken or turkey), or lean cuts of red meat (beef or pork). Before you cook meat or poultry, cut off any visible fat.   Use less fat and oil.  Try baking foods instead of frying them. Add less fat, such as margarine, sour cream, regular salad dressing and mayonnaise to foods. Eat fewer high-fat foods. Some examples of high-fat foods include french fries, doughnuts, ice cream, and cakes.  Eat fewer sweets.  Limit foods and drinks that are high in sugar. This includes candy, cookies, regular soda, and sweetened drinks.  Exercise:  Exercise at least 30 minutes per day on most days of the week. Some examples of exercise include walking, biking, dancing, and swimming. You can also fit in more physical activity by taking the stairs instead of the elevator or parking farther away from stores. Ask your healthcare provider about the best exercise plan for you.    © Copyright [employer] 2018 Information is for End User's use only and may not be sold, redistributed or otherwise used for commercial purposes. All illustrations and images included in CareNotes® are the copyrighted property of [employer] or [employer]

## 2025-06-24 NOTE — ASSESSMENT & PLAN NOTE
Lab Results   Component Value Date    HGBA1C 8.8 (H) 05/13/2025     Most recent A1c above goal   Reported fasting blood sugar average to goal since patient has started following carnivore diet; occasional hypoglycemia.   Uncertain efficacy of Rybelsus when tablet is split     Medications:  Rybelsus: patient aware Rybelsus half tablet may have variable absorption, will have patient continue as is for now as he has approx 3 months supply. At next visit will reasses. Patient to try taking more consistently  Glimepiride: reduce to 2mg once daily  Home Monitoring:   BLOOD SUGAR TESTING  Please test your blood sugar:  1 Times per day.  When to test your blood sugar:   Before Breakfast  Target Blood sugar range: Before Meals: , 2 hours after meals: < 180  Call if your blood sugar is less than 60   Lifestyle Modifications: patient to continue with lifestyle modifications    Orders:    Ambulatory referral to clinical pharmacy    Albumin / creatinine urine ratio; Future    Comprehensive metabolic panel; Future    Hemoglobin A1C; Future    Lipid Panel with Direct LDL reflex; Future    glimepiride (AMARYL) 2 mg tablet; Take 1 tablet (2 mg total) by mouth every morning

## 2025-06-24 NOTE — ASSESSMENT & PLAN NOTE
Discussed preventative health, cancer screening, immunizations, and safety issues.  Patient had colonoscopy October 21st 2024 with recommendations to recheck again in 5 years.  Patient had Prevnar 13 and Prevnar 20.  Patient had the Shingrix shots.  Patient had Tdap vaccination 7/17/2017.

## 2025-06-24 NOTE — PROGRESS NOTES
[facility] Clinical [facility]acy [facility]  Dalton Rm    Administrative Statements   Encounter provider Dalton Rm    The Patient is located at Home and in the following state in which I hold an active license [place].    The patient was identified by name and date of birth. Sergio Ahumada was informed that this is a telemedicine visit and that the visit is being conducted through the Epic Embedded platform. He agrees to proceed..  My office door was closed. No one else was in the room.  He acknowledged consent and understanding of privacy and security of the video platform. The patient has agreed to participate and understands they can discontinue the visit at any time.        Assessment/ Plan     Assessment & Plan  Type 2 diabetes mellitus with hyperglycemia, without long-term current use of insulin ([facility])    Lab Results   Component Value Date    HGBA1C 8.8 (H) 05/13/2025     Most recent A1c above goal   Reported fasting blood sugar average to goal since patient has started following carnivore diet; occasional hypoglycemia.   Uncertain efficacy of Rybelsus when tablet is split     Medications:  Rybelsus: patient aware Rybelsus half tablet may have variable absorption, will have patient continue as is for now as he has approx 3 months supply. At next visit will reasses. Patient to try taking more consistently  Glimepiride: reduce to 2mg once daily  Home Monitoring:   BLOOD SUGAR TESTING  Please test your blood sugar:  1 Times per day.  When to test your blood sugar:   Before Breakfast  Target Blood sugar range: Before Meals: , 2 hours after meals: < 180  Call if your blood sugar is less than 60   Lifestyle Modifications: patient to continue with lifestyle modifications    Orders:    Ambulatory referral to clinical pharmacy    Albumin / creatinine urine ratio; Future    Comprehensive metabolic panel; Future    Hemoglobin A1C; Future    Lipid Panel with Direct LDL reflex; Future     glimepiride (AMARYL) 2 mg tablet; Take 1 tablet (2 mg total) by mouth every morning      Follow-up: 8/2025 - patient to obtain labs prior     Subjective   Diabetes  He presents for his initial diabetic visit. He has type 2 diabetes mellitus. The initial diagnosis of diabetes was made 5 years ago.       Medication Adherence/ Tolerability/ Cost:  allopurinol  ALPRAZolam  aspirin  buPROPion  Cinnamon Tabs  Contour Next Test Strp  glimepiride  glucosamine-chondroitin  losartan  metoprolol-hydrochlorothiazide  Omega-3 Fish Oil Caps  rosuvastatin  Semaglutide: severe nausea, denies vomiting. Gets from [facility]. Has approx 2.5 months remaining  tamsulosin    Previous Medications  Ozempic: had issues with getting it when he was working, would prefer to avoid injections at this time  Metformin: GI issues    Review of Systems   Constitutional:  Negative for unexpected weight change.   Gastrointestinal:  Negative for constipation, diarrhea, nausea and vomiting.        2. Lifestyle:   Used to work as traveling salesman  Started following [employer] diet within the past month, has been losing weight   Beverages: Tito (unflavored; makes own; 8-12 oz per day), diet iced tea    3. Home monitoring devices  Glucometer: Yes  Fasting blood sugar 108-120    Hypoglycemia: The patient had occasional episodes/symptoms of hypoglycemia; midafternoon    Objective       ASCVD Risk:  The 10-year ASCVD risk score (Genny BLANCHARD, et al., 2019) is: 27.7%    Values used to calculate the score:      Age: 64 years      Clincally relevant sex: Male      Is Non- : No      Diabetic: Yes      Tobacco smoker: No      Systolic Blood Pressure: 124 mmHg      Is BP treated: Yes      HDL Cholesterol: 31 mg/dL      Total Cholesterol: 140 mg/dL     Vitals:  There were no vitals filed for this visit.    Eye Exam:    Lab Results   Component Value Date    LEFTDIABRET None 05/21/2024    RIGHTDIABRET None 05/21/2024       Labs:  Lab  Results   Component Value Date    SODIUM 133 (L) 04/15/2025    K 4.3 04/15/2025    EGFR 83 04/15/2025    CREATININE 0.95 04/15/2025    GLUF 295 (H) 04/15/2025    MICROALBCRE 29 03/12/2025       Lab Results   Component Value Date    HGBA1C 8.8 (H) 05/13/2025    HGBA1C 9.3 (A) 05/07/2025    HGBA1C 9.5 (H) 04/10/2025         Pharmacist Tracking Tool     Pharmacist Tracking Tool  Reason For Outreach: Embedded Pharmacist  Demographics:  Intervention Method: Video  Type of Intervention: New  Topics Addressed: Diabetes  Pharmacologic Interventions: Dose or Frequency Adjusted  Non-Pharmacologic Interventions: Care coordination, Disease state education, and Labs  Time:  Direct Patient Care: 35 mins  Care Coordination: 10 mins  Recommendation Recipient: Patient/Caregiver  Outcome: Accepted

## 2025-06-24 NOTE — ASSESSMENT & PLAN NOTE
Most recent A1c elevated at 8.8, patient is following with clinical pharmacist, continue meds along with healthy diet and exercise, patient up-to-date with diabetic foot exam and diabetic eye exam.  Lab Results   Component Value Date    HGBA1C 8.8 (H) 05/13/2025

## 2025-06-24 NOTE — PROGRESS NOTES
Name: Sergio Ahumada      : 1960      MRN: [id number]  Encounter Provider: Steev Nguyen MD  Encounter Date: 2025   Encounter department: [facility]  :  Assessment & Plan  Medicare annual wellness visit, initial  Discussed preventative health, cancer screening, immunizations, and safety issues.  Patient had colonoscopy 2024 with recommendations to recheck again in 5 years.  Patient had Prevnar 13 and Prevnar 20.  Patient had the Shingrix shots.  Patient had Tdap vaccination 2017.       Type 2 diabetes mellitus with hyperglycemia, without long-term current use of insulin (HCC)  Most recent A1c elevated at 8.8, patient is following with clinical pharmacist, continue meds along with healthy diet and exercise, patient up-to-date with diabetic foot exam and diabetic eye exam.  Lab Results   Component Value Date    HGBA1C 8.8 (H) 2025          Essential hypertension  Blood pressure is good, continue with healthy diet and exercise and medications       Mixed hyperlipidemia  Continue fish oil and rosuvastatin along with healthy diet          Preventive health issues were discussed with patient, and age appropriate screening tests were ordered as noted in patient's After Visit Summary. Personalized health advice and appropriate referrals for health education or preventive services given if needed, as noted in patient's After Visit Summary.    History of Present Illness     Here for regular follow-up and due for annual wellness visit       Patient Care Team:  Steve Nguyen MD as PCP - General (Internal Medicine)  Chayito Sewell RD as Diabetes Educator (Diabetes Services)  Hailey Molina, RN as RN Care Manager (Care Coordination)  Lillie Damon, Pharmacist as Pharmacist (Pharmacy)    Review of Systems   Constitutional:  Negative for chills, fatigue and fever.   HENT:  Negative for congestion, nosebleeds, postnasal drip, sore throat and trouble swallowing.     Eyes:  Negative for pain.   Respiratory:  Negative for cough, chest tightness, shortness of breath and wheezing.    Cardiovascular:  Negative for chest pain, palpitations and leg swelling.   Gastrointestinal:  Negative for abdominal pain, constipation, diarrhea, nausea and vomiting.   Endocrine: Negative for polydipsia and polyuria.   Genitourinary:  Negative for dysuria, flank pain and hematuria.   Musculoskeletal:  Positive for arthralgias.   Skin:  Negative for rash.   Neurological:  Negative for dizziness, tremors, light-headedness and headaches.   Hematological:  Does not bruise/bleed easily.   Psychiatric/Behavioral:  Negative for confusion and dysphoric mood. The patient is not nervous/anxious.      Medical History Reviewed by provider this encounter:  Tobacco  Allergies  Meds  Problems  Med Hx  Surg Hx  Fam Hx       Annual Wellness Visit Questionnaire   Sergio is here for his Subsequent Wellness visit.     Health Risk Assessment:   Patient rates overall health as good. Patient feels that their physical health rating is same. Patient is very satisfied with their life. Eyesight was rated as same. Hearing was rated as same. Patient feels that their emotional and mental health rating is same. Patients states they are never, rarely angry. Patient states they are never, rarely unusually tired/fatigued. Pain experienced in the last 7 days has been none. Patient states that he has experienced no weight loss or gain in last 6 months.     Depression Screening:   PHQ-9 Score: 0      Fall Risk Screening:   In the past year, patient has experienced: no history of falling in past year      Home Safety:  Patient does not have trouble with stairs inside or outside of their home. Patient has working smoke alarms and has working carbon monoxide detector. Home safety hazards include: none.     Nutrition:   Current diet is Regular.     Medications:   Patient is not currently taking any over-the-counter supplements.  Patient is able to manage medications.     Activities of Daily Living (ADLs)/Instrumental Activities of Daily Living (IADLs):   Walk and transfer into and out of bed and chair?: Yes  Dress and groom yourself?: Yes    Bathe or shower yourself?: Yes    Feed yourself? Yes  Do your laundry/housekeeping?: Yes  Manage your money, pay your bills and track your expenses?: Yes  Make your own meals?: Yes    Do your own shopping?: Yes    Previous Hospitalizations:   Any hospitalizations or ED visits within the last 12 months?: Yes    How many hospitalizations have you had in the last year?: 3-4    Advance Care Planning:   Living will: No    Durable POA for healthcare: No    Advanced directive: No    Advanced directive counseling given: Yes      Cognitive Screening:   Provider or family/friend/caregiver concerned regarding cognition?: No    Preventive Screenings      Cardiovascular Screening:    General: Screening Not Indicated, History Lipid Disorder, Risks and Benefits Discussed and Screening Current      Diabetes Screening:     General: Screening Not Indicated, History Diabetes, Risks and Benefits Discussed and Screening Current      Colorectal Cancer Screening:     General: Screening Current and Risks and Benefits Discussed      Prostate Cancer Screening:    General: Risks and Benefits Discussed      Osteoporosis Screening:    General: Risks and Benefits Discussed and Screening Not Indicated      Abdominal Aortic Aneurysm (AAA) Screening:    Risk factors include: age between 65-76 yo and tobacco use        General: Risks and Benefits Discussed and Screening Current      Lung Cancer Screening:     General: Screening Not Indicated and Risks and Benefits Discussed      Hepatitis C Screening:    General: Screening Current and Risks and Benefits Discussed    Screening, Brief Intervention, and Referral to Treatment (SBIRT)     Screening  Typical number of drinks in a day: 1  Typical number of drinks in a week: 7  Interpretation:  Low risk drinking behavior.    Single Item Drug Screening:  How often have you used an illegal drug (including marijuana) or a prescription medication for non-medical reasons in the past year? never    Single Item Drug Screen Score: 0  Interpretation: Negative screen for possible drug use disorder    Brief Intervention  Alcohol & drug use screenings were reviewed. No concerns regarding substance use disorder identified.     Other Counseling Topics:   Car/seat belt/driving safety, skin self-exam and sunscreen.     Social Drivers of Health     Food Insecurity: No Food Insecurity (6/24/2025)    Nursing - Inadequate Food Risk Classification    • Worried About Running Out of Food in the Last Year: Never true    • Ran Out of Food in the Last Year: Never true   Transportation Needs: No Transportation Needs (6/24/2025)    PRAPARE - Transportation    • Lack of Transportation (Medical): No    • Lack of Transportation (Non-Medical): No   Housing Stability: Low Risk  (6/24/2025)    Housing Stability Vital Sign    • Unable to Pay for Housing in the Last Year: No    • Number of Times Moved in the Last Year: 0    • Homeless in the Last Year: No   Utilities: Not At Risk (6/24/2025)    [facility] Utilities    • Threatened with loss of utilities: No     No results found.    Objective   /78 (BP Location: Left arm, Patient Position: Sitting, Cuff Size: Standard)   Pulse 62   Temp 97.6 °F (36.4 °C) (Tympanic)   Wt 126 kg (277 lb 9.6 oz)   SpO2 98%   BMI 35.16 kg/m²     Physical Exam  Vitals reviewed.   Constitutional:       General: He is not in acute distress.     Appearance: Normal appearance. He is well-developed.   HENT:      Head: Normocephalic and atraumatic.      Right Ear: External ear normal.      Left Ear: External ear normal.      Nose: Nose normal.     Eyes:      General: No scleral icterus.     Conjunctiva/sclera: Conjunctivae normal.     Neck:      Trachea: No tracheal deviation.     Cardiovascular:      Rate and  Rhythm: Normal rate and regular rhythm.      Heart sounds: Normal heart sounds. No murmur heard.  Pulmonary:      Effort: Pulmonary effort is normal. No respiratory distress.      Breath sounds: Normal breath sounds. No wheezing or rales.   Abdominal:      General: Bowel sounds are normal.      Palpations: Abdomen is soft. There is no mass.      Tenderness: There is no abdominal tenderness. There is no guarding.      Hernia: There is no hernia in the left inguinal area or right inguinal area.   Genitourinary:     Testes: Normal.     Musculoskeletal:      Cervical back: Normal range of motion and neck supple.      Right lower leg: No edema.      Left lower leg: No edema.   Lymphadenopathy:      Cervical: No cervical adenopathy.     Skin:     Coloration: Skin is not jaundiced or pale.     Neurological:      General: No focal deficit present.      Mental Status: He is alert and oriented to person, place, and time.     Psychiatric:         Mood and Affect: Mood normal.         Behavior: Behavior normal.         Thought Content: Thought content normal.         Judgment: Judgment normal.

## 2025-07-01 ENCOUNTER — PATIENT OUTREACH (OUTPATIENT)
Dept: CASE MANAGEMENT | Facility: OTHER | Age: 65
End: 2025-07-01

## 2025-07-01 NOTE — PROGRESS NOTES
Outpatient Care Management Note:    CM called Stuart. He is checking blood sugars fastin/16: 117  6: 108  : 121  : 106    He states that it is elevated in the morning when he eats after dinner. He is trying to decrease that trend.     He denies any further low blood sugars. He knows to check his sugar if he experiences any symptoms of low sugar.     Stuart states that he has lost 8-9 lbs and now his weight has plateaud.  We discussed adding regular exercise to his routine.  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  He is aware that exercise will help to decrease blood sugars.     We talked about scheduling with podiatry and ophthalmology.  We reviewed why these appts are important.  Stuart states he will not see any doctors until after July.  I encouraged him to call and schedule- just book it for August. He will consider it.       Stuart agreed to CM following up in 2 weeks.

## 2025-07-03 ENCOUNTER — HOSPITAL ENCOUNTER (OUTPATIENT)
Dept: ULTRASOUND IMAGING | Facility: HOSPITAL | Age: 65
Discharge: HOME | End: 2025-07-03
Payer: MEDICARE

## 2025-07-03 DIAGNOSIS — N20.1 RIGHT URETERAL STONE: ICD-10-CM

## 2025-07-03 PROCEDURE — 76775 US EXAM ABDO BACK WALL LIM: CPT

## 2025-07-15 ENCOUNTER — PATIENT OUTREACH (OUTPATIENT)
Dept: CASE MANAGEMENT | Facility: OTHER | Age: 65
End: 2025-07-15

## 2025-07-24 PROBLEM — Z00.00 MEDICARE ANNUAL WELLNESS VISIT, INITIAL: Status: RESOLVED | Noted: 2025-06-24 | Resolved: 2025-07-24

## 2025-08-04 ENCOUNTER — APPOINTMENT (OUTPATIENT)
Dept: LAB | Facility: CLINIC | Age: 65
End: 2025-08-04
Attending: INTERNAL MEDICINE
Payer: MEDICARE

## 2025-08-04 DIAGNOSIS — E11.65 TYPE 2 DIABETES MELLITUS WITH HYPERGLYCEMIA, WITHOUT LONG-TERM CURRENT USE OF INSULIN (HCC): ICD-10-CM

## 2025-08-04 LAB
ALBUMIN SERPL BCG-MCNC: 4.7 G/DL (ref 3.5–5)
ALP SERPL-CCNC: 56 U/L (ref 34–104)
ALT SERPL W P-5'-P-CCNC: 31 U/L (ref 7–52)
ANION GAP SERPL CALCULATED.3IONS-SCNC: 5 MMOL/L (ref 4–13)
AST SERPL W P-5'-P-CCNC: 22 U/L (ref 13–39)
BILIRUB SERPL-MCNC: 0.52 MG/DL (ref 0.2–1)
BUN SERPL-MCNC: 18 MG/DL (ref 5–25)
CALCIUM SERPL-MCNC: 9.6 MG/DL (ref 8.4–10.2)
CHLORIDE SERPL-SCNC: 103 MMOL/L (ref 96–108)
CHOLEST SERPL-MCNC: 150 MG/DL (ref ?–200)
CO2 SERPL-SCNC: 30 MMOL/L (ref 21–32)
CREAT SERPL-MCNC: 0.99 MG/DL (ref 0.6–1.3)
CREAT UR-MCNC: 169.3 MG/DL
EST. AVERAGE GLUCOSE BLD GHB EST-MCNC: 128 MG/DL
GFR SERPL CREATININE-BSD FRML MDRD: 79 ML/MIN/1.73SQ M
GLUCOSE P FAST SERPL-MCNC: 133 MG/DL (ref 65–99)
HBA1C MFR BLD: 6.1 %
HDLC SERPL-MCNC: 32 MG/DL
LDLC SERPL CALC-MCNC: 73 MG/DL (ref 0–100)
MICROALBUMIN UR-MCNC: 21.3 MG/L
MICROALBUMIN/CREAT 24H UR: 13 MG/G CREATININE (ref 0–30)
POTASSIUM SERPL-SCNC: 4.2 MMOL/L (ref 3.5–5.3)
PROT SERPL-MCNC: 7.4 G/DL (ref 6.4–8.4)
SODIUM SERPL-SCNC: 138 MMOL/L (ref 135–147)
TRIGL SERPL-MCNC: 223 MG/DL (ref ?–150)

## 2025-08-04 PROCEDURE — 82570 ASSAY OF URINE CREATININE: CPT

## 2025-08-04 PROCEDURE — 80053 COMPREHEN METABOLIC PANEL: CPT

## 2025-08-04 PROCEDURE — 36415 COLL VENOUS BLD VENIPUNCTURE: CPT

## 2025-08-04 PROCEDURE — 83036 HEMOGLOBIN GLYCOSYLATED A1C: CPT

## 2025-08-04 PROCEDURE — 80061 LIPID PANEL: CPT

## 2025-08-04 PROCEDURE — 82043 UR ALBUMIN QUANTITATIVE: CPT

## 2025-08-06 ENCOUNTER — PATIENT OUTREACH (OUTPATIENT)
Dept: CASE MANAGEMENT | Facility: OTHER | Age: 65
End: 2025-08-06

## 2025-08-20 ENCOUNTER — PATIENT OUTREACH (OUTPATIENT)
Dept: CASE MANAGEMENT | Facility: OTHER | Age: 65
End: 2025-08-20

## 2025-08-21 DIAGNOSIS — E78.2 MIXED HYPERLIPIDEMIA: ICD-10-CM

## 2025-08-22 RX ORDER — ROSUVASTATIN CALCIUM 20 MG/1
20 TABLET, COATED ORAL DAILY
Qty: 90 TABLET | Refills: 1 | Status: SHIPPED | OUTPATIENT
Start: 2025-08-22

## 2025-08-26 PROBLEM — M1A.0710 IDIOPATHIC CHRONIC GOUT OF RIGHT FOOT WITHOUT TOPHUS: Status: RESOLVED | Noted: 2021-03-02 | Resolved: 2025-08-26

## 2025-08-26 PROBLEM — R39.9 UTI SYMPTOMS: Status: RESOLVED | Noted: 2024-05-21 | Resolved: 2025-08-26

## 2025-08-26 PROBLEM — D72.819 LEUKOPENIA: Status: RESOLVED | Noted: 2021-02-22 | Resolved: 2025-08-26

## 2025-08-26 PROBLEM — K43.9 VENTRAL HERNIA WITHOUT OBSTRUCTION OR GANGRENE: Status: RESOLVED | Noted: 2024-07-15 | Resolved: 2025-08-26

## 2025-08-26 PROBLEM — Z79.2 LONG TERM (CURRENT) USE OF ANTIBIOTICS: Status: RESOLVED | Noted: 2020-12-21 | Resolved: 2025-08-26

## 2025-08-26 PROBLEM — M17.11 PRIMARY OSTEOARTHRITIS OF RIGHT KNEE: Status: ACTIVE | Noted: 2025-08-26

## 2025-08-26 PROBLEM — M75.01 ADHESIVE CAPSULITIS OF RIGHT SHOULDER: Status: RESOLVED | Noted: 2021-07-07 | Resolved: 2025-08-26

## 2025-08-26 PROBLEM — K42.9 UMBILICAL HERNIA WITHOUT OBSTRUCTION OR GANGRENE: Status: RESOLVED | Noted: 2024-08-19 | Resolved: 2025-08-26

## 2025-08-26 PROBLEM — M27.2 OSTEOMYELITIS, JAW ACUTE: Status: RESOLVED | Noted: 2020-12-07 | Resolved: 2025-08-26

## 2025-08-26 PROBLEM — R10.32 LEFT INGUINAL PAIN: Status: RESOLVED | Noted: 2024-08-19 | Resolved: 2025-08-26

## 2025-08-26 PROBLEM — E66.9 OBESITY (BMI 30-39.9): Status: ACTIVE | Noted: 2025-08-26

## 2025-08-26 PROBLEM — M79.671 RIGHT FOOT PAIN: Status: RESOLVED | Noted: 2022-01-20 | Resolved: 2025-08-26

## 2025-08-26 PROBLEM — R94.31 ABNORMAL EKG: Status: RESOLVED | Noted: 2023-06-15 | Resolved: 2025-08-26

## 2025-08-26 PROBLEM — G50.8 TRIGEMINAL NEURITIS: Status: RESOLVED | Noted: 2020-11-19 | Resolved: 2025-08-26

## 2025-08-26 PROBLEM — R26.89 BALANCE PROBLEM: Status: RESOLVED | Noted: 2021-07-07 | Resolved: 2025-08-26

## 2025-08-26 PROBLEM — R47.01 APHASIA: Status: RESOLVED | Noted: 2022-10-25 | Resolved: 2025-08-26

## (undated) DEVICE — STERILE MANDIBLE PACK: Brand: CARDINAL HEALTH

## (undated) DEVICE — GLOVE SRG BIOGEL ECLIPSE 7

## (undated) DEVICE — INVIEW CLEAR LEGGINGS: Brand: CONVERTORS

## (undated) DEVICE — GLOVE INDICATOR PI UNDERGLOVE SZ 8 BLUE

## (undated) DEVICE — DISPOSABLE OR TOWEL: Brand: CARDINAL HEALTH

## (undated) DEVICE — SUT VICRYL 1 CT-1 27 IN J261H

## (undated) DEVICE — ELECTRODE NEEDLE MOD E-Z CLEAN 2.75IN 7CM -0013M

## (undated) DEVICE — TOWEL SURG XR DETECT GREEN STRL RFD

## (undated) DEVICE — UROLOGIC DRAIN BAG: Brand: UNBRANDED

## (undated) DEVICE — GLOVE INDICATOR PI UNDERGLOVE SZ 7 BLUE

## (undated) DEVICE — BASKET SPECIMEN RETRIVAL 1.9FR 120CM

## (undated) DEVICE — PREMIUM DRY TRAY LF: Brand: MEDLINE INDUSTRIES, INC.

## (undated) DEVICE — DENTAL BURR SIDE WHITE

## (undated) DEVICE — PACK TUR

## (undated) DEVICE — PENCIL ELECTROSURG E-Z CLEAN -0035H

## (undated) DEVICE — CATH URETERAL 5FR X 70 CM FLEX TIP POLYUR BARD

## (undated) DEVICE — STERILE LUBRICATING JELLY, TUBE: Brand: HR LUBRICATING JELLY

## (undated) DEVICE — VIOLET BRAIDED (POLYGLACTIN 910), SYNTHETIC ABSORBABLE SUTURE: Brand: COATED VICRYL

## (undated) DEVICE — ELECTRODE BLADE MOD E-Z CLEAN  2.75IN 7CM -0012AM

## (undated) DEVICE — NEEDLE 25G X 1 1/2

## (undated) DEVICE — LIGACLIP MCA MULTIPLE CLIP APPLIERS, 20 MEDIUM CLIPS: Brand: LIGACLIP

## (undated) DEVICE — GUIDEWIRE STRGHT TIP 0.035 IN  SOLO PLUS

## (undated) DEVICE — Device

## (undated) DEVICE — CHLORAPREP HI-LITE 26ML ORANGE

## (undated) DEVICE — SUT VICRYL 2-0 SH 27 IN UNDYED J417H

## (undated) DEVICE — PAD GROUNDING ADULT

## (undated) DEVICE — DECANTER: Brand: UNBRANDED

## (undated) DEVICE — INTENDED FOR TISSUE SEPARATION, AND OTHER PROCEDURES THAT REQUIRE A SHARP SURGICAL BLADE TO PUNCTURE OR CUT.: Brand: BARD-PARKER SAFETY BLADES SIZE 15, STERILE

## (undated) DEVICE — SPECIMEN CONTAINER STERILE PEEL PACK

## (undated) DEVICE — SUT PDS II 3-0 SH 27 IN Z316H

## (undated) DEVICE — TOWEL SET X-RAY

## (undated) DEVICE — CHLORHEXIDINE 4PCT 4 OZ

## (undated) DEVICE — INTENDED FOR TISSUE SEPARATION, AND OTHER PROCEDURES THAT REQUIRE A SHARP SURGICAL BLADE TO PUNCTURE OR CUT.: Brand: BARD-PARKER ® CARBON RIB-BACK BLADES

## (undated) DEVICE — SUT MONOCRYL 4-0 PS-2 27 IN Y426H

## (undated) DEVICE — SUT VICRYL PLUS 2-0 54IN VCP286G

## (undated) DEVICE — EXOFIN PRECISION PEN HIGH VISCOSITY TOPICAL SKIN ADHESIVE: Brand: EXOFIN PRECISION PEN, 1G

## (undated) DEVICE — GLOVE SRG BIOGEL ECLIPSE 7.5

## (undated) DEVICE — DENTAL BURR ROUND WHITE

## (undated) DEVICE — GLOVE SRG BIOGEL ECLIPSE 6.5

## (undated) DEVICE — BETHLEHEM UNIVERSAL MINOR GEN: Brand: CARDINAL HEALTH

## (undated) DEVICE — ANTIBACTERIAL VIOLET BRAIDED (POLYGLACTIN 910), SYNTHETIC ABSORBABLE SUTURE: Brand: COATED VICRYL

## (undated) DEVICE — NEEDLE 20 G X 1 1/2

## (undated) DEVICE — TELFA NON-ADHERENT ABSORBENT DRESSING: Brand: TELFA

## (undated) DEVICE — UNDYED BRAIDED (POLYGLACTIN 910), SYNTHETIC ABSORBABLE SUTURE: Brand: COATED VICRYL

## (undated) DEVICE — ANTIBACTERIAL UNDYED BRAIDED (POLYGLACTIN 910), SYNTHETIC ABSORBABLE SUTURE: Brand: COATED VICRYL

## (undated) DEVICE — SPONGE STICK WITH PVP-I: Brand: KENDALL

## (undated) DEVICE — CULTURE TUBE ANAEROBIC

## (undated) DEVICE — CULTURE TUBE AEROBIC

## (undated) DEVICE — 3000CC GUARDIAN II: Brand: GUARDIAN